# Patient Record
Sex: FEMALE | Race: WHITE | Employment: OTHER | URBAN - NONMETROPOLITAN AREA
[De-identification: names, ages, dates, MRNs, and addresses within clinical notes are randomized per-mention and may not be internally consistent; named-entity substitution may affect disease eponyms.]

---

## 2017-01-02 DIAGNOSIS — F32.9 MDD (MAJOR DEPRESSIVE DISORDER): ICD-10-CM

## 2017-01-02 DIAGNOSIS — I10 HTN (HYPERTENSION): ICD-10-CM

## 2017-01-02 DIAGNOSIS — G25.81 RESTLESS LEGS SYNDROME (RLS): Primary | ICD-10-CM

## 2017-01-02 DIAGNOSIS — E78.5 HYPERLIPIDEMIA LDL GOAL <100: ICD-10-CM

## 2017-01-03 ENCOUNTER — HOSPITAL ENCOUNTER (OUTPATIENT)
Dept: PHYSICAL THERAPY | Facility: HOSPITAL | Age: 67
Setting detail: THERAPIES SERIES
End: 2017-01-03
Attending: NURSE PRACTITIONER
Payer: COMMERCIAL

## 2017-01-03 ENCOUNTER — HOSPITAL ENCOUNTER (OUTPATIENT)
Dept: PHYSICAL THERAPY | Facility: HOSPITAL | Age: 67
Setting detail: THERAPIES SERIES
End: 2017-01-03
Attending: FAMILY MEDICINE
Payer: COMMERCIAL

## 2017-01-03 PROCEDURE — 97140 MANUAL THERAPY 1/> REGIONS: CPT | Mod: GP

## 2017-01-03 PROCEDURE — 40000718 ZZHC STATISTIC PT DEPARTMENT ORTHO VISIT

## 2017-01-03 PROCEDURE — 97035 APP MDLTY 1+ULTRASOUND EA 15: CPT | Mod: GP

## 2017-01-03 PROCEDURE — 97110 THERAPEUTIC EXERCISES: CPT | Mod: GP

## 2017-01-04 RX ORDER — ATORVASTATIN CALCIUM 20 MG/1
TABLET, FILM COATED ORAL
Qty: 90 TABLET | Refills: 1 | Status: SHIPPED | OUTPATIENT
Start: 2017-01-04 | End: 2017-07-05

## 2017-01-04 RX ORDER — VENLAFAXINE HYDROCHLORIDE 75 MG/1
CAPSULE, EXTENDED RELEASE ORAL
Qty: 90 CAPSULE | Refills: 1 | Status: SHIPPED | OUTPATIENT
Start: 2017-01-04 | End: 2017-07-05

## 2017-01-04 RX ORDER — POTASSIUM CHLORIDE 1500 MG/1
TABLET, EXTENDED RELEASE ORAL
Qty: 90 TABLET | Refills: 1 | Status: SHIPPED | OUTPATIENT
Start: 2017-01-04 | End: 2017-07-05

## 2017-01-04 RX ORDER — PRAMIPEXOLE DIHYDROCHLORIDE 0.25 MG/1
TABLET ORAL
Qty: 270 TABLET | Refills: 1 | Status: SHIPPED | OUTPATIENT
Start: 2017-01-04 | End: 2017-07-05

## 2017-01-05 ENCOUNTER — HOSPITAL ENCOUNTER (OUTPATIENT)
Dept: PHYSICAL THERAPY | Facility: HOSPITAL | Age: 67
Setting detail: THERAPIES SERIES
End: 2017-01-05
Attending: FAMILY MEDICINE
Payer: COMMERCIAL

## 2017-01-05 PROCEDURE — 97140 MANUAL THERAPY 1/> REGIONS: CPT | Mod: GP

## 2017-01-05 PROCEDURE — 40000718 ZZHC STATISTIC PT DEPARTMENT ORTHO VISIT

## 2017-01-09 ENCOUNTER — OFFICE VISIT (OUTPATIENT)
Dept: FAMILY MEDICINE | Facility: OTHER | Age: 67
End: 2017-01-09
Attending: NURSE PRACTITIONER
Payer: COMMERCIAL

## 2017-01-09 VITALS
SYSTOLIC BLOOD PRESSURE: 94 MMHG | BODY MASS INDEX: 33.75 KG/M2 | WEIGHT: 210 LBS | RESPIRATION RATE: 20 BRPM | TEMPERATURE: 97.6 F | HEART RATE: 80 BPM | HEIGHT: 66 IN | DIASTOLIC BLOOD PRESSURE: 62 MMHG

## 2017-01-09 DIAGNOSIS — J06.9 URI WITH COUGH AND CONGESTION: ICD-10-CM

## 2017-01-09 DIAGNOSIS — J01.90 ACUTE SINUSITIS WITH SYMPTOMS GREATER THAN 10 DAYS: Primary | ICD-10-CM

## 2017-01-09 PROCEDURE — 99213 OFFICE O/P EST LOW 20 MIN: CPT | Performed by: NURSE PRACTITIONER

## 2017-01-09 RX ORDER — BENZONATATE 100 MG/1
100 CAPSULE ORAL 3 TIMES DAILY PRN
Qty: 42 CAPSULE | Refills: 0 | Status: SHIPPED | OUTPATIENT
Start: 2017-01-09 | End: 2017-08-20

## 2017-01-09 RX ORDER — ALBUTEROL SULFATE 90 UG/1
2 AEROSOL, METERED RESPIRATORY (INHALATION) EVERY 6 HOURS PRN
Qty: 1 INHALER | Refills: 0 | Status: SHIPPED | OUTPATIENT
Start: 2017-01-09 | End: 2017-08-20

## 2017-01-09 RX ORDER — LEVOFLOXACIN 500 MG/1
500 TABLET, FILM COATED ORAL DAILY
Qty: 10 TABLET | Refills: 0 | Status: SHIPPED | OUTPATIENT
Start: 2017-01-09 | End: 2017-01-19

## 2017-01-09 ASSESSMENT — PAIN SCALES - GENERAL: PAINLEVEL: EXTREME PAIN (8)

## 2017-01-09 NOTE — NURSING NOTE
"Chief Complaint   Patient presents with     URI     treated at as time with antibiotics and symptoms still present. Left ear pain is new, pressure and congestion in sinuses continues with a cough       Initial BP 94/62 mmHg  Pulse 80  Temp(Src) 97.6  F (36.4  C) (Tympanic)  Resp 20  Ht 5' 6\" (1.676 m)  Wt 210 lb (95.255 kg)  BMI 33.91 kg/m2 Estimated body mass index is 33.91 kg/(m^2) as calculated from the following:    Height as of this encounter: 5' 6\" (1.676 m).    Weight as of this encounter: 210 lb (95.255 kg).  BP completed using cuff size: X-large  Tamiko Mead      "

## 2017-01-09 NOTE — PATIENT INSTRUCTIONS
Sinusitis (Antibiotic Treatment)    The sinuses are air-filled spaces within the bones of the face. They connect to the inside of the nose. Sinusitis is an inflammation of the tissue lining the sinus cavity. Sinus inflammation can occur during a cold. It can also be due to allergies to pollens and other particles in the air. Sinusitis can cause symptoms of sinus congestion and fullness. A sinus infection causes fever, headache and facial pain. There is often green or yellow drainage from the nose or into the back of the throat (post-nasal drip). You have been given antibiotics to treat this condition.  Home care:    Take the full course of antibiotics as instructed. Do not stop taking them, even if you feel better.    Drink plenty of water, hot tea, and other liquids. This may help thin mucus. It also may promote sinus drainage.    Heat may help soothe painful areas of the face. Use a towel soaked in hot water. Or,  the shower and direct the hot spray onto your face. Using a vaporizer along with a menthol rub at night may also help.     An expectorant containing guaifenesin may help thin the mucus and promote drainage from the sinuses.    Over-the-counter decongestants may be used unless a similar medicine was prescribed. Nasal sprays work the fastest. Use one that contains phenylephrine or oxymetazoline. First blow the nose gently. Then use the spray. Do not use these medicines more often than directed on the label or symptoms may get worse. You may also use tablets containing pseudoephedrine. Avoid products that combine ingredients, because side effects may be increased. Read labels. You can also ask the pharmacist for help. (NOTE: Persons with high blood pressure should not use decongestants. They can raise blood pressure.)    Over-the-counter antihistamines may help if allergies contributed to your sinusitis.      Do not use nasal rinses or irrigation during an acute sinus infection, unless told to by  your health care provider. Rinsing may spread the infection to other sinuses.    Use acetaminophen or ibuprofen to control pain, unless another pain medicine was prescribed. (If you have chronic liver or kidney disease or ever had a stomach ulcer, talk with your doctor before using these medicines. Aspirin should never be used in anyone under 18 years of age who is ill with a fever. It may cause severe liver damage.)    Don't smoke. This can worsen symptoms.  Follow-up care  Follow up with your healthcare provider or our staff if you are not improving within the next week.  When to seek medical advice  Call your healthcare provider if any of these occur:    Facial pain or headache becoming more severe    Stiff neck    Unusual drowsiness or confusion    Swelling of the forehead or eyelids    Vision problems, including blurred or double vision    Fever of 100.4 F (38 C) or higher, or as directed by your healthcare provider    Seizure    Breathing problems    Symptoms not resolving within 10 days    4352-7779 [employer]. [address] [place] [address], [place] [address]. All rights reserved. This information is not intended as a substitute for professional medical care. Always follow your healthcare professional's instructions.    Follow up with ENT

## 2017-01-09 NOTE — MR AVS SNAPSHOT
After Visit Summary   1/9/2017    Radha Fox    MRN: [id number]           Patient Information     Date Of Birth          1950        Visit Information        Provider Department      1/9/2017 2:30 PM Ninoska Gore APRN [facility]        Today's Diagnoses     Acute sinusitis with symptoms greater than 10 days    -  1     URI with cough and congestion           Care Instructions      Sinusitis (Antibiotic Treatment)    The sinuses are air-filled spaces within the bones of the face. They connect to the inside of the nose. Sinusitis is an inflammation of the tissue lining the sinus cavity. Sinus inflammation can occur during a cold. It can also be due to allergies to pollens and other particles in the air. Sinusitis can cause symptoms of sinus congestion and fullness. A sinus infection causes fever, headache and facial pain. There is often green or yellow drainage from the nose or into the back of the throat (post-nasal drip). You have been given antibiotics to treat this condition.  Home care:    Take the full course of antibiotics as instructed. Do not stop taking them, even if you feel better.    Drink plenty of water, hot tea, and other liquids. This may help thin mucus. It also may promote sinus drainage.    Heat may help soothe painful areas of the face. Use a towel soaked in hot water. Or,  the shower and direct the hot spray onto your face. Using a vaporizer along with a menthol rub at night may also help.     An expectorant containing guaifenesin may help thin the mucus and promote drainage from the sinuses.    Over-the-counter decongestants may be used unless a similar medicine was prescribed. Nasal sprays work the fastest. Use one that contains phenylephrine or oxymetazoline. First blow the nose gently. Then use the spray. Do not use these medicines more often than directed on the label or symptoms may get worse. You may also use tablets  containing pseudoephedrine. Avoid products that combine ingredients, because side effects may be increased. Read labels. You can also ask the pharmacist for help. (NOTE: Persons with high blood pressure should not use decongestants. They can raise blood pressure.)    Over-the-counter antihistamines may help if allergies contributed to your sinusitis.      Do not use nasal rinses or irrigation during an acute sinus infection, unless told to by your health care provider. Rinsing may spread the infection to other sinuses.    Use acetaminophen or ibuprofen to control pain, unless another pain medicine was prescribed. (If you have chronic liver or kidney disease or ever had a stomach ulcer, talk with your doctor before using these medicines. Aspirin should never be used in anyone under 18 years of age who is ill with a fever. It may cause severe liver damage.)    Don't smoke. This can worsen symptoms.  Follow-up care  Follow up with your healthcare provider or our staff if you are not improving within the next week.  When to seek medical advice  Call your healthcare provider if any of these occur:    Facial pain or headache becoming more severe    Stiff neck    Unusual drowsiness or confusion    Swelling of the forehead or eyelids    Vision problems, including blurred or double vision    Fever of 100.4 F (38 C) or higher, or as directed by your healthcare provider    Seizure    Breathing problems    Symptoms not resolving within 10 days    1953-1404 [employer]. [address] [place] [address]. All rights reserved. This information is not intended as a substitute for professional medical care. Always follow your healthcare professional's instructions.    Follow up with ENT          Follow-ups after your visit        Your next 10 appointments already scheduled     Parker 10, 2017  1:00 PM   Treatment with Savana Glasgow PTA   [facility] ([facility] )    [address] [place] [address]  [place] [address]   [phone]            Parker 10, 2017  1:30 PM   Treatment with Krystina Araya, PT   [facility] ([facility] )    [address]  [place] [address]   [phone]            Jan 12, 2017  1:30 PM   Treatment with Krystina Araya, PT   [facility] ([facility] )    [address]  [place] [address]   [phone]            Jan 12, 2017  2:00 PM   Treatment with Savana Glasgow PTA   [facility] ([facility] )    [address]  [place] [address]   [phone]            Jan 16, 2017  9:00 AM   LAB with HC LAB   [facility] [place] ([facility] [place] [facility])    [address]  [place] [address]   [phone]            Jan 16, 2017 10:00 AM   Level 6 with HC INF RM 3306   [facility] [place] ([facility] [place] [facility])    [address]  [place] [address]   [phone]            Jan 30, 2017  9:00 AM   LAB with HC LAB   [facility] [place] ([facility] [place] [facility])    [address]  [place] [address]   [phone]            Jan 30, 2017 10:00 AM   Level 6 with HC INF RM 3308   [facility] [place] ([facility] [place] [facility])    [address]  [place] [address]   [phone]            Mar 02, 2017  1:15 PM   (Arrive by 1:00 PM)   Return Visit with Jo Paulino PA-C   [facility] [place] ([facility] [place] [facility])    [address]  [place] [address]   [phone]            May 23, 2017  1:00 PM   (Arrive by 12:45 PM)   Return Visit with Palak Pham NP   [facility] [place] ([facility] [place] [facility])    [address]  [place] [address]   [phone]              Who to contact     If you have questions or need follow up information about today's clinic visit or your schedule please contact [facility] [place] directly at [phone].  Normal or non-critical lab and imaging results will be communicated to you by MyChart, letter or phone within 4 business days after the clinic has received the results. If you do  "not hear from us within 7 days, please contact the clinic through [email or web address] or phone. If you have a critical or abnormal lab result, we will notify you by phone as soon as possible.  Submit refill requests through [email or web address] or call your pharmacy and they will forward the refill request to us. Please allow 3 business days for your refill to be completed.          Additional Information About Your Visit        [employer] Information     [email or web address] gives you secure access to your electronic health record. If you see a primary care provider, you can also send messages to your care team and make appointments. If you have questions, please call your primary care clinic.  If you do not have a primary care provider, please call [phone] and they will assist you.        Care EveryWhere ID     This is your Care EveryWhere ID. This could be used by other organizations to access your [facility] medical records  [id number]        Your Vitals Were     Pulse Temperature Respirations Height BMI (Body Mass Index)       80 97.6  F (36.4  C) (Tympanic) 20 5' 6\" (1.676 m) 33.91 kg/m2        Blood Pressure from Last 3 Encounters:   01/09/17 94/62   12/21/16 98/60   12/14/16 120/80    Weight from Last 3 Encounters:   01/09/17 210 lb (95.255 kg)   12/21/16 217 lb (98.431 kg)   12/14/16 215 lb (97.523 kg)              Today, you had the following     No orders found for display         Today's Medication Changes          These changes are accurate as of: 1/9/17  2:42 PM.  If you have any questions, ask your nurse or doctor.               Start taking these medicines.        Dose/Directions    levofloxacin 500 MG tablet   Commonly known as:  LEVAQUIN   Used for:  Acute sinusitis with symptoms greater than 10 days   Started by:  Ninoska Gore APRN CNP        Dose:  500 mg   Take 1 tablet (500 mg) by mouth daily   Quantity:  10 tablet   Refills:  0            Where to get your medicines      These medications were sent to [facility] - [place], [place] - [address] AT [facility] [address]  [address], [place] [address]     Phone:  [phone]    - albuterol 108 (90 BASE) MCG/ACT Inhaler  - benzonatate 100 MG capsule  - levofloxacin 500 MG tablet             Primary Care Provider Office Phone # Fax #    Lanie DUARTE Costello [phone]        [facility] [address]  [facility] [address]        Thank you!     Thank you for choosing [facility]  for your care. Our goal is always to provide you with excellent care. Hearing back from our patients is one way we can continue to improve our services. Please take a few minutes to complete the written survey that you may receive in the mail after your visit with us. Thank you!             Your Updated Medication List - Protect others around you: Learn how to safely use, store and throw away your medicines at www.disposemymeds.org.          This list is accurate as of: 1/9/17  2:42 PM.  Always use your most recent med list.                   Brand Name Dispense Instructions for use    albuterol 108 (90 BASE) MCG/ACT Inhaler    PROAIR HFA/PROVENTIL HFA/VENTOLIN HFA    1 Inhaler    Inhale 2 puffs into the lungs every 6 hours as needed for shortness of breath / dyspnea or wheezing       ascorbic acid 1000 MG Tabs    vitamin C    30 tablet    Take 1 tablet (1,000 mg) by mouth daily       atorvastatin 20 MG tablet    LIPITOR    90 tablet    TAKE 1 TABLET DAILY       benzonatate 100 MG capsule    TESSALON    42 capsule    Take 1 capsule (100 mg) by mouth 3 times daily as needed for cough       * blood glucose calibration solution     1 Bottle    Use as directed       * blood glucose calibration solution     1 each    Use to calibrate blood glucose monitor as directed.       blood glucose monitoring test strip    ACCU-CHEK ANGELICA    100 strip    Use to test blood sugars one time by finger poke twice weekly or as directed.       calcium + D 600-200 MG-UNIT  Tabs   Generic drug:  calcium carbonate-vitamin D      Take 600 mg by mouth 2 times daily.       celeBREX 200 MG capsule   Generic drug:  celecoxib     90 capsule    TAKE 1 CAPSULE DAILY       ESTRACE VAGINAL 0.1 MG/GM cream   Generic drug:  estradiol     42.5 g    Place vaginally three times a week       fluticasone 50 MCG/ACT spray    FLONASE    3 Bottle    Spray 2 sprays into both nostrils daily       folic acid 1 MG tablet    FOLVITE     Take 2 tablets by mouth daily       levofloxacin 500 MG tablet    LEVAQUIN    10 tablet    Take 1 tablet (500 mg) by mouth daily       METHOTREXATE PO      Inject 12.5 mg as directed once a week.       Multi-vitamin Tabs tablet      Take  by mouth daily.       omeprazole 40 MG capsule    priLOSEC    90 capsule    TAKE 1 CAPSULE DAILY BEFORE A MEAL       potassium chloride SA 20 MEQ CR tablet    K-DUR/KLOR-CON M    90 tablet    TAKE 1 TABLET DAILY WITH FOOD       pramipexole 0.25 MG tablet    MIRAPEX    270 tablet    TAKE 1 TABLET AT 5 P.M. AND 2 TABLETS AT 9 P.M.       predniSONE 1 MG tablet    DELTASONE     Take 1 mg by mouth daily Take 3 tabs po daily       propranolol 20 MG tablet    INDERAL    90 tablet    TAKE 1 TABLET DAILY       RITUXAN IV          venlafaxine 75 MG 24 hr capsule    EFFEXOR-XR    90 capsule    TAKE 1 CAPSULE DAILY       VITAMIN B 12 PO      Take 500 mg by mouth daily.       * Notice:  This list has 2 medication(s) that are the same as other medications prescribed for you. Read the directions carefully, and ask your doctor or other care provider to review them with you.

## 2017-01-10 ENCOUNTER — HOSPITAL ENCOUNTER (OUTPATIENT)
Dept: PHYSICAL THERAPY | Facility: HOSPITAL | Age: 67
Setting detail: THERAPIES SERIES
End: 2017-01-10
Attending: FAMILY MEDICINE
Payer: COMMERCIAL

## 2017-01-10 PROCEDURE — 40000718 ZZHC STATISTIC PT DEPARTMENT ORTHO VISIT

## 2017-01-10 PROCEDURE — 97140 MANUAL THERAPY 1/> REGIONS: CPT | Mod: GP

## 2017-01-12 ENCOUNTER — HOSPITAL ENCOUNTER (OUTPATIENT)
Dept: PHYSICAL THERAPY | Facility: HOSPITAL | Age: 67
Setting detail: THERAPIES SERIES
End: 2017-01-12
Attending: FAMILY MEDICINE
Payer: COMMERCIAL

## 2017-01-12 PROCEDURE — 40000718 ZZHC STATISTIC PT DEPARTMENT ORTHO VISIT

## 2017-01-12 PROCEDURE — 97140 MANUAL THERAPY 1/> REGIONS: CPT | Mod: GP

## 2017-01-16 DIAGNOSIS — M81.0 OSTEOPOROSIS: ICD-10-CM

## 2017-01-16 DIAGNOSIS — Z79.899 ENCOUNTER FOR LONG-TERM (CURRENT) USE OF MEDICATIONS: Primary | ICD-10-CM

## 2017-01-16 LAB
ALT SERPL W P-5'-P-CCNC: 35 U/L (ref 0–50)
BASOPHILS # BLD AUTO: 0 10E9/L (ref 0–0.2)
BASOPHILS NFR BLD AUTO: 0.4 %
CALCIUM SERPL-MCNC: 9.5 MG/DL (ref 8.5–10.1)
CREAT SERPL-MCNC: 0.87 MG/DL (ref 0.52–1.04)
DIFFERENTIAL METHOD BLD: NORMAL
EOSINOPHIL # BLD AUTO: 0.3 10E9/L (ref 0–0.7)
EOSINOPHIL NFR BLD AUTO: 2.6 %
ERYTHROCYTE [DISTWIDTH] IN BLOOD BY AUTOMATED COUNT: 14.2 % (ref 10–15)
GFR SERPL CREATININE-BSD FRML MDRD: 65 ML/MIN/1.7M2
HCT VFR BLD AUTO: 40.6 % (ref 35–47)
HGB BLD-MCNC: 13.5 G/DL (ref 11.7–15.7)
IMM GRANULOCYTES # BLD: 0 10E9/L (ref 0–0.4)
IMM GRANULOCYTES NFR BLD: 0.3 %
LYMPHOCYTES # BLD AUTO: 1.3 10E9/L (ref 0.8–5.3)
LYMPHOCYTES NFR BLD AUTO: 13 %
MCH RBC QN AUTO: 32.8 PG (ref 26.5–33)
MCHC RBC AUTO-ENTMCNC: 33.3 G/DL (ref 31.5–36.5)
MCV RBC AUTO: 99 FL (ref 78–100)
MONOCYTES # BLD AUTO: 0.8 10E9/L (ref 0–1.3)
MONOCYTES NFR BLD AUTO: 8.3 %
NEUTROPHILS # BLD AUTO: 7.2 10E9/L (ref 1.6–8.3)
NEUTROPHILS NFR BLD AUTO: 75.4 %
NRBC # BLD AUTO: 0 10*3/UL
NRBC BLD AUTO-RTO: 0 /100
PLATELET # BLD AUTO: 237 10E9/L (ref 150–450)
RBC # BLD AUTO: 4.11 10E12/L (ref 3.8–5.2)
WBC # BLD AUTO: 9.6 10E9/L (ref 4–11)

## 2017-01-16 PROCEDURE — 82306 VITAMIN D 25 HYDROXY: CPT | Mod: 90 | Performed by: NURSE PRACTITIONER

## 2017-01-16 PROCEDURE — 82310 ASSAY OF CALCIUM: CPT | Performed by: NURSE PRACTITIONER

## 2017-01-16 PROCEDURE — 82565 ASSAY OF CREATININE: CPT | Performed by: NURSE PRACTITIONER

## 2017-01-16 PROCEDURE — 99000 SPECIMEN HANDLING OFFICE-LAB: CPT | Performed by: NURSE PRACTITIONER

## 2017-01-16 PROCEDURE — 85025 COMPLETE CBC W/AUTO DIFF WBC: CPT | Performed by: NURSE PRACTITIONER

## 2017-01-16 PROCEDURE — 84460 ALANINE AMINO (ALT) (SGPT): CPT | Performed by: NURSE PRACTITIONER

## 2017-01-16 PROCEDURE — 36415 COLL VENOUS BLD VENIPUNCTURE: CPT | Performed by: NURSE PRACTITIONER

## 2017-01-17 ENCOUNTER — HOSPITAL ENCOUNTER (OUTPATIENT)
Dept: PHYSICAL THERAPY | Facility: HOSPITAL | Age: 67
Setting detail: THERAPIES SERIES
End: 2017-01-17
Attending: FAMILY MEDICINE
Payer: COMMERCIAL

## 2017-01-17 ENCOUNTER — TELEPHONE (OUTPATIENT)
Dept: FAMILY MEDICINE | Facility: OTHER | Age: 67
End: 2017-01-17

## 2017-01-17 PROCEDURE — 97140 MANUAL THERAPY 1/> REGIONS: CPT | Mod: GP

## 2017-01-17 PROCEDURE — 40000718 ZZHC STATISTIC PT DEPARTMENT ORTHO VISIT

## 2017-01-17 NOTE — PROGRESS NOTES
"   01/17/17 1400   Signing Clinician's Name / Credentials   Signing clinician's name / credentials Krystina Araya DPT   Session Number   Session Number 5_[email or web address]   Progress Note/Recertification   Progress Note Due Date 02/07/17   Subjective Report   Subjective Report Patient seen [id number] for C/O LBP stemming from fall into tub at home. She feels like the leg lengths are off again. That place in the right SI jt doesn't seem to change. She has not been able to get the Rituxan for her RA so maybe the pain is the result of her RA?    Objective Measure 1   Objective Measure Somatic dysfunction   Details Left SI jt locked and left LE 5/8 inch shorter than right. Left ASIS, IC, PSIS and IT all superior relative to right. Right sacral sulcus deep and right ALEXANDRE prominent. T3-L5=NSRRL   Objective Measure 2   Objective Measure XR 11/11/2015   Details Lumbar: 1. MILD MULTILEVEL DISK DISEASE. 2. DIFFUSE FACET ARTHROSIS   System Outcome Measures   Outcome Measures Low Back Pain (see Oswestry and Wade)   Manual Therapy   Minutes 30   Skilled Intervention man ther   Patient Response good   Treatment Detail MET, regional indirect tissue releases   Progress Post tx leg lengths equal, pelvis level and SI jts equally mobile. Sacral, lumbar and thoracic segments WFL of mobility and alignment.    Plan   Homework Stand and sit with equal weight on right and left sides   Plan for next session Tx per re-eval   Comments   Comments Same pattern returned. Is the right SI jt pain related to RA and soft tissues or is it OA and increasing roughness of SI joint surfaces? Is the pain in the right SI jt causing patient to \"get away\" from the right side pain and bear more weight on left side, resulting in repeated left upslipped innominate (causing SI jt pain?) Would an SI jt injection decrease pain? Could this be a fracture, since it came from a fall into the tub?   Total Session Time   Total Session Time 30'     Krystina Araya DPT      "

## 2017-01-17 NOTE — TELEPHONE ENCOUNTER
3:39 PM    Reason for Call: Phone Call    Description: Pt called to have an order requested for the FI joint, per pt.  Pt  states the physical therapist requested that order to be made.  Pt saw PT provider Savana Glasgow 01/17/2017 at 1:30 pm.  Nurse, please advise.    Was an appointment offered for this call? No    Preferred method for responding to this message: Telephone Call: [phone] house phone    If we cannot reach you directly, may we leave a detailed response at the number you provided? Yes    Can this message wait until your PCP/provider returns, if available today? yes    Selina Ann

## 2017-01-18 LAB — DEPRECATED CALCIDIOL+CALCIFEROL SERPL-MC: 55 UG/L (ref 20–75)

## 2017-01-19 ENCOUNTER — HOSPITAL ENCOUNTER (OUTPATIENT)
Dept: PHYSICAL THERAPY | Facility: HOSPITAL | Age: 67
Setting detail: THERAPIES SERIES
End: 2017-01-19
Attending: FAMILY MEDICINE
Payer: COMMERCIAL

## 2017-01-19 ENCOUNTER — OFFICE VISIT (OUTPATIENT)
Dept: FAMILY MEDICINE | Facility: OTHER | Age: 67
End: 2017-01-19
Attending: FAMILY MEDICINE
Payer: COMMERCIAL

## 2017-01-19 VITALS
DIASTOLIC BLOOD PRESSURE: 72 MMHG | SYSTOLIC BLOOD PRESSURE: 130 MMHG | HEART RATE: 62 BPM | OXYGEN SATURATION: 98 % | WEIGHT: 218 LBS | TEMPERATURE: 97.8 F | BODY MASS INDEX: 35.2 KG/M2 | RESPIRATION RATE: 14 BRPM

## 2017-01-19 DIAGNOSIS — R05.9 COUGH: ICD-10-CM

## 2017-01-19 DIAGNOSIS — M53.3 PAIN OF RIGHT SACROILIAC JOINT: Primary | ICD-10-CM

## 2017-01-19 PROCEDURE — 40000193 ZZH STATISTIC PT WARD VISIT

## 2017-01-19 PROCEDURE — 40000718 ZZHC STATISTIC PT DEPARTMENT ORTHO VISIT

## 2017-01-19 PROCEDURE — 72220 X-RAY EXAM SACRUM TAILBONE: CPT | Mod: TC | Performed by: RADIOLOGY

## 2017-01-19 PROCEDURE — 97033 APP MDLTY 1+IONTPHRSIS EA 15: CPT | Mod: GP

## 2017-01-19 PROCEDURE — 99213 OFFICE O/P EST LOW 20 MIN: CPT | Performed by: FAMILY MEDICINE

## 2017-01-19 ASSESSMENT — PAIN SCALES - GENERAL: PAINLEVEL: MILD PAIN (2)

## 2017-01-19 NOTE — NURSING NOTE
"Chief Complaint   Patient presents with     Joint Pain     right SI joint, Krystina in PT would like Xray as she has not made any progress     Cough     3 weeks       Initial /72 mmHg  Pulse 62  Temp(Src) 97.8  F (36.6  C)  Resp 14  Wt 218 lb (98.884 kg)  SpO2 98% Estimated body mass index is 35.2 kg/(m^2) as calculated from the following:    Height as of 1/9/17: 5' 6\" (1.676 m).    Weight as of this encounter: 218 lb (98.884 kg).  BP completed using cuff size: regular    Gregoria Gregorio      "

## 2017-01-19 NOTE — MR AVS SNAPSHOT
After Visit Summary   1/19/2017    Radha Fox    MRN: [id number]           Patient Information     Date Of Birth          1950        Visit Information        Provider Department      1/19/2017 3:00 PM Lanie Costello MD [facility] [place]        Today's Diagnoses     Pain of right sacroiliac joint    -  1     Cough           Care Instructions    Will call with results of xray.  Consider SI joint injection.  Cough likely post infectious.  Continue symptomatic cares.  Follow up with ENT as scheduled.        Follow-ups after your visit        Your next 10 appointments already scheduled     Jan 23, 2017  2:00 PM   Treatment with Doreen Arnett, PT   [facility] ([facility] )    [address]  [place] [address]   [phone]            Jan 26, 2017 12:00 PM   LAB with HC LAB   [facility] [place] ([facility] [place] [facility])    [address]  [place] [address]   [phone]            Jan 26, 2017  2:00 PM   Treatment with Doreen Arnett, PT   [facility] ([facility] )    [address]  [place] [address]   [phone]            Jan 26, 2017  2:30 PM   Treatment with Krystina Araya, PT   [facility] ([facility] )    [address]  [place] [address]   [phone]            Jan 27, 2017 10:30 AM   Level 6 with HC INF RM 3308   [facility] [place] ([facility] [place] [facility])    [address]  [place] [address]   [phone]            Feb 09, 2017 12:00 PM   LAB with HC LAB   [facility] [place] ([facility] [place] [facility])    [address]  [place] [address]   [phone]            Feb 10, 2017 10:30 AM   Level 6 with HC INF RM 3306   [facility] [place] ([facility] [place] [facility])    [address]  [place] [address]   [phone]            Mar 02, 2017  1:15 PM   (Arrive by 1:00 PM)   Return Visit with Jo Paulino PA-C   [facility] [place] ([facility] [place] [facility])    [address]  [place] [address]   [phone]            May 23, 2017  1:00 PM   (Arrive by 12:45 PM)   Return Visit with Palak Pham NP   [facility] ([facility])    [address] [place] [address]   [phone]              Who to contact     If you have questions or need follow up information about today's clinic visit or your schedule please contact [facility] directly at [phone].  Normal or non-critical lab and imaging results will be communicated to you by Argushart, letter or phone within 4 business days after the clinic has received the results. If you do not hear from us within 7 days, please contact the clinic through Yoomlyt or phone. If you have a critical or abnormal lab result, we will notify you by phone as soon as possible.  Submit refill requests through In[employer] or call your pharmacy and they will forward the refill request to us. Please allow 3 business days for your refill to be completed.          Additional Information About Your Visit        [email or web address] Information     [employer] gives you secure access to your electronic health record. If you see a primary care provider, you can also send messages to your care team and make appointments. If you have questions, please call your primary care clinic.  If you do not have a primary care provider, please call [phone] and they will assist you.        Care EveryWhere ID     This is your Care EveryWhere ID. This could be used by other organizations to access your [place] medical records  [id number]        Your Vitals Were     Pulse Temperature Respirations Pulse Oximetry          62 97.8  F (36.6  C) 14 98%         Blood Pressure from Last 3 Encounters:   01/19/17 130/72   01/09/17 94/62   12/21/16 98/60    Weight from Last 3 Encounters:   01/19/17 218 lb (98.884 kg)   01/09/17 210 lb (95.255 kg)   12/21/16 217 lb (98.431 kg)              We Performed the Following     XR Sacrum and Coccyx 2 Views           Today's Medication Changes          These changes are accurate as of: 1/19/17  3:41 PM.  If you have any questions, ask your nurse or doctor.               Stop taking these medicines if you haven't already. Please contact your care team if you have questions.     levofloxacin 500 MG tablet   Commonly known as:  LEVAQUIN   Stopped by:  Lanie Costello MD                    Primary Care Provider Office Phone # Fax #    Lanie Costello [phone]        FAMILY [facility] [address] [facility] [address]        Thank you!     Thank you for choosing [facility]  for your care. Our goal is always to provide you with excellent care. Hearing back from our patients is one way we can continue to improve our services. Please take a few minutes to complete the written survey that you may receive in the mail after your visit with us. Thank you!             Your Updated Medication List - Protect others around you: Learn how to safely use, store and throw away your medicines at www.disposemymeds.org.          This list is accurate as of: 1/19/17  3:41 PM.  Always use your most recent med list.                   Brand Name Dispense Instructions for use    albuterol 108 (90 BASE) MCG/ACT Inhaler    PROAIR HFA/PROVENTIL HFA/VENTOLIN HFA    1 Inhaler    Inhale 2 puffs into the lungs every 6 hours as needed for shortness of breath / dyspnea or wheezing       ascorbic acid 1000 MG Tabs    vitamin C    30 tablet    Take 1 tablet (1,000 mg) by mouth daily       atorvastatin 20 MG tablet    LIPITOR    90 tablet    TAKE 1 TABLET DAILY       benzonatate 100 MG capsule    TESSALON    42 capsule    Take 1 capsule (100 mg) by mouth 3 times daily as needed for cough       * blood glucose calibration solution     1 Bottle    Use as directed       * blood glucose calibration solution     1 each    Use to calibrate blood glucose monitor as directed.       blood glucose monitoring test strip    ACCU-CHEK  ANGELICA    100 strip    Use to test blood sugars one time by finger poke twice weekly or as directed.       calcium + D 600-200 MG-UNIT Tabs   Generic drug:  calcium carbonate-vitamin D      Take 600 mg by mouth 2 times daily.       celeBREX 200 MG capsule   Generic drug:  celecoxib     90 capsule    TAKE 1 CAPSULE DAILY       ESTRACE VAGINAL 0.1 MG/GM cream   Generic drug:  estradiol     42.5 g    Place vaginally three times a week       fluticasone 50 MCG/ACT spray    FLONASE    3 Bottle    Spray 2 sprays into both nostrils daily       folic acid 1 MG tablet    FOLVITE     Take 2 tablets by mouth daily       METHOTREXATE PO      Inject 12.5 mg as directed once a week.       Multi-vitamin Tabs tablet      Take  by mouth daily.       omeprazole 40 MG capsule    priLOSEC    90 capsule    TAKE 1 CAPSULE DAILY BEFORE A MEAL       potassium chloride SA 20 MEQ CR tablet    K-DUR/KLOR-CON M    90 tablet    TAKE 1 TABLET DAILY WITH FOOD       pramipexole 0.25 MG tablet    MIRAPEX    270 tablet    TAKE 1 TABLET AT 5 P.M. AND 2 TABLETS AT 9 P.M.       predniSONE 1 MG tablet    DELTASONE     Take 1 mg by mouth daily Take 3 tabs po daily       propranolol 20 MG tablet    INDERAL    90 tablet    TAKE 1 TABLET DAILY       RITUXAN IV          venlafaxine 75 MG 24 hr capsule    EFFEXOR-XR    90 capsule    TAKE 1 CAPSULE DAILY       VITAMIN B 12 PO      Take 500 mg by mouth daily.       * Notice:  This list has 2 medication(s) that are the same as other medications prescribed for you. Read the directions carefully, and ask your doctor or other care provider to review them with you.

## 2017-01-19 NOTE — PATIENT INSTRUCTIONS
Will call with results of xray.  Consider SI joint injection.  Cough likely post infectious.  Continue symptomatic cares.  Follow up with ENT as scheduled.

## 2017-01-19 NOTE — PROGRESS NOTES
SUBJECTIVE:  Radha is a 67 year old female who comes in today for follow up low back pain, SI join, on the right.  Had a fall early 12/2016.  Has been doing physical therapy.  upslipping noted.  Recommend xray to evaluate for OA/RA affecting this area, as well as to rule out fracture, since it did start with a fall.  Pain is worse with prolonged standing and worse at night, lying down.  Does not radiate.    Has been dealing with a cough as well.  Would like lungs listened too.  Treated with Doxycycline 12/21/16, then Levaquin 1/9/17.  Recent CBC for her RA normal.    Current Outpatient Prescriptions   Medication     benzonatate (TESSALON) 100 MG capsule     albuterol (PROAIR HFA/PROVENTIL HFA/VENTOLIN HFA) 108 (90 BASE) MCG/ACT Inhaler     venlafaxine (EFFEXOR-XR) 75 MG 24 hr capsule     atorvastatin (LIPITOR) 20 MG tablet     potassium chloride SA (K-DUR/KLOR-CON M) 20 MEQ CR tablet     pramipexole (MIRAPEX) 0.25 MG tablet     fluticasone (FLONASE) 50 MCG/ACT spray     omeprazole (PRILOSEC) 40 MG capsule     propranolol (INDERAL) 20 MG tablet     RiTUXimab (RITUXAN IV)     ESTRACE VAGINAL 0.1 MG/GM vaginal cream     ascorbic acid (VITAMIN C) 1000 MG TABS     predniSONE (DELTASONE) 1 MG tablet     CELEBREX 200 MG capsule     folic acid (FOLVITE) 1 MG tablet     Blood Glucose Calibration (ACCU-CHEK COMPACT BLUE CONTROL) LIQD     glucose blood VI test strips (ACCU-CHEK ANGELICA) strip     Blood Glucose Calibration (ACCU-CHEK ANGELICA) SOLN     calcium carbonate-vitamin D (CALCIUM + D) 600-200 MG-UNIT TABS     Methotrexate Sodium (METHOTREXATE PO)     multivitamin, therapeutic with minerals (MULTI-VITAMIN) TABS     Cyanocobalamin (VITAMIN B 12 PO)     No current facility-administered medications for this visit.        Allergies   Allergen Reactions     Neomycin-Polymyxin-Hc      Rash behind ear after ear drops     Penicillins Hives and Itching     One time large local reaction after IM PCN in 1970, no airway symptoms      Plaquenil [Hydroxychloroquine Sulfate] Hives       Past Medical History   Diagnosis Date     Other and unspecified hyperlipidemia 04/13/2005     Leukocytosis, unspecified 12/22/2007     Depressive disorder, not elsewhere classified 01/11/2002     Contact dermatitis and other eczema, due to unspecified cause 03/05/2009     Unspecified diffuse connective tissue disease 04/04/2000     Rheumatoid arthritis(714.0) 11/09/1999     Arthropathy, unspecified, site unspecified 05/01/2000     Coronary atherosclerosis of unspecified type of vessel, native or graft 09/27/2005     Unspecified sinusitis (chronic) 09/05/2000     Impaired fasting glucose 11/12/2010     Esophageal reflux 11/12/2010     Restless legs syndrome (RLS) 04/21/2011     Osteoporosis, unspecified 01/19/2012     Obesity, unspecified 01/19/2012     Other dyspnea and respiratory abnormality 01/19/2012     reduced DLCO     Insomnia, unspecified 01/24/2013     Chest pain, unspecified 2005     Resolved     H/O: hysterectomy 1977     Abnormal PFT      moderate restriction/parenchymal; moderate diffusion defect; minimal obstruction     Diabetes mellitus type 2, diet-controlled (H)      Past Surgical History   Procedure Laterality Date     Appendectomy  1961     Cholecystectomy  1973     Genitourinary surgery       Tot internal urethrotomy  2010     Laparoscopy diagnostic (general)       Endoscopic sphenoidotomy  10/2011     Right     Tubal ligation       Excision bilateral telma bullosa  10/2011     Hysterectomy       benign     Colonoscopy N/A 3/23/2015     Procedure: COLONOSCOPY;  Surgeon: Clarisa Larkin MD;  Location: [facility]     Orthopedic surgery       left foot 5th metarsal fracture screws placed     Ethmoidectomy Bilateral 9/27/2016     Procedure: ETHMOIDECTOMY;  Surgeon: Aracelis Ayon MD;  Location: [facility]     Septoplasty N/A 9/27/2016     Procedure: SEPTOPLASTY;  Surgeon: Aracelis Ayon MD;  Location: [facility]     Social History     Social  History     Marital Status:      Spouse Name: N/A     Number of Children: N/A     Years of Education: N/A     Occupational History     Not on file.     Social History Main Topics     Smoking status: Never Smoker      Smokeless tobacco: Never Used     Alcohol Use: No     Drug Use: No     Sexual Activity:     Partners: Male     Other Topics Concern      Service No     Blood Transfusions Yes     permits if needed     Caffeine Concern Yes     coffee - 3 cups daily      Occupational Exposure No     Hobby Hazards No     Sleep Concern No     Stress Concern No     Weight Concern No     Special Diet No     Back Care No     Exercise No     Seat Belt Yes     Self-Exams No     had mammogram 2013     Parent/Sibling W/ Cabg, Mi Or Angioplasty Before 65f 55m? Yes     father-heart attack     Social History Narrative       ROS:  General: negative for, fever, chills  Skin: negative for, rash, bruising  ENT: negative for, ear pain bilaterally, sinus congestion, sore throat  Resp: Cough- as above  CV: negative for, palpitations and chest pain  GI: negative for, poor appetite, nausea and vomiting  Musculoskeletal: positive for as above, back pain, arthritis and joint pain  Neurologic: negative for, local weakness, numbness or tingling of hands and numbness or tingling of feet    OBJECTIVE:  Filed Vitals:    01/19/17 1514   BP: 130/72   Pulse: 62   Temp: 97.8  F (36.6  C)   Resp: 14   Weight: 218 lb (98.884 kg)   SpO2: 98%     GENERAL APPEARANCE: alert, no distress, cooperative and over weight  EYES: EOMI, fundi benign- PERRL  HENT: ear canals and TM's normal and nose and mouth without ulcers or lesions  NECK: no adenopathy, no asymmetry, masses, or scars and thyroid normal to palpation  RESP: lungs clear to auscultation - no rales, rhonchi or wheezes  CV: regular rates and rhythm, normal S1 S2, no S3 or S4 and no murmur, click or rub -  MS: gait normal, normal muscle tone and tender to palpation right SI joint; no midline  spine tenderness; stork testing positive on the right  SKIN: no suspicious lesions or rashes  PSYCH: mentation appears normal. and affect normal/bright    ASSESSMENT/ORDERS:    ICD-10-CM    1. Pain of right sacroiliac joint M53.3 XR Sacrum and Coccyx 2 Views     XR Sacrum and Coccyx 2 Views   2. Cough R05      PLAN:  Patient Instructions   Will call with results of xray.  Consider SI joint injection.  Cough likely post infectious.  Continue symptomatic cares.  Follow up with ENT as scheduled.        Lanie Duggan

## 2017-01-20 ENCOUNTER — TELEPHONE (OUTPATIENT)
Dept: FAMILY MEDICINE | Facility: OTHER | Age: 67
End: 2017-01-20

## 2017-01-20 DIAGNOSIS — M53.3 PAIN OF RIGHT SACROILIAC JOINT: Primary | ICD-10-CM

## 2017-01-20 NOTE — TELEPHONE ENCOUNTER
Call with xray results - normal - no fracture or arthritis commented on. Some arthritis noted in lumbar spine.  If ongoing pain at SI joint, could still arrange for steroid injection through radiology to SI joint.  Could consider MRI  Patient notified and would like to have a injection to SI joint. Please place orders

## 2017-01-23 ENCOUNTER — HOSPITAL ENCOUNTER (OUTPATIENT)
Dept: PHYSICAL THERAPY | Facility: HOSPITAL | Age: 67
Setting detail: THERAPIES SERIES
End: 2017-01-23
Attending: FAMILY MEDICINE
Payer: COMMERCIAL

## 2017-01-23 PROCEDURE — 40000718 ZZHC STATISTIC PT DEPARTMENT ORTHO VISIT

## 2017-01-23 PROCEDURE — 97033 APP MDLTY 1+IONTPHRSIS EA 15: CPT | Mod: GP

## 2017-01-25 ENCOUNTER — HOSPITAL ENCOUNTER (OUTPATIENT)
Dept: PHYSICAL THERAPY | Facility: HOSPITAL | Age: 67
Setting detail: THERAPIES SERIES
End: 2017-01-25
Attending: FAMILY MEDICINE
Payer: COMMERCIAL

## 2017-01-25 PROCEDURE — 40000718 ZZHC STATISTIC PT DEPARTMENT ORTHO VISIT

## 2017-01-25 PROCEDURE — 97033 APP MDLTY 1+IONTPHRSIS EA 15: CPT | Mod: GP

## 2017-01-26 DIAGNOSIS — Z79.899 MEDICATION MANAGEMENT: ICD-10-CM

## 2017-01-26 DIAGNOSIS — M81.0 OSTEOPOROSIS: Primary | ICD-10-CM

## 2017-01-26 LAB
CALCIUM SERPL-MCNC: NORMAL MG/DL (ref 8.5–10.1)
CREAT SERPL-MCNC: NORMAL MG/DL (ref 0.52–1.04)
DEPRECATED CALCIDIOL+CALCIFEROL SERPL-MC: NORMAL UG/L (ref 20–75)
GFR SERPL CREATININE-BSD FRML MDRD: NORMAL ML/MIN/1.7M2

## 2017-01-30 ENCOUNTER — INFUSION THERAPY VISIT (OUTPATIENT)
Dept: INFUSION THERAPY | Facility: OTHER | Age: 67
End: 2017-01-30
Attending: INTERNAL MEDICINE
Payer: COMMERCIAL

## 2017-01-30 VITALS
SYSTOLIC BLOOD PRESSURE: 129 MMHG | HEIGHT: 66 IN | BODY MASS INDEX: 34.84 KG/M2 | DIASTOLIC BLOOD PRESSURE: 60 MMHG | WEIGHT: 216.8 LBS | TEMPERATURE: 96.3 F | HEART RATE: 58 BPM | RESPIRATION RATE: 16 BRPM | OXYGEN SATURATION: 95 %

## 2017-01-30 DIAGNOSIS — M06.09 RHEUMATOID ARTHRITIS OF MULTIPLE SITES WITHOUT RHEUMATOID FACTOR (H): Primary | ICD-10-CM

## 2017-01-30 DIAGNOSIS — M06.09 RHEUMATOID ARTHRITIS OF MULTIPLE SITES WITH NEGATIVE RHEUMATOID FACTOR (H): ICD-10-CM

## 2017-01-30 DIAGNOSIS — M06.9 RHEUMATOID ARTHRITIS, INVOLVING UNSPECIFIED SITE, UNSPECIFIED RHEUMATOID FACTOR PRESENCE: ICD-10-CM

## 2017-01-30 DIAGNOSIS — Z79.899 ENCOUNTER FOR LONG-TERM (CURRENT) USE OF MEDICATIONS: Primary | ICD-10-CM

## 2017-01-30 LAB
ALT SERPL W P-5'-P-CCNC: 28 U/L (ref 0–50)
BASOPHILS # BLD AUTO: 0 10E9/L (ref 0–0.2)
BASOPHILS NFR BLD AUTO: 0.4 %
DIFFERENTIAL METHOD BLD: NORMAL
EOSINOPHIL # BLD AUTO: 0.4 10E9/L (ref 0–0.7)
EOSINOPHIL NFR BLD AUTO: 4.1 %
ERYTHROCYTE [DISTWIDTH] IN BLOOD BY AUTOMATED COUNT: 14.2 % (ref 10–15)
HCT VFR BLD AUTO: 42.7 % (ref 35–47)
HGB BLD-MCNC: 14.1 G/DL (ref 11.7–15.7)
IMM GRANULOCYTES # BLD: 0 10E9/L (ref 0–0.4)
IMM GRANULOCYTES NFR BLD: 0.2 %
LYMPHOCYTES # BLD AUTO: 2 10E9/L (ref 0.8–5.3)
LYMPHOCYTES NFR BLD AUTO: 22.1 %
MCH RBC QN AUTO: 32.6 PG (ref 26.5–33)
MCHC RBC AUTO-ENTMCNC: 33 G/DL (ref 31.5–36.5)
MCV RBC AUTO: 99 FL (ref 78–100)
MONOCYTES # BLD AUTO: 0.8 10E9/L (ref 0–1.3)
MONOCYTES NFR BLD AUTO: 8.4 %
NEUTROPHILS # BLD AUTO: 5.9 10E9/L (ref 1.6–8.3)
NEUTROPHILS NFR BLD AUTO: 64.8 %
NRBC # BLD AUTO: 0 10*3/UL
NRBC BLD AUTO-RTO: 0 /100
PLATELET # BLD AUTO: 268 10E9/L (ref 150–450)
RBC # BLD AUTO: 4.33 10E12/L (ref 3.8–5.2)
WBC # BLD AUTO: 9.2 10E9/L (ref 4–11)

## 2017-01-30 PROCEDURE — 96415 CHEMO IV INFUSION ADDL HR: CPT | Performed by: INTERNAL MEDICINE

## 2017-01-30 PROCEDURE — 85025 COMPLETE CBC W/AUTO DIFF WBC: CPT

## 2017-01-30 PROCEDURE — 96375 TX/PRO/DX INJ NEW DRUG ADDON: CPT | Performed by: INTERNAL MEDICINE

## 2017-01-30 PROCEDURE — 36415 COLL VENOUS BLD VENIPUNCTURE: CPT

## 2017-01-30 PROCEDURE — 84460 ALANINE AMINO (ALT) (SGPT): CPT

## 2017-01-30 PROCEDURE — 96413 CHEMO IV INFUSION 1 HR: CPT | Performed by: INTERNAL MEDICINE

## 2017-01-30 RX ADMIN — Medication 250 ML: at 09:39

## 2017-01-30 NOTE — PROGRESS NOTES
"Patient is a 67 year old female here accompanied by self today for infusion of Rituxumab per order of Dr. Conner under the supervision of Dr. Costello.  Patient meets parameters for today's infusion. Patient identified with two identifiers, order verified, and verbal consent for today's infusion obtained from patient.      Today's lab values:  WBC: 9.2, HGB: 14.1, PLT: 268, ANC: 5.9, ALT: 28.  Patient meets order parameters for today's treatment.     22 gauge angio cath inserted into right hand.  Immediate blood return noted.  IV secured with sterile, transparent dressing and tape.  Patient tolerated well, denies pain or discomfort at this time.  Flushes easily without resistance, no signs or symptoms of infiltration or infection.   Patient denies questions or concerns regarding infusion and/or medication(s) being administered.    This nurse was informed by pharmacy that IV ranitidine is not available at this time and recommend IV Pepcid 20mg. TC placed to Dr. Conner. MARILEE Ball RN, \"OK to give IV Pepcid 20mg in place of IV ranitidine.\" Treatment plan updated.     1024: IV pump verified with Rituxan 1000mg dose, drug, and rate of administration by second RN, Elsa Pringle.  Infusion administered per protocol.  Patient tolerated infusion well, no signs or symptoms of adverse reaction noted.  Patient denies pain nor discomfort.     IV removed, catheter intact.  Site clean, dry and intact.  No signs or symptoms of infiltration or infection.  Covered with a sterile bandage, slight pressure applied for 30 seconds.  Pt instructed to leave bandage intact for a minimum of one hour, and to call with questions or concerns.  Copy of appointments, discharge instructions, and after visit summary (AVS) provided to patient.  Patient states understanding, discharged ambulatory.  Evangelina Marcelo RN    "

## 2017-01-30 NOTE — MR AVS SNAPSHOT
After Visit Summary   1/30/2017    Radha Fox    MRN: [id number]           Patient Information     Date Of Birth          1950        Visit Information        Provider Department      1/30/2017 9:00 AM  INF  [address] [facility]        Today's Diagnoses     Rheumatoid arthritis of multiple sites without rheumatoid factor (H)    -  1     Rheumatoid arthritis of multiple sites with negative rheumatoid factor (H)         Rheumatoid arthritis, involving unspecified site, unspecified rheumatoid factor presence (H)           Care Instructions    Discharge Instructions for Infusion Therapy/Chemotherapy Department:    Your doctor has prescribed the following medication(s) Rituxan to treat your RA.    All treatments have potential side effects, but they don't all happen to everyone.  If you have any questions, problems, or concerns, we want you to call us.  You can reach the Infusion Nurses at [phone] Monday - Friday 8:00am to 4:30pm or the Health Unit Coordinator at [phone] Monday - Friday 8:00am to 5:00pm.      After hours, evenings, weekends, and holidays please call Urgent Care [phone] or toll free [phone] or go to your nearest Emergency Department.    IV, PICC line or Port access site care or injection site care:   Please report signs of infection or infiltration;  *Redness     *Swelling/puffiness  *Pain/tenderness   *Fever 100.4 F or higher  *Drainage         Possible side effects include:  *Diarrhea     *Allergic Reaction  *Constipation    *Drop in blood counts  *Depression/Anxiey   *Nausea/Vomiting  *Weakness/Fatigue   *Cold intolerance  *Skin changes/Rash   *Stomatitis (mouth sores)  *Loss of appetite/Taste changes *Weight gain/Swelling (edema)  *Hand-Foot syndrome   *Hypertension (high blood pressure)  *Abdominal pain    *Peripheral Neuropathy (numbness/tingling in hands/feet)    YOU NEED TO CALL US OR GO TO YOUR NEAREST URGENT CARE/EMERGENCY  DEPARTMENT IF ANY OF THE FOLLOWING OCCUR:     *You have a fever of 100.4 F or higher.      *You are experiencing uncontrolled vomiting while taking your  anti-nausea medications.      *You are having watery diarrhea (4-6 loose stools in a 24 hour  period).      *You are experiencing a severe rash or skin changes.      *You have mouth sores or a sore throat.      *You have severe constipation (no BM for 3 days).      ANY questions or problems, PLEASE do not hesitate to call us!!        Follow-ups after your visit        Your next 10 appointments already scheduled     Jan 31, 2017  1:30 PM   Treatment with Doreen Arnett, PT   [facility] ([facility] )    [address] [address]   [phone]            Jan 31, 2017  2:00 PM   Treatment with Krystina Araya, PT   [facility] ([facility] )    [address] [address]   [phone]            Feb 02, 2017  1:30 PM   Treatment with Krystina Araya PT   [facility] ([facility] )    [address] [address]   [phone]            Feb 02, 2017  2:00 PM   Treatment with Savana Glasgow PTA   [facility] ([facility] )    [address] [address]   [phone]            Feb 13, 2017  9:00 AM   LAB with HC LAB   [facility] [place] [facility] ([facility])    [address]  [place] [address]   [phone]            Feb 13, 2017  9:30 AM   Level 6 with HC INF RM [address]   [facility] ([facility])    [address]  [place] [address]   [phone]            Mar 02, 2017  1:15 PM   (Arrive by 1:00 PM)   Return Visit with Jo Paulino PA-C   [facility] [place] [facility] ([facility])    [address]  [place] [address]   [phone]            May 23, 2017  1:00 PM   (Arrive by 12:45 PM)   Return Visit with Palak Pham NP   [facility] [place] [facility] ([facility])    [address]  [place]  "[place] [address]   [phone]              Who to contact     If you have questions or need follow up information about today's clinic visit or your schedule please contact [facility] [place] directly at [phone].  Normal or non-critical lab and imaging results will be communicated to you by MyChart, letter or phone within 4 business days after the clinic has received the results. If you do not hear from us within 7 days, please contact the clinic through MyChart or phone. If you have a critical or abnormal lab result, we will notify you by phone as soon as possible.  Submit refill requests through [employer] or call your pharmacy and they will forward the refill request to us. Please allow 3 business days for your refill to be completed.          Additional Information About Your Visit        [facility] Information     [employer] gives you secure access to your electronic health record. If you see a primary care provider, you can also send messages to your care team and make appointments. If you have questions, please call your primary care clinic.  If you do not have a primary care provider, please call [phone] and they will assist you.        Care EveryWhere ID     This is your Care EveryWhere ID. This could be used by other organizations to access your [place] medical records  [id number]        Your Vitals Were     Pulse Temperature Respirations Height BMI (Body Mass Index) Pulse Oximetry    58 96.3  F (35.7  C) (Tympanic) 16 1.676 m (5' 6\") 35.01 kg/m2 95%       Blood Pressure from Last 3 Encounters:   01/30/17 129/60   01/19/17 130/72   01/09/17 94/62    Weight from Last 3 Encounters:   01/30/17 98.34 kg (216 lb 12.8 oz)   01/19/17 98.884 kg (218 lb)   01/09/17 95.255 kg (210 lb)              We Performed the Following     Nursing Communication 1     Nursing Communication 1     Treatment Conditions     Treatment Conditions     Treatment Conditions     Treatment Conditions        Primary Care Provider Office " Phone # Fax #    Lanie Costello [phone]       [facility] [address] [facility] [address]        Thank you!     Thank you for choosing [facility]  for your care. Our goal is always to provide you with excellent care. Hearing back from our patients is one way we can continue to improve our services. Please take a few minutes to complete the written survey that you may receive in the mail after your visit with us. Thank you!             Your Updated Medication List - Protect others around you: Learn how to safely use, store and throw away your medicines at www.disposemymeds.org.          This list is accurate as of: 1/30/17  1:50 PM.  Always use your most recent med list.                   Brand Name Dispense Instructions for use    albuterol 108 (90 BASE) MCG/ACT Inhaler    PROAIR HFA/PROVENTIL HFA/VENTOLIN HFA    1 Inhaler    Inhale 2 puffs into the lungs every 6 hours as needed for shortness of breath / dyspnea or wheezing       ascorbic acid 1000 MG Tabs    vitamin C    30 tablet    Take 1 tablet (1,000 mg) by mouth daily       atorvastatin 20 MG tablet    LIPITOR    90 tablet    TAKE 1 TABLET DAILY       benzonatate 100 MG capsule    TESSALON    42 capsule    Take 1 capsule (100 mg) by mouth 3 times daily as needed for cough       * blood glucose calibration solution     1 Bottle    Use as directed       * blood glucose calibration solution     1 each    Use to calibrate blood glucose monitor as directed.       blood glucose monitoring test strip    ACCU-CHEK ANGELICA    100 strip    Use to test blood sugars one time by finger poke twice weekly or as directed.       calcium + D 600-200 MG-UNIT Tabs   Generic drug:  calcium carbonate-vitamin D      Take 600 mg by mouth 2 times daily.       celeBREX 200 MG capsule   Generic drug:  celecoxib     90 capsule    TAKE 1 CAPSULE DAILY       ESTRACE VAGINAL 0.1 MG/GM cream   Generic drug:  estradiol     42.5 g    Place  vaginally three times a week       fluticasone 50 MCG/ACT spray    FLONASE    3 Bottle    Spray 2 sprays into both nostrils daily       folic acid 1 MG tablet    FOLVITE     Take 2 tablets by mouth daily       METHOTREXATE PO      Inject 12.5 mg as directed once a week.       Multi-vitamin Tabs tablet      Take  by mouth daily.       omeprazole 40 MG capsule    priLOSEC    90 capsule    TAKE 1 CAPSULE DAILY BEFORE A MEAL       potassium chloride SA 20 MEQ CR tablet    K-DUR/KLOR-CON M    90 tablet    TAKE 1 TABLET DAILY WITH FOOD       pramipexole 0.25 MG tablet    MIRAPEX    270 tablet    TAKE 1 TABLET AT 5 P.M. AND 2 TABLETS AT 9 P.M.       predniSONE 1 MG tablet    DELTASONE     Take 1 mg by mouth daily Take 3 tabs po daily       propranolol 20 MG tablet    INDERAL    90 tablet    TAKE 1 TABLET DAILY       RITUXAN IV          venlafaxine 75 MG 24 hr capsule    EFFEXOR-XR    90 capsule    TAKE 1 CAPSULE DAILY       VITAMIN B 12 PO      Take 500 mg by mouth daily.       * Notice:  This list has 2 medication(s) that are the same as other medications prescribed for you. Read the directions carefully, and ask your doctor or other care provider to review them with you.

## 2017-01-30 NOTE — PATIENT INSTRUCTIONS
Discharge Instructions for Infusion Therapy/Chemotherapy Department:    Your doctor has prescribed the following medication(s) Rituxan to treat your RA.    All treatments have potential side effects, but they don't all happen to everyone.  If you have any questions, problems, or concerns, we want you to call us.  You can reach the Infusion Nurses at [phone] Monday - Friday 8:00am to 4:30pm or the Health Unit Coordinator at [phone] Monday - Friday 8:00am to 5:00pm.      After hours, evenings, weekends, and holidays please call Urgent Care [phone] or toll free [phone] or go to your nearest Emergency Department.    IV, PICC line or Port access site care or injection site care:   Please report signs of infection or infiltration;  *Redness     *Swelling/puffiness  *Pain/tenderness   *Fever 100.4 F or higher  *Drainage         Possible side effects include:  *Diarrhea     *Allergic Reaction  *Constipation    *Drop in blood counts  *Depression/Anxiey   *Nausea/Vomiting  *Weakness/Fatigue   *Cold intolerance  *Skin changes/Rash   *Stomatitis (mouth sores)  *Loss of appetite/Taste changes *Weight gain/Swelling (edema)  *Hand-Foot syndrome   *Hypertension (high blood pressure)  *Abdominal pain    *Peripheral Neuropathy (numbness/tingling in hands/feet)    YOU NEED TO CALL US OR GO TO YOUR NEAREST URGENT CARE/EMERGENCY DEPARTMENT IF ANY OF THE FOLLOWING OCCUR:     *You have a fever of 100.4 F or higher.      *You are experiencing uncontrolled vomiting while taking your  anti-nausea medications.      *You are having watery diarrhea (4-6 loose stools in a 24 hour  period).      *You are experiencing a severe rash or skin changes.      *You have mouth sores or a sore throat.      *You have severe constipation (no BM for 3 days).      ANY questions or problems, PLEASE do not hesitate to call us!!

## 2017-01-31 ENCOUNTER — HOSPITAL ENCOUNTER (OUTPATIENT)
Dept: PHYSICAL THERAPY | Facility: HOSPITAL | Age: 67
Setting detail: THERAPIES SERIES
End: 2017-01-31
Attending: FAMILY MEDICINE
Payer: COMMERCIAL

## 2017-01-31 PROCEDURE — 97033 APP MDLTY 1+IONTPHRSIS EA 15: CPT | Mod: GP

## 2017-01-31 PROCEDURE — 40000718 ZZHC STATISTIC PT DEPARTMENT ORTHO VISIT

## 2017-01-31 PROCEDURE — 97110 THERAPEUTIC EXERCISES: CPT | Mod: GP

## 2017-01-31 PROCEDURE — 97140 MANUAL THERAPY 1/> REGIONS: CPT | Mod: GP

## 2017-02-01 DIAGNOSIS — M53.3 SACROCOCCYGEAL DISORDERS, NOT ELSEWHERE CLASSIFIED: Primary | ICD-10-CM

## 2017-02-02 ENCOUNTER — HOSPITAL ENCOUNTER (OUTPATIENT)
Dept: PHYSICAL THERAPY | Facility: HOSPITAL | Age: 67
Setting detail: THERAPIES SERIES
End: 2017-02-02
Attending: FAMILY MEDICINE
Payer: COMMERCIAL

## 2017-02-02 PROCEDURE — 97140 MANUAL THERAPY 1/> REGIONS: CPT | Mod: GP

## 2017-02-02 PROCEDURE — 97033 APP MDLTY 1+IONTPHRSIS EA 15: CPT | Mod: GP

## 2017-02-02 PROCEDURE — 40000718 ZZHC STATISTIC PT DEPARTMENT ORTHO VISIT

## 2017-02-13 ENCOUNTER — INFUSION THERAPY VISIT (OUTPATIENT)
Dept: INFUSION THERAPY | Facility: OTHER | Age: 67
End: 2017-02-13
Attending: FAMILY MEDICINE
Payer: COMMERCIAL

## 2017-02-13 VITALS
WEIGHT: 215.2 LBS | HEIGHT: 66 IN | BODY MASS INDEX: 34.58 KG/M2 | OXYGEN SATURATION: 95 % | TEMPERATURE: 97 F | DIASTOLIC BLOOD PRESSURE: 57 MMHG | HEART RATE: 59 BPM | RESPIRATION RATE: 16 BRPM | SYSTOLIC BLOOD PRESSURE: 124 MMHG

## 2017-02-13 DIAGNOSIS — M06.09 RHEUMATOID ARTHRITIS OF MULTIPLE SITES WITHOUT RHEUMATOID FACTOR (H): ICD-10-CM

## 2017-02-13 DIAGNOSIS — M06.09 RHEUMATOID ARTHRITIS OF MULTIPLE SITES WITH NEGATIVE RHEUMATOID FACTOR (H): ICD-10-CM

## 2017-02-13 DIAGNOSIS — M06.9 RHEUMATOID ARTHRITIS, INVOLVING UNSPECIFIED SITE, UNSPECIFIED RHEUMATOID FACTOR PRESENCE: Primary | ICD-10-CM

## 2017-02-13 DIAGNOSIS — Z79.899 MEDICATION MANAGEMENT: Primary | ICD-10-CM

## 2017-02-13 LAB
ALT SERPL W P-5'-P-CCNC: 37 U/L (ref 0–50)
BASOPHILS # BLD AUTO: 0 10E9/L (ref 0–0.2)
BASOPHILS NFR BLD AUTO: 0.3 %
DIFFERENTIAL METHOD BLD: NORMAL
EOSINOPHIL # BLD AUTO: 0.2 10E9/L (ref 0–0.7)
EOSINOPHIL NFR BLD AUTO: 2.6 %
ERYTHROCYTE [DISTWIDTH] IN BLOOD BY AUTOMATED COUNT: 14.4 % (ref 10–15)
HCT VFR BLD AUTO: 42 % (ref 35–47)
HGB BLD-MCNC: 13.9 G/DL (ref 11.7–15.7)
IMM GRANULOCYTES # BLD: 0 10E9/L (ref 0–0.4)
IMM GRANULOCYTES NFR BLD: 0.1 %
LYMPHOCYTES # BLD AUTO: 1.8 10E9/L (ref 0.8–5.3)
LYMPHOCYTES NFR BLD AUTO: 20.4 %
MCH RBC QN AUTO: 32.4 PG (ref 26.5–33)
MCHC RBC AUTO-ENTMCNC: 33.1 G/DL (ref 31.5–36.5)
MCV RBC AUTO: 98 FL (ref 78–100)
MONOCYTES # BLD AUTO: 0.6 10E9/L (ref 0–1.3)
MONOCYTES NFR BLD AUTO: 6.6 %
NEUTROPHILS # BLD AUTO: 6.2 10E9/L (ref 1.6–8.3)
NEUTROPHILS NFR BLD AUTO: 70 %
NRBC # BLD AUTO: 0 10*3/UL
NRBC BLD AUTO-RTO: 0 /100
PLATELET # BLD AUTO: 236 10E9/L (ref 150–450)
RBC # BLD AUTO: 4.29 10E12/L (ref 3.8–5.2)
WBC # BLD AUTO: 8.8 10E9/L (ref 4–11)

## 2017-02-13 PROCEDURE — 85025 COMPLETE CBC W/AUTO DIFF WBC: CPT | Performed by: INTERNAL MEDICINE

## 2017-02-13 PROCEDURE — 96375 TX/PRO/DX INJ NEW DRUG ADDON: CPT | Performed by: FAMILY MEDICINE

## 2017-02-13 PROCEDURE — 84460 ALANINE AMINO (ALT) (SGPT): CPT | Performed by: INTERNAL MEDICINE

## 2017-02-13 PROCEDURE — 96413 CHEMO IV INFUSION 1 HR: CPT | Performed by: FAMILY MEDICINE

## 2017-02-13 PROCEDURE — 36415 COLL VENOUS BLD VENIPUNCTURE: CPT | Performed by: INTERNAL MEDICINE

## 2017-02-13 PROCEDURE — 96415 CHEMO IV INFUSION ADDL HR: CPT | Performed by: FAMILY MEDICINE

## 2017-02-13 RX ORDER — DIPHENHYDRAMINE HCL 50 MG
50 CAPSULE ORAL ONCE
Status: DISCONTINUED | OUTPATIENT
Start: 2017-02-13 | End: 2017-02-13 | Stop reason: HOSPADM

## 2017-02-13 RX ORDER — ACETAMINOPHEN 325 MG/1
1000 TABLET ORAL ONCE
Status: CANCELLED
Start: 2017-02-13 | End: 2017-02-13

## 2017-02-13 RX ORDER — DIPHENHYDRAMINE HCL 50 MG
50 CAPSULE ORAL ONCE
Status: CANCELLED
Start: 2017-02-13 | End: 2017-02-13

## 2017-02-13 RX ORDER — ACETAMINOPHEN 325 MG/1
1000 TABLET ORAL ONCE
Status: DISCONTINUED | OUTPATIENT
Start: 2017-02-13 | End: 2017-02-13 | Stop reason: HOSPADM

## 2017-02-13 RX ORDER — METHYLPREDNISOLONE SODIUM SUCCINATE 125 MG/2ML
50 INJECTION, POWDER, LYOPHILIZED, FOR SOLUTION INTRAMUSCULAR; INTRAVENOUS ONCE
Status: CANCELLED
Start: 2017-02-13 | End: 2017-02-13

## 2017-02-13 RX ORDER — METHYLPREDNISOLONE SODIUM SUCCINATE 125 MG/2ML
50 INJECTION, POWDER, LYOPHILIZED, FOR SOLUTION INTRAMUSCULAR; INTRAVENOUS ONCE
Status: DISCONTINUED | OUTPATIENT
Start: 2017-02-13 | End: 2017-02-13 | Stop reason: HOSPADM

## 2017-02-13 RX ORDER — METHYLPREDNISOLONE SODIUM SUCCINATE 40 MG/ML
40 INJECTION, POWDER, LYOPHILIZED, FOR SOLUTION INTRAMUSCULAR; INTRAVENOUS ONCE
Status: CANCELLED
Start: 2017-02-13 | End: 2017-02-13

## 2017-02-13 RX ORDER — METHYLPREDNISOLONE SODIUM SUCCINATE 125 MG/2ML
100 INJECTION, POWDER, LYOPHILIZED, FOR SOLUTION INTRAMUSCULAR; INTRAVENOUS ONCE
Status: COMPLETED | OUTPATIENT
Start: 2017-02-13 | End: 2017-02-13

## 2017-02-13 RX ADMIN — Medication 250 ML: at 10:07

## 2017-02-13 RX ADMIN — METHYLPREDNISOLONE SODIUM SUCCINATE 100 MG: 125 INJECTION INTRAMUSCULAR; INTRAVENOUS at 10:55

## 2017-02-13 NOTE — PROGRESS NOTES
Evangelina Velasquez, RN        Patient is a 67 year old female here accompanied by self today for infusion of Rituxan 1000mg per order of Dr. Conner under the supervision of Dr. Costello. Patient meets parameters for today's infusion. Patient identified with two identifiers, order verified, and verbal consent for today's infusion obtained from patient.      Recent lab values: WBC: 8.8, HGB: 13.9, PLT: 236, ANC: 6.2, ALT: 37. Patient meets order parameters for today's treatment.      22 gauge angio cath inserted into right hand. Immediate blood return noted. IV secured with sterile, transparent dressing and tape. Patient tolerated well, denies pain or discomfort at this time. Flushes easily without resistance, no signs or symptoms of infiltration or infection. Patient denies questions or concerns regarding infusion and/or medication(s) being administered.     1059 Rituxan IV pump verified with dose, drug, and rate of administration with MAR . Infusion administered per protocol. Patient tolerated infusion well, no signs or symptoms of adverse reaction noted. Patient denies pain nor discomfort.      IV removed, catheter intact. Site clean, dry and intact. No signs or symptoms of infiltration or infection. Covered with a sterile bandage, slight pressure applied for 30 seconds. Pt instructed to leave bandage intact for a minimum of one hour, and to call with questions or concerns. Copy of appointments, discharge instructions, and after visit summary (AVS) provided to patient. Patient states understanding, discharged ambulatory

## 2017-02-13 NOTE — NURSING NOTE
Patient is a 67 year old female here accompanied by self today for infusion of Rituxan 1000mg per order of Dr. Conner under the supervision of Dr. Costello.  Patient meets parameters for today's infusion. Patient identified with two identifiers, order verified, and verbal consent for today's infusion obtained from patient.     Recent lab values:  WBC: 8.8, HGB: 13.9, PLT: 236, ANC: 6.2,  ALT: 37. Patient meets order parameters for today's treatment.     22 gauge angio cath inserted into right hand.  Immediate blood return noted.  IV secured with sterile, transparent dressing and tape.  Patient tolerated well, denies pain or discomfort at this time.  Flushes easily without resistance, no signs or symptoms of infiltration or infection.   Patient denies questions or concerns regarding infusion and/or medication(s) being administered.    1059 Rituxan IV pump verified with dose, drug, and rate of administration with MAR . Infusion administered per protocol.  Patient tolerated infusion well, no signs or symptoms of adverse reaction noted.  Patient denies pain nor discomfort.     IV removed, catheter intact.  Site clean, dry and intact.  No signs or symptoms of infiltration or infection.  Covered with a sterile bandage, slight pressure applied for 30 seconds.  Pt instructed to leave bandage intact for a minimum of one hour, and to call with questions or concerns.  Copy of appointments, discharge instructions, and after visit summary (AVS) provided to patient.  Patient states understanding, discharged ambulatory.

## 2017-02-13 NOTE — MR AVS SNAPSHOT
After Visit Summary   2/13/2017    Radha Fox    MRN: [id number]           Patient Information     Date Of Birth          1950        Visit Information        Provider Department      2/13/2017 9:30 AM HC INF RM [address] [facility]        Today's Diagnoses     Rheumatoid arthritis, involving unspecified site, unspecified rheumatoid factor presence (H)    -  1    Rheumatoid arthritis of multiple sites without rheumatoid factor (H)        Rheumatoid arthritis of multiple sites with negative rheumatoid factor (H)          Care Instructions    We will see you for your next scheduled infusion.        Follow-ups after your visit        Your next 10 appointments already scheduled     Mar 02, 2017  1:15 PM CST   (Arrive by 1:00 PM)   Return Visit with Jo Paulino PA-C   [facility] ([facility]    [address] [facility] [address]   [phone]            May 23, 2017  1:00 PM CDT   (Arrive by 12:45 PM)   Return Visit with Palak Pham NP   [facility] ([facility])    [address] [facility] [address]   [phone]              Who to contact     If you have questions or need follow up information about today's clinic visit or your schedule please contact [facility] directly at [phone].  Normal or non-critical lab and imaging results will be communicated to you by MyChart, letter or phone within 4 business days after the clinic has received the results. If you do not hear from us within 7 days, please contact the clinic through MyChart or phone. If you have a critical or abnormal lab result, we will notify you by phone as soon as possible.  Submit refill requests through [employer] or call your pharmacy and they will forward the refill request to us. Please allow 3 business days for your refill to be completed.          Additional Information About Your Visit        [employer] Information     [employer] gives you secure access  "to your electronic health record. If you see a primary care provider, you can also send messages to your care team and make appointments. If you have questions, please call your primary care clinic.  If you do not have a primary care provider, please call [phone] and they will assist you.        Care EveryWhere ID     This is your Care EveryWhere ID. This could be used by other organizations to access your [place] medical records  [id number]        Your Vitals Were     Pulse Temperature Respirations Height Pulse Oximetry BMI (Body Mass Index)    57 97  F (36.1  C) (Tympanic) 16 1.676 m (5' 5.98\") 95% 34.75 kg/m2       Blood Pressure from Last 3 Encounters:   02/13/17 132/58   01/30/17 129/60   01/19/17 130/72    Weight from Last 3 Encounters:   02/13/17 97.6 kg (215 lb 3.2 oz)   01/30/17 98.3 kg (216 lb 12.8 oz)   01/19/17 98.9 kg (218 lb)              We Performed the Following     Nursing Communication 1     Nursing Communication 1     Nursing Communication 1     Treatment Conditions     Treatment Conditions     Treatment Conditions     Treatment Conditions        Primary Care Provider Office Phone # Fax #    Lanie Costello [phone]       [address]  [place] [address]        Thank you!     Thank you for choosing [facility]  for your care. Our goal is always to provide you with excellent care. Hearing back from our patients is one way we can continue to improve our services. Please take a few minutes to complete the written survey that you may receive in the mail after your visit with us. Thank you!             Your Updated Medication List - Protect others around you: Learn how to safely use, store and throw away your medicines at www.disposemymeds.org.          This list is accurate as of: 2/13/17 12:42 PM.  Always use your most recent med list.                   Brand Name Dispense Instructions for use    albuterol 108 (90 BASE) MCG/ACT " Inhaler    PROAIR HFA/PROVENTIL HFA/VENTOLIN HFA    1 Inhaler    Inhale 2 puffs into the lungs every 6 hours as needed for shortness of breath / dyspnea or wheezing       ascorbic acid 1000 MG Tabs    vitamin C    30 tablet    Take 1 tablet (1,000 mg) by mouth daily       atorvastatin 20 MG tablet    LIPITOR    90 tablet    TAKE 1 TABLET DAILY       benzonatate 100 MG capsule    TESSALON    42 capsule    Take 1 capsule (100 mg) by mouth 3 times daily as needed for cough       * blood glucose calibration solution     1 Bottle    Use as directed       * blood glucose calibration solution     1 each    Use to calibrate blood glucose monitor as directed.       blood glucose monitoring test strip    ACCU-CHEK ANGELICA    100 strip    Use to test blood sugars one time by finger poke twice weekly or as directed.       calcium + D 600-200 MG-UNIT Tabs   Generic drug:  calcium carbonate-vitamin D      Take 600 mg by mouth 2 times daily.       celeBREX 200 MG capsule   Generic drug:  celecoxib     90 capsule    TAKE 1 CAPSULE DAILY       ESTRACE VAGINAL 0.1 MG/GM cream   Generic drug:  estradiol     42.5 g    Place vaginally three times a week       fluticasone 50 MCG/ACT spray    FLONASE    3 Bottle    Spray 2 sprays into both nostrils daily       folic acid 1 MG tablet    FOLVITE     Take 2 tablets by mouth daily       METHOTREXATE PO      Inject 12.5 mg as directed once a week.       Multi-vitamin Tabs tablet      Take  by mouth daily.       omeprazole 40 MG capsule    priLOSEC    90 capsule    TAKE 1 CAPSULE DAILY BEFORE A MEAL       potassium chloride SA 20 MEQ CR tablet    K-DUR/KLOR-CON M    90 tablet    TAKE 1 TABLET DAILY WITH FOOD       pramipexole 0.25 MG tablet    MIRAPEX    270 tablet    TAKE 1 TABLET AT 5 P.M. AND 2 TABLETS AT 9 P.M.       predniSONE 1 MG tablet    DELTASONE     Take 1 mg by mouth daily Take 3 tabs po daily       propranolol 20 MG tablet    INDERAL    90 tablet    TAKE 1 TABLET DAILY       RITUXAN  IV          venlafaxine 75 MG 24 hr capsule    EFFEXOR-XR    90 capsule    TAKE 1 CAPSULE DAILY       VITAMIN B 12 PO      Take 500 mg by mouth daily.       * Notice:  This list has 2 medication(s) that are the same as other medications prescribed for you. Read the directions carefully, and ask your doctor or other care provider to review them with you.

## 2017-03-02 ENCOUNTER — OFFICE VISIT (OUTPATIENT)
Dept: OTOLARYNGOLOGY | Facility: OTHER | Age: 67
End: 2017-03-02
Attending: PHYSICIAN ASSISTANT
Payer: COMMERCIAL

## 2017-03-02 VITALS
HEART RATE: 65 BPM | SYSTOLIC BLOOD PRESSURE: 120 MMHG | DIASTOLIC BLOOD PRESSURE: 60 MMHG | WEIGHT: 215 LBS | TEMPERATURE: 96.2 F | HEIGHT: 66 IN | BODY MASS INDEX: 34.55 KG/M2

## 2017-03-02 DIAGNOSIS — Z98.890 S/P FESS (FUNCTIONAL ENDOSCOPIC SINUS SURGERY): ICD-10-CM

## 2017-03-02 DIAGNOSIS — J32.0 CHRONIC MAXILLARY SINUSITIS: ICD-10-CM

## 2017-03-02 DIAGNOSIS — J32.2 CHRONIC ETHMOIDAL SINUSITIS: ICD-10-CM

## 2017-03-02 DIAGNOSIS — R05.3 CHRONIC COUGH: Primary | ICD-10-CM

## 2017-03-02 PROCEDURE — 31231 NASAL ENDOSCOPY DX: CPT | Performed by: PHYSICIAN ASSISTANT

## 2017-03-02 PROCEDURE — 99213 OFFICE O/P EST LOW 20 MIN: CPT | Mod: 25 | Performed by: PHYSICIAN ASSISTANT

## 2017-03-02 ASSESSMENT — PAIN SCALES - GENERAL: PAINLEVEL: NO PAIN (0)

## 2017-03-02 NOTE — PROGRESS NOTES
Chief Complaint   Patient presents with     Chronic Cough     Pt is here for a f/u chronic cough.  Pt continues to have a cough and sinus drainage.  States that surgery did help.  Pt is s/p Fess and septoplasty on 9/27/16.     Doing well   No concerns.   Sinuses and rinses have been w/o issues  She did have one recent OM. Sinusitis this past winter.   Past Medical History   Diagnosis Date     Abnormal PFT      moderate restriction/parenchymal; moderate diffusion defect; minimal obstruction     Arthropathy, unspecified, site unspecified 05/01/2000     Chest pain, unspecified 2005     Resolved     Contact dermatitis and other eczema, due to unspecified cause 03/05/2009     Coronary atherosclerosis of unspecified type of vessel, native or graft 09/27/2005     Depressive disorder, not elsewhere classified 01/11/2002     Diabetes mellitus type 2, diet-controlled (H)      Esophageal reflux 11/12/2010     H/O: hysterectomy 1977     Impaired fasting glucose 11/12/2010     Insomnia, unspecified 01/24/2013     Leukocytosis, unspecified 12/22/2007     Obesity, unspecified 01/19/2012     Osteoporosis, unspecified 01/19/2012     Other and unspecified hyperlipidemia 04/13/2005     Other dyspnea and respiratory abnormality 01/19/2012     reduced DLCO     Restless legs syndrome (RLS) 04/21/2011     Rheumatoid arthritis(714.0) 11/09/1999     Unspecified diffuse connective tissue disease 04/04/2000     Unspecified sinusitis (chronic) 09/05/2000        Allergies   Allergen Reactions     Neomycin-Polymyxin-Hc      Rash behind ear after ear drops     Penicillins Hives and Itching     One time large local reaction after IM PCN in 1970, no airway symptoms     Plaquenil [Hydroxychloroquine Sulfate] Hives     Current Outpatient Prescriptions   Medication     benzonatate (TESSALON) 100 MG capsule     albuterol (PROAIR HFA/PROVENTIL HFA/VENTOLIN HFA) 108 (90 BASE) MCG/ACT Inhaler     venlafaxine (EFFEXOR-XR) 75 MG 24 hr capsule      "atorvastatin (LIPITOR) 20 MG tablet     potassium chloride SA (K-DUR/KLOR-CON M) 20 MEQ CR tablet     pramipexole (MIRAPEX) 0.25 MG tablet     fluticasone (FLONASE) 50 MCG/ACT spray     omeprazole (PRILOSEC) 40 MG capsule     propranolol (INDERAL) 20 MG tablet     RiTUXimab (RITUXAN IV)     ESTRACE VAGINAL 0.1 MG/GM vaginal cream     ascorbic acid (VITAMIN C) 1000 MG TABS     predniSONE (DELTASONE) 1 MG tablet     CELEBREX 200 MG capsule     folic acid (FOLVITE) 1 MG tablet     Blood Glucose Calibration (ACCU-CHEK COMPACT BLUE CONTROL) LIQD     glucose blood VI test strips (ACCU-CHEK ANGELICA) strip     Blood Glucose Calibration (ACCU-CHEK ANGELICA) SOLN     calcium carbonate-vitamin D (CALCIUM + D) 600-200 MG-UNIT TABS     Methotrexate Sodium (METHOTREXATE PO)     multivitamin, therapeutic with minerals (MULTI-VITAMIN) TABS     Cyanocobalamin (VITAMIN B 12 PO)     No current facility-administered medications for this visit.       ROS: 10 point ROS neg other than the symptoms noted above in the HPI.  /60 (BP Location: Left arm, Cuff Size: Adult Large)  Pulse 65  Temp 96.2  F (35.7  C) (Tympanic)  Ht 5' 6\" (1.676 m)  Wt 215 lb (97.5 kg)  BMI 34.7 kg/m2    General - The patient is awake and alert, and answers questions appropriately during the history and physical.  The vocal quality is hypernasal, but there is no dyspnea or stridor noted.  Eyes - The EOMI, there is no conjuncitval or scleral injection.  Pupils are equally round and reactive to light.  Oral - The oral mucosa is pink and moist.  The tongue is mobile and midline on protrusion, no edema noted.  Nasal - The nasal examination was done with a rigid nasal endoscope today for the purposes of bilateral endoscopically assisted debridement of the sinuses.  I began by spraying both sides with lidocaine and neosynephrine.   I began on the left side, I was then able to visualize the left maxillary sinusotomy, it is healing well and open.  No purulence noted. .  The " roof was then visualized and is healing well.  I turned my attention to the right side.  .  I was then able to pass the scope into the right middle meatus.  The maxillary sinusotomy is healing well, no abnormal secretions noted.   I was  able to visualize a nicely remucosalizing surface.  Bialterally, no early synechiae or touch points were noted.  Septum intact and midline    ASSESSMENT:      ICD-10-CM    1. Chronic cough R05    2. Chronic ethmoidal sinusitis J32.2    3. Chronic maxillary sinusitis J32.0    4. S/P FESS (functional endoscopic sinus surgery) Z98.890      Doing well Follow up in 6 months- 1 year  Continue rinses and Flonase      Jo Paulino PA-C  [facility]  [facility], [place]  [phone]

## 2017-03-02 NOTE — MR AVS SNAPSHOT
After Visit Summary   3/2/2017    Radha Fox    MRN: [id number]           Patient Information     Date Of Birth          1950        Visit Information        Provider Department      3/2/2017 1:15 PM Jo Paulino PA-C [facility]        Today's Diagnoses     Chronic cough    -  1    Chronic ethmoidal sinusitis        Chronic maxillary sinusitis        S/P FESS (functional endoscopic sinus surgery)          Care Instructions    Continue with katie med rinse  Continue with Flonase.   Follow up in 6 months or as needed    If there are concerns or questions, Call [phone] and ask for nurse        Follow-ups after your visit        Follow-up notes from your care team     Return in about 6 months (around 9/2/2017), or if symptoms worsen or fail to improve.      Your next 10 appointments already scheduled     May 23, 2017  1:00 PM CDT   (Arrive by 12:45 PM)   Return Visit with Palak Pham NP   [facility] ([facility])    [address] [place], [place] [address] [address]   [phone]              Who to contact     If you have questions or need follow up information about today's clinic visit or your schedule please contact [facility] directly at [phone].  Normal or non-critical lab and imaging results will be communicated to you by MyChart, letter or phone within 4 business days after the clinic has received the results. If you do not hear from us within 7 days, please contact the clinic through MyChart or phone. If you have a critical or abnormal lab result, we will notify you by phone as soon as possible.  Submit refill requests through Wizer or call your pharmacy and they will forward the refill request to us. Please allow 3 business days for your refill to be completed.          Additional Information About Your Visit        [employer] Information     Wizer gives you secure access to your electronic health record. If you see a primary care  "provider, you can also send messages to your care team and make appointments. If you have questions, please call your primary care clinic.  If you do not have a primary care provider, please call [phone] and they will assist you.        Care EveryWhere ID     This is your Care EveryWhere ID. This could be used by other organizations to access your [place] medical records  [id number]        Your Vitals Were     Pulse Temperature Height BMI (Body Mass Index)          65 96.2  F (35.7  C) (Tympanic) 5' 6\" (1.676 m) 34.7 kg/m2         Blood Pressure from Last 3 Encounters:   03/02/17 120/60   02/13/17 124/57   01/30/17 129/60    Weight from Last 3 Encounters:   03/02/17 215 lb (97.5 kg)   02/13/17 215 lb 3.2 oz (97.6 kg)   01/30/17 216 lb 12.8 oz (98.3 kg)              Today, you had the following     No orders found for display       Primary Care Provider Office Phone # Fax #    Lanie Costello [phone]        FAMILY [facility] [address] [place] [address]  [facility] [address]        Thank you!     Thank you for choosing [facility]  for your care. Our goal is always to provide you with excellent care. Hearing back from our patients is one way we can continue to improve our services. Please take a few minutes to complete the written survey that you may receive in the mail after your visit with us. Thank you!             Your Updated Medication List - Protect others around you: Learn how to safely use, store and throw away your medicines at www.disposemymeds.org.          This list is accurate as of: 3/2/17  1:37 PM.  Always use your most recent med list.                   Brand Name Dispense Instructions for use    albuterol 108 (90 BASE) MCG/ACT Inhaler    PROAIR HFA/PROVENTIL HFA/VENTOLIN HFA    1 Inhaler    Inhale 2 puffs into the lungs every 6 hours as needed for shortness of breath / dyspnea or wheezing       ascorbic acid 1000 MG Tabs    vitamin C    30 tablet    Take 1 tablet " (1,000 mg) by mouth daily       atorvastatin 20 MG tablet    LIPITOR    90 tablet    TAKE 1 TABLET DAILY       benzonatate 100 MG capsule    TESSALON    42 capsule    Take 1 capsule (100 mg) by mouth 3 times daily as needed for cough       * blood glucose calibration solution     1 Bottle    Use as directed       * blood glucose calibration solution     1 each    Use to calibrate blood glucose monitor as directed.       blood glucose monitoring test strip    ACCU-CHEK ANGELICA    100 strip    Use to test blood sugars one time by finger poke twice weekly or as directed.       calcium + D 600-200 MG-UNIT Tabs   Generic drug:  calcium carbonate-vitamin D      Take 600 mg by mouth 2 times daily.       celeBREX 200 MG capsule   Generic drug:  celecoxib     90 capsule    TAKE 1 CAPSULE DAILY       ESTRACE VAGINAL 0.1 MG/GM cream   Generic drug:  estradiol     42.5 g    Place vaginally three times a week       fluticasone 50 MCG/ACT spray    FLONASE    3 Bottle    Spray 2 sprays into both nostrils daily       folic acid 1 MG tablet    FOLVITE     Take 2 tablets by mouth daily       METHOTREXATE PO      Inject 12.5 mg as directed once a week.       Multi-vitamin Tabs tablet      Take  by mouth daily.       omeprazole 40 MG capsule    priLOSEC    90 capsule    TAKE 1 CAPSULE DAILY BEFORE A MEAL       potassium chloride SA 20 MEQ CR tablet    K-DUR/KLOR-CON M    90 tablet    TAKE 1 TABLET DAILY WITH FOOD       pramipexole 0.25 MG tablet    MIRAPEX    270 tablet    TAKE 1 TABLET AT 5 P.M. AND 2 TABLETS AT 9 P.M.       predniSONE 1 MG tablet    DELTASONE     Take 1 mg by mouth daily Take 3 tabs po daily       propranolol 20 MG tablet    INDERAL    90 tablet    TAKE 1 TABLET DAILY       RITUXAN IV          venlafaxine 75 MG 24 hr capsule    EFFEXOR-XR    90 capsule    TAKE 1 CAPSULE DAILY       VITAMIN B 12 PO      Take 500 mg by mouth daily.       * Notice:  This list has 2 medication(s) that are the same as other medications  prescribed for you. Read the directions carefully, and ask your doctor or other care provider to review them with you.

## 2017-03-02 NOTE — NURSING NOTE
"Chief Complaint   Patient presents with     Chronic Cough     Pt is here for a f/u chronic cough.  Pt continues to have a cough and sinus drainage.  States that surgery did help.  Pt is s/p Fess and septoplasty on 9/27/16.       Initial /60 (BP Location: Left arm, Cuff Size: Adult Large)  Pulse 65  Temp 96.2  F (35.7  C) (Tympanic)  Ht 5' 6\" (1.676 m)  Wt 215 lb (97.5 kg)  BMI 34.7 kg/m2 Estimated body mass index is 34.7 kg/(m^2) as calculated from the following:    Height as of this encounter: 5' 6\" (1.676 m).    Weight as of this encounter: 215 lb (97.5 kg).  Medication Reconciliation: complete   Ana Ospina LPN      "

## 2017-03-14 NOTE — PROGRESS NOTES
Outpatient Physical Therapy Discharge Note     Patient: Radha Fox  : 1950    Beginning/End Dates of Reporting Period:  2016 to 3/14/2017    Referring Provider: Lanie Costello MD    Therapy Diagnosis: Back pain mediated by mechanical dysfunction at pelvic, sacral, lumbar, thoracic and cervical segments with functional leg length difference     Client Self Report: Patient seen [phone] for C/O LBP stemming from fall into tub at home.     Objective Measurements:  Objective Measure: Somatic dysfunction  Details: Left SI jt locked and left LE 3/8 inch shorter than right. Left ASIS, IC, PSIS and IT all superior relative to right. Right sacral sulcus deep and right ALEXANDRE prominent. T3-L5=NSRRL    Objective Measure: XR 2015  Details: Lumbar: 1. MILD MULTILEVEL DISK DISEASE. 2. DIFFUSE FACET ARTHROSIS       Goals:  Goal Identifier 1 Functional   Goal Description Improved Oswestry Disablity Index score to 20% or better   Target Date 17   Date Met      Progress:     Goal Identifier 2 Outcome   Goal Description Resolution of mechanical dysfunction   Target Date 17   Date Met      Progress:       Progress Toward Goals:   Progress limited due to infrequent appointment attendance    Plan:  Discharge from therapy.    Discharge:    Reason for Discharge: Patient has not made expected progress due to interrupted treatment attendance.  Patient has failed to schedule further appointments.    Equipment Issued: n/a    Discharge Plan: current status of patient is unknown    Krystina Araya DPT

## 2017-04-06 DIAGNOSIS — R31.9 HEMATURIA: ICD-10-CM

## 2017-04-06 DIAGNOSIS — N39.0 RECURRENT UTI: ICD-10-CM

## 2017-04-06 DIAGNOSIS — R30.0 DYSURIA: ICD-10-CM

## 2017-04-06 DIAGNOSIS — Z79.899 ENCOUNTER FOR LONG-TERM (CURRENT) USE OF MEDICATIONS: Primary | ICD-10-CM

## 2017-04-06 LAB
ALBUMIN UR-MCNC: 100 MG/DL
ALT SERPL W P-5'-P-CCNC: 32 U/L (ref 0–50)
APPEARANCE UR: ABNORMAL
BACTERIA #/AREA URNS HPF: ABNORMAL /HPF
BASOPHILS # BLD AUTO: 0.1 10E9/L (ref 0–0.2)
BASOPHILS NFR BLD AUTO: 0.5 %
BILIRUB UR QL STRIP: NEGATIVE
COLOR UR AUTO: YELLOW
DIFFERENTIAL METHOD BLD: ABNORMAL
EOSINOPHIL # BLD AUTO: 0.3 10E9/L (ref 0–0.7)
EOSINOPHIL NFR BLD AUTO: 2.8 %
ERYTHROCYTE [DISTWIDTH] IN BLOOD BY AUTOMATED COUNT: 14.1 % (ref 10–15)
GLUCOSE UR STRIP-MCNC: NEGATIVE MG/DL
HCT VFR BLD AUTO: 39.5 % (ref 35–47)
HGB BLD-MCNC: 13.6 G/DL (ref 11.7–15.7)
HGB UR QL STRIP: ABNORMAL
IMM GRANULOCYTES # BLD: 0 10E9/L (ref 0–0.4)
IMM GRANULOCYTES NFR BLD: 0.3 %
KETONES UR STRIP-MCNC: NEGATIVE MG/DL
LEUKOCYTE ESTERASE UR QL STRIP: ABNORMAL
LYMPHOCYTES # BLD AUTO: 1.5 10E9/L (ref 0.8–5.3)
LYMPHOCYTES NFR BLD AUTO: 13.6 %
MCH RBC QN AUTO: 33.6 PG (ref 26.5–33)
MCHC RBC AUTO-ENTMCNC: 34.4 G/DL (ref 31.5–36.5)
MCV RBC AUTO: 98 FL (ref 78–100)
MONOCYTES # BLD AUTO: 1.2 10E9/L (ref 0–1.3)
MONOCYTES NFR BLD AUTO: 10.4 %
MUCOUS THREADS #/AREA URNS LPF: PRESENT /LPF
NEUTROPHILS # BLD AUTO: 8 10E9/L (ref 1.6–8.3)
NEUTROPHILS NFR BLD AUTO: 72.4 %
NITRATE UR QL: NEGATIVE
NRBC # BLD AUTO: 0 10*3/UL
NRBC BLD AUTO-RTO: 0 /100
PH UR STRIP: 6.5 PH (ref 4.7–8)
PLATELET # BLD AUTO: 270 10E9/L (ref 150–450)
RBC # BLD AUTO: 4.05 10E12/L (ref 3.8–5.2)
RBC #/AREA URNS AUTO: >182 /HPF (ref 0–2)
SP GR UR STRIP: 1.02 (ref 1–1.03)
SQUAMOUS #/AREA URNS AUTO: 7 /HPF (ref 0–1)
TRANS CELLS #/AREA URNS HPF: 2 /HPF (ref 0–1)
URN SPEC COLLECT METH UR: ABNORMAL
UROBILINOGEN UR STRIP-MCNC: NORMAL MG/DL (ref 0–2)
WBC # BLD AUTO: 11 10E9/L (ref 4–11)
WBC #/AREA URNS AUTO: >182 /HPF (ref 0–2)
WBC CLUMPS #/AREA URNS HPF: PRESENT /HPF

## 2017-04-06 PROCEDURE — 81001 URINALYSIS AUTO W/SCOPE: CPT | Performed by: FAMILY MEDICINE

## 2017-04-06 PROCEDURE — 36415 COLL VENOUS BLD VENIPUNCTURE: CPT | Performed by: INTERNAL MEDICINE

## 2017-04-06 PROCEDURE — 85025 COMPLETE CBC W/AUTO DIFF WBC: CPT | Performed by: INTERNAL MEDICINE

## 2017-04-06 PROCEDURE — 87086 URINE CULTURE/COLONY COUNT: CPT | Performed by: FAMILY MEDICINE

## 2017-04-06 PROCEDURE — 84460 ALANINE AMINO (ALT) (SGPT): CPT | Performed by: INTERNAL MEDICINE

## 2017-04-06 RX ORDER — CIPROFLOXACIN 500 MG/1
500 TABLET, FILM COATED ORAL 2 TIMES DAILY
Qty: 14 TABLET | Refills: 0 | Status: SHIPPED | OUTPATIENT
Start: 2017-04-06 | End: 2017-05-31

## 2017-04-08 LAB
BACTERIA SPEC CULT: ABNORMAL
MICRO REPORT STATUS: ABNORMAL
SPECIMEN SOURCE: ABNORMAL

## 2017-04-27 DIAGNOSIS — N95.2 ATROPHIC VAGINITIS: ICD-10-CM

## 2017-04-28 DIAGNOSIS — N95.2 ATROPHIC VAGINITIS: ICD-10-CM

## 2017-04-28 RX ORDER — ESTRADIOL 0.1 MG/G
CREAM VAGINAL
Qty: 42.5 G | Refills: 0 | Status: SHIPPED | OUTPATIENT
Start: 2017-04-28 | End: 2017-05-31

## 2017-05-01 RX ORDER — ESTRADIOL 0.1 MG/G
CREAM VAGINAL
Qty: 42.5 G | OUTPATIENT
Start: 2017-05-01

## 2017-05-11 DIAGNOSIS — Z79.899 MEDICATION MANAGEMENT: ICD-10-CM

## 2017-05-11 DIAGNOSIS — M06.09 RHEUMATOID ARTHRITIS OF MULTIPLE SITES WITHOUT RHEUMATOID FACTOR (H): Primary | ICD-10-CM

## 2017-05-11 LAB
ALBUMIN SERPL-MCNC: 3.5 G/DL (ref 3.4–5)
ALT SERPL W P-5'-P-CCNC: 28 U/L (ref 0–50)
BASOPHILS # BLD AUTO: 0 10E9/L (ref 0–0.2)
BASOPHILS NFR BLD AUTO: 0.4 %
CREAT SERPL-MCNC: 0.83 MG/DL (ref 0.52–1.04)
CRP SERPL-MCNC: 8.1 MG/L (ref 0–8)
DIFFERENTIAL METHOD BLD: ABNORMAL
EOSINOPHIL # BLD AUTO: 0.3 10E9/L (ref 0–0.7)
EOSINOPHIL NFR BLD AUTO: 2.5 %
ERYTHROCYTE [DISTWIDTH] IN BLOOD BY AUTOMATED COUNT: 13.9 % (ref 10–15)
GFR SERPL CREATININE-BSD FRML MDRD: 69 ML/MIN/1.7M2
HCT VFR BLD AUTO: 39.2 % (ref 35–47)
HGB BLD-MCNC: 13.1 G/DL (ref 11.7–15.7)
IMM GRANULOCYTES # BLD: 0 10E9/L (ref 0–0.4)
IMM GRANULOCYTES NFR BLD: 0.4 %
LYMPHOCYTES # BLD AUTO: 1.2 10E9/L (ref 0.8–5.3)
LYMPHOCYTES NFR BLD AUTO: 12.3 %
MCH RBC QN AUTO: 33.3 PG (ref 26.5–33)
MCHC RBC AUTO-ENTMCNC: 33.4 G/DL (ref 31.5–36.5)
MCV RBC AUTO: 100 FL (ref 78–100)
MONOCYTES # BLD AUTO: 0.8 10E9/L (ref 0–1.3)
MONOCYTES NFR BLD AUTO: 8.2 %
NEUTROPHILS # BLD AUTO: 7.6 10E9/L (ref 1.6–8.3)
NEUTROPHILS NFR BLD AUTO: 76.2 %
NRBC # BLD AUTO: 0 10*3/UL
NRBC BLD AUTO-RTO: 0 /100
PLATELET # BLD AUTO: 249 10E9/L (ref 150–450)
RBC # BLD AUTO: 3.93 10E12/L (ref 3.8–5.2)
WBC # BLD AUTO: 9.9 10E9/L (ref 4–11)

## 2017-05-11 PROCEDURE — 84460 ALANINE AMINO (ALT) (SGPT): CPT | Performed by: INTERNAL MEDICINE

## 2017-05-11 PROCEDURE — 82565 ASSAY OF CREATININE: CPT | Performed by: INTERNAL MEDICINE

## 2017-05-11 PROCEDURE — 85025 COMPLETE CBC W/AUTO DIFF WBC: CPT | Performed by: INTERNAL MEDICINE

## 2017-05-11 PROCEDURE — 36415 COLL VENOUS BLD VENIPUNCTURE: CPT | Performed by: INTERNAL MEDICINE

## 2017-05-11 PROCEDURE — 82040 ASSAY OF SERUM ALBUMIN: CPT | Performed by: INTERNAL MEDICINE

## 2017-05-11 PROCEDURE — 86140 C-REACTIVE PROTEIN: CPT | Performed by: INTERNAL MEDICINE

## 2017-05-31 ENCOUNTER — OFFICE VISIT (OUTPATIENT)
Dept: UROLOGY | Facility: OTHER | Age: 67
End: 2017-05-31
Attending: NURSE PRACTITIONER
Payer: COMMERCIAL

## 2017-05-31 VITALS
DIASTOLIC BLOOD PRESSURE: 66 MMHG | SYSTOLIC BLOOD PRESSURE: 104 MMHG | BODY MASS INDEX: 34.23 KG/M2 | OXYGEN SATURATION: 96 % | WEIGHT: 213 LBS | TEMPERATURE: 97 F | HEART RATE: 71 BPM | HEIGHT: 66 IN

## 2017-05-31 DIAGNOSIS — N95.2 ATROPHIC VAGINITIS: ICD-10-CM

## 2017-05-31 DIAGNOSIS — R31.29 MICROSCOPIC HEMATURIA: ICD-10-CM

## 2017-05-31 DIAGNOSIS — N32.81 OAB (OVERACTIVE BLADDER): Primary | ICD-10-CM

## 2017-05-31 PROCEDURE — 99213 OFFICE O/P EST LOW 20 MIN: CPT | Mod: 25 | Performed by: NURSE PRACTITIONER

## 2017-05-31 PROCEDURE — 51798 US URINE CAPACITY MEASURE: CPT | Performed by: NURSE PRACTITIONER

## 2017-05-31 RX ORDER — ESTRADIOL 0.1 MG/G
2 CREAM VAGINAL
Qty: 42 G | Refills: 3 | Status: SHIPPED | OUTPATIENT
Start: 2017-05-31 | End: 2019-02-14

## 2017-05-31 ASSESSMENT — PAIN SCALES - GENERAL: PAINLEVEL: NO PAIN (0)

## 2017-05-31 NOTE — MR AVS SNAPSHOT
After Visit Summary   5/31/2017    Radha Fox    MRN: [id number]           Patient Information     Date Of Birth          1950        Visit Information        Provider Department      5/31/2017 10:00 AM Palak Pham NP [facility]        Today's Diagnoses     OAB (overactive bladder)    -  1    Atrophic vaginitis        Microscopic hematuria          Care Instructions    Continue Estrace cream and vitamin C.  Let me know if you need more refills for the Estrace.  Stop by the lab for a urinalysis only.  We will let you know if you still have blood in your urine, if you do you may need additional testing.    Otherwise see me in a year.          Follow-ups after your visit        Future tests that were ordered for you today     Open Future Orders        Priority Expected Expires Ordered    UA reflex to Microscopic and Culture Routine  5/31/2018 5/31/2017            Who to contact     If you have questions or need follow up information about today's clinic visit or your schedule please contact [facility] directly at [phone].  Normal or non-critical lab and imaging results will be communicated to you by MOGhart, letter or phone within 4 business days after the clinic has received the results. If you do not hear from us within 7 days, please contact the clinic through [employer] or phone. If you have a critical or abnormal lab result, we will notify you by phone as soon as possible.  Submit refill requests through [employer]formable or call your pharmacy and they will forward the refill request to us. Please allow 3 business days for your refill to be completed.          Additional Information About Your Visit        MOG[employer] Information     Per[employer]able gives you secure access to your electronic health record. If you see a primary care provider, you can also send messages to your care team and make appointments. If you have questions, please call your primary care clinic.  If  "you do not have a primary care provider, please call [phone] and they will assist you.        Care EveryWhere ID     This is your Care EveryWhere ID. This could be used by other organizations to access your [place] medical records  [id number]        Your Vitals Were     Pulse Temperature Height Pulse Oximetry BMI (Body Mass Index)       71 97  F (36.1  C) (Tympanic) 1.676 m (5' 6\") 96% 34.38 kg/m2        Blood Pressure from Last 3 Encounters:   05/31/17 104/66   03/02/17 120/60   02/13/17 124/57    Weight from Last 3 Encounters:   05/31/17 96.6 kg (213 lb)   03/02/17 97.5 kg (215 lb)   02/13/17 97.6 kg (215 lb 3.2 oz)                 Today's Medication Changes          These changes are accurate as of: 5/31/17 10:39 AM.  If you have any questions, ask your nurse or doctor.               These medicines have changed or have updated prescriptions.        Dose/Directions    ESTRACE VAGINAL 0.1 MG/GM cream   This may have changed:  See the new instructions.   Used for:  Atrophic vaginitis   Generic drug:  estradiol   Changed by:  Palak Pham, NP        Dose:  2 g   Place 2 g vaginally three times a week   Quantity:  42 g   Refills:  3            Where to get your medicines      These medications were sent to [facility] DELIVERY [address] [address]     Phone:  [phone]     ESTRACE VAGINAL 0.1 MG/GM cream                Primary Care Provider Office Phone # Fax #    Lanie Costello [phone]        FAMILY [facility] [address] [place] [address] [place] [address]        Thank you!     Thank you for choosing [facility]  for your care. Our goal is always to provide you with excellent care. Hearing back from our patients is one way we can continue to improve our services. Please take a few minutes to complete the written survey that you may receive in the mail after your visit with us. Thank you!             Your " Updated Medication List - Protect others around you: Learn how to safely use, store and throw away your medicines at www.disposemymeds.org.          This list is accurate as of: 5/31/17 10:39 AM.  Always use your most recent med list.                   Brand Name Dispense Instructions for use    albuterol 108 (90 BASE) MCG/ACT Inhaler    PROAIR HFA/PROVENTIL HFA/VENTOLIN HFA    1 Inhaler    Inhale 2 puffs into the lungs every 6 hours as needed for shortness of breath / dyspnea or wheezing       atorvastatin 20 MG tablet    LIPITOR    90 tablet    TAKE 1 TABLET DAILY       benzonatate 100 MG capsule    TESSALON    42 capsule    Take 1 capsule (100 mg) by mouth 3 times daily as needed for cough       * blood glucose calibration solution     1 Bottle    Use as directed       * blood glucose calibration solution     1 each    Use to calibrate blood glucose monitor as directed.       blood glucose monitoring test strip    ACCU-CHEK ANGELICA    100 strip    Use to test blood sugars one time by finger poke twice weekly or as directed.       calcium + D 600-200 MG-UNIT Tabs   Generic drug:  calcium carbonate-vitamin D      Take 600 mg by mouth 2 times daily.       celeBREX 200 MG capsule   Generic drug:  celecoxib     90 capsule    TAKE 1 CAPSULE DAILY       ESTRACE VAGINAL 0.1 MG/GM cream   Generic drug:  estradiol     42 g    Place 2 g vaginally three times a week       fluticasone 50 MCG/ACT spray    FLONASE    3 Bottle    Spray 2 sprays into both nostrils daily       folic acid 1 MG tablet    FOLVITE     Take 2 tablets by mouth daily       METHOTREXATE PO      Inject 12.5 mg as directed once a week.       Multi-vitamin Tabs tablet      Take  by mouth daily.       omeprazole 40 MG capsule    priLOSEC    90 capsule    TAKE 1 CAPSULE DAILY BEFORE A MEAL       potassium chloride SA 20 MEQ CR tablet    K-DUR/KLOR-CON M    90 tablet    TAKE 1 TABLET DAILY WITH FOOD       pramipexole 0.25 MG tablet    MIRAPEX    270 tablet     TAKE 1 TABLET AT 5 P.M. AND 2 TABLETS AT 9 P.M.       predniSONE 1 MG tablet    DELTASONE     Take 1 mg by mouth daily Take 3 tabs po daily       propranolol 20 MG tablet    INDERAL    90 tablet    TAKE 1 TABLET DAILY       RITUXAN IV          venlafaxine 75 MG 24 hr capsule    EFFEXOR-XR    90 capsule    TAKE 1 CAPSULE DAILY       VITAMIN B 12 PO      Take 500 mg by mouth daily.       * Notice:  This list has 2 medication(s) that are the same as other medications prescribed for you. Read the directions carefully, and ask your doctor or other care provider to review them with you.

## 2017-05-31 NOTE — NURSING NOTE
"Chief Complaint   Patient presents with     Urinary Problem     1 yr f/u; recurrent UTI's       Initial /66 (BP Location: Left arm, Cuff Size: Adult Large)  Pulse 71  Temp 97  F (36.1  C) (Tympanic)  Ht 1.676 m (5' 6\")  Wt 96.6 kg (213 lb)  SpO2 96%  BMI 34.38 kg/m2 Estimated body mass index is 34.38 kg/(m^2) as calculated from the following:    Height as of this encounter: 1.676 m (5' 6\").    Weight as of this encounter: 96.6 kg (213 lb).  Medication Reconciliation: complete   Jeanie Hoyt LPN      "

## 2017-05-31 NOTE — PROGRESS NOTES
"CC: UTI symptoms    HPI: Ms. Radha Fox is a very pleasant 65 year old female seen today in the urology clinic for recurrent urinary tract infections.  I last saw her for this problem on 5/24/16.    Since then she has had two documented urinary tract infections, which were: 11/5/16- ,000 Ecoli and 11/22/16- >100,000 Ecoli.  All other urinalysis tests showed mixed mikhail.  Of note- on 4/6/17 her urinalysis showed mixed mikhail, but she had >182 RBC.  This was also true of her 9/26/16 urine where she had 3 RBC.   She has been using her Estrace cream.  She has now started Vitamin C.  She is also taking a probiotic.  She also has a history of urinary incontinence and urinary urgency.  She has had some sort of bladder surgery in the past and many years ago was on \"some kind of bladder medicine that didn't work\".      Daytime frequency: No  Nocturia: Yes, X1  Urgency: Yes, about once a day  Dysuria: No  Hematuria: No  Urge incontinence: Yes  Stress incontinence: No  Number of pads per day: one  Episodes of incontinence per day: little dribbles  Sensation of incomplete bladder emptying: No  Strain or Crede to improve bladder emptying: No  Effect on life:  Rate from 1-5, with 1 being the least bothersome and 5 the most bothersome: 1-2    Past Medical History:   Diagnosis Date     Abnormal PFT     moderate restriction/parenchymal; moderate diffusion defect; minimal obstruction     Arthropathy, unspecified, site unspecified 05/01/2000     Chest pain, unspecified 2005    Resolved     Contact dermatitis and other eczema, due to unspecified cause 03/05/2009     Coronary atherosclerosis of unspecified type of vessel, native or graft 09/27/2005     Depressive disorder, not elsewhere classified 01/11/2002     Diabetes mellitus type 2, diet-controlled (H)      Esophageal reflux 11/12/2010     H/O: hysterectomy 1977     Impaired fasting glucose 11/12/2010     Insomnia, unspecified 01/24/2013     Leukocytosis, unspecified " 12/22/2007     Obesity, unspecified 01/19/2012     Osteoporosis, unspecified 01/19/2012     Other and unspecified hyperlipidemia 04/13/2005     Other dyspnea and respiratory abnormality 01/19/2012    reduced DLCO     Restless legs syndrome (RLS) 04/21/2011     Rheumatoid arthritis(714.0) 11/09/1999     Unspecified diffuse connective tissue disease 04/04/2000     Unspecified sinusitis (chronic) 09/05/2000     Past Surgical History:   Procedure Laterality Date     APPENDECTOMY  1961     CHOLECYSTECTOMY  1973     COLONOSCOPY N/A 3/23/2015    Procedure: COLONOSCOPY;  Surgeon: Clarisa Larkin MD;  Location: [facility]     endoscopic sphenoidotomy  10/2011    Right     ETHMOIDECTOMY Bilateral 9/27/2016    Procedure: ETHMOIDECTOMY;  Surgeon: Aracelis Ayon MD;  Location: [facility]     excision bilateral telma bullosa  10/2011     GENITOURINARY SURGERY       HYSTERECTOMY      benign     LAPAROSCOPY DIAGNOSTIC (GENERAL)       ORTHOPEDIC SURGERY      left foot 5th metarsal fracture screws placed     SEPTOPLASTY N/A 9/27/2016    Procedure: SEPTOPLASTY;  Surgeon: Aracelis Ayon MD;  Location: [facility]     TOT Internal urethrotomy  2010     TUBAL LIGATION       Current Outpatient Prescriptions   Medication Sig Dispense Refill     ESTRACE VAGINAL 0.1 MG/GM cream PLACE VAGINALLY THREE TIMES A WEEK 42.5 g 0     venlafaxine (EFFEXOR-XR) 75 MG 24 hr capsule TAKE 1 CAPSULE DAILY 90 capsule 1     atorvastatin (LIPITOR) 20 MG tablet TAKE 1 TABLET DAILY 90 tablet 1     potassium chloride SA (K-DUR/KLOR-CON M) 20 MEQ CR tablet TAKE 1 TABLET DAILY WITH FOOD 90 tablet 1     pramipexole (MIRAPEX) 0.25 MG tablet TAKE 1 TABLET AT 5 P.M. AND 2 TABLETS AT 9 P.M. 270 tablet 1     fluticasone (FLONASE) 50 MCG/ACT spray Spray 2 sprays into both nostrils daily 3 Bottle 11     omeprazole (PRILOSEC) 40 MG capsule TAKE 1 CAPSULE DAILY BEFORE A MEAL 90 capsule 3     propranolol (INDERAL) 20 MG tablet TAKE 1 TABLET DAILY 90 tablet 3     RiTUXimab  (RITUXAN IV)        predniSONE (DELTASONE) 1 MG tablet Take 1 mg by mouth daily Take 3 tabs po daily       CELEBREX 200 MG capsule TAKE 1 CAPSULE DAILY 90 capsule 1     folic acid (FOLVITE) 1 MG tablet Take 2 tablets by mouth daily       Blood Glucose Calibration (ACCU-CHEK COMPACT BLUE CONTROL) LIQD Use to calibrate blood glucose monitor as directed. 1 each 0     glucose blood VI test strips (ACCU-CHEK ANGELICA) strip Use to test blood sugars one time by finger poke twice weekly or as directed. 100 strip 3     Blood Glucose Calibration (ACCU-CHEK ANGELICA) SOLN Use as directed 1 Bottle 0     calcium carbonate-vitamin D (CALCIUM + D) 600-200 MG-UNIT TABS Take 600 mg by mouth 2 times daily.       Methotrexate Sodium (METHOTREXATE PO) Inject 12.5 mg as directed once a week.       multivitamin, therapeutic with minerals (MULTI-VITAMIN) TABS Take  by mouth daily.       Cyanocobalamin (VITAMIN B 12 PO) Take 500 mg by mouth daily.       benzonatate (TESSALON) 100 MG capsule Take 1 capsule (100 mg) by mouth 3 times daily as needed for cough (Patient not taking: Reported on 5/31/2017) 42 capsule 0     albuterol (PROAIR HFA/PROVENTIL HFA/VENTOLIN HFA) 108 (90 BASE) MCG/ACT Inhaler Inhale 2 puffs into the lungs every 6 hours as needed for shortness of breath / dyspnea or wheezing (Patient not taking: Reported on 5/31/2017) 1 Inhaler 0     Allergies   Allergen Reactions     Neomycin-Polymyxin-Hc      Rash behind ear after ear drops     Penicillins Hives and Itching     One time large local reaction after IM PCN in 1970, no airway symptoms     Plaquenil [Hydroxychloroquine Sulfate] Hives     Family History:Reviewed, no changes  Social History: Reviewed, no changes    Review Of Systems  Skin: negative  Eyes: negative  Ears/Nose/Throat:hearing loss  Respiratory: No shortness of breath, dyspnea on exertion, cough, or hemoptysis  Cardiovascular: negative  Gastrointestinal: negative  Genitourinary: dysuria  Musculoskeletal:  "arthritis  Neurologic: negative  Psychiatric:stable depression  Hematologic/Lymphatic/Immunologic: negative  Endocrine: negative    PHYSICAL EXAM:   Blood pressure 104/66, pulse 71, temperature 97  F (36.1  C), temperature source Tympanic, height 1.676 m (5' 6\"), weight 96.6 kg (213 lb), SpO2 96 %.   GENERAL: Well groomed/well developed/well nourished female in no acute distress.  RESP: No increased respiratory effort.  CV: RRR, no murmurs/rubs/gallops.  ABD: Soft, non tender, non distended, no palpable masses.  Normoactive bowel sounds.  No CVA tenderness  MS: Full range of motion in extremities.  NEURO: Alert and oriented x 3.  PSYCH: Normal mood and affect, pleasant and agreeable during interview and exam.    Orders Only on 11/25/2014   Component Date Value Range Status     WBC 11/25/2014 12.4* 4.0 - 11.0 10e9/L Final     RBC Count 11/25/2014 3.90  3.8 - 5.2 10e12/L Final     Hemoglobin 11/25/2014 13.3  11.7 - 15.7 g/dL Final     Hematocrit 11/25/2014 39.7  35.0 - 47.0 % Final     MCV 11/25/2014 102* 78 - 100 fl Final     MCH 11/25/2014 34.1* 26.5 - 33.0 pg Final     MCHC 11/25/2014 33.5  31.5 - 36.5 g/dL Final     RDW 11/25/2014 14.6  10.0 - 15.0 % Final     Platelet Count 11/25/2014 270  150 - 450 10e9/L Final     Diff Method 11/25/2014 Automated Method   Final     % Neutrophils 11/25/2014 82.8   Final     % Lymphocytes 11/25/2014 7.4   Final     % Monocytes 11/25/2014 8.4   Final     % Eosinophils 11/25/2014 1.1   Final     % Basophils 11/25/2014 0.2   Final     % Immature Granulocytes 11/25/2014 0.1   Final     Absolute Neutrophil 11/25/2014 10.3* 1.6 - 8.3 10e9/L Final     Absolute Lymphocytes 11/25/2014 0.9  0.8 - 5.3 10e9/L Final     Absolute Monoctyes 11/25/2014 1.0  0.0 - 1.3 10e9/L Final     Absolute Eosinophils 11/25/2014 0.1  0.0 - 0.7 10e9/L Final     Absolute Basophils 11/25/2014 0.0  0.0 - 0.2 10e9/L Final     Abs Immature Granulocytes 11/25/2014 0.0  0 - 0.4 10e9/L Final     ASSESSMENT/PLAN:  Ms. " Radha Fox is a very pleasant 65 year old female with a history of recurrent urinary tract infections. She will continue to use her Estrogen cream.  She will continue Vitamin C 1000 mg daily.  She has a new identified issue of possible microhematuria.  She has some issues with bleeding with intercourse, so we are not sure if the blood in her urine on 4/6/17 was from that or not.  She could not void today, she will return for a repeat urinalysis and will refrain from intercourse the day before.    We may need to do a hematuria evaluation if she still has blood in her urine, and I did explain to her what this entails, she verbalized understanding.  She will see me again in a year, or sooner if needed.    I have enjoyed participating in the medical care of this very pleasant patient.  Using layterms, and diagrams when needed, I explained the pathophysiology, usual course, potential complications, recommended monitoring, preventive modalities, treatment rationale, risks, and benefits to Ms. Radha Fox. The patient appeared to understand and all questions were satisfactorily answered.      Palak Pham  CNP, CWOCN  Urology and Wound Care  May 31, 2017

## 2017-05-31 NOTE — PATIENT INSTRUCTIONS
Continue Estrace cream and vitamin C.  Let me know if you need more refills for the Estrace.  Stop by the lab for a urinalysis only.  We will let you know if you still have blood in your urine, if you do you may need additional testing.    Otherwise see me in a year.

## 2017-06-26 ENCOUNTER — TRANSFERRED RECORDS (OUTPATIENT)
Dept: HEALTH INFORMATION MANAGEMENT | Facility: HOSPITAL | Age: 67
End: 2017-06-26

## 2017-06-27 DIAGNOSIS — M06.09 RHEUMATOID ARTHRITIS OF MULTIPLE SITES WITHOUT RHEUMATOID FACTOR (H): Primary | ICD-10-CM

## 2017-06-27 DIAGNOSIS — Z79.899 MEDICATION MANAGEMENT: ICD-10-CM

## 2017-06-27 DIAGNOSIS — R31.29 MICROSCOPIC HEMATURIA: ICD-10-CM

## 2017-06-27 DIAGNOSIS — R82.90 ABNORMAL URINE FINDINGS: ICD-10-CM

## 2017-06-27 LAB
ALBUMIN SERPL-MCNC: 3.5 G/DL (ref 3.4–5)
ALBUMIN UR-MCNC: 10 MG/DL
APPEARANCE UR: CLEAR
BACTERIA #/AREA URNS HPF: ABNORMAL /HPF
BASOPHILS # BLD AUTO: 0 10E9/L (ref 0–0.2)
BASOPHILS NFR BLD AUTO: 0.1 %
BILIRUB UR QL STRIP: ABNORMAL
CALCIUM SERPL-MCNC: 9 MG/DL (ref 8.5–10.1)
COLOR UR AUTO: YELLOW
CREAT SERPL-MCNC: 0.78 MG/DL (ref 0.52–1.04)
CRP SERPL-MCNC: 4.1 MG/L (ref 0–8)
DIFFERENTIAL METHOD BLD: ABNORMAL
EOSINOPHIL # BLD AUTO: 0 10E9/L (ref 0–0.7)
EOSINOPHIL NFR BLD AUTO: 0.1 %
ERYTHROCYTE [DISTWIDTH] IN BLOOD BY AUTOMATED COUNT: 13.8 % (ref 10–15)
GFR SERPL CREATININE-BSD FRML MDRD: 74 ML/MIN/1.7M2
GLUCOSE UR STRIP-MCNC: 70 MG/DL
HCT VFR BLD AUTO: 39.7 % (ref 35–47)
HGB BLD-MCNC: 13.4 G/DL (ref 11.7–15.7)
HGB UR QL STRIP: NEGATIVE
IMM GRANULOCYTES # BLD: 0.1 10E9/L (ref 0–0.4)
IMM GRANULOCYTES NFR BLD: 0.6 %
KETONES UR STRIP-MCNC: NEGATIVE MG/DL
LEUKOCYTE ESTERASE UR QL STRIP: ABNORMAL
LYMPHOCYTES # BLD AUTO: 0.8 10E9/L (ref 0.8–5.3)
LYMPHOCYTES NFR BLD AUTO: 4.1 %
MCH RBC QN AUTO: 33.9 PG (ref 26.5–33)
MCHC RBC AUTO-ENTMCNC: 33.8 G/DL (ref 31.5–36.5)
MCV RBC AUTO: 101 FL (ref 78–100)
MONOCYTES # BLD AUTO: 1.3 10E9/L (ref 0–1.3)
MONOCYTES NFR BLD AUTO: 6.9 %
MUCOUS THREADS #/AREA URNS LPF: PRESENT /LPF
NEUTROPHILS # BLD AUTO: 16.6 10E9/L (ref 1.6–8.3)
NEUTROPHILS NFR BLD AUTO: 88.2 %
NITRATE UR QL: NEGATIVE
NRBC # BLD AUTO: 0 10*3/UL
NRBC BLD AUTO-RTO: 0 /100
PH UR STRIP: 6 PH (ref 4.7–8)
PLATELET # BLD AUTO: 303 10E9/L (ref 150–450)
RBC # BLD AUTO: 3.95 10E12/L (ref 3.8–5.2)
RBC #/AREA URNS AUTO: 1 /HPF (ref 0–2)
SP GR UR STRIP: 1.02 (ref 1–1.03)
URN SPEC COLLECT METH UR: ABNORMAL
UROBILINOGEN UR STRIP-MCNC: NORMAL MG/DL (ref 0–2)
WBC # BLD AUTO: 18.8 10E9/L (ref 4–11)
WBC #/AREA URNS AUTO: 55 /HPF (ref 0–2)

## 2017-06-27 PROCEDURE — 82306 VITAMIN D 25 HYDROXY: CPT | Mod: 90 | Performed by: INTERNAL MEDICINE

## 2017-06-27 PROCEDURE — 2894A VOIDCORRECT: CPT | Performed by: INTERNAL MEDICINE

## 2017-06-27 PROCEDURE — 82565 ASSAY OF CREATININE: CPT | Performed by: INTERNAL MEDICINE

## 2017-06-27 PROCEDURE — 84460 ALANINE AMINO (ALT) (SGPT): CPT | Performed by: INTERNAL MEDICINE

## 2017-06-27 PROCEDURE — 82040 ASSAY OF SERUM ALBUMIN: CPT | Performed by: INTERNAL MEDICINE

## 2017-06-27 PROCEDURE — 36415 COLL VENOUS BLD VENIPUNCTURE: CPT | Performed by: INTERNAL MEDICINE

## 2017-06-27 PROCEDURE — 86140 C-REACTIVE PROTEIN: CPT | Performed by: INTERNAL MEDICINE

## 2017-06-27 PROCEDURE — 81001 URINALYSIS AUTO W/SCOPE: CPT | Performed by: NURSE PRACTITIONER

## 2017-06-27 PROCEDURE — 85025 COMPLETE CBC W/AUTO DIFF WBC: CPT | Performed by: INTERNAL MEDICINE

## 2017-06-27 PROCEDURE — 99000 SPECIMEN HANDLING OFFICE-LAB: CPT | Performed by: INTERNAL MEDICINE

## 2017-06-27 PROCEDURE — 87086 URINE CULTURE/COLONY COUNT: CPT | Performed by: NURSE PRACTITIONER

## 2017-06-27 PROCEDURE — 82310 ASSAY OF CALCIUM: CPT | Performed by: INTERNAL MEDICINE

## 2017-06-29 LAB
BACTERIA SPEC CULT: ABNORMAL
DEPRECATED CALCIDIOL+CALCIFEROL SERPL-MC: 72 UG/L (ref 20–75)
MICRO REPORT STATUS: ABNORMAL
SPECIMEN SOURCE: ABNORMAL

## 2017-07-03 LAB
ALT SERPL W P-5'-P-CCNC: 36 U/L (ref 0–50)
AST SERPL W P-5'-P-CCNC: NORMAL U/L (ref 0–45)

## 2017-07-05 DIAGNOSIS — G25.81 RESTLESS LEGS SYNDROME (RLS): ICD-10-CM

## 2017-07-05 DIAGNOSIS — E78.5 HYPERLIPIDEMIA LDL GOAL <100: ICD-10-CM

## 2017-07-05 DIAGNOSIS — F32.9 MDD (MAJOR DEPRESSIVE DISORDER): ICD-10-CM

## 2017-07-05 DIAGNOSIS — I10 HTN (HYPERTENSION): ICD-10-CM

## 2017-07-07 RX ORDER — ATORVASTATIN CALCIUM 20 MG/1
TABLET, FILM COATED ORAL
Qty: 90 TABLET | Refills: 1 | Status: SHIPPED | OUTPATIENT
Start: 2017-07-07 | End: 2017-10-30

## 2017-07-07 RX ORDER — PRAMIPEXOLE DIHYDROCHLORIDE 0.25 MG/1
TABLET ORAL
Qty: 270 TABLET | Refills: 0 | Status: SHIPPED | OUTPATIENT
Start: 2017-07-07 | End: 2017-10-30

## 2017-07-07 RX ORDER — POTASSIUM CHLORIDE 1500 MG/1
TABLET, EXTENDED RELEASE ORAL
Qty: 90 TABLET | Refills: 0 | Status: SHIPPED | OUTPATIENT
Start: 2017-07-07 | End: 2017-10-30

## 2017-07-07 RX ORDER — VENLAFAXINE HYDROCHLORIDE 75 MG/1
CAPSULE, EXTENDED RELEASE ORAL
Qty: 90 CAPSULE | Refills: 0 | Status: SHIPPED | OUTPATIENT
Start: 2017-07-07 | End: 2017-10-30

## 2017-07-07 NOTE — NURSING NOTE
FAxed orders received from [facility] L [facility] for Rituximab infusion X 2.  Orders faxed to Dr. Costello for co-signature.

## 2017-08-07 ENCOUNTER — TELEPHONE (OUTPATIENT)
Dept: INFUSION THERAPY | Facility: OTHER | Age: 67
End: 2017-08-07

## 2017-08-07 ENCOUNTER — INFUSION THERAPY VISIT (OUTPATIENT)
Dept: INFUSION THERAPY | Facility: OTHER | Age: 67
End: 2017-08-07
Attending: FAMILY MEDICINE
Payer: COMMERCIAL

## 2017-08-07 VITALS
RESPIRATION RATE: 20 BRPM | WEIGHT: 210.1 LBS | BODY MASS INDEX: 33.77 KG/M2 | HEIGHT: 66 IN | SYSTOLIC BLOOD PRESSURE: 108 MMHG | TEMPERATURE: 97 F | OXYGEN SATURATION: 95 % | DIASTOLIC BLOOD PRESSURE: 58 MMHG | HEART RATE: 55 BPM

## 2017-08-07 DIAGNOSIS — M06.09 RHEUMATOID ARTHRITIS OF MULTIPLE SITES WITHOUT RHEUMATOID FACTOR (H): Primary | ICD-10-CM

## 2017-08-07 DIAGNOSIS — M06.9 RHEUMATOID ARTHRITIS, INVOLVING UNSPECIFIED SITE, UNSPECIFIED RHEUMATOID FACTOR PRESENCE: ICD-10-CM

## 2017-08-07 DIAGNOSIS — M06.09 RHEUMATOID ARTHRITIS OF MULTIPLE SITES WITH NEGATIVE RHEUMATOID FACTOR (H): ICD-10-CM

## 2017-08-07 DIAGNOSIS — M05.9 RHEUMATOID ARTHRITIS WITH POSITIVE RHEUMATOID FACTOR, INVOLVING UNSPECIFIED SITE (H): Primary | ICD-10-CM

## 2017-08-07 LAB
ALT SERPL W P-5'-P-CCNC: 34 U/L (ref 0–50)
BASOPHILS # BLD AUTO: 0 10E9/L (ref 0–0.2)
BASOPHILS NFR BLD AUTO: 0.3 %
DIFFERENTIAL METHOD BLD: ABNORMAL
EOSINOPHIL # BLD AUTO: 0.2 10E9/L (ref 0–0.7)
EOSINOPHIL NFR BLD AUTO: 2.6 %
ERYTHROCYTE [DISTWIDTH] IN BLOOD BY AUTOMATED COUNT: 14.2 % (ref 10–15)
HCT VFR BLD AUTO: 39.5 % (ref 35–47)
HGB BLD-MCNC: 13.4 G/DL (ref 11.7–15.7)
IMM GRANULOCYTES # BLD: 0 10E9/L (ref 0–0.4)
IMM GRANULOCYTES NFR BLD: 0.4 %
LYMPHOCYTES # BLD AUTO: 1.4 10E9/L (ref 0.8–5.3)
LYMPHOCYTES NFR BLD AUTO: 18 %
MCH RBC QN AUTO: 34.3 PG (ref 26.5–33)
MCHC RBC AUTO-ENTMCNC: 33.9 G/DL (ref 31.5–36.5)
MCV RBC AUTO: 101 FL (ref 78–100)
MONOCYTES # BLD AUTO: 0.7 10E9/L (ref 0–1.3)
MONOCYTES NFR BLD AUTO: 8.8 %
NEUTROPHILS # BLD AUTO: 5.4 10E9/L (ref 1.6–8.3)
NEUTROPHILS NFR BLD AUTO: 69.9 %
NRBC # BLD AUTO: 0 10*3/UL
NRBC BLD AUTO-RTO: 0 /100
PLATELET # BLD AUTO: 256 10E9/L (ref 150–450)
RBC # BLD AUTO: 3.91 10E12/L (ref 3.8–5.2)
WBC # BLD AUTO: 7.7 10E9/L (ref 4–11)

## 2017-08-07 PROCEDURE — 96375 TX/PRO/DX INJ NEW DRUG ADDON: CPT | Performed by: FAMILY MEDICINE

## 2017-08-07 PROCEDURE — S0028 INJECTION, FAMOTIDINE, 20 MG: HCPCS | Performed by: FAMILY MEDICINE

## 2017-08-07 PROCEDURE — 96415 CHEMO IV INFUSION ADDL HR: CPT | Performed by: FAMILY MEDICINE

## 2017-08-07 PROCEDURE — 96413 CHEMO IV INFUSION 1 HR: CPT | Performed by: FAMILY MEDICINE

## 2017-08-07 PROCEDURE — 85025 COMPLETE CBC W/AUTO DIFF WBC: CPT | Performed by: NURSE PRACTITIONER

## 2017-08-07 PROCEDURE — 96367 TX/PROPH/DG ADDL SEQ IV INF: CPT | Performed by: FAMILY MEDICINE

## 2017-08-07 PROCEDURE — 36415 COLL VENOUS BLD VENIPUNCTURE: CPT | Performed by: NURSE PRACTITIONER

## 2017-08-07 PROCEDURE — 84460 ALANINE AMINO (ALT) (SGPT): CPT | Performed by: NURSE PRACTITIONER

## 2017-08-07 RX ORDER — METHYLPREDNISOLONE SODIUM SUCCINATE 125 MG/2ML
100 INJECTION, POWDER, LYOPHILIZED, FOR SOLUTION INTRAMUSCULAR; INTRAVENOUS ONCE
Status: CANCELLED
Start: 2017-08-07 | End: 2017-08-07

## 2017-08-07 RX ORDER — METHYLPREDNISOLONE SODIUM SUCCINATE 40 MG/ML
40 INJECTION, POWDER, LYOPHILIZED, FOR SOLUTION INTRAMUSCULAR; INTRAVENOUS ONCE
Status: CANCELLED
Start: 2017-08-07 | End: 2017-08-07

## 2017-08-07 RX ORDER — METHYLPREDNISOLONE SODIUM SUCCINATE 125 MG/2ML
100 INJECTION, POWDER, LYOPHILIZED, FOR SOLUTION INTRAMUSCULAR; INTRAVENOUS ONCE
Status: COMPLETED | OUTPATIENT
Start: 2017-08-07 | End: 2017-08-07

## 2017-08-07 RX ADMIN — Medication 250 ML: at 10:52

## 2017-08-07 RX ADMIN — METHYLPREDNISOLONE SODIUM SUCCINATE 100 MG: 125 INJECTION INTRAMUSCULAR; INTRAVENOUS at 10:53

## 2017-08-07 NOTE — MR AVS SNAPSHOT
After Visit Summary   8/7/2017    Radha Fox    MRN: [id number]           Patient Information     Date Of Birth          1950        Visit Information        Provider Department      8/7/2017 10:00 AM  INF  [facility]        Today's Diagnoses     Rheumatoid arthritis of multiple sites without rheumatoid factor (H)    -  1    Rheumatoid arthritis of multiple sites with negative rheumatoid factor (H)        Rheumatoid arthritis, involving unspecified site, unspecified rheumatoid factor presence (H)          Care Instructions      Rituximab Solution for injection  What is this medicine?  RITUXIMAB (ri TUX i mab) is a monoclonal antibody. This medicine changes the way the body's immune system works. It is used commonly to treat non-Hodgkin lymphoma and other conditions. In cancer cells, this drug targets a specific protein within cancer cells and stops the cancer cells from growing. It is also used to treat rheumatoid arthritis (RA). In RA, this medicine slows the inflammatory process and help reduce joint pain and swelling. This medicine is often used with other cancer or arthritis medications.  This medicine may be used for other purposes; ask your health care provider or pharmacist if you have questions.  What should I tell my health care provider before I take this medicine?  They need to know if you have any of these conditions:    blood disorders    heart disease    history of hepatitis B    infection (especially a virus infection such as chickenpox, cold sores, or herpes)    irregular heartbeat    kidney disease    lung or breathing disease, like asthma    lupus    an unusual or allergic reaction to rituximab, mouse proteins, other medicines, foods, dyes, or preservatives    pregnant or trying to get pregnant    breast-feeding  How should I use this medicine?  This medicine is for infusion into a vein. It is administered in a hospital or clinic by a specially  trained health care professional.  A special MedGuide will be given to you by the pharmacist with each prescription and refill. Be sure to read this information carefully each time.  Talk to your pediatrician regarding the use of this medicine in children. This medicine is not approved for use in children.  Overdosage: If you think you have taken too much of this medicine contact a poison control center or emergency room at once.  NOTE: This medicine is only for you. Do not share this medicine with others.  What if I miss a dose?  It is important not to miss a dose. Call your doctor or health care professional if you are unable to keep an appointment.  What may interact with this medicine?    cisplatin    medicines for blood pressure    some other medicines for arthritis    vaccines  This list may not describe all possible interactions. Give your health care provider a list of all the medicines, herbs, non-prescription drugs, or dietary supplements you use. Also tell them if you smoke, drink alcohol, or use illegal drugs. Some items may interact with your medicine.  What should I watch for while using this medicine?  Report any side effects that you notice during your treatment right away, such as changes in your breathing, fever, chills, dizziness or lightheadedness. These effects are more common with the first dose.  Visit your prescriber or health care professional for checks on your progress. You will need to have regular blood work. Report any other side effects. The side effects of this medicine can continue after you finish your treatment. Continue your course of treatment even though you feel ill unless your doctor tells you to stop.  Call your doctor or health care professional for advice if you get a fever, chills or sore throat, or other symptoms of a cold or flu. Do not treat yourself. This drug decreases your body's ability to fight infections. Try to avoid being around people who are sick.  This  medicine may increase your risk to bruise or bleed. Call your doctor or health care professional if you notice any unusual bleeding.  Be careful brushing and flossing your teeth or using a toothpick because you may get an infection or bleed more easily. If you have any dental work done, tell your dentist you are receiving this medicine.  Avoid taking products that contain aspirin, acetaminophen, ibuprofen, naproxen, or ketoprofen unless instructed by your doctor. These medicines may hide a fever.  Do not become pregnant while taking this medicine. Women should inform their doctor if they wish to become pregnant or think they might be pregnant. There is a potential for serious side effects to an unborn child. Talk to your health care professional or pharmacist for more information. Do not breast-feed an infant while taking this medicine.  What side effects may I notice from receiving this medicine?  Side effects that you should report to your doctor or health care professional as soon as possible:    allergic reactions like skin rash, itching or hives, swelling of the face, lips, or tongue    low blood counts - this medicine may decrease the number of white blood cells, red blood cells and platelets. You may be at increased risk for infections and bleeding.    signs of infection - fever or chills, cough, sore throat, pain or difficulty passing urine    signs of decreased platelets or bleeding - bruising, pinpoint red spots on the skin, black, tarry stools, blood in the urine    signs of decreased red blood cells - unusually weak or tired, fainting spells, lightheadedness    breathing problems    confused, not responsive    chest pain    fast, irregular heartbeat    feeling faint or lightheaded, falls    mouth sores    redness, blistering, peeling or loosening of the skin, including inside the mouth    stomach pain    swelling of the ankles, feet, or hands    trouble passing urine or change in the amount of  urine  Side effects that usually do not require medical attention (report to your doctor or other health care professional if they continue or are bothersome):    anxiety    headache    loss of appetite    muscle aches    nausea    night sweats  This list may not describe all possible side effects. Call your doctor for medical advice about side effects. You may report side effects to FDA at 8-442-GTJ-5103.  Where should I keep my medicine?  This drug is given in a hospital or clinic and will not be stored at home.  NOTE:This sheet is a summary. It may not cover all possible information. If you have questions about this medicine, talk to your doctor, pharmacist, or health care provider. Copyright  2016 [employer]                Follow-ups after your visit        Your next 10 appointments already scheduled     Aug 21, 2017  9:30 AM CDT   LAB with HC LAB   [facility] ([facility] [place] )    [address]  [place] [address]   [phone]            Aug 21, 2017 10:30 AM CDT   Level 6 with HC INF RM [id number]   [facility] [place] ([facility] [place] )    [address]  [place] [address]   [phone]            May 30, 2018 10:00 AM CDT   (Arrive by 9:45 AM)   Return Visit with Palak Pham NP   [facility] ([facility] [place] )    [address]  [place] [address]   [phone]              Who to contact     If you have questions or need follow up information about today's clinic visit or your schedule please contact [facility] directly at [phone].  Normal or non-critical lab and imaging results will be communicated to you by MyChart, letter or phone within 4 business days after the clinic has received the results. If you do not hear from us within 7 days, please contact the clinic through MyChart or phone. If you have a critical or abnormal lab result, we will notify you by phone as soon as possible.  Submit  "refill requests through [employer] or call your pharmacy and they will forward the refill request to us. Please allow 3 business days for your refill to be completed.          Additional Information About Your Visit        [employer] Information     [employer] gives you secure access to your electronic health record. If you see a primary care provider, you can also send messages to your care team and make appointments. If you have questions, please call your primary care clinic.  If you do not have a primary care provider, please call [phone] and they will assist you.        Care EveryWhere ID     This is your Care EveryWhere ID. This could be used by other organizations to access your [place] medical records  [id number]        Your Vitals Were     Pulse Temperature Respirations Height Pulse Oximetry BMI (Body Mass Index)    55 97  F (36.1  C) (Tympanic) 20 1.676 m (5' 6\") 95% 33.91 kg/m2       Blood Pressure from Last 3 Encounters:   08/07/17 108/58   05/31/17 104/66   03/02/17 120/60    Weight from Last 3 Encounters:   08/07/17 95.3 kg (210 lb 1.6 oz)   05/31/17 96.6 kg (213 lb)   03/02/17 97.5 kg (215 lb)              Today, you had the following     No orders found for display       Primary Care Provider Office Phone # Fax #    Lanie Costello [phone]       [facility] [address] [place] [address]        Equal Access to Services     FOUZIA TAMAYO AH: Hadii aad ku hadasho Soomaali, waaxda luqadaha, qaybta kaalmada adeegyada, erika marley. So [facility] [phone].    ATENCIÓN: Si habla español, tiene a garcia disposición servicios gratuitos de asistencia lingüística. Llwenceslao al [phone].    We comply with applicable federal civil rights laws and [place] laws. We do not discriminate on the basis of race, color, national origin, age, disability sex, sexual orientation or gender identity.            Thank you!     Thank you for choosing [facility] " [place]  for your care. Our goal is always to provide you with excellent care. Hearing back from our patients is one way we can continue to improve our services. Please take a few minutes to complete the written survey that you may receive in the mail after your visit with us. Thank you!             Your Updated Medication List - Protect others around you: Learn how to safely use, store and throw away your medicines at www.disposemymeds.org.          This list is accurate as of: 8/7/17  3:08 PM.  Always use your most recent med list.                   Brand Name Dispense Instructions for use Diagnosis    albuterol 108 (90 BASE) MCG/ACT Inhaler    PROAIR HFA/PROVENTIL HFA/VENTOLIN HFA    1 Inhaler    Inhale 2 puffs into the lungs every 6 hours as needed for shortness of breath / dyspnea or wheezing    URI with cough and congestion       atorvastatin 20 MG tablet    LIPITOR    90 tablet    TAKE 1 TABLET DAILY    Hyperlipidemia LDL goal <100       benzonatate 100 MG capsule    TESSALON    42 capsule    Take 1 capsule (100 mg) by mouth 3 times daily as needed for cough    URI with cough and congestion       * blood glucose calibration solution     1 Bottle    Use as directed    Impaired fasting glucose       * blood glucose calibration solution     1 each    Use to calibrate blood glucose monitor as directed.    Impaired fasting glucose       blood glucose monitoring test strip    ACCU-CHEK ANGELICA    100 strip    Use to test blood sugars one time by finger poke twice weekly or as directed.    Impaired fasting glucose       calcium + D 600-200 MG-UNIT Tabs   Generic drug:  calcium carbonate-vitamin D      Take 600 mg by mouth 2 times daily.        celeBREX 200 MG capsule   Generic drug:  celecoxib     90 capsule    TAKE 1 CAPSULE DAILY    RA (rheumatoid arthritis) (H)       ESTRACE VAGINAL 0.1 MG/GM cream   Generic drug:  estradiol     42 g    Place 2 g vaginally three times a week    Atrophic vaginitis       fluticasone  50 MCG/ACT spray    FLONASE    3 Bottle    Spray 2 sprays into both nostrils daily    S/P FESS (functional endoscopic sinus surgery)       folic acid 1 MG tablet    FOLVITE     Take 2 tablets by mouth daily        METHOTREXATE PO      Inject 12.5 mg as directed once a week.        Multi-vitamin Tabs tablet      Take  by mouth daily.        omeprazole 40 MG capsule    priLOSEC    90 capsule    TAKE 1 CAPSULE DAILY BEFORE A MEAL    Esophageal reflux       potassium chloride SA 20 MEQ CR tablet    K-DUR/KLOR-CON M    90 tablet    TAKE 1 TABLET DAILY WITH FOOD    HTN (hypertension)       pramipexole 0.25 MG tablet    MIRAPEX    270 tablet    TAKE 1 TABLET AT 5 P.M. AND 2 TABLETS AT 9 P.M.    Restless legs syndrome (RLS)       predniSONE 1 MG tablet    DELTASONE     Take 1 mg by mouth daily Take 3 tabs po daily        propranolol 20 MG tablet    INDERAL    90 tablet    TAKE 1 TABLET DAILY    HTN (hypertension)       RITUXAN IV           venlafaxine 75 MG 24 hr capsule    EFFEXOR-XR    90 capsule    TAKE 1 CAPSULE DAILY    MDD (major depressive disorder)       VITAMIN B 12 PO      Take 500 mg by mouth daily.        * Notice:  This list has 2 medication(s) that are the same as other medications prescribed for you. Read the directions carefully, and ask your doctor or other care provider to review them with you.

## 2017-08-07 NOTE — PROGRESS NOTES
Patient is a 67 year old female here accompanied by self today for infusion of Rituxan 1000mg per order of Dr. Conner under the supervision of Dr. Costello. Patient meets parameters for today's infusion. Patient identified with two identifiers, order verified, and verbal consent for today's infusion obtained from patient.       Recent lab values: WBC: 7.7, HGB: 13.4, PLT: 256, ANC: 5.4, ALT: 34. Patient meets order parameters for today's treatment.      22 gauge angio cath inserted into left hand. Immediate blood return noted. IV secured with sterile, transparent dressing and tape. Patient tolerated well, denies pain or discomfort at this time. Flushes easily without resistance, no signs or symptoms of infiltration or infection. Patient denies questions or concerns regarding infusion and/or medication(s) being administered.      1132- Rituxan IV pump verified with dose, drug, and rate of administration with MAR . Infusion administered per protocol. Patient tolerated infusion well, no signs or symptoms of adverse reaction noted. Patient denies pain nor discomfort.       IV removed, catheter intact. Site clean, dry and intact. No signs or symptoms of infiltration or infection. Covered with a sterile bandage, slight pressure applied for 30 seconds. Pt instructed to leave bandage intact for a minimum of one hour, and to call with questions or concerns. Copy of appointments, discharge instructions, and after visit summary (AVS) provided to patient. Patient states understanding, discharged ambulatory

## 2017-08-07 NOTE — PATIENT INSTRUCTIONS
Rituximab Solution for injection  What is this medicine?  RITUXIMAB (ri TUX i mab) is a monoclonal antibody. This medicine changes the way the body's immune system works. It is used commonly to treat non-Hodgkin lymphoma and other conditions. In cancer cells, this drug targets a specific protein within cancer cells and stops the cancer cells from growing. It is also used to treat rheumatoid arthritis (RA). In RA, this medicine slows the inflammatory process and help reduce joint pain and swelling. This medicine is often used with other cancer or arthritis medications.  This medicine may be used for other purposes; ask your health care provider or pharmacist if you have questions.  What should I tell my health care provider before I take this medicine?  They need to know if you have any of these conditions:    blood disorders    heart disease    history of hepatitis B    infection (especially a virus infection such as chickenpox, cold sores, or herpes)    irregular heartbeat    kidney disease    lung or breathing disease, like asthma    lupus    an unusual or allergic reaction to rituximab, mouse proteins, other medicines, foods, dyes, or preservatives    pregnant or trying to get pregnant    breast-feeding  How should I use this medicine?  This medicine is for infusion into a vein. It is administered in a hospital or clinic by a specially trained health care professional.  A special MedGuide will be given to you by the pharmacist with each prescription and refill. Be sure to read this information carefully each time.  Talk to your pediatrician regarding the use of this medicine in children. This medicine is not approved for use in children.  Overdosage: If you think you have taken too much of this medicine contact a poison control center or emergency room at once.  NOTE: This medicine is only for you. Do not share this medicine with others.  What if I miss a dose?  It is important not to miss a dose. Call your  doctor or health care professional if you are unable to keep an appointment.  What may interact with this medicine?    cisplatin    medicines for blood pressure    some other medicines for arthritis    vaccines  This list may not describe all possible interactions. Give your health care provider a list of all the medicines, herbs, non-prescription drugs, or dietary supplements you use. Also tell them if you smoke, drink alcohol, or use illegal drugs. Some items may interact with your medicine.  What should I watch for while using this medicine?  Report any side effects that you notice during your treatment right away, such as changes in your breathing, fever, chills, dizziness or lightheadedness. These effects are more common with the first dose.  Visit your prescriber or health care professional for checks on your progress. You will need to have regular blood work. Report any other side effects. The side effects of this medicine can continue after you finish your treatment. Continue your course of treatment even though you feel ill unless your doctor tells you to stop.  Call your doctor or health care professional for advice if you get a fever, chills or sore throat, or other symptoms of a cold or flu. Do not treat yourself. This drug decreases your body's ability to fight infections. Try to avoid being around people who are sick.  This medicine may increase your risk to bruise or bleed. Call your doctor or health care professional if you notice any unusual bleeding.  Be careful brushing and flossing your teeth or using a toothpick because you may get an infection or bleed more easily. If you have any dental work done, tell your dentist you are receiving this medicine.  Avoid taking products that contain aspirin, acetaminophen, ibuprofen, naproxen, or ketoprofen unless instructed by your doctor. These medicines may hide a fever.  Do not become pregnant while taking this medicine. Women should inform their doctor if  they wish to become pregnant or think they might be pregnant. There is a potential for serious side effects to an unborn child. Talk to your health care professional or pharmacist for more information. Do not breast-feed an infant while taking this medicine.  What side effects may I notice from receiving this medicine?  Side effects that you should report to your doctor or health care professional as soon as possible:    allergic reactions like skin rash, itching or hives, swelling of the face, lips, or tongue    low blood counts - this medicine may decrease the number of white blood cells, red blood cells and platelets. You may be at increased risk for infections and bleeding.    signs of infection - fever or chills, cough, sore throat, pain or difficulty passing urine    signs of decreased platelets or bleeding - bruising, pinpoint red spots on the skin, black, tarry stools, blood in the urine    signs of decreased red blood cells - unusually weak or tired, fainting spells, lightheadedness    breathing problems    confused, not responsive    chest pain    fast, irregular heartbeat    feeling faint or lightheaded, falls    mouth sores    redness, blistering, peeling or loosening of the skin, including inside the mouth    stomach pain    swelling of the ankles, feet, or hands    trouble passing urine or change in the amount of urine  Side effects that usually do not require medical attention (report to your doctor or other health care professional if they continue or are bothersome):    anxiety    headache    loss of appetite    muscle aches    nausea    night sweats  This list may not describe all possible side effects. Call your doctor for medical advice about side effects. You may report side effects to FDA at 7-287-AZF-2792.  Where should I keep my medicine?  This drug is given in a hospital or clinic and will not be stored at home.  NOTE:This sheet is a summary. It may not cover all possible information. If you  have questions about this medicine, talk to your doctor, pharmacist, or health care provider. Copyright  2016 Gold Standard

## 2017-08-07 NOTE — PROGRESS NOTES
POST-INFUSION OF BIOLOGICAL MEDICATION:    Reviewed with patient.  Given biologic medication or medication hand-out. Inform patient if any fever, chills or signs of infection, new symptoms, abdominal pain, heart palpitations, shortness of breath, reaction, weakness, neurological changes, seek medical attention immediately and should not receive infusions. No live virus vaccines prior to or during treatment or up to 6 months post infusion. If the patient has an upcoming procedure or surgery, this should be discussed with the rheumatologist and surgeon or provider.

## 2017-08-07 NOTE — PROGRESS NOTES
PRIOR TO INFUSION OF BIOLOGICAL MEDICATIONS OR ANY OF THESE AS LISTED: Rituxan    Prior to Infusion of biological medications or any of these as listed:    1. Elevated temperature, fever, chills, productive cough or abnormal vital signs : NO    2. Open wounds or new incisions: NO    3. Recent hospitalization: NO    4. Any standing or future labs [may be on multiple immunosuppressants]. If abnormal, hold until cleared to infuse: NO    5. Confirm negative PPD or quantiferon gold MTB. If positive, verify has negative chest x-ray or the patient is at least 4 weeks post initiation of INH/B6 therapy and have clearance from rheumatologist before infusion:NO    6. Negative hepatitis B surface antigen or hepatitis C. If positive, clearance from rheumatologist before infusion:NO    7. Any current or recent bouts of illness or infection? On any antibiotics? : NO    8. Any upcoming surgeries?:NO    9. Any new, sudden or worsening abdominal pain :NO    10. Vaccination within 4 weeks? Patient or someone in the household is scheduled to receive vaccination? No live virus vaccines prior to or during treatment :NO    11. Any nervous system diseases [i.e. multiple sclerosis, Guillain-Norman, seizures, neurological  changes]: NO    12. Pregnant or breast feeding; or plans on pregnancy in the future: NO    13. Signs of worsening depression or suicidal ideations while taking benlysta: NO    14. New-onset medical symptoms: NO      **Note: If answered yes to any of the above, hold the infusion and contact ordering rheumatologist or on-call rheumatologist.

## 2017-08-20 ENCOUNTER — HOSPITAL ENCOUNTER (EMERGENCY)
Facility: HOSPITAL | Age: 67
Discharge: HOME | End: 2017-08-20
Attending: FAMILY MEDICINE | Admitting: FAMILY MEDICINE
Payer: COMMERCIAL

## 2017-08-20 VITALS
OXYGEN SATURATION: 95 % | SYSTOLIC BLOOD PRESSURE: 137 MMHG | RESPIRATION RATE: 16 BRPM | DIASTOLIC BLOOD PRESSURE: 58 MMHG | TEMPERATURE: 96.2 F

## 2017-08-20 DIAGNOSIS — R30.0 DYSURIA: ICD-10-CM

## 2017-08-20 LAB
ALBUMIN UR-MCNC: 100 MG/DL
APPEARANCE UR: ABNORMAL
BACTERIA #/AREA URNS HPF: ABNORMAL /HPF
BILIRUB UR QL STRIP: NEGATIVE
COLOR UR AUTO: YELLOW
GLUCOSE UR STRIP-MCNC: NEGATIVE MG/DL
HGB UR QL STRIP: ABNORMAL
KETONES UR STRIP-MCNC: NEGATIVE MG/DL
LEUKOCYTE ESTERASE UR QL STRIP: ABNORMAL
MUCOUS THREADS #/AREA URNS LPF: PRESENT /LPF
NITRATE UR QL: NEGATIVE
PH UR STRIP: 6 PH (ref 4.7–8)
RBC #/AREA URNS AUTO: 50 /HPF (ref 0–2)
SOURCE: ABNORMAL
SP GR UR STRIP: 1.01 (ref 1–1.03)
UROBILINOGEN UR STRIP-MCNC: NORMAL MG/DL (ref 0–2)
WBC #/AREA URNS AUTO: >182 /HPF (ref 0–2)

## 2017-08-20 PROCEDURE — 99213 OFFICE O/P EST LOW 20 MIN: CPT

## 2017-08-20 PROCEDURE — 81001 URINALYSIS AUTO W/SCOPE: CPT | Performed by: FAMILY MEDICINE

## 2017-08-20 PROCEDURE — 99213 OFFICE O/P EST LOW 20 MIN: CPT | Performed by: FAMILY MEDICINE

## 2017-08-20 PROCEDURE — 87088 URINE BACTERIA CULTURE: CPT | Performed by: FAMILY MEDICINE

## 2017-08-20 PROCEDURE — 87186 SC STD MICRODIL/AGAR DIL: CPT | Performed by: FAMILY MEDICINE

## 2017-08-20 PROCEDURE — 87086 URINE CULTURE/COLONY COUNT: CPT | Performed by: FAMILY MEDICINE

## 2017-08-20 RX ORDER — SULFAMETHOXAZOLE/TRIMETHOPRIM 800-160 MG
1 TABLET ORAL 2 TIMES DAILY
Qty: 10 TABLET | Refills: 0 | Status: SHIPPED | OUTPATIENT
Start: 2017-08-20 | End: 2017-08-25

## 2017-08-20 ASSESSMENT — ENCOUNTER SYMPTOMS
PSYCHIATRIC NEGATIVE: 1
DYSURIA: 0
RESPIRATORY NEGATIVE: 1
HEMATURIA: 0
FEVER: 0
ABDOMINAL PAIN: 0
MUSCULOSKELETAL NEGATIVE: 1
ACTIVITY CHANGE: 0
FREQUENCY: 1

## 2017-08-20 NOTE — ED PROVIDER NOTES
History     Chief Complaint   Patient presents with     UTI     c/o frequency and notes this is typical for her with a uti     HPI  Radha Fox is a 67 year old female who has had a UTI with frequency for about 24 hours.  She states this is generally how they start, and she is pretty sure she needs an antibiotic.  She is on Rituxan for her RA, and is aware that she will likely not get a dose tomorrow with her infection.    I have reviewed the Medications, Allergies, Past Medical and Surgical History, and Social History in the Epic system.    Allergies:   Allergies   Allergen Reactions     Neomycin-Polymyxin-Hc      Rash behind ear after ear drops     Penicillins Hives and Itching     One time large local reaction after IM PCN in 1970, no airway symptoms     Plaquenil [Hydroxychloroquine Sulfate] Hives         No current facility-administered medications on file prior to encounter.   Current Outpatient Prescriptions on File Prior to Encounter:  atorvastatin (LIPITOR) 20 MG tablet TAKE 1 TABLET DAILY   venlafaxine (EFFEXOR-XR) 75 MG 24 hr capsule TAKE 1 CAPSULE DAILY   pramipexole (MIRAPEX) 0.25 MG tablet TAKE 1 TABLET AT 5 P.M. AND 2 TABLETS AT 9 P.M.   ESTRACE VAGINAL 0.1 MG/GM cream Place 2 g vaginally three times a week   omeprazole (PRILOSEC) 40 MG capsule TAKE 1 CAPSULE DAILY BEFORE A MEAL   propranolol (INDERAL) 20 MG tablet TAKE 1 TABLET DAILY   RiTUXimab (RITUXAN IV)    predniSONE (DELTASONE) 1 MG tablet Take 1 mg by mouth daily Take 3 tabs po daily   CELEBREX 200 MG capsule TAKE 1 CAPSULE DAILY   folic acid (FOLVITE) 1 MG tablet Take 2 tablets by mouth daily   calcium carbonate-vitamin D (CALCIUM + D) 600-200 MG-UNIT TABS Take 600 mg by mouth 2 times daily.   Methotrexate Sodium (METHOTREXATE PO) Inject 12.5 mg as directed once a week.   multivitamin, therapeutic with minerals (MULTI-VITAMIN) TABS Take  by mouth daily.   Cyanocobalamin (VITAMIN B 12 PO) Take 500 mg by mouth daily.   potassium  chloride SA (K-DUR/KLOR-CON M) 20 MEQ CR tablet TAKE 1 TABLET DAILY WITH FOOD   Blood Glucose Calibration (ACCU-CHEK COMPACT BLUE CONTROL) LIQD Use to calibrate blood glucose monitor as directed.   glucose blood VI test strips (ACCU-CHEK ANGELICA) strip Use to test blood sugars one time by finger poke twice weekly or as directed.   Blood Glucose Calibration (ACCU-CHEK ANGELICA) SOLN Use as directed       Patient Active Problem List   Diagnosis     RA (rheumatoid arthritis) (H)     Recurrent UTI     Hyperlipidemia     Depressive disorder, not elsewhere classified     Contact dermatitis and other eczema, due to unspecified cause     Diffuse connective tissue disease (H)     Arthropathy     Coronary atherosclerosis     Sinusitis, chronic     Impaired fasting glucose     Esophageal reflux     Restless legs syndrome (RLS)     Osteoporosis     Obesity     Insomnia     Abnormal glucose     History of sinus surgery     Simple chronic serous otitis media     Dysfunction of eustachian tube     Mixed hearing loss, unilateral     SNHL (sensory-neural hearing loss), asymmetrical     S/P myringotomy with insertion of tube     History of chronic sinusitis     Rheumatoid arthritis of multiple sites without rheumatoid factor (H)     ACP (advance care planning)     Diabetes mellitus type 2, diet-controlled (H)     Chronic cough       Past Surgical History:   Procedure Laterality Date     APPENDECTOMY  1961     CHOLECYSTECTOMY  1973     COLONOSCOPY N/A 3/23/2015    Procedure: COLONOSCOPY;  Surgeon: Clarisa Larkin MD;  Location: [facility]     endoscopic sphenoidotomy  10/2011    Right     ETHMOIDECTOMY Bilateral 9/27/2016    Procedure: ETHMOIDECTOMY;  Surgeon: Aracelis Ayon MD;  Location: [facility]     excision bilateral telma bullosa  10/2011     GENITOURINARY SURGERY       HYSTERECTOMY      benign     LAPAROSCOPY DIAGNOSTIC (GENERAL)       ORTHOPEDIC SURGERY      left foot 5th metarsal fracture screws placed     SEPTOPLASTY N/A  "9/27/2016    Procedure: SEPTOPLASTY;  Surgeon: Aracelis Ayon MD;  Location: [facility]     TOT Internal urethrotomy  2010     TUBAL LIGATION         Social History   Substance Use Topics     Smoking status: Never Smoker     Smokeless tobacco: Never Used     Alcohol use No       Most Recent Immunizations   Administered Date(s) Administered     Influenza (H1N1) 11/20/2009     Influenza (High Dose) 3 valent vaccine 10/10/2016     Influenza (IIV3) 10/06/2010     Influenza Vaccine IM 3yrs+ 4 Valent IIV4 10/14/2013     Influenza vaccine ages 6-35 months 10/19/2011     Pneumococcal 23 valent 11/15/2002     TDAP Vaccine (Boostrix) 03/08/2010       BMI: Estimated body mass index is 33.91 kg/(m^2) as calculated from the following:    Height as of 8/7/17: 1.676 m (5' 6\").    Weight as of 8/7/17: 95.3 kg (210 lb 1.6 oz).      Review of Systems   Constitutional: Negative for activity change and fever.   HENT: Negative.    Respiratory: Negative.    Gastrointestinal: Negative for abdominal pain.   Genitourinary: Positive for frequency and urgency. Negative for dysuria and hematuria.   Musculoskeletal: Negative.    Psychiatric/Behavioral: Negative.        Physical Exam   BP: 137/58  Heart Rate: 63  Temp: 96.2  F (35.7  C)  Resp: 16  SpO2: 95 %  Physical Exam   Constitutional: She is oriented to person, place, and time. She appears well-developed and well-nourished. No distress.   HENT:   Head: Normocephalic and atraumatic.   Abdominal: Soft. Bowel sounds are normal. There is no tenderness. There is no CVA tenderness.   Neurological: She is alert and oriented to person, place, and time.   Skin: Skin is warm and dry.   Psychiatric: She has a normal mood and affect.       ED Course     ED Course     Procedures        Labs Ordered and Resulted from Time of ED Arrival Up to the Time of Departure from the ED   UA MACROSCOPIC WITH REFLEX TO MICRO AND CULTURE - Abnormal; Notable for the following:        Result Value    Blood Urine " Large (*)     Protein Albumin Urine 100 (*)     Leukocyte Esterase Urine Large (*)     RBC Urine 50 (*)     WBC Urine >182 (*)     Bacteria Urine Few (*)     Mucous Urine Present (*)     All other components within normal limits   URINE CULTURE AEROBIC BACTERIAL       Assessments & Plan (with Medical Decision Making)   Urine grossly infected.  Patient will be discharged on Bactrim DS for 5 days.  Follow up with PCP prn.    I have reviewed the nursing notes.    I have reviewed the findings, diagnosis, plan and need for follow up with the patient.       New Prescriptions    SULFAMETHOXAZOLE-TRIMETHOPRIM (BACTRIM DS) 800-160 MG PER TABLET    Take 1 tablet by mouth 2 times daily for 5 days       Final diagnoses:   Dysuria       8/20/2017   [facility]     Allison Teixeira MD  08/20/17 4235

## 2017-08-20 NOTE — ED AVS SNAPSHOT
[facility]    [address] [address]    Phone:  [phone]                                       Radha Fox   MRN: [id number]    Department:  [facility]   Date of Visit:  8/20/2017           Patient Information     Date Of Birth          1950        Your diagnoses for this visit were:     Dysuria        You were seen by Allison Teixeira MD.      Follow-up Information     Follow up with Lanie Costello MD In 3 days.    Specialty:  Family Practice    Why:  If symptoms worsen, or fail to improve    Contact information:    [facility]  [address] [facility] [address]  [phone]        Discharge References/Attachments     BLADDER INFECTION, FEMALE (ADULT) (ENGLISH)      Future Appointments        Provider Department Dept Phone Center    8/21/2017 9:30 AM HC LAB [phone] [facility]    8/21/2017 10:30 AM HC INF [facility] [phone] [facility]    5/30/2018 10:00 AM Palak Carroll, NP [facility] [phone] [facility]         Review of your medicines      START taking        Dose / Directions Last dose taken    sulfamethoxazole-trimethoprim 800-160 MG per tablet   Commonly known as:  BACTRIM DS   Dose:  1 tablet   Quantity:  10 tablet        Take 1 tablet by mouth 2 times daily for 5 days   Refills:  0          Our records show that you are taking the medicines listed below. If these are incorrect, please call your family doctor or clinic.        Dose / Directions Last dose taken    atorvastatin 20 MG tablet   Commonly known as:  LIPITOR   Quantity:  90 tablet        TAKE 1 TABLET DAILY   Refills:  1        * blood glucose calibration solution   Quantity:  1 Bottle        Use as directed   Refills:  0        * blood glucose calibration solution   Quantity:  1 each        Use to calibrate blood glucose monitor as directed.   Refills:  0        blood  glucose monitoring test strip   Commonly known as:  ACCU-CHEK ANGELICA   Quantity:  100 strip        Use to test blood sugars one time by finger poke twice weekly or as directed.   Refills:  3        calcium + D 600-200 MG-UNIT Tabs   Dose:  600 mg   Generic drug:  calcium carbonate-vitamin D        Take 600 mg by mouth 2 times daily.   Refills:  0        celeBREX 200 MG capsule   Quantity:  90 capsule   Generic drug:  celecoxib        TAKE 1 CAPSULE DAILY   Refills:  1        ESTRACE VAGINAL 0.1 MG/GM cream   Dose:  2 g   Quantity:  42 g   Generic drug:  estradiol        Place 2 g vaginally three times a week   Refills:  3        folic acid 1 MG tablet   Commonly known as:  FOLVITE   Dose:  2 tablet        Take 2 tablets by mouth daily   Refills:  0        METHOTREXATE PO   Dose:  12.5 mg        Inject 12.5 mg as directed once a week.   Refills:  0        Multi-vitamin Tabs tablet        Take  by mouth daily.   Refills:  0        omeprazole 40 MG capsule   Commonly known as:  priLOSEC   Quantity:  90 capsule        TAKE 1 CAPSULE DAILY BEFORE A MEAL   Refills:  3        potassium chloride SA 20 MEQ CR tablet   Commonly known as:  K-DUR/KLOR-CON M   Quantity:  90 tablet        TAKE 1 TABLET DAILY WITH FOOD   Refills:  0        pramipexole 0.25 MG tablet   Commonly known as:  MIRAPEX   Quantity:  270 tablet        TAKE 1 TABLET AT 5 P.M. AND 2 TABLETS AT 9 P.M.   Refills:  0        predniSONE 1 MG tablet   Commonly known as:  DELTASONE   Dose:  1 mg        Take 1 mg by mouth daily Take 3 tabs po daily   Refills:  0        propranolol 20 MG tablet   Commonly known as:  INDERAL   Quantity:  90 tablet        TAKE 1 TABLET DAILY   Refills:  3        RITUXAN IV        Refills:  0        venlafaxine 75 MG 24 hr capsule   Commonly known as:  EFFEXOR-XR   Quantity:  90 capsule        TAKE 1 CAPSULE DAILY   Refills:  0        VITAMIN B 12 PO   Dose:  500 mg        Take 500 mg by mouth daily.   Refills:  0        * Notice:  This  list has 2 medication(s) that are the same as other medications prescribed for you. Read the directions carefully, and ask your doctor or other care provider to review them with you.            Prescriptions were sent or printed at these locations (1 Prescription)                   [facility] - [place], [place] - [address] AT [facility] [address]   [address], [place] [address]    Telephone:  [phone]   Fax:  [phone]   Hours:                  E-Prescribed (1 of 1)         sulfamethoxazole-trimethoprim (BACTRIM DS) 800-160 MG per tablet                Procedures and tests performed during your visit     UA reflex to Microscopic and Culture    Urine Culture Aerobic Bacterial      Orders Needing Specimen Collection     None      Pending Results     Date and Time Order Name Status Description    8/20/2017 1316 Urine Culture Aerobic Bacterial In process             Pending Culture Results     Date and Time Order Name Status Description    8/20/2017 1316 Urine Culture Aerobic Bacterial In process             Thank you for choosing [facility]       Thank you for choosing [facility] for your care. Our goal is always to provide you with excellent care. Hearing back from our patients is one way we can continue to improve our services. Please take a few minutes to complete the written survey that you may receive in the mail after you visit with us. Thank you!        [employer] Information     [employer] gives you secure access to your electronic health record. If you see a primary care provider, you can also send messages to your care team and make appointments. If you have questions, please call your primary care clinic.  If you do not have a primary care provider, please call [phone] and they will assist you.        Care EveryWhere ID     This is your Care EveryWhere ID. This could be used by other organizations to access your [facility] medical records  [id number]        Equal Access to Services      JOE TAMAYO : Hadii mikki Navarro, wapetersonda luqadaha, qaybta kaalmagisela cee, erika marley. So [facility] [phone].    ATENCIÓN: Si habla español, tiene a garcia disposición servicios gratuitos de asistencia lingüística. Llame al [phone].    We comply with applicable federal civil rights laws and [place] laws. We do not discriminate on the basis of race, color, national origin, age, disability sex, sexual orientation or gender identity.            After Visit Summary       This is your record. Keep this with you and show to your community pharmacist(s) and doctor(s) at your next visit.

## 2017-08-20 NOTE — ED AVS SNAPSHOT
[facility]    [address] [address]    Phone:  [phone]                                       Radha Fox   MRN: [id number]    Department:  [facility]   Date of Visit:  8/20/2017           After Visit Summary Signature Page     I have received my discharge instructions, and my questions have been answered. I have discussed any challenges I see with this plan with the nurse or doctor.    ..........................................................................................................................................  Patient/Patient Representative Signature      ..........................................................................................................................................  Patient Representative Print Name and Relationship to Patient    ..................................................               ................................................  Date                                            Time    ..........................................................................................................................................  Reviewed by Signature/Title    ...................................................              ..............................................  Date                                                            Time

## 2017-08-23 ENCOUNTER — TELEPHONE (OUTPATIENT)
Dept: FAMILY MEDICINE | Facility: OTHER | Age: 67
End: 2017-08-23

## 2017-08-23 DIAGNOSIS — N39.0 URINARY TRACT INFECTION: Primary | ICD-10-CM

## 2017-08-23 LAB
BACTERIA SPEC CULT: ABNORMAL
SPECIMEN SOURCE: ABNORMAL

## 2017-08-23 NOTE — TELEPHONE ENCOUNTER
Reason for call:  RESULTS    1. What is the test that was ordered? PT said she had labs done the other day and she is just waiting on the results  2. Who ordered your test? PCP  3. When was the test performed?  Lab work  Description: Pt would like to know the results of her lab work.  Was an appointment offered for this a call? No  If Yes :  Appointment type                 Date    Preferred method for responding to this message: Telephone Call  What is your phone number ?[phone]    If we cannot reach you directly, may we leave a detailed response at the number you provided? Yes  Can this message wait until your PCP/Provider returns if not available today? PCP is in

## 2017-08-23 NOTE — PROGRESS NOTES
Urine culture - final >100,000 colonies/mL Escherichia coli, sensitive to Bactrim DS prescribed for patient.  Report routed to PCP Lanie Costello.

## 2017-08-27 ENCOUNTER — HOSPITAL ENCOUNTER (EMERGENCY)
Facility: HOSPITAL | Age: 67
Discharge: HOME | End: 2017-08-27
Attending: NURSE PRACTITIONER | Admitting: NURSE PRACTITIONER
Payer: COMMERCIAL

## 2017-08-27 VITALS
RESPIRATION RATE: 18 BRPM | HEART RATE: 61 BPM | DIASTOLIC BLOOD PRESSURE: 71 MMHG | TEMPERATURE: 96.1 F | SYSTOLIC BLOOD PRESSURE: 136 MMHG | OXYGEN SATURATION: 100 %

## 2017-08-27 DIAGNOSIS — R82.90 ABNORMAL URINE FINDINGS: Primary | ICD-10-CM

## 2017-08-27 DIAGNOSIS — N30.01 ACUTE CYSTITIS WITH HEMATURIA: ICD-10-CM

## 2017-08-27 LAB
ALBUMIN UR-MCNC: 300 MG/DL
APPEARANCE UR: ABNORMAL
BACTERIA #/AREA URNS HPF: ABNORMAL /HPF
BILIRUB UR QL STRIP: NEGATIVE
COLOR UR AUTO: YELLOW
GLUCOSE UR STRIP-MCNC: 30 MG/DL
HGB UR QL STRIP: ABNORMAL
KETONES UR STRIP-MCNC: NEGATIVE MG/DL
LEUKOCYTE ESTERASE UR QL STRIP: ABNORMAL
NITRATE UR QL: NEGATIVE
PH UR STRIP: 6.5 PH (ref 4.7–8)
RBC #/AREA URNS AUTO: 16 /HPF (ref 0–2)
SOURCE: ABNORMAL
SP GR UR STRIP: 1.02 (ref 1–1.03)
UROBILINOGEN UR STRIP-MCNC: NORMAL MG/DL (ref 0–2)
WBC #/AREA URNS AUTO: >182 /HPF (ref 0–2)

## 2017-08-27 PROCEDURE — 87086 URINE CULTURE/COLONY COUNT: CPT | Performed by: NURSE PRACTITIONER

## 2017-08-27 PROCEDURE — 99213 OFFICE O/P EST LOW 20 MIN: CPT

## 2017-08-27 PROCEDURE — 99214 OFFICE O/P EST MOD 30 MIN: CPT | Performed by: NURSE PRACTITIONER

## 2017-08-27 PROCEDURE — 81001 URINALYSIS AUTO W/SCOPE: CPT | Performed by: NURSE PRACTITIONER

## 2017-08-27 RX ORDER — LEVOFLOXACIN 500 MG/1
500 TABLET, FILM COATED ORAL DAILY
Qty: 7 TABLET | Refills: 0 | Status: SHIPPED | OUTPATIENT
Start: 2017-08-27 | End: 2017-09-15

## 2017-08-27 ASSESSMENT — ENCOUNTER SYMPTOMS
RESPIRATORY NEGATIVE: 1
DYSURIA: 1
NAUSEA: 0
MUSCULOSKELETAL NEGATIVE: 1
VOMITING: 0
FEVER: 0
FLANK PAIN: 0
CONSTITUTIONAL NEGATIVE: 1
FREQUENCY: 1
DIARRHEA: 0
CARDIOVASCULAR NEGATIVE: 1
GASTROINTESTINAL NEGATIVE: 1
WEAKNESS: 0
HEMATURIA: 1

## 2017-08-27 NOTE — ED AVS SNAPSHOT
[facility]    [address] [address]    Phone:  [phone]                                       Radha Fox   MRN: [id number]    Department[facility]  [facility]   Date of Visit:  8/27/2017           After Visit Summary Signature Page     I have received my discharge instructions, and my questions have been answered. I have discussed any challenges I see with this plan with the nurse or doctor.    ..........................................................................................................................................  Patient/Patient Representative Signature      ..........................................................................................................................................  Patient Representative Print Name and Relationship to Patient    ..................................................               ................................................  Date                                            Time    ..........................................................................................................................................  Reviewed by Signature/Title    ...................................................              ..............................................  Date                                                            Time

## 2017-08-27 NOTE — ED PROVIDER NOTES
"  History     Chief Complaint   Patient presents with     UTI     The history is provided by the patient. No  was used.     Radha Fox is a 67 year old female who presents with dysuria that has been going on for 1.5 weeks.  She was seen and tx'd about one week ago with a 5 day course of septra ds and she continues to have dysuria, she reports that it really never got better. \"not really\".  She hasn't had any fever, rigors, no cva tenderness but aching in her back,she states that her urine has been \"pinky\" in color and she does not \"feel good\". .  She was seen and treated a week ago for a UTI and treated with a five day course of septra. She had a UC which grew out E Coli and was sensitive to septra Ds    She is on a biologic for RA  She has seen urology in the past for UTI's.      I have reviewed the Medications, Allergies, Past Medical and Surgical History, and Social History in the Epic system.    Allergies:   Allergies   Allergen Reactions     Neomycin-Polymyxin-Hc      Rash behind ear after ear drops     Penicillins Hives and Itching     One time large local reaction after IM PCN in 1970, no airway symptoms     Plaquenil [Hydroxychloroquine Sulfate] Hives         No current facility-administered medications on file prior to encounter.   Current Outpatient Prescriptions on File Prior to Encounter:  potassium chloride SA (K-DUR/KLOR-CON M) 20 MEQ CR tablet TAKE 1 TABLET DAILY WITH FOOD   atorvastatin (LIPITOR) 20 MG tablet TAKE 1 TABLET DAILY   venlafaxine (EFFEXOR-XR) 75 MG 24 hr capsule TAKE 1 CAPSULE DAILY   pramipexole (MIRAPEX) 0.25 MG tablet TAKE 1 TABLET AT 5 P.M. AND 2 TABLETS AT 9 P.M.   ESTRACE VAGINAL 0.1 MG/GM cream Place 2 g vaginally three times a week   omeprazole (PRILOSEC) 40 MG capsule TAKE 1 CAPSULE DAILY BEFORE A MEAL   propranolol (INDERAL) 20 MG tablet TAKE 1 TABLET DAILY   RiTUXimab (RITUXAN IV)    predniSONE (DELTASONE) 1 MG tablet Take 1 mg by mouth daily Take 3 " tabs po daily   CELEBREX 200 MG capsule TAKE 1 CAPSULE DAILY   folic acid (FOLVITE) 1 MG tablet Take 2 tablets by mouth daily   Blood Glucose Calibration (ACCU-CHEK COMPACT BLUE CONTROL) LIQD Use to calibrate blood glucose monitor as directed.   glucose blood VI test strips (ACCU-CHEK ANGELICA) strip Use to test blood sugars one time by finger poke twice weekly or as directed.   Blood Glucose Calibration (ACCU-CHEK ANGELICA) SOLN Use as directed   calcium carbonate-vitamin D (CALCIUM + D) 600-200 MG-UNIT TABS Take 600 mg by mouth 2 times daily.   Methotrexate Sodium (METHOTREXATE PO) Inject 12.5 mg as directed once a week.   multivitamin, therapeutic with minerals (MULTI-VITAMIN) TABS Take  by mouth daily.   Cyanocobalamin (VITAMIN B 12 PO) Take 500 mg by mouth daily.       Patient Active Problem List   Diagnosis     RA (rheumatoid arthritis) (H)     Recurrent UTI     Hyperlipidemia     Depressive disorder, not elsewhere classified     Contact dermatitis and other eczema, due to unspecified cause     Diffuse connective tissue disease (H)     Arthropathy     Coronary atherosclerosis     Sinusitis, chronic     Impaired fasting glucose     Esophageal reflux     Restless legs syndrome (RLS)     Osteoporosis     Obesity     Insomnia     Abnormal glucose     History of sinus surgery     Simple chronic serous otitis media     Dysfunction of eustachian tube     Mixed hearing loss, unilateral     SNHL (sensory-neural hearing loss), asymmetrical     S/P myringotomy with insertion of tube     History of chronic sinusitis     Rheumatoid arthritis of multiple sites without rheumatoid factor (H)     ACP (advance care planning)     Diabetes mellitus type 2, diet-controlled (H)     Chronic cough       Past Surgical History:   Procedure Laterality Date     APPENDECTOMY  1961     CHOLECYSTECTOMY  1973     COLONOSCOPY N/A 3/23/2015    Procedure: COLONOSCOPY;  Surgeon: Clarisa Larkin MD;  Location: [facility]     endoscopic sphenoidotomy   "10/2011    Right     ETHMOIDECTOMY Bilateral 9/27/2016    Procedure: ETHMOIDECTOMY;  Surgeon: Aracelis Ayon MD;  Location: [facility]     excision bilateral telma bullosa  10/2011     GENITOURINARY SURGERY       HYSTERECTOMY      benign     LAPAROSCOPY DIAGNOSTIC (GENERAL)       ORTHOPEDIC SURGERY      left foot 5th metarsal fracture screws placed     SEPTOPLASTY N/A 9/27/2016    Procedure: SEPTOPLASTY;  Surgeon: Aracelis Ayon MD;  Location: [facility]     TOT Internal urethrotomy  2010     TUBAL LIGATION         Social History   Substance Use Topics     Smoking status: Never Smoker     Smokeless tobacco: Never Used     Alcohol use No       Most Recent Immunizations   Administered Date(s) Administered     Influenza (H1N1) 11/20/2009     Influenza (High Dose) 3 valent vaccine 10/10/2016     Influenza (IIV3) 10/06/2010     Influenza Vaccine IM 3yrs+ 4 Valent IIV4 10/14/2013     Influenza vaccine ages 6-35 months 10/19/2011     Pneumococcal 23 valent 11/15/2002     TDAP Vaccine (Boostrix) 03/08/2010       BMI: Estimated body mass index is 33.91 kg/(m^2) as calculated from the following:    Height as of 8/7/17: 1.676 m (5' 6\").    Weight as of 8/7/17: 95.3 kg (210 lb 1.6 oz).      Review of Systems   Constitutional: Negative.  Negative for fever.   Respiratory: Negative.    Cardiovascular: Negative.    Gastrointestinal: Negative.  Negative for diarrhea, nausea and vomiting.   Genitourinary: Positive for dysuria, frequency, hematuria and urgency. Negative for flank pain and vaginal discharge.   Musculoskeletal: Negative.    Skin: Negative.  Negative for rash.   Neurological: Negative for weakness.       Physical Exam   BP: 136/71  Pulse: 61  Temp: 96.1  F (35.6  C)  Resp: 18  SpO2: 100 %  Physical Exam   Constitutional: She is oriented to person, place, and time. She appears well-developed and well-nourished. No distress.   HENT:   Head: Normocephalic and atraumatic.   Neck: Normal range of motion. "   Cardiovascular: Normal rate, regular rhythm and normal heart sounds.  Exam reveals no gallop and no friction rub.    No murmur heard.  Pulmonary/Chest: Effort normal. She has no wheezes. She has no rales.   Abdominal: Soft. Bowel sounds are normal. She exhibits no mass. There is no tenderness. There is no rebound and no guarding.   No CVA tenderness   Musculoskeletal: Normal range of motion.   Stiffness/baseline   Neurological: She is alert and oriented to person, place, and time.   Skin: Skin is warm and dry. No rash noted. She is not diaphoretic.   Nursing note and vitals reviewed.      ED Course     ED Course     Procedures               Labs Ordered and Resulted from Time of ED Arrival Up to the Time of Departure from the ED   UA MACROSCOPIC WITH REFLEX TO MICRO AND CULTURE - Abnormal; Notable for the following:        Result Value    Glucose Urine 30 (*)     Blood Urine Moderate (*)     Protein Albumin Urine 300 (*)     Leukocyte Esterase Urine Large (*)     RBC Urine 16 (*)     WBC Urine >182 (*)     Bacteria Urine Few (*)     All other components within normal limits   URINE CULTURE AEROBIC BACTERIAL     Results for orders placed or performed during the hospital encounter of 08/27/17   UA reflex to Microscopic and Culture   Result Value Ref Range    Color Urine Yellow     Appearance Urine Cloudy     Glucose Urine 30 (A) NEG^Negative mg/dL    Bilirubin Urine Negative NEG^Negative    Ketones Urine Negative NEG^Negative mg/dL    Specific Gravity Urine 1.025 1.003 - 1.035    Blood Urine Moderate (A) NEG^Negative    pH Urine 6.5 4.7 - 8.0 pH    Protein Albumin Urine 300 (A) NEG^Negative mg/dL    Urobilinogen mg/dL Normal 0.0 - 2.0 mg/dL    Nitrite Urine Negative NEG^Negative    Leukocyte Esterase Urine Large (A) NEG^Negative    Source Midstream Urine     RBC Urine 16 (H) 0 - 2 /HPF    WBC Urine >182 (H) 0 - 2 /HPF    Bacteria Urine Few (A) NEG^Negative /HPF       Assessments & Plan (with Medical Decision Making)  "    I have reviewed the nursing notes.    Pathophysiology, possible etiology and treatment with potential outcomes, risks, benefits, and alternatives discussed to the best of my ability      She is concerned that she may get a \"stomach ache from cipro\"  as she thinks this has happened in the past.  Therefore she is given levaquin.  She has not been using pyridium.  Sx should start to improve fairly quickly in the next day or so and no new sx should develop if this is not the case she should f/u with PCP or here if needed  Written and verbal instructions provided  I have reviewed the findings, diagnosis, plan and need for follow up with the patient.      Discharge Medication List as of 8/27/2017 10:12 AM      START taking these medications    Details   levofloxacin (LEVAQUIN) 500 MG tablet Take 1 tablet (500 mg) by mouth daily, Disp-7 tablet, R-0, E-Prescribe             Final diagnoses:   Acute cystitis with hematuria     Pt verbalizes understanding and agreement with plan.  Follow up for worsening symptoms    8/27/2017   [facility]     Savana Gutierrez NP  08/27/17 1257    "

## 2017-08-27 NOTE — ED NOTES
Pt presents today alone for c/o dysuria, urgency and frequency she was just on Sulfa for a UTI and it didn't resolve the infection, pt is still experiencing the same s/s, previous urine was cultured and should have responded to the sulfa.

## 2017-08-27 NOTE — DISCHARGE INSTRUCTIONS
Understanding Urinary Tract Infections (UTIs)  Most UTIs are caused by bacteria, although they may also be caused by viruses or fungi. Bacteria from the bowel are the most common source of infection. The infection may start because of any of the following:    Sexual activity. During sex, bacteria can travel from the penis, vagina, or rectum into the urethra.     Bacteria on the skin outside the rectum may travel into the urethra. This is more common in women since the rectum and urethra are closer to each other than in men. Wiping from front to back after using the toilet and keeping the area clean can help prevent germs from getting to the urethra.    Blockage of urine flow through the urinary tract. If urine sits too long, germs may start to grow out of control.      Parts of the urinary tract  The infection can occur in any part of the urinary tract.    The kidneys collect and store urine.    The ureters carry urine from the kidneys to the bladder.    The bladder holds urine until you are ready to let it out.    The urethra carries urine from the bladder out of the body. It is shorter in women, so bacteria can move through it more easily. The urethra is longer in men, so a UTI is less likely to reach the bladder or kidneys in men.  Date Last Reviewed: 1/1/2017 2000-2017 [employer]. [address] [place] [address], [place] [address]. All rights reserved. This information is not intended as a substitute for professional medical care. Always follow your healthcare professional's instructions.

## 2017-08-27 NOTE — ED AVS SNAPSHOT
[facility]    [address] [address]    Phone:  [phone]                                       Radha Fox   MRN: [id number]    Department[facility]  [facility]   Date of Visit:  8/27/2017           Patient Information     Date Of Birth          1950        Your diagnoses for this visit were:     Abnormal urine findings     Acute cystitis with hematuria        You were seen by Savana Gutierrez NP.      Follow-up Information     Follow up with [facility].    Specialty:  EMERGENCY MEDICINE    Why:  As needed, If symptoms worsen    Contact information:    [address] [address] [phone]    Additional information:    From [place]: Take [address]. Turn left at [address]. Turn left at the first stoplight on [address]. At the first stop sign, take a right onto [address]. Take a left into the parking lot and continue through until you reach the North enterance of the building.       From [place]: Take [address]. Take the [address] ramp towards [place]. Turn left onto [address]. Take a slight right onto [address]. Turn left at the first stoplight on [address]. At the first stop sign, take a right onto [address]. Take a left into the parking lot and continue through until you reach the North enterance of the building.       From [place]: Take [address]. Take a right at [address]. At the first stop sign, take a right onto [address]. Take a left into the parking lot and continue through until you reach the North enterance of the building.         Follow up with Lanie Costello MD.    Specialty:  Family Practice    Why:  As needed, If symptoms worsen    Contact information:    [facility]  [address]  [facility] [address]  [phone]          Discharge Instructions         Understanding Urinary Tract Infections  (UTIs)  Most UTIs are caused by bacteria, although they may also be caused by viruses or fungi. Bacteria from the bowel are the most common source of infection. The infection may start because of any of the following:    Sexual activity. During sex, bacteria can travel from the penis, vagina, or rectum into the urethra.     Bacteria on the skin outside the rectum may travel into the urethra. This is more common in women since the rectum and urethra are closer to each other than in men. Wiping from front to back after using the toilet and keeping the area clean can help prevent germs from getting to the urethra.    Blockage of urine flow through the urinary tract. If urine sits too long, germs may start to grow out of control.      Parts of the urinary tract  The infection can occur in any part of the urinary tract.    The kidneys collect and store urine.    The ureters carry urine from the kidneys to the bladder.    The bladder holds urine until you are ready to let it out.    The urethra carries urine from the bladder out of the body. It is shorter in women, so bacteria can move through it more easily. The urethra is longer in men, so a UTI is less likely to reach the bladder or kidneys in men.  Date Last Reviewed: 1/1/2017 2000-2017 [employer]. [address] [place] [address], [place] [address]. All rights reserved. This information is not intended as a substitute for professional medical care. Always follow your healthcare professional's instructions.          Future Appointments        Provider Department Dept Phone Center    5/30/2018 10:00 AM Palak Carroll NP [facility] [phone] Madhuri Vergara         Review of your medicines      START taking        Dose / Directions Last dose taken    levofloxacin 500 MG tablet   Commonly known as:  LEVAQUIN   Dose:  500 mg   Quantity:  7 tablet        Take 1 tablet (500 mg) by mouth daily   Refills:  0          Our records show that you are  taking the medicines listed below. If these are incorrect, please call your family doctor or clinic.        Dose / Directions Last dose taken    atorvastatin 20 MG tablet   Commonly known as:  LIPITOR   Quantity:  90 tablet        TAKE 1 TABLET DAILY   Refills:  1        * blood glucose calibration solution   Quantity:  1 Bottle        Use as directed   Refills:  0        * blood glucose calibration solution   Quantity:  1 each        Use to calibrate blood glucose monitor as directed.   Refills:  0        blood glucose monitoring test strip   Commonly known as:  ACCU-CHEK ANGELICA   Quantity:  100 strip        Use to test blood sugars one time by finger poke twice weekly or as directed.   Refills:  3        calcium + D 600-200 MG-UNIT Tabs   Dose:  600 mg   Generic drug:  calcium carbonate-vitamin D        Take 600 mg by mouth 2 times daily.   Refills:  0        celeBREX 200 MG capsule   Quantity:  90 capsule   Generic drug:  celecoxib        TAKE 1 CAPSULE DAILY   Refills:  1        ESTRACE VAGINAL 0.1 MG/GM cream   Dose:  2 g   Quantity:  42 g   Generic drug:  estradiol        Place 2 g vaginally three times a week   Refills:  3        folic acid 1 MG tablet   Commonly known as:  FOLVITE   Dose:  2 tablet        Take 2 tablets by mouth daily   Refills:  0        METHOTREXATE PO   Dose:  12.5 mg        Inject 12.5 mg as directed once a week.   Refills:  0        Multi-vitamin Tabs tablet        Take  by mouth daily.   Refills:  0        omeprazole 40 MG capsule   Commonly known as:  priLOSEC   Quantity:  90 capsule        TAKE 1 CAPSULE DAILY BEFORE A MEAL   Refills:  3        potassium chloride SA 20 MEQ CR tablet   Commonly known as:  K-DUR/KLOR-CON M   Quantity:  90 tablet        TAKE 1 TABLET DAILY WITH FOOD   Refills:  0        pramipexole 0.25 MG tablet   Commonly known as:  MIRAPEX   Quantity:  270 tablet        TAKE 1 TABLET AT 5 P.M. AND 2 TABLETS AT 9 P.M.   Refills:  0        predniSONE 1 MG tablet    Commonly known as:  DELTASONE   Dose:  1 mg        Take 1 mg by mouth daily Take 3 tabs po daily   Refills:  0        propranolol 20 MG tablet   Commonly known as:  INDERAL   Quantity:  90 tablet        TAKE 1 TABLET DAILY   Refills:  3        RITUXAN IV        Refills:  0        venlafaxine 75 MG 24 hr capsule   Commonly known as:  EFFEXOR-XR   Quantity:  90 capsule        TAKE 1 CAPSULE DAILY   Refills:  0        VITAMIN B 12 PO   Dose:  500 mg        Take 500 mg by mouth daily.   Refills:  0        * Notice:  This list has 2 medication(s) that are the same as other medications prescribed for you. Read the directions carefully, and ask your doctor or other care provider to review them with you.            Prescriptions were sent or printed at these locations (1 Prescription)                   [facility] [address] - [place], [place] - [address] AT [facility] [address]   [address], [place] [address]    Telephone:  [phone]   Fax:  [phone]   Hours:                  E-Prescribed (1 of 1)         levofloxacin (LEVAQUIN) 500 MG tablet                Procedures and tests performed during your visit     UA reflex to Microscopic and Culture    Urine Culture Aerobic Bacterial      Orders Needing Specimen Collection     None      Pending Results     Date and Time Order Name Status Description    8/27/2017 0953 Urine Culture Aerobic Bacterial In process             Pending Culture Results     Date and Time Order Name Status Description    8/27/2017 0953 Urine Culture Aerobic Bacterial In process             Thank you for choosing [facility]       Thank you for choosing [facility] for your care. Our goal is always to provide you with excellent care. Hearing back from our patients is one way we can continue to improve our services. Please take a few minutes to complete the written survey that you may receive in the mail after you visit with us. Thank you!        [employer] Information     [employer] gives you  secure access to your electronic health record. If you see a primary care provider, you can also send messages to your care team and make appointments. If you have questions, please call your primary care clinic.  If you do not have a primary care provider, please call [phone] and they will assist you.        Care EveryWhere ID     This is your Care EveryWhere ID. This could be used by other organizations to access your [facility] medical records  [id number]        Equal Access to Services     JOE TAMAYO : Richard Navarro, wacolten dixon, qageo kaalmagsiela cee, erika marley. So [facility] [phone].    ATENCIÓN: Si habla español, tiene a garcia disposición servicios gratuitos de asistencia lingüística. Llame al [phone].    We comply with applicable federal civil rights laws and [place] laws. We do not discriminate on the basis of race, color, national origin, age, disability sex, sexual orientation or gender identity.            After Visit Summary       This is your record. Keep this with you and show to your community pharmacist(s) and doctor(s) at your next visit.

## 2017-08-29 LAB
BACTERIA SPEC CULT: ABNORMAL
SPECIMEN SOURCE: ABNORMAL

## 2017-09-05 DIAGNOSIS — R82.90 ABNORMAL URINE FINDINGS: Primary | ICD-10-CM

## 2017-09-05 DIAGNOSIS — N39.0 URINARY TRACT INFECTION: ICD-10-CM

## 2017-09-05 LAB
ALBUMIN UR-MCNC: 30 MG/DL
APPEARANCE UR: ABNORMAL
BACTERIA #/AREA URNS HPF: ABNORMAL /HPF
BILIRUB UR QL STRIP: NEGATIVE
COLOR UR AUTO: ABNORMAL
GLUCOSE UR STRIP-MCNC: 70 MG/DL
HGB UR QL STRIP: NEGATIVE
KETONES UR STRIP-MCNC: NEGATIVE MG/DL
LEUKOCYTE ESTERASE UR QL STRIP: ABNORMAL
MUCOUS THREADS #/AREA URNS LPF: PRESENT /LPF
NITRATE UR QL: NEGATIVE
PH UR STRIP: 5.5 PH (ref 4.7–8)
RBC #/AREA URNS AUTO: 4 /HPF (ref 0–2)
SOURCE: ABNORMAL
SP GR UR STRIP: 1.01 (ref 1–1.03)
SQUAMOUS #/AREA URNS AUTO: 8 /HPF (ref 0–1)
UROBILINOGEN UR STRIP-MCNC: NORMAL MG/DL (ref 0–2)
WBC #/AREA URNS AUTO: >182 /HPF (ref 0–2)

## 2017-09-05 PROCEDURE — 81001 URINALYSIS AUTO W/SCOPE: CPT | Performed by: FAMILY MEDICINE

## 2017-09-05 PROCEDURE — 87086 URINE CULTURE/COLONY COUNT: CPT | Performed by: FAMILY MEDICINE

## 2017-09-06 ENCOUNTER — TELEPHONE (OUTPATIENT)
Dept: FAMILY MEDICINE | Facility: OTHER | Age: 67
End: 2017-09-06

## 2017-09-06 NOTE — TELEPHONE ENCOUNTER
Reason for call:  RESULTS    1. What is the test that was ordered? Urine Culture  2. Who ordered your test? PCP Dr. Lanie Costello  3. When was the test performed?  09/05/2017  Description: PT would like to know the results of her urine culture from yesterday, if you could call her back at [phone]  Preferred method for responding to this message: Telephone Call  What is your phone number ?   [phone]    If we cannot reach you directly, may we leave a detailed response at the number you provided? Yes  Can this message wait until your PCP/Provider returns if not available today? PCP is in

## 2017-09-07 DIAGNOSIS — M06.9 RHEUMATOID ARTHRITIS, INVOLVING UNSPECIFIED SITE, UNSPECIFIED RHEUMATOID FACTOR PRESENCE: Primary | ICD-10-CM

## 2017-09-07 LAB
BACTERIA SPEC CULT: ABNORMAL
SPECIMEN SOURCE: ABNORMAL

## 2017-09-07 NOTE — NURSING NOTE
Pt called to inform that UTI has been treated and needs to be scheduled for Day 15 Rituximab infusion. Pt scheduled for 9-15-17, labs day prior. Rituximab medication is delivered from [facility].

## 2017-09-14 DIAGNOSIS — M06.09 RHEUMATOID ARTHRITIS OF MULTIPLE SITES WITHOUT RHEUMATOID FACTOR (H): Primary | ICD-10-CM

## 2017-09-14 LAB
ALBUMIN SERPL-MCNC: 3.5 G/DL (ref 3.4–5)
ALT SERPL W P-5'-P-CCNC: 40 U/L (ref 0–50)
BASOPHILS # BLD AUTO: 0 10E9/L (ref 0–0.2)
BASOPHILS NFR BLD AUTO: 0.4 %
CREAT SERPL-MCNC: 0.78 MG/DL (ref 0.52–1.04)
CRP SERPL-MCNC: 4.2 MG/L (ref 0–8)
DIFFERENTIAL METHOD BLD: ABNORMAL
EOSINOPHIL # BLD AUTO: 0.4 10E9/L (ref 0–0.7)
EOSINOPHIL NFR BLD AUTO: 3.7 %
ERYTHROCYTE [DISTWIDTH] IN BLOOD BY AUTOMATED COUNT: 14.6 % (ref 10–15)
GFR SERPL CREATININE-BSD FRML MDRD: 74 ML/MIN/1.7M2
HCT VFR BLD AUTO: 38 % (ref 35–47)
HGB BLD-MCNC: 12.8 G/DL (ref 11.7–15.7)
IMM GRANULOCYTES # BLD: 0 10E9/L (ref 0–0.4)
IMM GRANULOCYTES NFR BLD: 0.4 %
LYMPHOCYTES # BLD AUTO: 0.9 10E9/L (ref 0.8–5.3)
LYMPHOCYTES NFR BLD AUTO: 9.8 %
MCH RBC QN AUTO: 34.4 PG (ref 26.5–33)
MCHC RBC AUTO-ENTMCNC: 33.7 G/DL (ref 31.5–36.5)
MCV RBC AUTO: 102 FL (ref 78–100)
MONOCYTES # BLD AUTO: 1.1 10E9/L (ref 0–1.3)
MONOCYTES NFR BLD AUTO: 11.3 %
NEUTROPHILS # BLD AUTO: 7 10E9/L (ref 1.6–8.3)
NEUTROPHILS NFR BLD AUTO: 74.4 %
NRBC # BLD AUTO: 0 10*3/UL
NRBC BLD AUTO-RTO: 0 /100
PLATELET # BLD AUTO: 257 10E9/L (ref 150–450)
RBC # BLD AUTO: 3.72 10E12/L (ref 3.8–5.2)
WBC # BLD AUTO: 9.5 10E9/L (ref 4–11)

## 2017-09-14 PROCEDURE — 82040 ASSAY OF SERUM ALBUMIN: CPT | Performed by: INTERNAL MEDICINE

## 2017-09-14 PROCEDURE — 84460 ALANINE AMINO (ALT) (SGPT): CPT | Performed by: INTERNAL MEDICINE

## 2017-09-14 PROCEDURE — 36415 COLL VENOUS BLD VENIPUNCTURE: CPT | Performed by: INTERNAL MEDICINE

## 2017-09-14 PROCEDURE — 85025 COMPLETE CBC W/AUTO DIFF WBC: CPT | Performed by: INTERNAL MEDICINE

## 2017-09-14 PROCEDURE — 82565 ASSAY OF CREATININE: CPT | Performed by: INTERNAL MEDICINE

## 2017-09-14 PROCEDURE — 86140 C-REACTIVE PROTEIN: CPT | Performed by: INTERNAL MEDICINE

## 2017-09-15 ENCOUNTER — INFUSION THERAPY VISIT (OUTPATIENT)
Dept: INFUSION THERAPY | Facility: OTHER | Age: 67
End: 2017-09-15
Attending: FAMILY MEDICINE
Payer: COMMERCIAL

## 2017-09-15 VITALS
SYSTOLIC BLOOD PRESSURE: 135 MMHG | BODY MASS INDEX: 33.82 KG/M2 | RESPIRATION RATE: 16 BRPM | DIASTOLIC BLOOD PRESSURE: 62 MMHG | HEART RATE: 59 BPM | OXYGEN SATURATION: 94 % | WEIGHT: 210.4 LBS | TEMPERATURE: 96 F | HEIGHT: 66 IN

## 2017-09-15 DIAGNOSIS — M06.9 RHEUMATOID ARTHRITIS, INVOLVING UNSPECIFIED SITE, UNSPECIFIED RHEUMATOID FACTOR PRESENCE: ICD-10-CM

## 2017-09-15 DIAGNOSIS — M06.09 RHEUMATOID ARTHRITIS OF MULTIPLE SITES WITH NEGATIVE RHEUMATOID FACTOR (H): ICD-10-CM

## 2017-09-15 DIAGNOSIS — M06.09 RHEUMATOID ARTHRITIS OF MULTIPLE SITES WITHOUT RHEUMATOID FACTOR (H): Primary | ICD-10-CM

## 2017-09-15 PROCEDURE — S0028 INJECTION, FAMOTIDINE, 20 MG: HCPCS | Performed by: INTERNAL MEDICINE

## 2017-09-15 PROCEDURE — 96413 CHEMO IV INFUSION 1 HR: CPT | Performed by: INTERNAL MEDICINE

## 2017-09-15 PROCEDURE — 96375 TX/PRO/DX INJ NEW DRUG ADDON: CPT | Performed by: INTERNAL MEDICINE

## 2017-09-15 PROCEDURE — 96415 CHEMO IV INFUSION ADDL HR: CPT | Performed by: INTERNAL MEDICINE

## 2017-09-15 RX ORDER — METHYLPREDNISOLONE SODIUM SUCCINATE 40 MG/ML
40 INJECTION, POWDER, LYOPHILIZED, FOR SOLUTION INTRAMUSCULAR; INTRAVENOUS ONCE
Status: CANCELLED
Start: 2017-09-15 | End: 2017-09-15

## 2017-09-15 RX ORDER — METHYLPREDNISOLONE SODIUM SUCCINATE 125 MG/2ML
100 INJECTION, POWDER, LYOPHILIZED, FOR SOLUTION INTRAMUSCULAR; INTRAVENOUS ONCE
Status: COMPLETED | OUTPATIENT
Start: 2017-09-15 | End: 2017-09-15

## 2017-09-15 RX ORDER — METHYLPREDNISOLONE SODIUM SUCCINATE 125 MG/2ML
100 INJECTION, POWDER, LYOPHILIZED, FOR SOLUTION INTRAMUSCULAR; INTRAVENOUS ONCE
Status: CANCELLED
Start: 2017-09-15 | End: 2017-09-15

## 2017-09-15 RX ADMIN — Medication 250 ML: at 09:53

## 2017-09-15 RX ADMIN — METHYLPREDNISOLONE SODIUM SUCCINATE 100 MG: 125 INJECTION INTRAMUSCULAR; INTRAVENOUS at 09:55

## 2017-09-15 NOTE — PATIENT INSTRUCTIONS
Rituximab Solution for injection  What is this medicine?  RITUXIMAB (ri TUX i mab) is a monoclonal antibody. This medicine changes the way the body's immune system works. It is used commonly to treat non-Hodgkin lymphoma and other conditions. In cancer cells, this drug targets a specific protein within cancer cells and stops the cancer cells from growing. It is also used to treat rheumatoid arthritis (RA). In RA, this medicine slows the inflammatory process and help reduce joint pain and swelling. This medicine is often used with other cancer or arthritis medications.  This medicine may be used for other purposes; ask your health care provider or pharmacist if you have questions.  What should I tell my health care provider before I take this medicine?  They need to know if you have any of these conditions:    blood disorders    heart disease    history of hepatitis B    infection (especially a virus infection such as chickenpox, cold sores, or herpes)    irregular heartbeat    kidney disease    lung or breathing disease, like asthma    lupus    an unusual or allergic reaction to rituximab, mouse proteins, other medicines, foods, dyes, or preservatives    pregnant or trying to get pregnant    breast-feeding  How should I use this medicine?  This medicine is for infusion into a vein. It is administered in a hospital or clinic by a specially trained health care professional.  A special MedGuide will be given to you by the pharmacist with each prescription and refill. Be sure to read this information carefully each time.  Talk to your pediatrician regarding the use of this medicine in children. This medicine is not approved for use in children.  Overdosage: If you think you have taken too much of this medicine contact a poison control center or emergency room at once.  NOTE: This medicine is only for you. Do not share this medicine with others.  What if I miss a dose?  It is important not to miss a dose. Call your  doctor or health care professional if you are unable to keep an appointment.  What may interact with this medicine?    cisplatin    medicines for blood pressure    some other medicines for arthritis    vaccines  This list may not describe all possible interactions. Give your health care provider a list of all the medicines, herbs, non-prescription drugs, or dietary supplements you use. Also tell them if you smoke, drink alcohol, or use illegal drugs. Some items may interact with your medicine.  What should I watch for while using this medicine?  Report any side effects that you notice during your treatment right away, such as changes in your breathing, fever, chills, dizziness or lightheadedness. These effects are more common with the first dose.  Visit your prescriber or health care professional for checks on your progress. You will need to have regular blood work. Report any other side effects. The side effects of this medicine can continue after you finish your treatment. Continue your course of treatment even though you feel ill unless your doctor tells you to stop.  Call your doctor or health care professional for advice if you get a fever, chills or sore throat, or other symptoms of a cold or flu. Do not treat yourself. This drug decreases your body's ability to fight infections. Try to avoid being around people who are sick.  This medicine may increase your risk to bruise or bleed. Call your doctor or health care professional if you notice any unusual bleeding.  Be careful brushing and flossing your teeth or using a toothpick because you may get an infection or bleed more easily. If you have any dental work done, tell your dentist you are receiving this medicine.  Avoid taking products that contain aspirin, acetaminophen, ibuprofen, naproxen, or ketoprofen unless instructed by your doctor. These medicines may hide a fever.  Do not become pregnant while taking this medicine. Women should inform their doctor if  they wish to become pregnant or think they might be pregnant. There is a potential for serious side effects to an unborn child. Talk to your health care professional or pharmacist for more information. Do not breast-feed an infant while taking this medicine.  What side effects may I notice from receiving this medicine?  Side effects that you should report to your doctor or health care professional as soon as possible:    allergic reactions like skin rash, itching or hives, swelling of the face, lips, or tongue    low blood counts - this medicine may decrease the number of white blood cells, red blood cells and platelets. You may be at increased risk for infections and bleeding.    signs of infection - fever or chills, cough, sore throat, pain or difficulty passing urine    signs of decreased platelets or bleeding - bruising, pinpoint red spots on the skin, black, tarry stools, blood in the urine    signs of decreased red blood cells - unusually weak or tired, fainting spells, lightheadedness    breathing problems    confused, not responsive    chest pain    fast, irregular heartbeat    feeling faint or lightheaded, falls    mouth sores    redness, blistering, peeling or loosening of the skin, including inside the mouth    stomach pain    swelling of the ankles, feet, or hands    trouble passing urine or change in the amount of urine  Side effects that usually do not require medical attention (report to your doctor or other health care professional if they continue or are bothersome):    anxiety    headache    loss of appetite    muscle aches    nausea    night sweats  This list may not describe all possible side effects. Call your doctor for medical advice about side effects. You may report side effects to FDA at 2-836-RQB-2761.  Where should I keep my medicine?  This drug is given in a hospital or clinic and will not be stored at home.  NOTE:This sheet is a summary. It may not cover all possible information. If you  have questions about this medicine, talk to your doctor, pharmacist, or health care provider. Copyright  2016 Gold Standard

## 2017-09-15 NOTE — MR AVS SNAPSHOT
After Visit Summary   9/15/2017    Radha Fox    MRN: [id number]           Patient Information     Date Of Birth          1950        Visit Information        Provider Department      9/15/2017 9:00 AM  INF  [address] 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  RITUXIMAB (ri TUX i mab) is a monoclonal antibody. This medicine changes the way the body's immune system works. It is used commonly to treat non-Hodgkin lymphoma and other conditions. In cancer cells, this drug targets a specific protein within cancer cells and stops the cancer cells from growing. It is also used to treat rheumatoid arthritis (RA). In RA, this medicine slows the inflammatory process and help reduce joint pain and swelling. This medicine is often used with other cancer or arthritis medications.  This medicine may be used for other purposes; ask your health care provider or pharmacist if you have questions.  What should I tell my health care provider before I take this medicine?  They need to know if you have any of these conditions:    blood disorders    heart disease    history of hepatitis B    infection (especially a virus infection such as chickenpox, cold sores, or herpes)    irregular heartbeat    kidney disease    lung or breathing disease, like asthma    lupus    an unusual or allergic reaction to rituximab, mouse proteins, other medicines, foods, dyes, or preservatives    pregnant or trying to get pregnant    breast-feeding  How should I use this medicine?  This medicine is for infusion into a vein. It is administered in a hospital or clinic by a specially  trained health care professional.  A special MedGuide will be given to you by the pharmacist with each prescription and refill. Be sure to read this information carefully each time.  Talk to your pediatrician regarding the use of this medicine in children. This medicine is not approved for use in children.  Overdosage: If you think you have taken too much of this medicine contact a poison control center or emergency room at once.  NOTE: This medicine is only for you. Do not share this medicine with others.  What if I miss a dose?  It is important not to miss a dose. Call your doctor or health care professional if you are unable to keep an appointment.  What may interact with this medicine?    cisplatin    medicines for blood pressure    some other medicines for arthritis    vaccines  This list may not describe all possible interactions. Give your health care provider a list of all the medicines, herbs, non-prescription drugs, or dietary supplements you use. Also tell them if you smoke, drink alcohol, or use illegal drugs. Some items may interact with your medicine.  What should I watch for while using this medicine?  Report any side effects that you notice during your treatment right away, such as changes in your breathing, fever, chills, dizziness or lightheadedness. These effects are more common with the first dose.  Visit your prescriber or health care professional for checks on your progress. You will need to have regular blood work. Report any other side effects. The side effects of this medicine can continue after you finish your treatment. Continue your course of treatment even though you feel ill unless your doctor tells you to stop.  Call your doctor or health care professional for advice if you get a fever, chills or sore throat, or other symptoms of a cold or flu. Do not treat yourself. This drug decreases your body's ability to fight infections. Try to avoid being around people who are sick.  This  medicine may increase your risk to bruise or bleed. Call your doctor or health care professional if you notice any unusual bleeding.  Be careful brushing and flossing your teeth or using a toothpick because you may get an infection or bleed more easily. If you have any dental work done, tell your dentist you are receiving this medicine.  Avoid taking products that contain aspirin, acetaminophen, ibuprofen, naproxen, or ketoprofen unless instructed by your doctor. These medicines may hide a fever.  Do not become pregnant while taking this medicine. Women should inform their doctor if they wish to become pregnant or think they might be pregnant. There is a potential for serious side effects to an unborn child. Talk to your health care professional or pharmacist for more information. Do not breast-feed an infant while taking this medicine.  What side effects may I notice from receiving this medicine?  Side effects that you should report to your doctor or health care professional as soon as possible:    allergic reactions like skin rash, itching or hives, swelling of the face, lips, or tongue    low blood counts - this medicine may decrease the number of white blood cells, red blood cells and platelets. You may be at increased risk for infections and bleeding.    signs of infection - fever or chills, cough, sore throat, pain or difficulty passing urine    signs of decreased platelets or bleeding - bruising, pinpoint red spots on the skin, black, tarry stools, blood in the urine    signs of decreased red blood cells - unusually weak or tired, fainting spells, lightheadedness    breathing problems    confused, not responsive    chest pain    fast, irregular heartbeat    feeling faint or lightheaded, falls    mouth sores    redness, blistering, peeling or loosening of the skin, including inside the mouth    stomach pain    swelling of the ankles, feet, or hands    trouble passing urine or change in the amount of  urine  Side effects that usually do not require medical attention (report to your doctor or other health care professional if they continue or are bothersome):    anxiety    headache    loss of appetite    muscle aches    nausea    night sweats  This list may not describe all possible side effects. Call your doctor for medical advice about side effects. You may report side effects to FDA at 1-939-CPV-2309.  Where should I keep my medicine?  This drug is given in a hospital or clinic and will not be stored at home.  NOTE:This sheet is a summary. It may not cover all possible information. If you have questions about this medicine, talk to your doctor, pharmacist, or health care provider. Copyright  2016 Gold Standard                Follow-ups after your visit        Your next 10 appointments already scheduled     May 30, 2018 10:00 AM CDT   (Arrive by 9:45 AM)   Return Visit with Palak Pham NP   [facility] ([facility] )    [address]  [facility] [address]   [phone]              Who to contact     If you have questions or need follow up information about today's clinic visit or your schedule please contact [facility] ([facility]] directly at [phone].  Normal or non-critical lab and imaging results will be communicated to you by MyChart, letter or phone within 4 business days after the clinic has received the results. If you do not hear from us within 7 days, please contact the clinic through [employer] or phone. If you have a critical or abnormal lab result, we will notify you by phone as soon as possible.  Submit refill requests through "[employer]" or call your pharmacy and they will forward the refill request to us. Please allow 3 business days for your refill to be completed.          Additional Information About Your Visit        [employer] Information     "[employer] gives you secure access to your electronic health record. If you see a primary care provider, you can also  "send messages to your care team and make appointments. If you have questions, please call your primary care clinic.  If you do not have a primary care provider, please call [phone] and they will assist you.        Care EveryWhere ID     This is your Care EveryWhere ID. This could be used by other organizations to access your [facility] medical records  [id number]        Your Vitals Were     Pulse Temperature Respirations Height Pulse Oximetry BMI (Body Mass Index)    59 96  F (35.6  C) (Oral) 16 1.676 m (5' 6\") 94% 33.96 kg/m2       Blood Pressure from Last 3 Encounters:   09/15/17 135/62   08/27/17 136/71   08/20/17 137/58    Weight from Last 3 Encounters:   09/15/17 95.4 kg (210 lb 6.4 oz)   08/07/17 95.3 kg (210 lb 1.6 oz)   05/31/17 96.6 kg (213 lb)              Today, you had the following     No orders found for display       Primary Care Provider Office Phone # Fax #    Lanie Costello [phone]       [facility] [address]  [facility] [address]        Equal Access to Services     JOE TAMAYO AH: Hadii aad ku hadasho Soomaali, waaxda luqadaha, qaybta kaalmada adeegyada, erika back haytarikn cammie ferrera lajuan ah. So [facility] [phone].    ATENCIÓN: Si habla español, tiene a garcia disposición servicios gratuitos de asistencia lingüística. Llame al [phone].    We comply with applicable federal civil rights laws and [place] laws. We do not discriminate on the basis of race, color, national origin, age, disability sex, sexual orientation or gender identity.            Thank you!     Thank you for choosing [facility]  for your care. Our goal is always to provide you with excellent care. Hearing back from our patients is one way we can continue to improve our services. Please take a few minutes to complete the written survey that you may receive in the mail after your visit with us. Thank you!             Your Updated Medication List - Protect others around " you: Learn how to safely use, store and throw away your medicines at www.disposemymeds.org.          This list is accurate as of: 9/15/17  1:52 PM.  Always use your most recent med list.                   Brand Name Dispense Instructions for use Diagnosis    atorvastatin 20 MG tablet    LIPITOR    90 tablet    TAKE 1 TABLET DAILY    Hyperlipidemia LDL goal <100       * blood glucose calibration solution     1 Bottle    Use as directed    Impaired fasting glucose       * blood glucose calibration solution     1 each    Use to calibrate blood glucose monitor as directed.    Impaired fasting glucose       blood glucose monitoring test strip    ACCU-CHEK ANGELICA    100 strip    Use to test blood sugars one time by finger poke twice weekly or as directed.    Impaired fasting glucose       calcium + D 600-200 MG-UNIT Tabs   Generic drug:  calcium carbonate-vitamin D      Take 600 mg by mouth 2 times daily.        celeBREX 200 MG capsule   Generic drug:  celecoxib     90 capsule    TAKE 1 CAPSULE DAILY    RA (rheumatoid arthritis) (H)       ESTRACE VAGINAL 0.1 MG/GM cream   Generic drug:  estradiol     42 g    Place 2 g vaginally three times a week    Atrophic vaginitis       folic acid 1 MG tablet    FOLVITE     Take 2 tablets by mouth daily        METHOTREXATE PO      Inject 12.5 mg as directed once a week.        Multi-vitamin Tabs tablet      Take  by mouth daily.        omeprazole 40 MG capsule    priLOSEC    90 capsule    TAKE 1 CAPSULE DAILY BEFORE A MEAL    Esophageal reflux       potassium chloride SA 20 MEQ CR tablet    K-DUR/KLOR-CON M    90 tablet    TAKE 1 TABLET DAILY WITH FOOD    HTN (hypertension)       pramipexole 0.25 MG tablet    MIRAPEX    270 tablet    TAKE 1 TABLET AT 5 P.M. AND 2 TABLETS AT 9 P.M.    Restless legs syndrome (RLS)       predniSONE 1 MG tablet    DELTASONE     Take 1 mg by mouth daily Take 3 tabs po daily        propranolol 20 MG tablet    INDERAL    90 tablet    TAKE 1 TABLET DAILY     HTN (hypertension)       RITUXAN IV           venlafaxine 75 MG 24 hr capsule    EFFEXOR-XR    90 capsule    TAKE 1 CAPSULE DAILY    MDD (major depressive disorder)       VITAMIN B 12 PO      Take 500 mg by mouth daily.        * Notice:  This list has 2 medication(s) that are the same as other medications prescribed for you. Read the directions carefully, and ask your doctor or other care provider to review them with you.

## 2017-09-15 NOTE — PROGRESS NOTES
Patient is a 66yo female here today for infusion of Rituxan per order of Dr Conner under the supervision of Dr Costello.  Patient meets parameters for today's infusion. Patient identified with two identifiers, order verified, and verbal consent for today's infusion obtained from patient.    9/14/17 lab values:  WBC: 9.5, HGB: 12.8, PLT: 257, ANC: 7.0,  ALT: 40,  Creatinine: 0.78.  Patient meets order parameters for today's treatment.     24 gauge angio cath inserted into the patient's right hand.  Immediate blood return noted.  IV secured with sterile, transparent dressing and tape.  Patient tolerated well, denies pain or discomfort at this time.  Flushes easily without resistance, no signs or symptoms of infiltration or infection.   Patient denies questions or concerns regarding infusion and/or medication(s) being administered.    1032 IV pump verified with rituximab 1000mg dose, drug, and rate of administration with MAR and medication label.  Infusion administered per protocol.  Patient tolerated infusion well, no signs or symptoms of adverse reaction noted.  Patient denies pain nor discomfort.     1355 IV removed, catheter intact.  Site clean, dry and intact.  No signs or symptoms of infiltration or infection.  Covered with a sterile bandage, slight pressure applied for 30 seconds.  Pt instructed to leave bandage intact for a minimum of one hour, and to call with questions or concerns.  Copy of appointments, discharge instructions, and after visit summary (AVS) provided to patient.  Patient states understanding, discharged ambulatory.    Lauren Pipkin, RN

## 2017-09-20 ENCOUNTER — TELEPHONE (OUTPATIENT)
Dept: FAMILY MEDICINE | Facility: OTHER | Age: 67
End: 2017-09-20

## 2017-09-21 ENCOUNTER — TELEPHONE (OUTPATIENT)
Dept: FAMILY MEDICINE | Facility: OTHER | Age: 67
End: 2017-09-21

## 2017-09-27 ENCOUNTER — OFFICE VISIT (OUTPATIENT)
Dept: FAMILY MEDICINE | Facility: OTHER | Age: 67
End: 2017-09-27
Attending: FAMILY MEDICINE
Payer: COMMERCIAL

## 2017-09-27 VITALS
HEART RATE: 70 BPM | DIASTOLIC BLOOD PRESSURE: 72 MMHG | WEIGHT: 209 LBS | SYSTOLIC BLOOD PRESSURE: 100 MMHG | BODY MASS INDEX: 33.73 KG/M2 | TEMPERATURE: 97.7 F | OXYGEN SATURATION: 97 %

## 2017-09-27 DIAGNOSIS — E78.5 HYPERLIPIDEMIA, UNSPECIFIED HYPERLIPIDEMIA TYPE: ICD-10-CM

## 2017-09-27 DIAGNOSIS — E11.9 TYPE 2 DIABETES, DIET CONTROLLED (H): ICD-10-CM

## 2017-09-27 DIAGNOSIS — Z12.31 ENCOUNTER FOR SCREENING MAMMOGRAM FOR BREAST CANCER: ICD-10-CM

## 2017-09-27 DIAGNOSIS — Z23 NEED FOR PROPHYLACTIC VACCINATION AND INOCULATION AGAINST INFLUENZA: Primary | ICD-10-CM

## 2017-09-27 LAB
ALBUMIN SERPL-MCNC: 3.4 G/DL (ref 3.4–5)
ALP SERPL-CCNC: 74 U/L (ref 40–150)
ALT SERPL W P-5'-P-CCNC: 35 U/L (ref 0–50)
ANION GAP SERPL CALCULATED.3IONS-SCNC: 7 MMOL/L (ref 3–14)
AST SERPL W P-5'-P-CCNC: 18 U/L (ref 0–45)
BILIRUB SERPL-MCNC: 0.5 MG/DL (ref 0.2–1.3)
BUN SERPL-MCNC: 22 MG/DL (ref 7–30)
CALCIUM SERPL-MCNC: 8.7 MG/DL (ref 8.5–10.1)
CHLORIDE SERPL-SCNC: 106 MMOL/L (ref 94–109)
CHOLEST SERPL-MCNC: 162 MG/DL
CO2 SERPL-SCNC: 28 MMOL/L (ref 20–32)
CREAT SERPL-MCNC: 0.8 MG/DL (ref 0.52–1.04)
CREAT UR-MCNC: 86 MG/DL
EST. AVERAGE GLUCOSE BLD GHB EST-MCNC: 134 MG/DL
GFR SERPL CREATININE-BSD FRML MDRD: 71 ML/MIN/1.7M2
GLUCOSE SERPL-MCNC: 122 MG/DL (ref 70–99)
HBA1C MFR BLD: 6.3 % (ref 4.3–6)
HDLC SERPL-MCNC: 79 MG/DL
LDLC SERPL CALC-MCNC: 61 MG/DL
MICROALBUMIN UR-MCNC: 10 MG/L
MICROALBUMIN/CREAT UR: 11.05 MG/G CR (ref 0–25)
NONHDLC SERPL-MCNC: 83 MG/DL
POTASSIUM SERPL-SCNC: 4.2 MMOL/L (ref 3.4–5.3)
PROT SERPL-MCNC: 7 G/DL (ref 6.8–8.8)
SODIUM SERPL-SCNC: 141 MMOL/L (ref 133–144)
T4 FREE SERPL-MCNC: 1.03 NG/DL (ref 0.76–1.46)
TRIGL SERPL-MCNC: 109 MG/DL
TSH SERPL DL<=0.005 MIU/L-ACNC: 5.49 MU/L (ref 0.4–4)

## 2017-09-27 PROCEDURE — 83036 HEMOGLOBIN GLYCOSYLATED A1C: CPT | Performed by: FAMILY MEDICINE

## 2017-09-27 PROCEDURE — 90662 IIV NO PRSV INCREASED AG IM: CPT | Performed by: FAMILY MEDICINE

## 2017-09-27 PROCEDURE — 84439 ASSAY OF FREE THYROXINE: CPT | Performed by: FAMILY MEDICINE

## 2017-09-27 PROCEDURE — 80061 LIPID PANEL: CPT | Performed by: FAMILY MEDICINE

## 2017-09-27 PROCEDURE — 80053 COMPREHEN METABOLIC PANEL: CPT | Performed by: FAMILY MEDICINE

## 2017-09-27 PROCEDURE — 99214 OFFICE O/P EST MOD 30 MIN: CPT | Mod: 25 | Performed by: FAMILY MEDICINE

## 2017-09-27 PROCEDURE — 90472 IMMUNIZATION ADMIN EACH ADD: CPT | Performed by: FAMILY MEDICINE

## 2017-09-27 PROCEDURE — 82043 UR ALBUMIN QUANTITATIVE: CPT | Performed by: FAMILY MEDICINE

## 2017-09-27 PROCEDURE — 90471 IMMUNIZATION ADMIN: CPT | Performed by: FAMILY MEDICINE

## 2017-09-27 PROCEDURE — 36415 COLL VENOUS BLD VENIPUNCTURE: CPT | Performed by: FAMILY MEDICINE

## 2017-09-27 PROCEDURE — 84443 ASSAY THYROID STIM HORMONE: CPT | Performed by: FAMILY MEDICINE

## 2017-09-27 PROCEDURE — 90732 PPSV23 VACC 2 YRS+ SUBQ/IM: CPT | Performed by: FAMILY MEDICINE

## 2017-09-27 ASSESSMENT — ANXIETY QUESTIONNAIRES
3. WORRYING TOO MUCH ABOUT DIFFERENT THINGS: NOT AT ALL
5. BEING SO RESTLESS THAT IT IS HARD TO SIT STILL: NOT AT ALL
6. BECOMING EASILY ANNOYED OR IRRITABLE: NOT AT ALL
1. FEELING NERVOUS, ANXIOUS, OR ON EDGE: NOT AT ALL
4. TROUBLE RELAXING: NOT AT ALL
7. FEELING AFRAID AS IF SOMETHING AWFUL MIGHT HAPPEN: NOT AT ALL
2. NOT BEING ABLE TO STOP OR CONTROL WORRYING: NOT AT ALL
GAD7 TOTAL SCORE: 0

## 2017-09-27 ASSESSMENT — PAIN SCALES - GENERAL: PAINLEVEL: NO PAIN (0)

## 2017-09-27 ASSESSMENT — PATIENT HEALTH QUESTIONNAIRE - PHQ9: SUM OF ALL RESPONSES TO PHQ QUESTIONS 1-9: 1

## 2017-09-27 NOTE — PATIENT INSTRUCTIONS
Flu shot and Pneumonia 23 booster given.  Will call with lab results.  Mammogram ordered.  Reminder to schedule annual diabetic eye exam.  Foot exam normal today.  DEXA done with rheumatology.

## 2017-09-27 NOTE — MR AVS SNAPSHOT
After Visit Summary   9/27/2017    Radha Fox    MRN: [id number]           Patient Information     Date Of Birth          1950        Visit Information        Provider Department      9/27/2017 10:00 AM Lanie Costello MD [facility]        Today's Diagnoses     Need for prophylactic vaccination and inoculation against influenza    -  1    Type 2 diabetes, diet controlled (H)        Hyperlipidemia, unspecified hyperlipidemia type        Encounter for screening mammogram for breast cancer          Care Instructions    Flu shot and Pneumonia 23 booster given.  Will call with lab results.  Mammogram ordered.  Reminder to schedule annual diabetic eye exam.  Foot exam normal today.  DEXA done with rheumatology.            Follow-ups after your visit        Your next 10 appointments already scheduled     May 30, 2018 10:00 AM CDT   (Arrive by 9:45 AM)   Return Visit with Palak Pham NP   [facility] [place] [facility]    [address]  [place] [address]   [phone]              Who to contact     If you have questions or need follow up information about today's clinic visit or your schedule please contact [facility] directly at [phone].  Normal or non-critical lab and imaging results will be communicated to you by MyChart, letter or phone within 4 business days after the clinic has received the results. If you do not hear from us within 7 days, please contact the clinic through [employer] or phone. If you have a critical or abnormal lab result, we will notify you by phone as soon as possible.  Submit refill requests through [employer] or call your pharmacy and they will forward the refill request to us. Please allow 3 business days for your refill to be completed.          Additional Information About Your Visit        MyChart Information     [employer] gives you secure access to your electronic health record. If you see a  primary care provider, you can also send messages to your care team and make appointments. If you have questions, please call your primary care clinic.  If you do not have a primary care provider, please call [phone] and they will assist you.        Care EveryWhere ID     This is your Care EveryWhere ID. This could be used by other organizations to access your [place] medical records  [id number]        Your Vitals Were     Pulse Temperature Pulse Oximetry BMI (Body Mass Index)          70 97.7  F (36.5  C) (Tympanic) 97% 33.73 kg/m2         Blood Pressure from Last 3 Encounters:   09/27/17 100/72   09/15/17 135/62   08/27/17 136/71    Weight from Last 3 Encounters:   09/27/17 209 lb (94.8 kg)   09/15/17 210 lb 6.4 oz (95.4 kg)   08/07/17 210 lb 1.6 oz (95.3 kg)              We Performed the Following     ADMIN INFLUENZA (For MEDICARE Patients ONLY) []     ADMIN PNEUMOVAX VACCINE (For MEDICARE Patients ONLY) []     Albumin Random Urine Quantitative with Creat Ratio     Comprehensive metabolic panel     HC FLU VACCINE, INCREASED ANTIGEN, PRESV FREE     Hemoglobin A1c     Lipid Profile (Chol, Trig, HDL, LDL calc)     Pneumococcal vaccine 23 valent PPSV23  (Pneumovax) [50050]     TSH with free T4 reflex        Primary Care Provider Office Phone # Fax #    Lanie Costello [phone]       [facility] [address]  [place] [address]        Equal Access to Services     FOUZIA TAMAYO : Hadii aad ku hadasho Soomaali, waaxda luqadaha, qaybta kaalmada adeegyada, erika rodriguez . So [facility] [phone].    ATENCIÓN: Si habla español, tiene a garcia disposición servicios gratuitos de asistencia lingüística. Llame al [phone].    We comply with applicable federal civil rights laws and [place] laws. We do not discriminate on the basis of race, color, national origin, age, disability sex, sexual orientation or gender identity.            Thank you!      [facility] you for [facility]  for your care. Our goal is always to provide you with excellent care. Hearing back from our patients is one way we can continue to improve our services. Please take a few minutes to complete the written survey that you may receive in the mail after your visit with us. Thank you!             Your Updated Medication List - Protect others around you: Learn how to safely use, store and throw away your medicines at www.disposemymeds.org.          This list is accurate as of: 9/27/17 10:29 AM.  Always use your most recent med list.                   Brand Name Dispense Instructions for use Diagnosis    atorvastatin 20 MG tablet    LIPITOR    90 tablet    TAKE 1 TABLET DAILY    Hyperlipidemia LDL goal <100       * blood glucose calibration solution     1 Bottle    Use as directed    Impaired fasting glucose       * blood glucose calibration solution     1 each    Use to calibrate blood glucose monitor as directed.    Impaired fasting glucose       blood glucose monitoring test strip    ACCU-CHEK ANGELICA    100 strip    Use to test blood sugars one time by finger poke twice weekly or as directed.    Impaired fasting glucose       calcium + D 600-200 MG-UNIT Tabs   Generic drug:  calcium carbonate-vitamin D      Take 600 mg by mouth 2 times daily.        celeBREX 200 MG capsule   Generic drug:  celecoxib     90 capsule    TAKE 1 CAPSULE DAILY    RA (rheumatoid arthritis) (H)       ESTRACE VAGINAL 0.1 MG/GM cream   Generic drug:  estradiol     42 g    Place 2 g vaginally three times a week    Atrophic vaginitis       folic acid 1 MG tablet    FOLVITE     Take 2 tablets by mouth daily        METHOTREXATE PO      Inject 12.5 mg as directed once a week.        Multi-vitamin Tabs tablet      Take  by mouth daily.        omeprazole 40 MG capsule    priLOSEC    90 capsule    TAKE 1 CAPSULE DAILY BEFORE A MEAL    Esophageal reflux       potassium chloride SA 20 MEQ CR tablet     K-DUR/KLOR-CON M    90 tablet    TAKE 1 TABLET DAILY WITH FOOD    HTN (hypertension)       pramipexole 0.25 MG tablet    MIRAPEX    270 tablet    TAKE 1 TABLET AT 5 P.M. AND 2 TABLETS AT 9 P.M.    Restless legs syndrome (RLS)       predniSONE 1 MG tablet    DELTASONE     Take 1 mg by mouth daily Take 3 tabs po daily        propranolol 20 MG tablet    INDERAL    90 tablet    TAKE 1 TABLET DAILY    HTN (hypertension)       RITUXAN IV           venlafaxine 75 MG 24 hr capsule    EFFEXOR-XR    90 capsule    TAKE 1 CAPSULE DAILY    MDD (major depressive disorder)       VITAMIN B 12 PO      Take 500 mg by mouth daily.        * Notice:  This list has 2 medication(s) that are the same as other medications prescribed for you. Read the directions carefully, and ask your doctor or other care provider to review them with you.

## 2017-09-27 NOTE — PROGRESS NOTES
SUBJECTIVE:  Radha is a 67 year old female who comes in today for follow up chronic disease management.    Diabetes - diagnosed 1 year ago, a1c 6.5%, diet controlled.  Occasionally checks sugars - 100-120.      Hyopkalemia - has been on replacement.  Is not on diuretic.  Wonders if she still needs it.    Osteopenia - DEXA done with her rheumatologist, Dr. [place].      Hyperlipidemia - due for fasting labs.    Would like flu shot.    Current Outpatient Prescriptions   Medication     potassium chloride SA (K-DUR/KLOR-CON M) 20 MEQ CR tablet     atorvastatin (LIPITOR) 20 MG tablet     venlafaxine (EFFEXOR-XR) 75 MG 24 hr capsule     pramipexole (MIRAPEX) 0.25 MG tablet     ESTRACE VAGINAL 0.1 MG/GM cream     omeprazole (PRILOSEC) 40 MG capsule     propranolol (INDERAL) 20 MG tablet     RiTUXimab (RITUXAN IV)     predniSONE (DELTASONE) 1 MG tablet     CELEBREX 200 MG capsule     folic acid (FOLVITE) 1 MG tablet     Blood Glucose Calibration (ACCU-CHEK COMPACT BLUE CONTROL) LIQD     glucose blood VI test strips (ACCU-CHEK ANGELICA) strip     Blood Glucose Calibration (ACCU-CHEK ANGELICA) SOLN     calcium carbonate-vitamin D (CALCIUM + D) 600-200 MG-UNIT TABS     Methotrexate Sodium (METHOTREXATE PO)     multivitamin, therapeutic with minerals (MULTI-VITAMIN) TABS     Cyanocobalamin (VITAMIN B 12 PO)     No current facility-administered medications for this visit.         Allergies   Allergen Reactions     Neomycin-Polymyxin-Hc      Rash behind ear after ear drops     Penicillins Hives and Itching     One time large local reaction after IM PCN in 1970, no airway symptoms     Plaquenil [Hydroxychloroquine Sulfate] Hives       Past Medical History:   Diagnosis Date     Abnormal PFT     moderate restriction/parenchymal; moderate diffusion defect; minimal obstruction     Arthropathy, unspecified, site unspecified 05/01/2000     Chest pain, unspecified 2005    Resolved     Contact dermatitis and other eczema, due to unspecified  cause 03/05/2009     Coronary atherosclerosis of unspecified type of vessel, native or graft 09/27/2005     Depressive disorder, not elsewhere classified 01/11/2002     Diabetes mellitus type 2, diet-controlled (H)      Esophageal reflux 11/12/2010     H/O: hysterectomy 1977     Impaired fasting glucose 11/12/2010     Insomnia, unspecified 01/24/2013     Leukocytosis, unspecified 12/22/2007     Obesity, unspecified 01/19/2012     Osteoporosis, unspecified 01/19/2012     Other and unspecified hyperlipidemia 04/13/2005     Other dyspnea and respiratory abnormality 01/19/2012    reduced DLCO     Restless legs syndrome (RLS) 04/21/2011     Rheumatoid arthritis(714.0) 11/09/1999     Unspecified diffuse connective tissue disease 04/04/2000     Unspecified sinusitis (chronic) 09/05/2000     Past Surgical History:   Procedure Laterality Date     APPENDECTOMY  1961     CHOLECYSTECTOMY  1973     COLONOSCOPY N/A 3/23/2015    Procedure: COLONOSCOPY;  Surgeon: Clarisa Larkin MD;  Location: [facility]     endoscopic sphenoidotomy  10/2011    Right     ETHMOIDECTOMY Bilateral 9/27/2016    Procedure: ETHMOIDECTOMY;  Surgeon: Aracelis Ayon MD;  Location: [facility] OR     excision bilateral telma bullosa  10/2011     GENITOURINARY SURGERY       HYSTERECTOMY      benign     LAPAROSCOPY DIAGNOSTIC (GENERAL)       ORTHOPEDIC SURGERY      left foot 5th metarsal fracture screws placed     SEPTOPLASTY N/A 9/27/2016    Procedure: SEPTOPLASTY;  Surgeon: Aracelis Ayon MD;  Location: [facility] OR     TOT Internal urethrotomy  2010     TUBAL LIGATION       Social History     Social History     Marital status:      Spouse name: N/A     Number of children: N/A     Years of education: N/A     Occupational History     Not on file.     Social History Main Topics     Smoking status: Never Smoker     Smokeless tobacco: Never Used     Alcohol use No     Drug use: No     Sexual activity: Yes     Partners: Male     Other Topics Concern       Service No     Blood Transfusions Yes     permits if needed     Caffeine Concern Yes     coffee - 3 cups daily      Occupational Exposure No     Hobby Hazards No     Sleep Concern No     Stress Concern No     Weight Concern No     Special Diet No     Back Care No     Exercise No     Seat Belt Yes     Self-Exams No     had mammogram 2013     Parent/Sibling W/ Cabg, Mi Or Angioplasty Before 65f 55m? Yes     father-heart attack     Social History Narrative       ROS:  General: negative for, fever, chills  Skin: negative for, rash  Eyes: negative for, visual blurring, double vision  Resp: No shortness of breath and No cough  CV: negative for, chest pain, lower extremity edema and syncope or near-syncope  GI: negative for, poor appetite, nausea, vomiting and abdominal pain  : negative for, dysuria, frequency and hematuria  Musculoskeletal: positive for arthritis  Neurologic: negative for, local weakness, numbness or tingling of hands and numbness or tingling of feet  Psychiatric: negative  Endocrine: negative      OBJECTIVE:  Vitals:    09/27/17 0951   BP: 100/72   BP Location: Left arm   Patient Position: Sitting   Cuff Size: Adult Large   Pulse: 70   Temp: 97.7  F (36.5  C)   TempSrc: Tympanic   SpO2: 97%   Weight: 209 lb (94.8 kg)     GENERAL APPEARANCE: healthy, alert and no distress  EYES: EOMI, fundi benign- PERRL  NECK: no adenopathy, no asymmetry, masses, or scars and thyroid normal to palpation  RESP: lungs clear to auscultation - no rales, rhonchi or wheezes  CV: regular rates and rhythm, normal S1 S2, no S3 or S4 and no murmur, click or rub -  MS: extremities normal- no gross deformities noted, gait normal, normal muscle tone, peripheral pulses normal and normal monofilament exam; no callouses  SKIN: no suspicious lesions or rashes  NEURO: Normal strength and tone, sensory exam grossly normal, mentation intact and speech normal  PSYCH: mentation appears normal. and affect  normal/bright    ASSESSMENT/ORDERS:    ICD-10-CM    1. Need for prophylactic vaccination and inoculation against influenza Z23 HC FLU VACCINE, INCREASED ANTIGEN, PRESV FREE     ADMIN INFLUENZA (For MEDICARE Patients ONLY) []     ADMIN PNEUMOVAX VACCINE (For MEDICARE Patients ONLY) []     Pneumococcal vaccine 23 valent PPSV23  (Pneumovax) [25995]     T4 free     Estimated Average Glucose   2. Type 2 diabetes, diet controlled (H) E11.9 Hemoglobin A1c     TSH with free T4 reflex     Albumin Random Urine Quantitative with Creat Ratio     Lipid Profile (Chol, Trig, HDL, LDL calc)     Comprehensive metabolic panel   3. Hyperlipidemia, unspecified hyperlipidemia type E78.5 Lipid Profile (Chol, Trig, HDL, LDL calc)     Comprehensive metabolic panel   4. Encounter for screening mammogram for breast cancer Z12.31 MA Screening Digital Bilateral     MA Screening Digital Bilateral     PLAN:  Patient Instructions   Flu shot and Pneumonia 23 booster given.  Will call with lab results.  Mammogram ordered.  Reminder to schedule annual diabetic eye exam.  Foot exam normal today.  DEXA done with rheumatology.      Also, if potassium higher normal, can do trial off.    Lanie Duggan    Injectable Influenza Immunization Documentation    1.  Is the person to be vaccinated sick today?   No    2. Does the person to be vaccinated have an allergy to a component   of the vaccine?   No    3. Has the person to be vaccinated ever had a serious reaction   to influenza vaccine in the past?   No    4. Has the person to be vaccinated ever had Guillain-Barré syndrome?   No    Form completed by Lucila Presley LPN

## 2017-09-28 ASSESSMENT — ANXIETY QUESTIONNAIRES: GAD7 TOTAL SCORE: 0

## 2017-10-17 ENCOUNTER — RADIANT APPOINTMENT (OUTPATIENT)
Dept: MAMMOGRAPHY | Facility: OTHER | Age: 67
End: 2017-10-17
Attending: FAMILY MEDICINE
Payer: MEDICARE

## 2017-10-17 DIAGNOSIS — Z12.31 ENCOUNTER FOR SCREENING MAMMOGRAM FOR BREAST CANCER: ICD-10-CM

## 2017-10-17 PROCEDURE — 77063 BREAST TOMOSYNTHESIS BI: CPT | Mod: TC

## 2017-10-17 PROCEDURE — G0202 SCR MAMMO BI INCL CAD: HCPCS | Mod: TC

## 2017-10-19 ENCOUNTER — TELEPHONE (OUTPATIENT)
Dept: FAMILY MEDICINE | Facility: OTHER | Age: 67
End: 2017-10-19

## 2017-10-19 DIAGNOSIS — E11.9 DIABETES MELLITUS, TYPE 2 (H): Primary | ICD-10-CM

## 2017-10-30 DIAGNOSIS — I10 HYPERTENSION, UNSPECIFIED TYPE: ICD-10-CM

## 2017-10-30 DIAGNOSIS — E11.9 DIABETES MELLITUS, TYPE 2 (H): ICD-10-CM

## 2017-10-30 DIAGNOSIS — G25.81 RESTLESS LEGS SYNDROME (RLS): ICD-10-CM

## 2017-10-30 DIAGNOSIS — F32.9 MDD (MAJOR DEPRESSIVE DISORDER): ICD-10-CM

## 2017-10-30 DIAGNOSIS — K21.9 GASTROESOPHAGEAL REFLUX DISEASE, ESOPHAGITIS PRESENCE NOT SPECIFIED: ICD-10-CM

## 2017-10-30 DIAGNOSIS — E78.5 HYPERLIPIDEMIA LDL GOAL <100: ICD-10-CM

## 2017-10-30 DIAGNOSIS — R73.09 ELEVATED GLUCOSE: Primary | ICD-10-CM

## 2017-10-30 NOTE — TELEPHONE ENCOUNTER
[facility] [place]    Last office visit 9/27/2017   Atorvastatin 90 refill 1 last fill 7/7/2017  Omeprazole 90 refill 3 last fill 10/17/2016  Potassium 90 refill 0 last fill 7/7/2017  mirapex 270 refill 0 last fill 7/7/2017  Propanolol 90 refill 3 last fill 7/29/2016  effexor 90 refill 0 last fill 7/7/2017  Test strips - new machine one touch ultra 2

## 2017-11-02 RX ORDER — PRAMIPEXOLE DIHYDROCHLORIDE 0.25 MG/1
TABLET ORAL
Qty: 270 TABLET | Refills: 0 | Status: SHIPPED | OUTPATIENT
Start: 2017-11-02 | End: 2018-02-27

## 2017-11-02 RX ORDER — PROPRANOLOL HYDROCHLORIDE 20 MG/1
20 TABLET ORAL DAILY
Qty: 90 TABLET | Refills: 1 | Status: SHIPPED | OUTPATIENT
Start: 2017-11-02 | End: 2018-05-14

## 2017-11-02 RX ORDER — OMEPRAZOLE 40 MG/1
CAPSULE, DELAYED RELEASE ORAL
Qty: 90 CAPSULE | Refills: 1 | Status: SHIPPED | OUTPATIENT
Start: 2017-11-02 | End: 2018-08-01

## 2017-11-02 RX ORDER — ATORVASTATIN CALCIUM 20 MG/1
20 TABLET, FILM COATED ORAL DAILY
Qty: 90 TABLET | Refills: 1 | Status: SHIPPED | OUTPATIENT
Start: 2017-11-02 | End: 2018-08-01

## 2017-11-02 RX ORDER — VENLAFAXINE HYDROCHLORIDE 75 MG/1
75 CAPSULE, EXTENDED RELEASE ORAL DAILY
Qty: 90 CAPSULE | Refills: 0 | Status: SHIPPED | OUTPATIENT
Start: 2017-11-02 | End: 2018-02-27

## 2017-11-02 RX ORDER — POTASSIUM CHLORIDE 1500 MG/1
TABLET, EXTENDED RELEASE ORAL
Qty: 90 TABLET | Refills: 1 | Status: SHIPPED | OUTPATIENT
Start: 2017-11-02 | End: 2018-05-16

## 2017-11-02 NOTE — TELEPHONE ENCOUNTER
All other medications have been signed and transferred to [facility].  When going to sign Omeprazole drug warning alerted that hasn't been signed off by [facility] Provider.  Thank you.

## 2017-12-04 ENCOUNTER — TELEPHONE (OUTPATIENT)
Dept: FAMILY MEDICINE | Facility: OTHER | Age: 67
End: 2017-12-04

## 2017-12-04 ENCOUNTER — TRANSFERRED RECORDS (OUTPATIENT)
Dept: HEALTH INFORMATION MANAGEMENT | Facility: HOSPITAL | Age: 67
End: 2017-12-04

## 2017-12-04 NOTE — TELEPHONE ENCOUNTER
12:53 PM    Reason for Call: OVERBOOK    Patient is having the following symptoms: Sinus infection for  2 months    The patient is requesting an appointment for  Today  with  Dr. Costello    Was an appointment offered for this call? No    Preferred method for responding to this message: [phone]    If we cannot reach you directly, may we leave a detailed response at the number you provided? Yes    Evelin Kim

## 2017-12-05 ENCOUNTER — OFFICE VISIT (OUTPATIENT)
Dept: FAMILY MEDICINE | Facility: OTHER | Age: 67
End: 2017-12-05
Attending: FAMILY MEDICINE
Payer: COMMERCIAL

## 2017-12-05 VITALS
BODY MASS INDEX: 33.4 KG/M2 | WEIGHT: 207.8 LBS | TEMPERATURE: 95.3 F | HEIGHT: 66 IN | HEART RATE: 61 BPM | DIASTOLIC BLOOD PRESSURE: 78 MMHG | SYSTOLIC BLOOD PRESSURE: 110 MMHG | OXYGEN SATURATION: 97 %

## 2017-12-05 DIAGNOSIS — J01.90 ACUTE SINUSITIS WITH SYMPTOMS > 10 DAYS: Primary | ICD-10-CM

## 2017-12-05 PROCEDURE — 99212 OFFICE O/P EST SF 10 MIN: CPT

## 2017-12-05 PROCEDURE — 99213 OFFICE O/P EST LOW 20 MIN: CPT | Performed by: FAMILY MEDICINE

## 2017-12-05 RX ORDER — AZITHROMYCIN 250 MG/1
TABLET, FILM COATED ORAL
Qty: 6 TABLET | Refills: 0 | Status: SHIPPED | OUTPATIENT
Start: 2017-12-05 | End: 2018-02-16

## 2017-12-05 ASSESSMENT — ANXIETY QUESTIONNAIRES
3. WORRYING TOO MUCH ABOUT DIFFERENT THINGS: NOT AT ALL
5. BEING SO RESTLESS THAT IT IS HARD TO SIT STILL: NOT AT ALL
6. BECOMING EASILY ANNOYED OR IRRITABLE: NOT AT ALL
GAD7 TOTAL SCORE: 0
2. NOT BEING ABLE TO STOP OR CONTROL WORRYING: NOT AT ALL
7. FEELING AFRAID AS IF SOMETHING AWFUL MIGHT HAPPEN: NOT AT ALL
1. FEELING NERVOUS, ANXIOUS, OR ON EDGE: NOT AT ALL
4. TROUBLE RELAXING: NOT AT ALL

## 2017-12-05 ASSESSMENT — PATIENT HEALTH QUESTIONNAIRE - PHQ9: SUM OF ALL RESPONSES TO PHQ QUESTIONS 1-9: 0

## 2017-12-05 ASSESSMENT — PAIN SCALES - GENERAL: PAINLEVEL: NO PAIN (1)

## 2017-12-05 NOTE — PROGRESS NOTES
SUBJECTIVE:   Radha Fox is a 67 year old female who presents to clinic today for the following health issues:      RESPIRATORY SYMPTOMS/ SINUS       Duration: 2 months    Description  nasal congestion, rhinorrhea, facial pain/pressure, cough, headache and fatigue/malaise    Severity: moderate- severe    Accompanying signs and symptoms: coughing up thick green mucus, blowing thick green mucus, facial pressure above and below the eyes    History (predisposing factors):  history of recurrent otitis media and 2 sinus surgery, pul op removal, had tubes put in the ears    Precipitating or alleviating factors: None    Therapies tried and outcome:  nasal spray/wash - salt water rinse,musinex, Claritin,nasocort, sudafed no relief, symptoms have not changed    Problem list and histories reviewed & adjusted, as indicated.  Additional history: as documented    Current Outpatient Prescriptions   Medication Sig Dispense Refill     azithromycin (ZITHROMAX) 250 MG tablet Two tablets first day, then one tablet daily for four days. 6 tablet 0     atorvastatin (LIPITOR) 20 MG tablet Take 1 tablet (20 mg) by mouth daily 90 tablet 1     blood glucose monitoring (NO BRAND SPECIFIED) test strip Use to test blood sugars 1 times daily or as directed 100 strip 1     omeprazole (PRILOSEC) 40 MG capsule TAKE 1 CAPSULE DAILY BEFORE A MEAL 90 capsule 1     potassium chloride SA (K-DUR/KLOR-CON M) 20 MEQ CR tablet TAKE 1 TABLET DAILY WITH FOOD 90 tablet 1     pramipexole (MIRAPEX) 0.25 MG tablet TAKE 1 TABLET AT 5 P.M. AND 2 TABLETS AT 9 P.M. 270 tablet 0     propranolol (INDERAL) 20 MG tablet Take 1 tablet (20 mg) by mouth daily 90 tablet 1     venlafaxine (EFFEXOR-XR) 75 MG 24 hr capsule Take 1 capsule (75 mg) by mouth daily 90 capsule 0     ASPIRIN PO Take 81 mg by mouth daily       ESTRACE VAGINAL 0.1 MG/GM cream Place 2 g vaginally three times a week 42 g 3     RiTUXimab (RITUXAN IV)        predniSONE (DELTASONE) 1 MG tablet Take  "1 mg by mouth daily Take 3 tabs po daily       CELEBREX 200 MG capsule TAKE 1 CAPSULE DAILY 90 capsule 1     folic acid (FOLVITE) 1 MG tablet Take 2 tablets by mouth daily       Blood Glucose Calibration (ACCU-CHEK COMPACT BLUE CONTROL) LIQD Use to calibrate blood glucose monitor as directed. 1 each 0     glucose blood VI test strips (ACCU-CHEK ANGELICA) strip Use to test blood sugars one time by finger poke twice weekly or as directed. 100 strip 3     Blood Glucose Calibration (ACCU-CHEK ANGELICA) SOLN Use as directed 1 Bottle 0     calcium carbonate-vitamin D (CALCIUM + D) 600-200 MG-UNIT TABS Take 600 mg by mouth 2 times daily.       Methotrexate Sodium (METHOTREXATE PO) Inject 12.5 mg as directed once a week.       multivitamin, therapeutic with minerals (MULTI-VITAMIN) TABS Take  by mouth daily.       Cyanocobalamin (VITAMIN B 12 PO) Take 500 mg by mouth daily.       Labs reviewed in EPIC    Reviewed and updated as needed this visit by clinical staffTobacco  Allergies  Meds       Reviewed and updated as needed this visit by Provider         ROS:  Constitutional, HEENT, cardiovascular, pulmonary, gi and gu systems are negative, except as otherwise noted.      OBJECTIVE:                                                    /78 (BP Location: Right arm, Patient Position: Sitting, Cuff Size: Adult Regular)  Pulse 61  Temp 95.3  F (35.2  C) (Tympanic)  Ht 5' 6\" (1.676 m)  Wt 207 lb 12.8 oz (94.3 kg)  SpO2 97%  BMI 33.54 kg/m2  Body mass index is 33.54 kg/(m^2).  GENERAL APPEARANCE: healthy, alert and no distress  HENT: ear canals and TM's normal, nose and mouth without ulcers or lesions and maxillary sinus tenderness bilateral  NECK: no adenopathy, no asymmetry, masses, or scars and thyroid normal to palpation  RESP: lungs clear to auscultation - no rales, rhonchi or wheezes  CV: regular rates and rhythm, normal S1 S2, no S3 or S4 and no murmur, click or rub  PSYCH: mentation appears normal and affect " normal/bright       ASSESSMENT/PLAN:                                                    1. Acute sinusitis with symptoms > 10 days  F/u if not improving  - azithromycin (ZITHROMAX) 250 MG tablet; Two tablets first day, then one tablet daily for four days.  Dispense: 6 tablet; Refill: 0    Patient was agreeable to this plan and had no further questions.  See Patient Instructions    Rubina Cyr MD  [facility]

## 2017-12-05 NOTE — NURSING NOTE
"Chief Complaint   Patient presents with     URI       Initial /78 (BP Location: Right arm, Patient Position: Sitting, Cuff Size: Adult Regular)  Pulse 61  Temp 95.3  F (35.2  C) (Tympanic)  Ht 5' 6\" (1.676 m)  Wt 207 lb 12.8 oz (94.3 kg)  SpO2 97%  BMI 33.54 kg/m2 Estimated body mass index is 33.54 kg/(m^2) as calculated from the following:    Height as of this encounter: 5' 6\" (1.676 m).    Weight as of this encounter: 207 lb 12.8 oz (94.3 kg).  Medication Reconciliation: complete     Verna De Leon    "

## 2017-12-05 NOTE — MR AVS SNAPSHOT
After Visit Summary   12/5/2017    Radha Fox    MRN: [id number]           Patient Information     Date Of Birth          1950        Visit Information        Provider Department      12/5/2017 1:30 PM [facility]        Today's Diagnoses     Acute sinusitis with symptoms > 10 days    -  1       Follow-ups after your visit        Your next 10 appointments already scheduled     May 30, 2018 10:00 AM CDT   (Arrive by 9:45 AM)   Return Visit with Paalk Pham NP   [facility] [place] ([facility] [place] )    [address]  [place] [address]   [phone]              Who to contact     If you have questions or need follow up information about today's clinic visit or your schedule please contact [facility] directly at [phone].  Normal or non-critical lab and imaging results will be communicated to you by MyChart, letter or phone within 4 business days after the clinic has received the results. If you do not hear from us within 7 days, please contact the clinic through MyChart or phone. If you have a critical or abnormal lab result, we will notify you by phone as soon as possible.  Submit refill requests through [employer] or call your pharmacy and they will forward the refill request to us. Please allow 3 business days for your refill to be completed.          Additional Information About Your Visit        MyChart Information     [employer] gives you secure access to your electronic health record. If you see a primary care provider, you can also send messages to your care team and make appointments. If you have questions, please call your primary care clinic.  If you do not have a primary care provider, please call [phone] and they will assist you.        Care EveryWhere ID     This is your Care EveryWhere ID. This could be used by other organizations to access your [place] medical records  [id number]       "  Your Vitals Were     Pulse Temperature Height Pulse Oximetry BMI (Body Mass Index)       61 95.3  F (35.2  C) (Tympanic) 5' 6\" (1.676 m) 97% 33.54 kg/m2        Blood Pressure from Last 3 Encounters:   12/05/17 110/78   09/27/17 100/72   09/15/17 135/62    Weight from Last 3 Encounters:   12/05/17 207 lb 12.8 oz (94.3 kg)   09/27/17 209 lb (94.8 kg)   09/15/17 210 lb 6.4 oz (95.4 kg)              Today, you had the following     No orders found for display         Today's Medication Changes          These changes are accurate as of: 12/5/17  1:52 PM.  If you have any questions, ask your nurse or doctor.               Start taking these medicines.        Dose/Directions    azithromycin 250 MG tablet   Commonly known as:  ZITHROMAX   Used for:  Acute sinusitis with symptoms > 10 days   Started by:  Rubina Cyr MD        Two tablets first day, then one tablet daily for four days.   Quantity:  6 tablet   Refills:  0            Where to get your medicines      These medications were sent to [facility] [address] [facility], [place] - [address] AT [facility] [address]  [address], [facility] [address]     Phone:  [phone]     azithromycin 250 MG tablet                Primary Care Provider Office Phone # Fax #    Lanie Costello [phone]       [facility] [address]  [place] [address]        Equal Access to Services     [facility] AH: Hadii mikki ku hadasho Soomaali, waaxda luqadaha, qaybta kaalmada adeegyada, waxay nazanin marley. So [facility] [phone].    ATENCIÓN: Si habla español, tiene a garcia disposición servicios gratuitos de asistencia lingüística. Llame al [phone].    We comply with applicable federal civil rights laws and [place] laws. We do not discriminate on the basis of race, color, national origin, age, disability, sex, sexual orientation, or gender identity.            Thank you!     Thank you for choosing [facility] [place]  for your care. Our goal is always to provide you with excellent care. Hearing back from our patients is one way we can continue to improve our services. Please take a few minutes to complete the written survey that you may receive in the mail after your visit with us. Thank you!             Your Updated Medication List - Protect others around you: Learn how to safely use, store and throw away your medicines at www.disposemymeds.org.          This list is accurate as of: 12/5/17  1:52 PM.  Always use your most recent med list.                   Brand Name Dispense Instructions for use Diagnosis    ASPIRIN PO      Take 81 mg by mouth daily        atorvastatin 20 MG tablet    LIPITOR    90 tablet    Take 1 tablet (20 mg) by mouth daily    Hyperlipidemia LDL goal <100       azithromycin 250 MG tablet    ZITHROMAX    6 tablet    Two tablets first day, then one tablet daily for four days.    Acute sinusitis with symptoms > 10 days       * blood glucose calibration solution     1 Bottle    Use as directed    Impaired fasting glucose       * blood glucose calibration solution     1 each    Use to calibrate blood glucose monitor as directed.    Impaired fasting glucose       * blood glucose monitoring test strip    ACCU-CHEK ANGELICA    100 strip    Use to test blood sugars one time by finger poke twice weekly or as directed.    Impaired fasting glucose       * blood glucose monitoring test strip    no brand specified    100 strip    Use to test blood sugars 1 times daily or as directed    Elevated glucose, Diabetes mellitus, type 2 (H)       calcium + D 600-200 MG-UNIT Tabs   Generic drug:  calcium carbonate-vitamin D      Take 600 mg by mouth 2 times daily.        celeBREX 200 MG capsule   Generic drug:  celecoxib     90 capsule    TAKE 1 CAPSULE DAILY    RA (rheumatoid arthritis) (H)       ESTRACE VAGINAL 0.1 MG/GM cream   Generic drug:  estradiol     42 g    Place 2 g vaginally three times a week    Atrophic  vaginitis       folic acid 1 MG tablet    FOLVITE     Take 2 tablets by mouth daily        METHOTREXATE PO      Inject 12.5 mg as directed once a week.        Multi-vitamin Tabs tablet      Take  by mouth daily.        omeprazole 40 MG capsule    priLOSEC    90 capsule    TAKE 1 CAPSULE DAILY BEFORE A MEAL    Gastroesophageal reflux disease, esophagitis presence not specified       potassium chloride SA 20 MEQ CR tablet    K-DUR/KLOR-CON M    90 tablet    TAKE 1 TABLET DAILY WITH FOOD    Hypertension, unspecified type       pramipexole 0.25 MG tablet    MIRAPEX    270 tablet    TAKE 1 TABLET AT 5 P.M. AND 2 TABLETS AT 9 P.M.    Restless legs syndrome (RLS)       predniSONE 1 MG tablet    DELTASONE     Take 1 mg by mouth daily Take 3 tabs po daily        propranolol 20 MG tablet    INDERAL    90 tablet    Take 1 tablet (20 mg) by mouth daily    Hypertension, unspecified type       RITUXAN IV           venlafaxine 75 MG 24 hr capsule    EFFEXOR-XR    90 capsule    Take 1 capsule (75 mg) by mouth daily    MDD (major depressive disorder)       VITAMIN B 12 PO      Take 500 mg by mouth daily.        * Notice:  This list has 4 medication(s) that are the same as other medications prescribed for you. Read the directions carefully, and ask your doctor or other care provider to review them with you.

## 2017-12-06 ASSESSMENT — ANXIETY QUESTIONNAIRES: GAD7 TOTAL SCORE: 0

## 2018-01-18 DIAGNOSIS — M06.09 RHEUMATOID ARTHRITIS OF MULTIPLE SITES WITHOUT RHEUMATOID FACTOR (H): ICD-10-CM

## 2018-01-18 DIAGNOSIS — M81.0 OSTEOPOROSIS: Primary | ICD-10-CM

## 2018-01-18 DIAGNOSIS — Z79.899 ENCOUNTER FOR LONG-TERM (CURRENT) USE OF MEDICATIONS: ICD-10-CM

## 2018-01-18 LAB
ALBUMIN SERPL-MCNC: 3.5 G/DL (ref 3.4–5)
ALT SERPL W P-5'-P-CCNC: 33 U/L (ref 0–50)
BASOPHILS # BLD AUTO: 0 10E9/L (ref 0–0.2)
BASOPHILS NFR BLD AUTO: 0.4 %
CALCIUM SERPL-MCNC: 8.9 MG/DL (ref 8.5–10.1)
CREAT SERPL-MCNC: 0.82 MG/DL (ref 0.52–1.04)
CRP SERPL-MCNC: 4.9 MG/L (ref 0–8)
DIFFERENTIAL METHOD BLD: ABNORMAL
EOSINOPHIL # BLD AUTO: 0.2 10E9/L (ref 0–0.7)
EOSINOPHIL NFR BLD AUTO: 1.4 %
ERYTHROCYTE [DISTWIDTH] IN BLOOD BY AUTOMATED COUNT: 14.1 % (ref 10–15)
GFR SERPL CREATININE-BSD FRML MDRD: 70 ML/MIN/1.7M2
HCT VFR BLD AUTO: 39.2 % (ref 35–47)
HGB BLD-MCNC: 13.2 G/DL (ref 11.7–15.7)
IMM GRANULOCYTES # BLD: 0.1 10E9/L (ref 0–0.4)
IMM GRANULOCYTES NFR BLD: 0.4 %
LYMPHOCYTES # BLD AUTO: 1.1 10E9/L (ref 0.8–5.3)
LYMPHOCYTES NFR BLD AUTO: 9.3 %
MCH RBC QN AUTO: 33.9 PG (ref 26.5–33)
MCHC RBC AUTO-ENTMCNC: 33.7 G/DL (ref 31.5–36.5)
MCV RBC AUTO: 101 FL (ref 78–100)
MONOCYTES # BLD AUTO: 1 10E9/L (ref 0–1.3)
MONOCYTES NFR BLD AUTO: 9.1 %
NEUTROPHILS # BLD AUTO: 8.9 10E9/L (ref 1.6–8.3)
NEUTROPHILS NFR BLD AUTO: 79.4 %
NRBC # BLD AUTO: 0 10*3/UL
NRBC BLD AUTO-RTO: 0 /100
PLATELET # BLD AUTO: 264 10E9/L (ref 150–450)
RBC # BLD AUTO: 3.89 10E12/L (ref 3.8–5.2)
WBC # BLD AUTO: 11.3 10E9/L (ref 4–11)

## 2018-01-18 PROCEDURE — 86140 C-REACTIVE PROTEIN: CPT | Mod: ZL | Performed by: NURSE PRACTITIONER

## 2018-01-18 PROCEDURE — 36415 COLL VENOUS BLD VENIPUNCTURE: CPT | Mod: ZL | Performed by: NURSE PRACTITIONER

## 2018-01-18 PROCEDURE — 84460 ALANINE AMINO (ALT) (SGPT): CPT | Mod: ZL | Performed by: NURSE PRACTITIONER

## 2018-01-18 PROCEDURE — 84681 ASSAY OF C-PEPTIDE: CPT | Mod: ZL | Performed by: NURSE PRACTITIONER

## 2018-01-18 PROCEDURE — 82306 VITAMIN D 25 HYDROXY: CPT | Mod: ZL | Performed by: NURSE PRACTITIONER

## 2018-01-18 PROCEDURE — 82310 ASSAY OF CALCIUM: CPT | Mod: ZL | Performed by: NURSE PRACTITIONER

## 2018-01-18 PROCEDURE — 82565 ASSAY OF CREATININE: CPT | Mod: ZL | Performed by: NURSE PRACTITIONER

## 2018-01-18 PROCEDURE — 82040 ASSAY OF SERUM ALBUMIN: CPT | Mod: ZL | Performed by: NURSE PRACTITIONER

## 2018-01-18 PROCEDURE — 85025 COMPLETE CBC W/AUTO DIFF WBC: CPT | Mod: ZL | Performed by: NURSE PRACTITIONER

## 2018-01-18 PROCEDURE — 86431 RHEUMATOID FACTOR QUANT: CPT | Mod: ZL | Performed by: NURSE PRACTITIONER

## 2018-01-21 LAB
DEPRECATED CALCIDIOL+CALCIFEROL SERPL-MC: 48 UG/L (ref 20–75)
RHEUMATOID FACT SER NEPH-ACNC: <20 IU/ML (ref 0–20)

## 2018-01-22 LAB — C PEPTIDE SERPL-MCNC: 9.3 NG/ML (ref 0.9–6.9)

## 2018-01-25 DIAGNOSIS — R73.01 IMPAIRED FASTING GLUCOSE: ICD-10-CM

## 2018-01-26 DIAGNOSIS — R73.09 ELEVATED GLUCOSE: ICD-10-CM

## 2018-01-26 DIAGNOSIS — E11.9 TYPE 2 DIABETES MELLITUS WITHOUT COMPLICATION, WITHOUT LONG-TERM CURRENT USE OF INSULIN (H): Primary | ICD-10-CM

## 2018-02-12 NOTE — PROGRESS NOTES
Faxed orders received per Dr. Conner/[facility] for Rituximab infusion 1,000mg on day 1 and day 15. Labs prior to day 1: CBC with diff, Alb, ALT, creat, CRP. Labs prior to day 15: CBC with diff, CRP. Therapy plan updated. Orders faxed to Dr. Costello for co-signiature. [facility] updated to call pt to schedule.

## 2018-02-12 NOTE — PROGRESS NOTES
Faxed orders received with co-signature per Dr. Costello for rituximab infusions. Sent to scanning.

## 2018-02-15 DIAGNOSIS — M06.09 RHEUMATOID ARTHRITIS OF MULTIPLE SITES WITHOUT RHEUMATOID FACTOR (H): ICD-10-CM

## 2018-02-15 DIAGNOSIS — Z79.899 ENCOUNTER FOR LONG-TERM (CURRENT) USE OF MEDICATIONS: ICD-10-CM

## 2018-02-15 LAB
ALBUMIN SERPL-MCNC: 3.7 G/DL (ref 3.4–5)
ALT SERPL W P-5'-P-CCNC: 35 U/L (ref 0–50)
BASOPHILS # BLD AUTO: 0.1 10E9/L (ref 0–0.2)
BASOPHILS NFR BLD AUTO: 0.4 %
CREAT SERPL-MCNC: 0.89 MG/DL (ref 0.52–1.04)
CRP SERPL-MCNC: 6.2 MG/L (ref 0–8)
DIFFERENTIAL METHOD BLD: ABNORMAL
EOSINOPHIL # BLD AUTO: 0.2 10E9/L (ref 0–0.7)
EOSINOPHIL NFR BLD AUTO: 1.6 %
ERYTHROCYTE [DISTWIDTH] IN BLOOD BY AUTOMATED COUNT: 14 % (ref 10–15)
GFR SERPL CREATININE-BSD FRML MDRD: 63 ML/MIN/1.7M2
HCT VFR BLD AUTO: 40.8 % (ref 35–47)
HGB BLD-MCNC: 13.9 G/DL (ref 11.7–15.7)
IMM GRANULOCYTES # BLD: 0.1 10E9/L (ref 0–0.4)
IMM GRANULOCYTES NFR BLD: 0.4 %
LYMPHOCYTES # BLD AUTO: 1.1 10E9/L (ref 0.8–5.3)
LYMPHOCYTES NFR BLD AUTO: 7.7 %
MCH RBC QN AUTO: 34.2 PG (ref 26.5–33)
MCHC RBC AUTO-ENTMCNC: 34.1 G/DL (ref 31.5–36.5)
MCV RBC AUTO: 101 FL (ref 78–100)
MONOCYTES # BLD AUTO: 1.4 10E9/L (ref 0–1.3)
MONOCYTES NFR BLD AUTO: 9.9 %
NEUTROPHILS # BLD AUTO: 11.3 10E9/L (ref 1.6–8.3)
NEUTROPHILS NFR BLD AUTO: 80 %
NRBC # BLD AUTO: 0 10*3/UL
NRBC BLD AUTO-RTO: 0 /100
PLATELET # BLD AUTO: 270 10E9/L (ref 150–450)
RBC # BLD AUTO: 4.06 10E12/L (ref 3.8–5.2)
WBC # BLD AUTO: 14.1 10E9/L (ref 4–11)

## 2018-02-15 PROCEDURE — 82040 ASSAY OF SERUM ALBUMIN: CPT | Mod: ZL | Performed by: NURSE PRACTITIONER

## 2018-02-15 PROCEDURE — 82565 ASSAY OF CREATININE: CPT | Mod: ZL | Performed by: NURSE PRACTITIONER

## 2018-02-15 PROCEDURE — 84460 ALANINE AMINO (ALT) (SGPT): CPT | Mod: ZL | Performed by: NURSE PRACTITIONER

## 2018-02-15 PROCEDURE — 36415 COLL VENOUS BLD VENIPUNCTURE: CPT | Mod: ZL | Performed by: NURSE PRACTITIONER

## 2018-02-15 PROCEDURE — 86140 C-REACTIVE PROTEIN: CPT | Mod: ZL | Performed by: NURSE PRACTITIONER

## 2018-02-15 PROCEDURE — 85025 COMPLETE CBC W/AUTO DIFF WBC: CPT | Mod: ZL | Performed by: NURSE PRACTITIONER

## 2018-02-16 ENCOUNTER — INFUSION THERAPY VISIT (OUTPATIENT)
Dept: INFUSION THERAPY | Facility: OTHER | Age: 68
End: 2018-02-16
Attending: INTERNAL MEDICINE
Payer: MEDICARE

## 2018-02-16 VITALS
HEART RATE: 65 BPM | BODY MASS INDEX: 33.59 KG/M2 | OXYGEN SATURATION: 97 % | SYSTOLIC BLOOD PRESSURE: 130 MMHG | DIASTOLIC BLOOD PRESSURE: 62 MMHG | HEIGHT: 66 IN | TEMPERATURE: 97.1 F | WEIGHT: 209 LBS | RESPIRATION RATE: 16 BRPM

## 2018-02-16 DIAGNOSIS — M06.09 RHEUMATOID ARTHRITIS OF MULTIPLE SITES WITH NEGATIVE RHEUMATOID FACTOR (H): ICD-10-CM

## 2018-02-16 DIAGNOSIS — M06.09 RHEUMATOID ARTHRITIS OF MULTIPLE SITES WITHOUT RHEUMATOID FACTOR (H): Primary | ICD-10-CM

## 2018-02-16 DIAGNOSIS — M06.9 RHEUMATOID ARTHRITIS, INVOLVING UNSPECIFIED SITE, UNSPECIFIED RHEUMATOID FACTOR PRESENCE: ICD-10-CM

## 2018-02-16 PROCEDURE — 25000125 ZZHC RX 250: Performed by: FAMILY MEDICINE

## 2018-02-16 PROCEDURE — 96366 THER/PROPH/DIAG IV INF ADDON: CPT

## 2018-02-16 PROCEDURE — 96365 THER/PROPH/DIAG IV INF INIT: CPT

## 2018-02-16 PROCEDURE — 25000128 H RX IP 250 OP 636: Performed by: FAMILY MEDICINE

## 2018-02-16 RX ORDER — METHYLPREDNISOLONE SODIUM SUCCINATE 125 MG/2ML
100 INJECTION, POWDER, LYOPHILIZED, FOR SOLUTION INTRAMUSCULAR; INTRAVENOUS ONCE
Status: COMPLETED | OUTPATIENT
Start: 2018-02-16 | End: 2018-02-16

## 2018-02-16 RX ORDER — METHYLPREDNISOLONE SODIUM SUCCINATE 40 MG/ML
40 INJECTION, POWDER, LYOPHILIZED, FOR SOLUTION INTRAMUSCULAR; INTRAVENOUS ONCE
Status: CANCELLED
Start: 2018-02-16 | End: 2018-02-16

## 2018-02-16 RX ORDER — METHYLPREDNISOLONE SODIUM SUCCINATE 125 MG/2ML
100 INJECTION, POWDER, LYOPHILIZED, FOR SOLUTION INTRAMUSCULAR; INTRAVENOUS ONCE
Status: CANCELLED
Start: 2018-02-16 | End: 2018-02-16

## 2018-02-16 RX ADMIN — RITUXIMAB 1000 MG: 10 INJECTION, SOLUTION INTRAVENOUS at 10:05

## 2018-02-16 RX ADMIN — METHYLPREDNISOLONE SODIUM SUCCINATE 100 MG: 125 INJECTION, POWDER, FOR SOLUTION INTRAMUSCULAR; INTRAVENOUS at 09:24

## 2018-02-16 RX ADMIN — FAMOTIDINE 20 MG: 10 INJECTION, SOLUTION INTRAVENOUS at 09:23

## 2018-02-16 NOTE — PROGRESS NOTES
Francois assessment and visit note faxed to Dr Conner at [facility].

## 2018-02-16 NOTE — PROGRESS NOTES
"Patient's WBC 14.1, does not meet parameters for Rituximab infusion per order. Noted that Pt is taking daily prednisone 1mg, denies any s/s of infection, afebrile. TC to [place] with Dr. Conner at [facility]. Verbal order received: \"OK to proceed with rituximab infusion today with WBC 14.1.\" Pharmacy updated.    1120: PIV placed by anesthesia after 3 attempts. No s/s of infiltration. Pt tolerated well.  1125: Rituximab restarted at 200ml/hr. Infusing without issues.  "

## 2018-02-16 NOTE — PATIENT INSTRUCTIONS
We will see you back as planned.   Rituximab Solution for injection  What is this medicine?  RITUXIMAB (ri TUX i mab) is a monoclonal antibody. This medicine changes the way the body's immune system works. It is used commonly to treat non-Hodgkin lymphoma and other conditions. In cancer cells, this drug targets a specific protein within cancer cells and stops the cancer cells from growing. It is also used to treat rheumatoid arthritis (RA). In RA, this medicine slows the inflammatory process and help reduce joint pain and swelling. This medicine is often used with other cancer or arthritis medications.  This medicine may be used for other purposes; ask your health care provider or pharmacist if you have questions.  What should I tell my health care provider before I take this medicine?  They need to know if you have any of these conditions:    blood disorders    heart disease    history of hepatitis B    infection (especially a virus infection such as chickenpox, cold sores, or herpes)    irregular heartbeat    kidney disease    lung or breathing disease, like asthma    lupus    an unusual or allergic reaction to rituximab, mouse proteins, other medicines, foods, dyes, or preservatives    pregnant or trying to get pregnant    breast-feeding  How should I use this medicine?  This medicine is for infusion into a vein. It is administered in a hospital or clinic by a specially trained health care professional.  A special MedGuide will be given to you by the pharmacist with each prescription and refill. Be sure to read this information carefully each time.  Talk to your pediatrician regarding the use of this medicine in children. This medicine is not approved for use in children.  Overdosage: If you think you have taken too much of this medicine contact a poison control center or emergency room at once.  NOTE: This medicine is only for you. Do not share this medicine with others.  What if I miss a dose?  It is important  not to miss a dose. Call your doctor or health care professional if you are unable to keep an appointment.  What may interact with this medicine?    cisplatin    medicines for blood pressure    some other medicines for arthritis    vaccines  This list may not describe all possible interactions. Give your health care provider a list of all the medicines, herbs, non-prescription drugs, or dietary supplements you use. Also tell them if you smoke, drink alcohol, or use illegal drugs. Some items may interact with your medicine.  What should I watch for while using this medicine?  Report any side effects that you notice during your treatment right away, such as changes in your breathing, fever, chills, dizziness or lightheadedness. These effects are more common with the first dose.  Visit your prescriber or health care professional for checks on your progress. You will need to have regular blood work. Report any other side effects. The side effects of this medicine can continue after you finish your treatment. Continue your course of treatment even though you feel ill unless your doctor tells you to stop.  Call your doctor or health care professional for advice if you get a fever, chills or sore throat, or other symptoms of a cold or flu. Do not treat yourself. This drug decreases your body's ability to fight infections. Try to avoid being around people who are sick.  This medicine may increase your risk to bruise or bleed. Call your doctor or health care professional if you notice any unusual bleeding.  Be careful brushing and flossing your teeth or using a toothpick because you may get an infection or bleed more easily. If you have any dental work done, tell your dentist you are receiving this medicine.  Avoid taking products that contain aspirin, acetaminophen, ibuprofen, naproxen, or ketoprofen unless instructed by your doctor. These medicines may hide a fever.  Do not become pregnant while taking this medicine. Women  should inform their doctor if they wish to become pregnant or think they might be pregnant. There is a potential for serious side effects to an unborn child. Talk to your health care professional or pharmacist for more information. Do not breast-feed an infant while taking this medicine.  What side effects may I notice from receiving this medicine?  Side effects that you should report to your doctor or health care professional as soon as possible:    allergic reactions like skin rash, itching or hives, swelling of the face, lips, or tongue    low blood counts - this medicine may decrease the number of white blood cells, red blood cells and platelets. You may be at increased risk for infections and bleeding.    signs of infection - fever or chills, cough, sore throat, pain or difficulty passing urine    signs of decreased platelets or bleeding - bruising, pinpoint red spots on the skin, black, tarry stools, blood in the urine    signs of decreased red blood cells - unusually weak or tired, fainting spells, lightheadedness    breathing problems    confused, not responsive    chest pain    fast, irregular heartbeat    feeling faint or lightheaded, falls    mouth sores    redness, blistering, peeling or loosening of the skin, including inside the mouth    stomach pain    swelling of the ankles, feet, or hands    trouble passing urine or change in the amount of urine  Side effects that usually do not require medical attention (report to your doctor or other health care professional if they continue or are bothersome):    anxiety    headache    loss of appetite    muscle aches    nausea    night sweats  This list may not describe all possible side effects. Call your doctor for medical advice about side effects. You may report side effects to FDA at 4-768-FDA-4981.  Where should I keep my medicine?  This drug is given in a hospital or clinic and will not be stored at home.  NOTE:This sheet is a summary. It may not cover  all possible information. If you have questions about this medicine, talk to your doctor, pharmacist, or health care provider. Copyright  2016 [employer]

## 2018-02-16 NOTE — MR AVS SNAPSHOT
After Visit Summary   2/16/2018    Radha Fox    MRN: [id number]           Patient Information     Date Of Birth          1950        Visit Information        Provider Department      2/16/2018 8:30 AM  INF  [address] [facility]        Today's Diagnoses     Rheumatoid arthritis of multiple sites without rheumatoid factor (H)    -  1    Rheumatoid arthritis of multiple sites with negative rheumatoid factor (H)        Rheumatoid arthritis, involving unspecified site, unspecified rheumatoid factor presence (H)          Care Instructions    We will see you back as planned.   Rituximab Solution for injection  What is this medicine?  RITUXIMAB (ri TUX i mab) is a monoclonal antibody. This medicine changes the way the body's immune system works. It is used commonly to treat non-Hodgkin lymphoma and other conditions. In cancer cells, this drug targets a specific protein within cancer cells and stops the cancer cells from growing. It is also used to treat rheumatoid arthritis (RA). In RA, this medicine slows the inflammatory process and help reduce joint pain and swelling. This medicine is often used with other cancer or arthritis medications.  This medicine may be used for other purposes; ask your health care provider or pharmacist if you have questions.  What should I tell my health care provider before I take this medicine?  They need to know if you have any of these conditions:    blood disorders    heart disease    history of hepatitis B    infection (especially a virus infection such as chickenpox, cold sores, or herpes)    irregular heartbeat    kidney disease    lung or breathing disease, like asthma    lupus    an unusual or allergic reaction to rituximab, mouse proteins, other medicines, foods, dyes, or preservatives    pregnant or trying to get pregnant    breast-feeding  How should I use this medicine?  This medicine is for infusion into a vein. It is administered in a  hospital or clinic by a specially trained health care professional.  A special MedGuide will be given to you by the pharmacist with each prescription and refill. Be sure to read this information carefully each time.  Talk to your pediatrician regarding the use of this medicine in children. This medicine is not approved for use in children.  Overdosage: If you think you have taken too much of this medicine contact a poison control center or emergency room at once.  NOTE: This medicine is only for you. Do not share this medicine with others.  What if I miss a dose?  It is important not to miss a dose. Call your doctor or health care professional if you are unable to keep an appointment.  What may interact with this medicine?    cisplatin    medicines for blood pressure    some other medicines for arthritis    vaccines  This list may not describe all possible interactions. Give your health care provider a list of all the medicines, herbs, non-prescription drugs, or dietary supplements you use. Also tell them if you smoke, drink alcohol, or use illegal drugs. Some items may interact with your medicine.  What should I watch for while using this medicine?  Report any side effects that you notice during your treatment right away, such as changes in your breathing, fever, chills, dizziness or lightheadedness. These effects are more common with the first dose.  Visit your prescriber or health care professional for checks on your progress. You will need to have regular blood work. Report any other side effects. The side effects of this medicine can continue after you finish your treatment. Continue your course of treatment even though you feel ill unless your doctor tells you to stop.  Call your doctor or health care professional for advice if you get a fever, chills or sore throat, or other symptoms of a cold or flu. Do not treat yourself. This drug decreases your body's ability to fight infections. Try to avoid being around  people who are sick.  This medicine may increase your risk to bruise or bleed. Call your doctor or health care professional if you notice any unusual bleeding.  Be careful brushing and flossing your teeth or using a toothpick because you may get an infection or bleed more easily. If you have any dental work done, tell your dentist you are receiving this medicine.  Avoid taking products that contain aspirin, acetaminophen, ibuprofen, naproxen, or ketoprofen unless instructed by your doctor. These medicines may hide a fever.  Do not become pregnant while taking this medicine. Women should inform their doctor if they wish to become pregnant or think they might be pregnant. There is a potential for serious side effects to an unborn child. Talk to your health care professional or pharmacist for more information. Do not breast-feed an infant while taking this medicine.  What side effects may I notice from receiving this medicine?  Side effects that you should report to your doctor or health care professional as soon as possible:    allergic reactions like skin rash, itching or hives, swelling of the face, lips, or tongue    low blood counts - this medicine may decrease the number of white blood cells, red blood cells and platelets. You may be at increased risk for infections and bleeding.    signs of infection - fever or chills, cough, sore throat, pain or difficulty passing urine    signs of decreased platelets or bleeding - bruising, pinpoint red spots on the skin, black, tarry stools, blood in the urine    signs of decreased red blood cells - unusually weak or tired, fainting spells, lightheadedness    breathing problems    confused, not responsive    chest pain    fast, irregular heartbeat    feeling faint or lightheaded, falls    mouth sores    redness, blistering, peeling or loosening of the skin, including inside the mouth    stomach pain    swelling of the ankles, feet, or hands    trouble passing urine or change  in the amount of urine  Side effects that usually do not require medical attention (report to your doctor or other health care professional if they continue or are bothersome):    anxiety    headache    loss of appetite    muscle aches    nausea    night sweats  This list may not describe all possible side effects. Call your doctor for medical advice about side effects. You may report side effects to FDA at 7-865-STX-4231.  Where should I keep my medicine?  This drug is given in a hospital or clinic and will not be stored at home.  NOTE:This sheet is a summary. It may not cover all possible information. If you have questions about this medicine, talk to your doctor, pharmacist, or health care provider. Copyright  2016 Gold Standard                Follow-ups after your visit        Your next 10 appointments already scheduled     Mar 01, 2018 12:30 PM CST   LAB with HC LAB   [facility] ([facility] [place] )    [address]  [place] [address]   [phone]            Mar 02, 2018  8:30 AM CST   Level 6 with HC INF RM [address]   [facility] [place] ([facility] [place] )    [address]  [place] [address]   [phone]            May 29, 2018  1:30 PM CDT   (Arrive by 1:15 PM)   Return Visit with Palak Pham NP   [facility] ([facility] [place] )    [address]  [place] [address]   [phone]              Who to contact     If you have questions or need follow up information about today's clinic visit or your schedule please contact [facility] directly at [phone].  Normal or non-critical lab and imaging results will be communicated to you by MyChart, letter or phone within 4 business days after the clinic has received the results. If you do not hear from us within 7 days, please contact the clinic through MyChart or phone. If you have a critical or abnormal lab result, we will notify you by phone as soon as  "possible.  Submit refill requests through [employer] or call your pharmacy and they will forward the refill request to us. Please allow 3 business days for your refill to be completed.          Additional Information About Your Visit        [employer] Information     [employer] gives you secure access to your electronic health record. If you see a primary care provider, you can also send messages to your care team and make appointments. If you have questions, please call your primary care clinic.  If you do not have a primary care provider, please call [phone] and they will assist you.        Care EveryWhere ID     This is your Care EveryWhere ID. This could be used by other organizations to access your [facility] medical records  [id number]        Your Vitals Were     Pulse Temperature Respirations Height Pulse Oximetry BMI (Body Mass Index)    65 97.1  F (36.2  C) (Tympanic) 16 1.676 m (5' 6\") 97% 33.73 kg/m2       Blood Pressure from Last 3 Encounters:   02/16/18 130/62   12/05/17 110/78   09/27/17 100/72    Weight from Last 3 Encounters:   02/16/18 94.8 kg (209 lb)   12/05/17 94.3 kg (207 lb 12.8 oz)   09/27/17 94.8 kg (209 lb)              Today, you had the following     No orders found for display       Primary Care Provider Office Phone # Fax #    Lanie Costello [phone]       [address] [facility] [address]  [facility] [address]        Equal Access to Services     [facility] Hospital[facility] A AH: Hadii mikki ku hadasho Soomaali, waaxda luqadaha, qaybta kaalmada adeegyada, [email or web address] nazanin rodriguez . So [facility] [phone].    ATENCIÓN: Si habla español, tiene a garcia disposición servicios gratuitos de asistencia lingüística. Llwenceslao al [phone].    We comply with applicable federal civil rights laws and [place] laws. We do not discriminate on the basis of race, color, national origin, age, disability, sex, sexual orientation, or gender identity.            Thank you!     Thank you for choosing [facility] " [place]  for your care. Our goal is always to provide you with excellent care. Hearing back from our patients is one way we can continue to improve our services. Please take a few minutes to complete the written survey that you may receive in the mail after your visit with us. Thank you!             Your Updated Medication List - Protect others around you: Learn how to safely use, store and throw away your medicines at www.disposemymeds.org.          This list is accurate as of 2/16/18  3:02 PM.  Always use your most recent med list.                   Brand Name Dispense Instructions for use Diagnosis    ASPIRIN PO      Take 81 mg by mouth daily        atorvastatin 20 MG tablet    LIPITOR    90 tablet    Take 1 tablet (20 mg) by mouth daily    Hyperlipidemia LDL goal <100       * blood glucose calibration solution     1 Bottle    Use as directed    Impaired fasting glucose       * blood glucose calibration solution     1 each    Use to calibrate blood glucose monitor as directed.    Impaired fasting glucose       * blood glucose monitoring test strip    ONETOUCH ULTRA    100 strip    Use to test blood sugar daily or as directed.    Impaired fasting glucose       * blood glucose monitoring test strip    ONETOUCH ULTRA    100 strip    Use to test blood sugars one time daily or as directed.    Elevated glucose, Type 2 diabetes mellitus without complication, without long-term current use of insulin (H)       calcium + D 600-200 MG-UNIT Tabs   Generic drug:  calcium carbonate-vitamin D      Take 600 mg by mouth 2 times daily.        celeBREX 200 MG capsule   Generic drug:  celecoxib     90 capsule    TAKE 1 CAPSULE DAILY    RA (rheumatoid arthritis) (H)       ESTRACE VAGINAL 0.1 MG/GM cream   Generic drug:  estradiol     42 g    Place 2 g vaginally three times a week    Atrophic vaginitis       folic acid 1 MG tablet    FOLVITE     Take 2 tablets by mouth daily        METHOTREXATE PO      Inject 12.5 mg as directed once  a week.        Multi-vitamin Tabs tablet      Take  by mouth daily.        omeprazole 40 MG capsule    priLOSEC    90 capsule    TAKE 1 CAPSULE DAILY BEFORE A MEAL    Gastroesophageal reflux disease, esophagitis presence not specified       potassium chloride SA 20 MEQ CR tablet    K-DUR/KLOR-CON M    90 tablet    TAKE 1 TABLET DAILY WITH FOOD    Hypertension, unspecified type       pramipexole 0.25 MG tablet    MIRAPEX    270 tablet    TAKE 1 TABLET AT 5 P.M. AND 2 TABLETS AT 9 P.M.    Restless legs syndrome (RLS)       predniSONE 1 MG tablet    DELTASONE     Take 1 mg by mouth daily Take 3 tabs po daily        propranolol 20 MG tablet    INDERAL    90 tablet    Take 1 tablet (20 mg) by mouth daily    Hypertension, unspecified type       RITUXAN IV           venlafaxine 75 MG 24 hr capsule    EFFEXOR-XR    90 capsule    Take 1 capsule (75 mg) by mouth daily    MDD (major depressive disorder)       VITAMIN B 12 PO      Take 500 mg by mouth daily.        * Notice:  This list has 4 medication(s) that are the same as other medications prescribed for you. Read the directions carefully, and ask your doctor or other care provider to review them with you.

## 2018-02-16 NOTE — PROGRESS NOTES
Patients own med sent from her pharmacy to inpatient pharmacy.       Patient is a 68 year old here accompanied by self today for infusion of Rituxan per order of Dr Conner under the supervision of Dr FADIA Costello.  Patient WBC elevated, Dr Conner contacted by Evangelina Marcelo RN, and authorization given to treat as no signs/symptoms of infection reported/noted. Patient identified with two identifiers, order verified, and verbal consent for today's infusion obtained from patient.      Yesterday lab values:  WBC: 14.1, HGB: 13.9, PLT: 270, ANC: 11.3, ALT: 35.     24 gauge angio cath inserted into right hand.  Immediate blood return noted.  IV secured with sterile, transparent dressing and tape.  Patient tolerated well, denies pain or discomfort at this time.  Flushes easily without resistance, no signs or symptoms of infiltration or infection.   Patient denies questions or concerns regarding infusion and/or medication(s) being administered. However at approximately 1038 line no longer infusing/infusion paused. Unable to flush. Line occluded and IV removed. Evangelina Marcelo attempted insertion without success and anesthesiology phoned.       1005: IV pump verified with Rituxan 1000mg dose, drug, and rate of administration.  Infusion administered per protocol.  Patient tolerated infusion well, no signs or symptoms of adverse reaction noted.  Patient denies pain nor discomfort.       IV removed, catheter intact.  Site clean, dry and intact.  No signs or symptoms of infiltration or infection.  Covered with a sterile bandage, slight pressure applied for 30 seconds.  Pt instructed to leave bandage intact for a minimum of one hour, and to call with questions or concerns.  Copy of appointments, discharge instructions, and after visit summary (AVS) provided to patient.  Patient states understanding, discharged ambulatory.

## 2018-02-27 DIAGNOSIS — G25.81 RESTLESS LEGS SYNDROME (RLS): ICD-10-CM

## 2018-02-27 DIAGNOSIS — F32.9 MDD (MAJOR DEPRESSIVE DISORDER): ICD-10-CM

## 2018-02-27 NOTE — TELEPHONE ENCOUNTER
pramipexole (MIRAPEX) 0.25 MG tablet  Last Written Prescription Date:  11/2/17  Last Fill Quantity: 270,   # refills: 0  Last Office Visit: 9/27/17  Future Office visit:          venlafaxine (EFFEXOR-XR) 75 MG 24 hr capsule   Last Written Prescription Date:  11/2/17  Last Fill Quantity: 90,   # refills: 0  Last Office Visit: 9/27/17  Future Office visit:

## 2018-03-01 DIAGNOSIS — M06.09 RHEUMATOID ARTHRITIS OF MULTIPLE SITES WITHOUT RHEUMATOID FACTOR (H): ICD-10-CM

## 2018-03-01 DIAGNOSIS — Z79.899 ENCOUNTER FOR LONG-TERM (CURRENT) USE OF MEDICATIONS: ICD-10-CM

## 2018-03-01 LAB
ALBUMIN SERPL-MCNC: 3.4 G/DL (ref 3.4–5)
ALT SERPL W P-5'-P-CCNC: 26 U/L (ref 0–50)
BASOPHILS # BLD AUTO: 0 10E9/L (ref 0–0.2)
BASOPHILS NFR BLD AUTO: 0.4 %
CREAT SERPL-MCNC: 0.85 MG/DL (ref 0.52–1.04)
CRP SERPL-MCNC: 5.4 MG/L (ref 0–8)
DIFFERENTIAL METHOD BLD: ABNORMAL
EOSINOPHIL # BLD AUTO: 0.2 10E9/L (ref 0–0.7)
EOSINOPHIL NFR BLD AUTO: 1.9 %
ERYTHROCYTE [DISTWIDTH] IN BLOOD BY AUTOMATED COUNT: 13.4 % (ref 10–15)
GFR SERPL CREATININE-BSD FRML MDRD: 67 ML/MIN/1.7M2
HCT VFR BLD AUTO: 40 % (ref 35–47)
HGB BLD-MCNC: 13.6 G/DL (ref 11.7–15.7)
IMM GRANULOCYTES # BLD: 0 10E9/L (ref 0–0.4)
IMM GRANULOCYTES NFR BLD: 0.4 %
LYMPHOCYTES # BLD AUTO: 0.9 10E9/L (ref 0.8–5.3)
LYMPHOCYTES NFR BLD AUTO: 7.7 %
MCH RBC QN AUTO: 34.3 PG (ref 26.5–33)
MCHC RBC AUTO-ENTMCNC: 34 G/DL (ref 31.5–36.5)
MCV RBC AUTO: 101 FL (ref 78–100)
MONOCYTES # BLD AUTO: 0.9 10E9/L (ref 0–1.3)
MONOCYTES NFR BLD AUTO: 7.7 %
NEUTROPHILS # BLD AUTO: 9.3 10E9/L (ref 1.6–8.3)
NEUTROPHILS NFR BLD AUTO: 81.9 %
NRBC # BLD AUTO: 0 10*3/UL
NRBC BLD AUTO-RTO: 0 /100
PLATELET # BLD AUTO: 273 10E9/L (ref 150–450)
RBC # BLD AUTO: 3.97 10E12/L (ref 3.8–5.2)
WBC # BLD AUTO: 11.4 10E9/L (ref 4–11)

## 2018-03-01 PROCEDURE — 86140 C-REACTIVE PROTEIN: CPT | Mod: ZL | Performed by: NURSE PRACTITIONER

## 2018-03-01 PROCEDURE — 36415 COLL VENOUS BLD VENIPUNCTURE: CPT | Mod: ZL | Performed by: NURSE PRACTITIONER

## 2018-03-01 PROCEDURE — 84460 ALANINE AMINO (ALT) (SGPT): CPT | Mod: ZL | Performed by: NURSE PRACTITIONER

## 2018-03-01 PROCEDURE — 82040 ASSAY OF SERUM ALBUMIN: CPT | Mod: ZL | Performed by: NURSE PRACTITIONER

## 2018-03-01 PROCEDURE — 85025 COMPLETE CBC W/AUTO DIFF WBC: CPT | Mod: ZL | Performed by: NURSE PRACTITIONER

## 2018-03-01 PROCEDURE — 82565 ASSAY OF CREATININE: CPT | Mod: ZL | Performed by: NURSE PRACTITIONER

## 2018-03-01 RX ORDER — PRAMIPEXOLE DIHYDROCHLORIDE 0.25 MG/1
TABLET ORAL
Qty: 270 TABLET | Refills: 0 | Status: SHIPPED | OUTPATIENT
Start: 2018-03-01 | End: 2018-05-14

## 2018-03-01 RX ORDER — VENLAFAXINE HYDROCHLORIDE 75 MG/1
75 CAPSULE, EXTENDED RELEASE ORAL DAILY
Qty: 90 CAPSULE | Refills: 0 | Status: SHIPPED | OUTPATIENT
Start: 2018-03-01 | End: 2018-05-14

## 2018-03-02 ENCOUNTER — INFUSION THERAPY VISIT (OUTPATIENT)
Dept: INFUSION THERAPY | Facility: OTHER | Age: 68
End: 2018-03-02
Attending: INTERNAL MEDICINE
Payer: MEDICARE

## 2018-03-02 VITALS
DIASTOLIC BLOOD PRESSURE: 50 MMHG | HEART RATE: 59 BPM | BODY MASS INDEX: 33.93 KG/M2 | RESPIRATION RATE: 16 BRPM | HEIGHT: 66 IN | OXYGEN SATURATION: 97 % | SYSTOLIC BLOOD PRESSURE: 128 MMHG | WEIGHT: 211.1 LBS | TEMPERATURE: 97.8 F

## 2018-03-02 DIAGNOSIS — M06.09 RHEUMATOID ARTHRITIS OF MULTIPLE SITES WITH NEGATIVE RHEUMATOID FACTOR (H): ICD-10-CM

## 2018-03-02 DIAGNOSIS — M06.9 RHEUMATOID ARTHRITIS, INVOLVING UNSPECIFIED SITE, UNSPECIFIED RHEUMATOID FACTOR PRESENCE: ICD-10-CM

## 2018-03-02 DIAGNOSIS — M06.09 RHEUMATOID ARTHRITIS OF MULTIPLE SITES WITHOUT RHEUMATOID FACTOR (H): Primary | ICD-10-CM

## 2018-03-02 PROCEDURE — 96367 TX/PROPH/DG ADDL SEQ IV INF: CPT

## 2018-03-02 PROCEDURE — 25000128 H RX IP 250 OP 636: Performed by: FAMILY MEDICINE

## 2018-03-02 PROCEDURE — 25000125 ZZHC RX 250: Performed by: FAMILY MEDICINE

## 2018-03-02 PROCEDURE — 96366 THER/PROPH/DIAG IV INF ADDON: CPT

## 2018-03-02 PROCEDURE — 96413 CHEMO IV INFUSION 1 HR: CPT

## 2018-03-02 PROCEDURE — 96365 THER/PROPH/DIAG IV INF INIT: CPT

## 2018-03-02 PROCEDURE — 96375 TX/PRO/DX INJ NEW DRUG ADDON: CPT

## 2018-03-02 PROCEDURE — 25000128 H RX IP 250 OP 636

## 2018-03-02 PROCEDURE — 96415 CHEMO IV INFUSION ADDL HR: CPT

## 2018-03-02 RX ORDER — METHYLPREDNISOLONE SODIUM SUCCINATE 125 MG/2ML
100 INJECTION, POWDER, LYOPHILIZED, FOR SOLUTION INTRAMUSCULAR; INTRAVENOUS ONCE
Status: DISCONTINUED | OUTPATIENT
Start: 2018-03-02 | End: 2018-03-02 | Stop reason: HOSPADM

## 2018-03-02 RX ORDER — METHYLPREDNISOLONE SODIUM SUCCINATE 40 MG/ML
40 INJECTION, POWDER, LYOPHILIZED, FOR SOLUTION INTRAMUSCULAR; INTRAVENOUS ONCE
Status: CANCELLED
Start: 2018-03-02 | End: 2018-03-02

## 2018-03-02 RX ORDER — SODIUM CHLORIDE 9 MG/ML
250 INJECTION, SOLUTION INTRAVENOUS ONCE
Status: DISCONTINUED | OUTPATIENT
Start: 2018-03-02 | End: 2018-03-02 | Stop reason: HOSPADM

## 2018-03-02 RX ORDER — METHYLPREDNISOLONE SODIUM SUCCINATE 125 MG/2ML
100 INJECTION, POWDER, LYOPHILIZED, FOR SOLUTION INTRAMUSCULAR; INTRAVENOUS ONCE
Status: CANCELLED
Start: 2018-03-02 | End: 2018-03-02

## 2018-03-02 RX ADMIN — METHYLPREDNISOLONE SODIUM SUCCINATE 100 MG: 125 INJECTION, POWDER, FOR SOLUTION INTRAMUSCULAR; INTRAVENOUS at 11:25

## 2018-03-02 RX ADMIN — SODIUM CHLORIDE 250 ML: 9 INJECTION, SOLUTION INTRAVENOUS at 11:20

## 2018-03-02 RX ADMIN — FAMOTIDINE 20 MG: 20 INJECTION, SOLUTION INTRAVENOUS at 11:27

## 2018-03-02 NOTE — PATIENT INSTRUCTIONS
Rituximab Solution for injection  What is this medicine?  RITUXIMAB (ri TUX i mab) is a monoclonal antibody. This medicine changes the way the body's immune system works. It is used commonly to treat non-Hodgkin lymphoma and other conditions. In cancer cells, this drug targets a specific protein within cancer cells and stops the cancer cells from growing. It is also used to treat rheumatoid arthritis (RA). In RA, this medicine slows the inflammatory process and help reduce joint pain and swelling. This medicine is often used with other cancer or arthritis medications.  This medicine may be used for other purposes; ask your health care provider or pharmacist if you have questions.  What should I tell my health care provider before I take this medicine?  They need to know if you have any of these conditions:    blood disorders    heart disease    history of hepatitis B    infection (especially a virus infection such as chickenpox, cold sores, or herpes)    irregular heartbeat    kidney disease    lung or breathing disease, like asthma    lupus    an unusual or allergic reaction to rituximab, mouse proteins, other medicines, foods, dyes, or preservatives    pregnant or trying to get pregnant    breast-feeding  How should I use this medicine?  This medicine is for infusion into a vein. It is administered in a hospital or clinic by a specially trained health care professional.  A special MedGuide will be given to you by the pharmacist with each prescription and refill. Be sure to read this information carefully each time.  Talk to your pediatrician regarding the use of this medicine in children. This medicine is not approved for use in children.  Overdosage: If you think you have taken too much of this medicine contact a poison control center or emergency room at once.  NOTE: This medicine is only for you. Do not share this medicine with others.  What if I miss a dose?  It is important not to miss a dose. Call your  doctor or health care professional if you are unable to keep an appointment.  What may interact with this medicine?    cisplatin    medicines for blood pressure    some other medicines for arthritis    vaccines  This list may not describe all possible interactions. Give your health care provider a list of all the medicines, herbs, non-prescription drugs, or dietary supplements you use. Also tell them if you smoke, drink alcohol, or use illegal drugs. Some items may interact with your medicine.  What should I watch for while using this medicine?  Report any side effects that you notice during your treatment right away, such as changes in your breathing, fever, chills, dizziness or lightheadedness. These effects are more common with the first dose.  Visit your prescriber or health care professional for checks on your progress. You will need to have regular blood work. Report any other side effects. The side effects of this medicine can continue after you finish your treatment. Continue your course of treatment even though you feel ill unless your doctor tells you to stop.  Call your doctor or health care professional for advice if you get a fever, chills or sore throat, or other symptoms of a cold or flu. Do not treat yourself. This drug decreases your body's ability to fight infections. Try to avoid being around people who are sick.  This medicine may increase your risk to bruise or bleed. Call your doctor or health care professional if you notice any unusual bleeding.  Be careful brushing and flossing your teeth or using a toothpick because you may get an infection or bleed more easily. If you have any dental work done, tell your dentist you are receiving this medicine.  Avoid taking products that contain aspirin, acetaminophen, ibuprofen, naproxen, or ketoprofen unless instructed by your doctor. These medicines may hide a fever.  Do not become pregnant while taking this medicine. Women should inform their doctor if  they wish to become pregnant or think they might be pregnant. There is a potential for serious side effects to an unborn child. Talk to your health care professional or pharmacist for more information. Do not breast-feed an infant while taking this medicine.  What side effects may I notice from receiving this medicine?  Side effects that you should report to your doctor or health care professional as soon as possible:    allergic reactions like skin rash, itching or hives, swelling of the face, lips, or tongue    low blood counts - this medicine may decrease the number of white blood cells, red blood cells and platelets. You may be at increased risk for infections and bleeding.    signs of infection - fever or chills, cough, sore throat, pain or difficulty passing urine    signs of decreased platelets or bleeding - bruising, pinpoint red spots on the skin, black, tarry stools, blood in the urine    signs of decreased red blood cells - unusually weak or tired, fainting spells, lightheadedness    breathing problems    confused, not responsive    chest pain    fast, irregular heartbeat    feeling faint or lightheaded, falls    mouth sores    redness, blistering, peeling or loosening of the skin, including inside the mouth    stomach pain    swelling of the ankles, feet, or hands    trouble passing urine or change in the amount of urine  Side effects that usually do not require medical attention (report to your doctor or other health care professional if they continue or are bothersome):    anxiety    headache    loss of appetite    muscle aches    nausea    night sweats  This list may not describe all possible side effects. Call your doctor for medical advice about side effects. You may report side effects to FDA at 2-145-JGY-8251.  NOTE:This sheet is a summary. It may not cover all possible information. If you have questions about this medicine, talk to your doctor, pharmacist, or health care provider. Copyright  2016  Gold Standard

## 2018-03-02 NOTE — PROGRESS NOTES
Pt not treated today d/t no drug available d/t insurance issues. Pt to call insurance and reschedule.    1120: Spoke with Beata at [facility]. Patient's medicare benefit covers rituximab infusions with no prior authorization needed, paperwork received via fax and sent to scanning. Pt would like to continue with today's infusion. Pharmacy notified.

## 2018-03-02 NOTE — MR AVS SNAPSHOT
After Visit Summary   3/2/2018    Radha Fox    MRN: [id number]           Patient Information     Date Of Birth          1950        Visit Information        Provider Department      3/2/2018 8:30 AM  INF  [address] [facility] [place]        Today's Diagnoses     Rheumatoid arthritis of multiple sites without rheumatoid factor (H)    -  1    Rheumatoid arthritis of multiple sites with negative rheumatoid factor (H)        Rheumatoid arthritis, involving unspecified site, unspecified rheumatoid factor presence (H)          Care Instructions      Rituximab Solution for injection  What is this medicine?  RITUXIMAB (ri TUX i mab) is a monoclonal antibody. This medicine changes the way the body's immune system works. It is used commonly to treat non-Hodgkin lymphoma and other conditions. In cancer cells, this drug targets a specific protein within cancer cells and stops the cancer cells from growing. It is also used to treat rheumatoid arthritis (RA). In RA, this medicine slows the inflammatory process and help reduce joint pain and swelling. This medicine is often used with other cancer or arthritis medications.  This medicine may be used for other purposes; ask your health care provider or pharmacist if you have questions.  What should I tell my health care provider before I take this medicine?  They need to know if you have any of these conditions:    blood disorders    heart disease    history of hepatitis B    infection (especially a virus infection such as chickenpox, cold sores, or herpes)    irregular heartbeat    kidney disease    lung or breathing disease, like asthma    lupus    an unusual or allergic reaction to rituximab, mouse proteins, other medicines, foods, dyes, or preservatives    pregnant or trying to get pregnant    breast-feeding  How should I use this medicine?  This medicine is for infusion into a vein. It is administered in a hospital or clinic by a specially  trained health care professional.  A special MedGuide will be given to you by the pharmacist with each prescription and refill. Be sure to read this information carefully each time.  Talk to your pediatrician regarding the use of this medicine in children. This medicine is not approved for use in children.  Overdosage: If you think you have taken too much of this medicine contact a poison control center or emergency room at once.  NOTE: This medicine is only for you. Do not share this medicine with others.  What if I miss a dose?  It is important not to miss a dose. Call your doctor or health care professional if you are unable to keep an appointment.  What may interact with this medicine?    cisplatin    medicines for blood pressure    some other medicines for arthritis    vaccines  This list may not describe all possible interactions. Give your health care provider a list of all the medicines, herbs, non-prescription drugs, or dietary supplements you use. Also tell them if you smoke, drink alcohol, or use illegal drugs. Some items may interact with your medicine.  What should I watch for while using this medicine?  Report any side effects that you notice during your treatment right away, such as changes in your breathing, fever, chills, dizziness or lightheadedness. These effects are more common with the first dose.  Visit your prescriber or health care professional for checks on your progress. You will need to have regular blood work. Report any other side effects. The side effects of this medicine can continue after you finish your treatment. Continue your course of treatment even though you feel ill unless your doctor tells you to stop.  Call your doctor or health care professional for advice if you get a fever, chills or sore throat, or other symptoms of a cold or flu. Do not treat yourself. This drug decreases your body's ability to fight infections. Try to avoid being around people who are sick.  This  medicine may increase your risk to bruise or bleed. Call your doctor or health care professional if you notice any unusual bleeding.  Be careful brushing and flossing your teeth or using a toothpick because you may get an infection or bleed more easily. If you have any dental work done, tell your dentist you are receiving this medicine.  Avoid taking products that contain aspirin, acetaminophen, ibuprofen, naproxen, or ketoprofen unless instructed by your doctor. These medicines may hide a fever.  Do not become pregnant while taking this medicine. Women should inform their doctor if they wish to become pregnant or think they might be pregnant. There is a potential for serious side effects to an unborn child. Talk to your health care professional or pharmacist for more information. Do not breast-feed an infant while taking this medicine.  What side effects may I notice from receiving this medicine?  Side effects that you should report to your doctor or health care professional as soon as possible:    allergic reactions like skin rash, itching or hives, swelling of the face, lips, or tongue    low blood counts - this medicine may decrease the number of white blood cells, red blood cells and platelets. You may be at increased risk for infections and bleeding.    signs of infection - fever or chills, cough, sore throat, pain or difficulty passing urine    signs of decreased platelets or bleeding - bruising, pinpoint red spots on the skin, black, tarry stools, blood in the urine    signs of decreased red blood cells - unusually weak or tired, fainting spells, lightheadedness    breathing problems    confused, not responsive    chest pain    fast, irregular heartbeat    feeling faint or lightheaded, falls    mouth sores    redness, blistering, peeling or loosening of the skin, including inside the mouth    stomach pain    swelling of the ankles, feet, or hands    trouble passing urine or change in the amount of  urine  Side effects that usually do not require medical attention (report to your doctor or other health care professional if they continue or are bothersome):    anxiety    headache    loss of appetite    muscle aches    nausea    night sweats  This list may not describe all possible side effects. Call your doctor for medical advice about side effects. You may report side effects to FDA at 0-215-HEG-4851.  NOTE:This sheet is a summary. It may not cover all possible information. If you have questions about this medicine, talk to your doctor, pharmacist, or health care provider. Copyright  2016 Gold Standard                Follow-ups after your visit        Your next 10 appointments already scheduled     May 29, 2018  1:30 PM CDT   (Arrive by 1:15 PM)   Return Visit with Palak Pham NP   [facility] [place] [facility]    [address] [place] [address]   [phone]              Who to contact     If you have questions or need follow up information about today's clinic visit or your schedule please contact [facility] directly at [phone].  Normal or non-critical lab and imaging results will be communicated to you by Pantechhart, letter or phone within 4 business days after the clinic has received the results. If you do not hear from us within 7 days, please contact the clinic through Pantechhart or phone. If you have a critical or abnormal lab result, we will notify you by phone as soon as possible.  Submit refill requests through Cloudmach or call your pharmacy and they will forward the refill request to us. Please allow 3 business days for your refill to be completed.          Additional Information About Your Visit        Pantechhart Information     Cloudmach gives you secure access to your electronic health record. If you see a primary care provider, you can also send messages to your care team and make appointments. If you have questions, please call your primary care  "clinic.  If you do not have a primary care provider, please call [phone] and they will assist you.        Care EveryWhere ID     This is your Care EveryWhere ID. This could be used by other organizations to access your [place] medical records  [id number]        Your Vitals Were     Pulse Temperature Respirations Height Pulse Oximetry BMI (Body Mass Index)    59 97.8  F (36.6  C) (Tympanic) 16 1.676 m (5' 6\") 97% 34.07 kg/m2       Blood Pressure from Last 3 Encounters:   03/02/18 128/50   02/16/18 130/62   12/05/17 110/78    Weight from Last 3 Encounters:   03/02/18 95.8 kg (211 lb 1.6 oz)   02/16/18 94.8 kg (209 lb)   12/05/17 94.3 kg (207 lb 12.8 oz)              Today, you had the following     No orders found for display       Primary Care Provider Office Phone # Fax #    Lanie Costello [phone] [address]  [facility] [address]        Equal Access to Services     [facility]: Hadii mikki conley haddennys Sorosita, waaxda luledy, qaybta wilfrido cee, erika rodriguez . So [facility] [phone].    ATENCIÓN: Si habla español, tiene a garcia disposición servicios gratuitos de asistencia lingüística. [facility] [phone].    We comply with applicable federal civil rights laws and [place] laws. We do not discriminate on the basis of race, color, national origin, age, disability, sex, sexual orientation, or gender identity.            Thank you!     Thank you for choosing [facility]  for your care. Our goal is always to provide you with excellent care. Hearing back from our patients is one way we can continue to improve our services. Please take a few minutes to complete the written survey that you may receive in the mail after your visit with us. Thank you!             Your Updated Medication List - Protect others around you: Learn how to safely use, store and throw away your medicines at www.disposemymeds.org.          This list is accurate " as of 3/2/18  3:09 PM.  Always use your most recent med list.                   Brand Name Dispense Instructions for use Diagnosis    ASPIRIN PO      Take 81 mg by mouth daily        atorvastatin 20 MG tablet    LIPITOR    90 tablet    Take 1 tablet (20 mg) by mouth daily    Hyperlipidemia LDL goal <100       * blood glucose calibration solution     1 Bottle    Use as directed    Impaired fasting glucose       * blood glucose calibration solution     1 each    Use to calibrate blood glucose monitor as directed.    Impaired fasting glucose       * blood glucose monitoring test strip    ONETOUCH ULTRA    100 strip    Use to test blood sugar daily or as directed.    Impaired fasting glucose       * blood glucose monitoring test strip    ONETOUCH ULTRA    100 strip    Use to test blood sugars one time daily or as directed.    Elevated glucose, Type 2 diabetes mellitus without complication, without long-term current use of insulin (H)       calcium + D 600-200 MG-UNIT Tabs   Generic drug:  calcium carbonate-vitamin D      Take 600 mg by mouth 2 times daily.        celeBREX 200 MG capsule   Generic drug:  celecoxib     90 capsule    TAKE 1 CAPSULE DAILY    RA (rheumatoid arthritis) (H)       ESTRACE VAGINAL 0.1 MG/GM cream   Generic drug:  estradiol     42 g    Place 2 g vaginally three times a week    Atrophic vaginitis       folic acid 1 MG tablet    FOLVITE     Take 2 tablets by mouth daily        METHOTREXATE PO      Inject 12.5 mg as directed once a week.        Multi-vitamin Tabs tablet      Take  by mouth daily.        omeprazole 40 MG capsule    priLOSEC    90 capsule    TAKE 1 CAPSULE DAILY BEFORE A MEAL    Gastroesophageal reflux disease, esophagitis presence not specified       potassium chloride SA 20 MEQ CR tablet    K-DUR/KLOR-CON M    90 tablet    TAKE 1 TABLET DAILY WITH FOOD    Hypertension, unspecified type       pramipexole 0.25 MG tablet    MIRAPEX    270 tablet    TAKE 1 TABLET AT 5 P.M. AND 2 TABLETS  AT 9 P.M.    Restless legs syndrome (RLS)       predniSONE 1 MG tablet    DELTASONE     Take 1 mg by mouth daily Take 3 tabs po daily        propranolol 20 MG tablet    INDERAL    90 tablet    Take 1 tablet (20 mg) by mouth daily    Hypertension, unspecified type       RITUXAN IV           venlafaxine 75 MG 24 hr capsule    EFFEXOR-XR    90 capsule    Take 1 capsule (75 mg) by mouth daily    MDD (major depressive disorder)       VITAMIN B 12 PO      Take 500 mg by mouth daily.        * Notice:  This list has 4 medication(s) that are the same as other medications prescribed for you. Read the directions carefully, and ask your doctor or other care provider to review them with you.

## 2018-03-02 NOTE — PROGRESS NOTES
Patient is a 68 year old female here accompanied by self today for infusion of Rituximab 1000mg per order of Dr. Costello under the supervision of Dr. Conner.  Patient meets parameters for today's infusion. Patient denies any fever, chills or antibiotics.  Rheumatology questionnaire completed.  Patient identified with two identifiers, order verified, and verbal consent for today's infusion obtained from patient.      3-1-18 lab values:  WBC: 11.4, HGB: 13.6, PLT: 273, ANC: 9.3, ALT: 26, Creatinine: 0.85, CRP Inflammation: 5.4.  Patient meets order parameters for today's treatment.     1115: 22 gauge angio cath inserted into right upper inner forearm.  Immediate blood return noted.  IV secured with sterile, transparent dressing and tape.  Patient tolerated well, denies pain or discomfort at this time.  Flushes easily without resistance, slight bruising noted surrounding site, no signs or symptoms of infiltration or infection.   Patient denies questions or concerns regarding infusion and/or medication(s) being administered.    1140: IV pump verified with Rituximab 1,000mg dose, drug, and rate of administration with MAR and medication label.  Infusion administered per protocol.  Patient tolerated infusion well, no signs or symptoms of adverse reaction noted.  Patient denies pain nor discomfort.     IV removed, catheter intact.  Site clean, dry and intact.  No signs or symptoms of infiltration or infection.  Covered with a sterile bandage, slight pressure applied for 30 seconds.  Pt instructed to leave bandage intact for a minimum of one hour, and to call with questions or concerns.  Copy of appointments, discharge instructions, and after visit summary (AVS) provided to patient.  Patient states understanding, discharged ambulatory.  Evangelina Marcelo RN

## 2018-04-03 NOTE — PROGRESS NOTES
Initial /90 (BP Location: Right arm, Patient Position: Sitting, Cuff Size: Adult Regular)  Pulse 81  Temp 98.2  F (36.8  C) (Oral)  Ht 1.829 m (6')  Wt 110.7 kg (244 lb)  BMI 33.09 kg/m2 Estimated body mass index is 33.09 kg/(m^2) as calculated from the following:    Height as of this encounter: 1.829 m (6').    Weight as of this encounter: 110.7 kg (244 lb). .    Crista Guillen CMA     [facility] Website verified for patient immunization history.      SUBJECTIVE:                                                    Radha Fox is a 66 year old female who presents to clinic today for the following health issues:    Continues to have sinus pressure. Today she has pain in her left ear. Radha has an appointment with [facility] in February and is trying to get into ENT sooner due to continued symptoms after sinus surgery in September. Radha finished the doxicycline, sinus rinsing and using flonase without any relief.      RESPIRATORY SYMPTOMS      Duration: since Xmas time    Description  nasal congestion, facial pain/pressure, cough, chills, ear pain left, headache, fatigue/malaise and hoarse voice    Severity: severe, worse in am    Accompanying signs and symptoms: cough and sinus pressure    History (predisposing factors):  Treated for sinus infection in December, never really cleared up.  Has sinus surgery in September to help clear these up for her    Precipitating or alleviating factors: None    Therapies tried and outcome:  nasal spray/wash - previous surgery           Problem list and histories reviewed & adjusted, as indicated.  Additional history: as documented    Patient Active Problem List   Diagnosis     RA (rheumatoid arthritis) (H)     Recurrent UTI     Hyperlipidemia     Depressive disorder, not elsewhere classified     Contact dermatitis and other eczema, due to unspecified cause     Diffuse connective tissue disease (H)     Arthropathy     Coronary atherosclerosis     Sinusitis, chronic     Impaired fasting glucose     Esophageal reflux     Restless legs syndrome (RLS)     Osteoporosis     Obesity     Insomnia     Abnormal glucose     History of sinus surgery     Simple chronic serous otitis media     Dysfunction of eustachian tube     Mixed hearing loss, unilateral     SNHL (sensory-neural hearing loss), asymmetrical     S/P myringotomy with insertion of tube     History of chronic  sinusitis     Rheumatoid arthritis of multiple sites without rheumatoid factor (H)     ACP (advance care planning)     Diabetes mellitus type 2, diet-controlled (H)     Chronic cough     Past Surgical History   Procedure Laterality Date     Appendectomy  1961     Cholecystectomy  1973     Genitourinary surgery       Tot internal urethrotomy  2010     Laparoscopy diagnostic (general)       Endoscopic sphenoidotomy  10/2011     Right     Tubal ligation       Excision bilateral telma bullosa  10/2011     Hysterectomy       benign     Colonoscopy N/A 3/23/2015     Procedure: COLONOSCOPY;  Surgeon: Clarisa Larkin MD;  Location: [facility] OR     Orthopedic surgery       left foot 5th metarsal fracture screws placed     Ethmoidectomy Bilateral 9/27/2016     Procedure: ETHMOIDECTOMY;  Surgeon: Aracelis Ayon MD;  Location: [facility] OR     Septoplasty N/A 9/27/2016     Procedure: SEPTOPLASTY;  Surgeon: Aracelis Ayon MD;  Location: [facility] OR       Social History   Substance Use Topics     Smoking status: Never Smoker      Smokeless tobacco: Never Used     Alcohol Use: No     Family History   Problem Relation Age of Onset     C.A.D. Father 83     Other - See Comments Father      CHF     CEREBROVASCULAR DISEASE Father      CVA     DIABETES Father      Hypertension Father      Coronary Artery Disease Father      CEREBROVASCULAR DISEASE Mother 72     CVA     HEART DISEASE Mother      Heart Issue     Hyperlipidemia Mother      Coronary Artery Disease Mother      DIABETES Brother      DIABETES Sister      Colon Cancer No family hx of      Breast Cancer No family hx of      Asthma No family hx of      Thyroid Disease No family hx of          Current Outpatient Prescriptions   Medication Sig Dispense Refill     venlafaxine (EFFEXOR-XR) 75 MG 24 hr capsule TAKE 1 CAPSULE DAILY 90 capsule 1     atorvastatin (LIPITOR) 20 MG tablet TAKE 1 TABLET DAILY 90 tablet 1     potassium chloride SA (K-DUR/KLOR-CON M) 20 MEQ CR tablet TAKE 1  TABLET DAILY WITH FOOD 90 tablet 1     pramipexole (MIRAPEX) 0.25 MG tablet TAKE 1 TABLET AT 5 P.M. AND 2 TABLETS AT 9 P.M. 270 tablet 1     albuterol (PROAIR HFA/PROVENTIL HFA/VENTOLIN HFA) 108 (90 BASE) MCG/ACT Inhaler Inhale 2 puffs into the lungs every 6 hours as needed for shortness of breath / dyspnea or wheezing 1 Inhaler 0     benzonatate (TESSALON) 100 MG capsule Take 1 capsule (100 mg) by mouth 3 times daily as needed for cough 42 capsule 0     fluticasone (FLONASE) 50 MCG/ACT spray Spray 2 sprays into both nostrils daily 3 Bottle 11     omeprazole (PRILOSEC) 40 MG capsule TAKE 1 CAPSULE DAILY BEFORE A MEAL 90 capsule 3     propranolol (INDERAL) 20 MG tablet TAKE 1 TABLET DAILY 90 tablet 3     RiTUXimab (RITUXAN IV)        ESTRACE VAGINAL 0.1 MG/GM vaginal cream Place vaginally three times a week 42.5 g 1     ascorbic acid (VITAMIN C) 1000 MG TABS Take 1 tablet (1,000 mg) by mouth daily 30 tablet 11     predniSONE (DELTASONE) 1 MG tablet Take 1 mg by mouth daily Take 3 tabs po daily       CELEBREX 200 MG capsule TAKE 1 CAPSULE DAILY 90 capsule 1     folic acid (FOLVITE) 1 MG tablet Take 2 tablets by mouth daily       Blood Glucose Calibration (ACCU-CHEK COMPACT BLUE CONTROL) LIQD Use to calibrate blood glucose monitor as directed. 1 each 0     glucose blood VI test strips (ACCU-CHEK ANGELICA) strip Use to test blood sugars one time by finger poke twice weekly or as directed. 100 strip 3     Blood Glucose Calibration (ACCU-CHEK ANGELICA) SOLN Use as directed 1 Bottle 0     calcium carbonate-vitamin D (CALCIUM + D) 600-200 MG-UNIT TABS Take 600 mg by mouth 2 times daily.       Methotrexate Sodium (METHOTREXATE PO) Inject 12.5 mg as directed once a week.       multivitamin, therapeutic with minerals (MULTI-VITAMIN) TABS Take  by mouth daily.       Cyanocobalamin (VITAMIN B 12 PO) Take 500 mg by mouth daily.       Allergies   Allergen Reactions     Neomycin-Polymyxin-Hc      Rash behind ear after ear drops      "Penicillins Hives and Itching     One time large local reaction after IM PCN in 1970, no airway symptoms     Plaquenil [Hydroxychloroquine Sulfate] Hives       ROS:  CONSTITUTIONAL:POSITIVE  for chills and fever   ENT/MOUTH: POSITIVE for ear pain left, Hx sinus infections, nasal congestion, postnasal drainage and sinus pressure - headache  RESP:cough-productive and wheezing  CV: NEGATIVE for chest pain, palpitations or peripheral edema    OBJECTIVE:                                                    BP 94/62 mmHg  Pulse 80  Temp(Src) 97.6  F (36.4  C) (Tympanic)  Resp 20  Ht 5' 6\" (1.676 m)  Wt 210 lb (95.255 kg)  BMI 33.91 kg/m2  Body mass index is 33.91 kg/(m^2).   GENERAL: healthy, alert and no distress  HENT: normal cephalic/atraumatic, right ear: normal: no effusions, no erythema, normal landmarks, left ear: erythematous, oropharynx clear, oral mucous membranes moist and sinuses: frontal tenderness on both sides  NECK: no adenopathy, no asymmetry, masses, or scars and thyroid normal to palpation  RESP: decreased breath sounds R lower posterior, L lower posterior and bilateral  CV: regular rate and rhythm, normal S1 S2, no S3 or S4, no murmur, click or rub, no peripheral edema and peripheral pulses strong  PSYCH: mentation appears normal, affect normal/bright    Diagnostic Test Results:  none      ASSESSMENT:                                                      Radha had sinus surgery in September. She states she felt better until about the beginning of December. Since then she has not been able to clear her sinuses and has increased sinus pressure.  Radha should follow up with ENT.      PLAN:                                                    ASSESSMENT / PLAN:  (J01.90) Acute sinusitis with symptoms greater than 10 days  (primary encounter diagnosis)  Comment:   Plan:  levofloxacin (LEVAQUIN) 500 MG tablet   Follow up with ENT-             (J06.9) URI with cough and congestion  Comment:   Plan: "  benzonatate (TESSALON) 100 MG capsule,          albuterol (PROAIR HFA/PROVENTIL HFA/VENTOLIN HFA) 108 (90 BASE) MCG/ACT Inhaler              Follow up if no improvement or symptoms worsen          SUSHMA [facility] ROSALINDA

## 2018-05-16 DIAGNOSIS — M06.9 RA (RHEUMATOID ARTHRITIS) (H): ICD-10-CM

## 2018-05-16 DIAGNOSIS — I10 HYPERTENSION, UNSPECIFIED TYPE: ICD-10-CM

## 2018-05-16 NOTE — TELEPHONE ENCOUNTER
Potassium chloride   Last Written Prescription Date:  11/2/17  Last Fill Quantity: 90,   # refills: 1  Last Office Visit: 9/27/17  Future Office visit:           celebrex       Last Written Prescription Date:  7/9/14  Last Fill Quantity: 90,   # refills: 1  Last Office Visit: 9/27/17  Future Office visit:       Routing refill request to provider for review/approval because:  Drug not on the [facility], [facility] or [facility] refill protocol or controlled substance

## 2018-05-17 RX ORDER — CELECOXIB 200 MG/1
200 CAPSULE ORAL DAILY
Qty: 90 CAPSULE | Refills: 0 | Status: SHIPPED | OUTPATIENT
Start: 2018-05-17 | End: 2018-08-01

## 2018-05-17 RX ORDER — POTASSIUM CHLORIDE 1500 MG/1
TABLET, EXTENDED RELEASE ORAL
Qty: 90 TABLET | Refills: 0 | Status: SHIPPED | OUTPATIENT
Start: 2018-05-17 | End: 2018-08-02

## 2018-06-16 ENCOUNTER — HOSPITAL ENCOUNTER (EMERGENCY)
Facility: HOSPITAL | Age: 68
Discharge: HOME | End: 2018-06-16
Attending: NURSE PRACTITIONER | Admitting: NURSE PRACTITIONER
Payer: MEDICARE

## 2018-06-16 VITALS
HEART RATE: 59 BPM | OXYGEN SATURATION: 97 % | SYSTOLIC BLOOD PRESSURE: 160 MMHG | TEMPERATURE: 98 F | RESPIRATION RATE: 18 BRPM | DIASTOLIC BLOOD PRESSURE: 71 MMHG

## 2018-06-16 DIAGNOSIS — R82.90 ABNORMAL URINE FINDINGS: Primary | ICD-10-CM

## 2018-06-16 DIAGNOSIS — N30.00 ACUTE CYSTITIS WITHOUT HEMATURIA: ICD-10-CM

## 2018-06-16 LAB
ALBUMIN UR-MCNC: NEGATIVE MG/DL
APPEARANCE UR: CLEAR
BACTERIA #/AREA URNS HPF: ABNORMAL /HPF
BILIRUB UR QL STRIP: NEGATIVE
COLOR UR AUTO: YELLOW
GLUCOSE UR STRIP-MCNC: NEGATIVE MG/DL
HGB UR QL STRIP: NEGATIVE
KETONES UR STRIP-MCNC: NEGATIVE MG/DL
LEUKOCYTE ESTERASE UR QL STRIP: ABNORMAL
MUCOUS THREADS #/AREA URNS LPF: PRESENT /LPF
NITRATE UR QL: NEGATIVE
PH UR STRIP: 6 PH (ref 4.7–8)
RBC #/AREA URNS AUTO: 3 /HPF (ref 0–2)
SOURCE: ABNORMAL
SP GR UR STRIP: 1.01 (ref 1–1.03)
UROBILINOGEN UR STRIP-MCNC: NORMAL MG/DL (ref 0–2)
WBC #/AREA URNS AUTO: 88 /HPF (ref 0–5)

## 2018-06-16 PROCEDURE — 87086 URINE CULTURE/COLONY COUNT: CPT | Performed by: NURSE PRACTITIONER

## 2018-06-16 PROCEDURE — G0463 HOSPITAL OUTPT CLINIC VISIT: HCPCS

## 2018-06-16 PROCEDURE — 99213 OFFICE O/P EST LOW 20 MIN: CPT | Performed by: NURSE PRACTITIONER

## 2018-06-16 PROCEDURE — 81001 URINALYSIS AUTO W/SCOPE: CPT | Performed by: NURSE PRACTITIONER

## 2018-06-16 RX ORDER — NITROFURANTOIN 25; 75 MG/1; MG/1
100 CAPSULE ORAL 2 TIMES DAILY
Qty: 14 CAPSULE | Refills: 0 | Status: SHIPPED | OUTPATIENT
Start: 2018-06-16 | End: 2018-08-08

## 2018-06-16 ASSESSMENT — ENCOUNTER SYMPTOMS
FATIGUE: 0
CHILLS: 0
FEVER: 0
APPETITE CHANGE: 0

## 2018-06-16 NOTE — ED AVS SNAPSHOT
[facility]    [address] [address]    Phone:  [phone]                                       Radha Fox   MRN: [id number]    Department:  [facility]   Date of Visit:  6/16/2018           Patient Information     Date Of Birth          1950        Your diagnoses for this visit were:     Abnormal urine findings     Acute cystitis without hematuria        You were seen by Marta Hill NP.      Follow-up Information     Follow up with [facility].    Specialty:  EMERGENCY MEDICINE    Why:  As needed, If symptoms worsen, or concerns develop    Contact information:    [address] [address] [phone]    Additional information:    From [facility]: Take [address]. Turn left at [address]. Turn left at the first stoplight on [address]. At the first stop sign, take a right onto [address]. Take a left into the parking lot and continue through until you reach the North enterance of the building.       From [place]: Take [address]. Take the [address] ramp towards [place]. Turn left onto [address]. Take a slight right onto [address]. Turn left at the first stoplight on [address]. At the first stop sign, take a right onto [address]. Take a left into the parking lot and continue through until you reach the North enterance of the building.       From [place]: Take [address]. Take a right at [address]. At the first stop sign, take a right onto [address]. Take a left into the parking lot and continue through until you reach the North enterance of the building.         Follow up with Lanie Costello MD.    Specialty:  Family Practice    Why:  As needed, if symptoms do not improve    Contact information:    [address]  [facility] [address]  [phone]           Review of your medicines      START taking         Dose / Directions Last dose taken    nitroFURantoin (macrocrystal-monohydrate) 100 MG capsule   Commonly known as:  MACROBID   Dose:  100 mg   Quantity:  14 capsule        Take 1 capsule (100 mg) by mouth 2 times daily   Refills:  0          Our records show that you are taking the medicines listed below. If these are incorrect, please call your family doctor or clinic.        Dose / Directions Last dose taken    ASPIRIN PO   Dose:  81 mg        Take 81 mg by mouth daily   Refills:  0        atorvastatin 20 MG tablet   Commonly known as:  LIPITOR   Dose:  20 mg   Quantity:  90 tablet        Take 1 tablet (20 mg) by mouth daily   Refills:  1        * blood glucose calibration solution   Quantity:  1 Bottle        Use as directed   Refills:  0        * blood glucose calibration solution   Quantity:  1 each        Use to calibrate blood glucose monitor as directed.   Refills:  0        * blood glucose monitoring test strip   Commonly known as:  ONETOUCH ULTRA   Quantity:  100 strip        Use to test blood sugar daily or as directed.   Refills:  2        * blood glucose monitoring test strip   Commonly known as:  ONETOUCH ULTRA   Quantity:  100 strip        Use to test blood sugars one time daily or as directed.   Refills:  2        calcium + D 600-200 MG-UNIT Tabs   Dose:  600 mg   Generic drug:  calcium carbonate-vitamin D        Take 600 mg by mouth 2 times daily.   Refills:  0        celecoxib 200 MG capsule   Commonly known as:  celeBREX   Dose:  200 mg   Quantity:  90 capsule        Take 1 capsule (200 mg) by mouth daily   Refills:  0        folic acid 1 MG tablet   Commonly known as:  FOLVITE   Dose:  2 tablet        Take 2 tablets by mouth daily   Refills:  0        METHOTREXATE PO   Dose:  12.5 mg        Inject 12.5 mg as directed once a week.   Refills:  0        Multi-vitamin Tabs tablet        Take  by mouth daily.   Refills:  0        omeprazole 40 MG capsule   Commonly known as:  priLOSEC   Quantity:  90  capsule        TAKE 1 CAPSULE DAILY BEFORE A MEAL   Refills:  1        potassium chloride SA 20 MEQ CR tablet   Commonly known as:  K-DUR/KLOR-CON M   Quantity:  90 tablet        TAKE 1 TABLET DAILY WITH FOOD   Refills:  0        pramipexole 0.25 MG tablet   Commonly known as:  MIRAPEX   Quantity:  270 tablet        TAKE ONE TABLET BY MOUTH AT 5 PM AND TWO TABLETS AT 9 PM   Refills:  1        predniSONE 1 MG tablet   Commonly known as:  DELTASONE   Dose:  1 mg        Take 1 mg by mouth daily Take 3 tabs po daily   Refills:  0        propranolol 20 MG tablet   Commonly known as:  INDERAL   Quantity:  90 tablet        TAKE ONE TABLET BY MOUTH ONCE DAILY   Refills:  1        RITUXAN IV        Refills:  0        venlafaxine 75 MG 24 hr capsule   Commonly known as:  EFFEXOR-XR   Quantity:  90 capsule        TAKE 1 CAPSULE BY MOUTH ONCE DAILY   Refills:  0        VITAMIN B 12 PO   Dose:  500 mg        Take 500 mg by mouth daily.   Refills:  0        * Notice:  This list has 4 medication(s) that are the same as other medications prescribed for you. Read the directions carefully, and ask your doctor or other care provider to review them with you.            Prescriptions were sent or printed at these locations (1 Prescription)                   [facility] [address] [facility], [place] - [address] [address]   [address] [address], [facility] [address]    Telephone:  [phone]   Fax:  [phone]   Hours:                  E-Prescribed (1 of 1)         nitroFURantoin, macrocrystal-monohydrate, (MACROBID) 100 MG capsule                Procedures and tests performed during your visit     UA reflex to Microscopic and Culture    Urine Culture Aerobic Bacterial      Orders Needing Specimen Collection     None      Pending Results     Date and Time Order Name Status Description    6/16/2018 1133 Urine Culture Aerobic Bacterial In process             Pending Culture Results     Date and Time Order Name Status Description    6/16/2018 1133 Urine  Culture Aerobic Bacterial In process             Thank you for choosing [facility]       Thank you for choosing [facility] for your care. Our goal is always to provide you with excellent care. Hearing back from our patients is one way we can continue to improve our services. Please take a few minutes to complete the written survey that you may receive in the mail after you visit with us. Thank you!        [employer] Information     [employer] gives you secure access to your electronic health record. If you see a primary care provider, you can also send messages to your care team and make appointments. If you have questions, please call your primary care clinic.  If you do not have a primary care provider, please call [phone] and they will assist you.        Care EveryWhere ID     This is your Care EveryWhere ID. This could be used by other organizations to access your [facility] medical records  [id number]        Equal Access to Services     JOE TAMAYO : Richard Navarro, jayme dixon, erika diaz. So [facility] [phone].    ATENCIÓN: Si habla español, tiene a garcia disposición servicios gratuitos de asistencia lingüística. Llame al [phone].    We comply with applicable federal civil rights laws and [place] laws. We do not discriminate on the basis of race, color, national origin, age, disability, sex, sexual orientation, or gender identity.            After Visit Summary       This is your record. Keep this with you and show to your community pharmacist(s) and doctor(s) at your next visit.

## 2018-06-16 NOTE — ED AVS SNAPSHOT
[facility]    [address] [address]    Phone:  [phone]                                       Radha Fox   MRN: [id number]    Department[facility]  [facility]   Date of Visit:  6/16/2018           After Visit Summary Signature Page     I have received my discharge instructions, and my questions have been answered. I have discussed any challenges I see with this plan with the nurse or doctor.    ..........................................................................................................................................  Patient/Patient Representative Signature      ..........................................................................................................................................  Patient Representative Print Name and Relationship to Patient    ..................................................               ................................................  Date                                            Time    ..........................................................................................................................................  Reviewed by Signature/Title    ...................................................              ..............................................  Date                                                            Time

## 2018-06-16 NOTE — ED PROVIDER NOTES
History     Chief Complaint   Patient presents with     Rule out Urinary Tract Infection     up several times to void  has had in the past     The history is provided by the patient. No  was used.     Radha Fox is a 68 year old female who presents today with a 2-3 day history of dysuria.  Appetite has been good.  She notes she has been traveling recently, hasn't been drinking fluids as much as normal.  No fevers or chills.  No hematuria      Problem List:    Patient Active Problem List    Diagnosis Date Noted     Type 2 diabetes mellitus without complication (H) 10/30/2017     Priority: Medium     Chronic cough 08/17/2016     Priority: Medium     ACP (advance care planning) 07/21/2016     Priority: Medium     Advance Care Planning 7/21/2016: ACP Review of Chart / Resources Provided:  Reviewed chart for advance care plan.  Radha Fox has no plan or code status on file. Discussed available resources and provided with information. Confirmed code status reflects current choices pending further ACP discussions.  Confirmed/documented legally designated decision makers.  Added by Gregoria Gregorio             Diabetes mellitus type 2, diet-controlled (H)      Priority: Medium     Rheumatoid arthritis of multiple sites without rheumatoid factor (H) 11/10/2015     Priority: Medium     SNHL (sensory-neural hearing loss), asymmetrical 07/08/2014     Priority: Medium     negative MRI IAC 7/2014       S/P myringotomy with insertion of tube 07/08/2014     Priority: Medium     History of chronic sinusitis 07/08/2014     Priority: Medium     History of sinus surgery 05/28/2014     Priority: Medium     Simple chronic serous otitis media 05/28/2014     Priority: Medium     Problem list name updated by automated process. Provider to review       Dysfunction of eustachian tube 05/28/2014     Priority: Medium     Mixed hearing loss, unilateral 05/28/2014     Priority: Medium     Abnormal glucose  01/22/2014     Priority: Medium     Problem list name updated by automated process. Provider to review       Recurrent UTI 03/20/2013     Priority: Medium     RA (rheumatoid arthritis) (H) 03/12/2013     Priority: Medium     Insomnia 01/24/2013     Priority: Medium     Problem list name updated by automated process. Provider to review       Osteoporosis 01/19/2012     Priority: Medium     Problem list name updated by automated process. Provider to review       Obesity 01/19/2012     Priority: Medium     Problem list name updated by automated process. Provider to review       Restless legs syndrome (RLS) 04/21/2011     Priority: Medium     Esophageal reflux 11/12/2010     Priority: Medium     Contact dermatitis and other eczema, due to unspecified cause 03/05/2009     Priority: Medium     Coronary atherosclerosis 09/27/2005     Priority: Medium     Problem list name updated by automated process. Provider to review       Hyperlipidemia 04/13/2005     Priority: Medium     Problem list name updated by automated process. Provider to review       Depressive disorder, not elsewhere classified 01/11/2002     Priority: Medium     Sinusitis, chronic 09/05/2000     Priority: Medium     Problem list name updated by automated process. Provider to review       Arthropathy 05/01/2000     Priority: Medium     Problem list name updated by automated process. Provider to review       Diffuse connective tissue disease (H) 04/04/2000     Priority: Medium     Problem list name updated by automated process. Provider to review          Past Medical History:    Past Medical History:   Diagnosis Date     Abnormal PFT      Arthropathy, unspecified, site unspecified 05/01/2000     Chest pain, unspecified 2005     Contact dermatitis and other eczema, due to unspecified cause 03/05/2009     Coronary atherosclerosis of unspecified type of vessel, native or graft 09/27/2005     Depressive disorder, not elsewhere classified 01/11/2002     Diabetes  mellitus type 2, diet-controlled (H)      Esophageal reflux 11/12/2010     H/O: hysterectomy 1977     Impaired fasting glucose 11/12/2010     Insomnia, unspecified 01/24/2013     Leukocytosis, unspecified 12/22/2007     Obesity, unspecified 01/19/2012     Osteoporosis, unspecified 01/19/2012     Other and unspecified hyperlipidemia 04/13/2005     Other dyspnea and respiratory abnormality 01/19/2012     Restless legs syndrome (RLS) 04/21/2011     Rheumatoid arthritis(714.0) 11/09/1999     Unspecified diffuse connective tissue disease 04/04/2000     Unspecified sinusitis (chronic) 09/05/2000       Past Surgical History:    Past Surgical History:   Procedure Laterality Date     APPENDECTOMY  1961     CHOLECYSTECTOMY  1973     COLONOSCOPY N/A 3/23/2015    Procedure: COLONOSCOPY;  Surgeon: Clarisa Larkin MD;  Location: [facility]     endoscopic sphenoidotomy  10/2011    Right     ETHMOIDECTOMY Bilateral 9/27/2016    Procedure: ETHMOIDECTOMY;  Surgeon: Aracelis Ayon MD;  Location: [facility]     excision bilateral telma bullosa  10/2011     GENITOURINARY SURGERY       HYSTERECTOMY      benign     LAPAROSCOPY DIAGNOSTIC (GENERAL)       ORTHOPEDIC SURGERY      left foot 5th metarsal fracture screws placed     SEPTOPLASTY N/A 9/27/2016    Procedure: SEPTOPLASTY;  Surgeon: Aracelis Ayon MD;  Location: [facility]     TOT Internal urethrotomy  2010     TUBAL LIGATION         Family History:    Family History   Problem Relation Age of Onset     C.A.D. Father 83     Other - See Comments Father      CHF     CEREBROVASCULAR DISEASE Father      CVA     DIABETES Father      Hypertension Father      Coronary Artery Disease Father      CEREBROVASCULAR DISEASE Mother 72     CVA     HEART DISEASE Mother      Heart Issue     Hyperlipidemia Mother      Coronary Artery Disease Mother      DIABETES Brother      DIABETES Sister      Colon Cancer No family hx of      Breast Cancer No family hx of      Asthma No family hx of      Thyroid  Disease No family hx of        Social History:  Marital Status:   [2]  Social History   Substance Use Topics     Smoking status: Never Smoker     Smokeless tobacco: Never Used     Alcohol use No        Medications:      nitroFURantoin, macrocrystal-monohydrate, (MACROBID) 100 MG capsule   ASPIRIN PO   atorvastatin (LIPITOR) 20 MG tablet   Blood Glucose Calibration (ACCU-CHEK ANGELICA) SOLN   Blood Glucose Calibration (ACCU-CHEK COMPACT BLUE CONTROL) LIQD   blood glucose monitoring (ONETOUCH ULTRA) test strip   blood glucose monitoring (ONETOUCH ULTRA) test strip   calcium carbonate-vitamin D (CALCIUM + D) 600-200 MG-UNIT TABS   celecoxib (CELEBREX) 200 MG capsule   Cyanocobalamin (VITAMIN B 12 PO)   folic acid (FOLVITE) 1 MG tablet   Methotrexate Sodium (METHOTREXATE PO)   multivitamin, therapeutic with minerals (MULTI-VITAMIN) TABS   omeprazole (PRILOSEC) 40 MG capsule   potassium chloride SA (K-DUR/KLOR-CON M) 20 MEQ CR tablet   pramipexole (MIRAPEX) 0.25 MG tablet   predniSONE (DELTASONE) 1 MG tablet   propranolol (INDERAL) 20 MG tablet   RiTUXimab (RITUXAN IV)   venlafaxine (EFFEXOR-XR) 75 MG 24 hr capsule         Review of Systems   Constitutional: Negative for appetite change, chills, fatigue and fever.   Genitourinary: Positive for dysuria, frequency and urgency. Negative for genital sores.       Physical Exam   BP: 160/71  Pulse: 59  Temp: 98  F (36.7  C)  Resp: 18  SpO2: 97 %      Physical Exam   Constitutional: She is oriented to person, place, and time. She appears well-developed.   HENT:   Head: Normocephalic.   Eyes: Conjunctivae are normal.   Neck: Normal range of motion. Neck supple.   Cardiovascular: Normal rate and regular rhythm.    Pulmonary/Chest: Effort normal and breath sounds normal.   Abdominal: Soft. Normal appearance and bowel sounds are normal. There is tenderness in the suprapubic area. There is no CVA tenderness.   Neurological: She is alert and oriented to person, place, and time.    Skin: Skin is warm and dry.   Psychiatric: She has a normal mood and affect. Her behavior is normal.   Nursing note and vitals reviewed.      ED Course     ED Course     Procedures      No results found for this or any previous visit (from the past 24 hour(s)).    Medications - No data to display    Assessments & Plan (with Medical Decision Making)     I have reviewed the nursing notes.    I have reviewed the findings, diagnosis, plan and need for follow up with the patient.  ASSESSMENT / PLAN:  (N30.00) Acute cystitis without hematuria  Comment: symptomatic, no CVA tenderness, afebrile, eating and drinking well  Plan: Drink lots of fluids (at least 6-8 glasses a day, unless you must restrict fluids for other medical reasons).    Avoid sexual intercourse until your symptoms are gone   Macrobid as prescribed   Pyridium as needed for 1-2 days   PREVENTING FUTURE INFECTIONS:   Always wipe from front to back after a bowel movement. Keep the genital area clean and dry.    Drink plenty of fluids each day to avoid dehydration.  Urinate right after intercourse to flush out the bladder.     Wear cotton underwear and cotton-lined panty hose; avoid tight-fitting pants.   FOLLOW UP Return to this facility or see your doctor if ALL symptoms are not gone after three days of treatment.   GET PROMPT MEDICAL ATTENTION if any of the following occur:     Fever over 101 F (38.3 C), No improvement by the third day of treatment, Increasing back or abdominal pain, vomiting,    unable to keep fluids or medicine down, weakness, dizziness, or fainting, vaginal discharge, pain, redness, or swelling of the vaginal area          Discharge Medication List as of 6/16/2018 12:20 PM      START taking these medications    Details   nitroFURantoin, macrocrystal-monohydrate, (MACROBID) 100 MG capsule Take 1 capsule (100 mg) by mouth 2 times daily, Disp-14 capsule, R-0, E-Prescribe             Final diagnoses:   Acute cystitis without hematuria        6/16/2018   [facility]     Marta Hill NP  06/18/18 [id number]

## 2018-06-18 LAB
BACTERIA SPEC CULT: ABNORMAL
SPECIMEN SOURCE: ABNORMAL

## 2018-06-18 ASSESSMENT — ENCOUNTER SYMPTOMS
FREQUENCY: 1
DYSURIA: 1

## 2018-06-18 NOTE — PROGRESS NOTES
I called and talked with the pt, concerning her recent UTI. She states she is feeling better. Culture results dictate no susceptibility testing required. Pt will call if she does not continue to feel better.  Irais Khoury Certified  Physician Assistant  6/18/2018  9:24 AM  UR[facility]ENT [facility]ARE CLINIC

## 2018-06-26 DIAGNOSIS — Z79.899 ENCOUNTER FOR LONG-TERM (CURRENT) USE OF MEDICATIONS: ICD-10-CM

## 2018-06-26 DIAGNOSIS — M06.09 RHEUMATOID ARTHRITIS OF MULTIPLE SITES WITHOUT RHEUMATOID FACTOR (H): ICD-10-CM

## 2018-06-26 LAB
ALBUMIN SERPL-MCNC: 3.6 G/DL (ref 3.4–5)
ALT SERPL W P-5'-P-CCNC: 37 U/L (ref 0–50)
BASOPHILS # BLD AUTO: 0 10E9/L (ref 0–0.2)
BASOPHILS NFR BLD AUTO: 0.3 %
CREAT SERPL-MCNC: 0.81 MG/DL (ref 0.52–1.04)
CRP SERPL-MCNC: 4.4 MG/L (ref 0–8)
DIFFERENTIAL METHOD BLD: ABNORMAL
EOSINOPHIL # BLD AUTO: 0.2 10E9/L (ref 0–0.7)
EOSINOPHIL NFR BLD AUTO: 1.5 %
ERYTHROCYTE [DISTWIDTH] IN BLOOD BY AUTOMATED COUNT: 13.5 % (ref 10–15)
GFR SERPL CREATININE-BSD FRML MDRD: 71 ML/MIN/1.7M2
HCT VFR BLD AUTO: 39.1 % (ref 35–47)
HGB BLD-MCNC: 13.2 G/DL (ref 11.7–15.7)
IMM GRANULOCYTES # BLD: 0 10E9/L (ref 0–0.4)
IMM GRANULOCYTES NFR BLD: 0.3 %
LYMPHOCYTES # BLD AUTO: 1.1 10E9/L (ref 0.8–5.3)
LYMPHOCYTES NFR BLD AUTO: 9.5 %
MCH RBC QN AUTO: 33.5 PG (ref 26.5–33)
MCHC RBC AUTO-ENTMCNC: 33.8 G/DL (ref 31.5–36.5)
MCV RBC AUTO: 99 FL (ref 78–100)
MONOCYTES # BLD AUTO: 0.9 10E9/L (ref 0–1.3)
MONOCYTES NFR BLD AUTO: 7.9 %
NEUTROPHILS # BLD AUTO: 9.5 10E9/L (ref 1.6–8.3)
NEUTROPHILS NFR BLD AUTO: 80.5 %
NRBC # BLD AUTO: 0 10*3/UL
NRBC BLD AUTO-RTO: 0 /100
PLATELET # BLD AUTO: 265 10E9/L (ref 150–450)
RBC # BLD AUTO: 3.94 10E12/L (ref 3.8–5.2)
WBC # BLD AUTO: 11.8 10E9/L (ref 4–11)

## 2018-06-26 PROCEDURE — 84460 ALANINE AMINO (ALT) (SGPT): CPT | Mod: ZL | Performed by: NURSE PRACTITIONER

## 2018-06-26 PROCEDURE — 36415 COLL VENOUS BLD VENIPUNCTURE: CPT | Mod: ZL | Performed by: NURSE PRACTITIONER

## 2018-06-26 PROCEDURE — 82040 ASSAY OF SERUM ALBUMIN: CPT | Mod: ZL | Performed by: NURSE PRACTITIONER

## 2018-06-26 PROCEDURE — 86140 C-REACTIVE PROTEIN: CPT | Mod: ZL | Performed by: NURSE PRACTITIONER

## 2018-06-26 PROCEDURE — 82565 ASSAY OF CREATININE: CPT | Mod: ZL | Performed by: NURSE PRACTITIONER

## 2018-06-26 PROCEDURE — 85025 COMPLETE CBC W/AUTO DIFF WBC: CPT | Mod: ZL | Performed by: NURSE PRACTITIONER

## 2018-08-01 ENCOUNTER — TELEPHONE (OUTPATIENT)
Dept: FAMILY MEDICINE | Facility: OTHER | Age: 68
End: 2018-08-01

## 2018-08-01 DIAGNOSIS — E78.5 HYPERLIPIDEMIA LDL GOAL <100: ICD-10-CM

## 2018-08-01 DIAGNOSIS — M06.9 RA (RHEUMATOID ARTHRITIS) (H): ICD-10-CM

## 2018-08-01 DIAGNOSIS — F32.9 MDD (MAJOR DEPRESSIVE DISORDER): ICD-10-CM

## 2018-08-01 DIAGNOSIS — K21.9 GASTROESOPHAGEAL REFLUX DISEASE, ESOPHAGITIS PRESENCE NOT SPECIFIED: ICD-10-CM

## 2018-08-01 NOTE — TELEPHONE ENCOUNTER
8:51 AM    Reason for Call: Phone Call    Description: Liz from [facility] called and states that she would like to try to get the labs from June faxed to them at [phone]    Was an appointment offered for this call? No  If yes : Appointment type              Date    Preferred method for responding to this message: Telephone Call  What is your phone number ?    If we cannot reach you directly, may we leave a detailed response at the number you provided? No    Can this message wait until your PCP/provider returns, if available today? Not applicable, PCP is in     [facility]

## 2018-08-01 NOTE — TELEPHONE ENCOUNTER
Printed Yaneli labs et put into the [facility] fax bin to be faxed to Liz at [facility].

## 2018-08-02 ENCOUNTER — TELEPHONE (OUTPATIENT)
Dept: INFUSION THERAPY | Facility: OTHER | Age: 68
End: 2018-08-02

## 2018-08-02 ENCOUNTER — MEDICAL CORRESPONDENCE (OUTPATIENT)
Dept: HEALTH INFORMATION MANAGEMENT | Facility: CLINIC | Age: 68
End: 2018-08-02

## 2018-08-02 DIAGNOSIS — M06.09 RHEUMATOID ARTHRITIS OF MULTIPLE SITES WITHOUT RHEUMATOID FACTOR (H): ICD-10-CM

## 2018-08-02 DIAGNOSIS — M06.9 RHEUMATOID ARTHRITIS, INVOLVING UNSPECIFIED SITE, UNSPECIFIED RHEUMATOID FACTOR PRESENCE: Primary | ICD-10-CM

## 2018-08-02 DIAGNOSIS — Z79.899 ENCOUNTER FOR LONG-TERM (CURRENT) USE OF MEDICATIONS: ICD-10-CM

## 2018-08-02 DIAGNOSIS — I10 HYPERTENSION, UNSPECIFIED TYPE: ICD-10-CM

## 2018-08-02 DIAGNOSIS — M81.0 OP (OSTEOPOROSIS): Primary | ICD-10-CM

## 2018-08-02 LAB
ALBUMIN SERPL-MCNC: 3.8 G/DL (ref 3.4–5)
ALT SERPL W P-5'-P-CCNC: 31 U/L (ref 0–50)
BASOPHILS # BLD AUTO: 0.1 10E9/L (ref 0–0.2)
BASOPHILS NFR BLD AUTO: 0.5 %
CALCIUM SERPL-MCNC: 8.7 MG/DL (ref 8.5–10.1)
CREAT SERPL-MCNC: 0.86 MG/DL (ref 0.52–1.04)
CRP SERPL-MCNC: 4.3 MG/L (ref 0–8)
DIFFERENTIAL METHOD BLD: ABNORMAL
EOSINOPHIL # BLD AUTO: 0.2 10E9/L (ref 0–0.7)
EOSINOPHIL NFR BLD AUTO: 1.9 %
ERYTHROCYTE [DISTWIDTH] IN BLOOD BY AUTOMATED COUNT: 14.2 % (ref 10–15)
ERYTHROCYTE [SEDIMENTATION RATE] IN BLOOD BY WESTERGREN METHOD: 17 MM/H (ref 0–30)
GFR SERPL CREATININE-BSD FRML MDRD: 66 ML/MIN/1.7M2
HCT VFR BLD AUTO: 41.3 % (ref 35–47)
HGB BLD-MCNC: 14.2 G/DL (ref 11.7–15.7)
IMM GRANULOCYTES # BLD: 0 10E9/L (ref 0–0.4)
IMM GRANULOCYTES NFR BLD: 0.3 %
LYMPHOCYTES # BLD AUTO: 1.1 10E9/L (ref 0.8–5.3)
LYMPHOCYTES NFR BLD AUTO: 9.1 %
MCH RBC QN AUTO: 34.1 PG (ref 26.5–33)
MCHC RBC AUTO-ENTMCNC: 34.4 G/DL (ref 31.5–36.5)
MCV RBC AUTO: 99 FL (ref 78–100)
MONOCYTES # BLD AUTO: 1.2 10E9/L (ref 0–1.3)
MONOCYTES NFR BLD AUTO: 10.2 %
NEUTROPHILS # BLD AUTO: 9.1 10E9/L (ref 1.6–8.3)
NEUTROPHILS NFR BLD AUTO: 78 %
NRBC # BLD AUTO: 0 10*3/UL
NRBC BLD AUTO-RTO: 0 /100
PLATELET # BLD AUTO: 272 10E9/L (ref 150–450)
RBC # BLD AUTO: 4.17 10E12/L (ref 3.8–5.2)
WBC # BLD AUTO: 11.7 10E9/L (ref 4–11)

## 2018-08-02 PROCEDURE — 82565 ASSAY OF CREATININE: CPT | Mod: ZL | Performed by: NURSE PRACTITIONER

## 2018-08-02 PROCEDURE — 36415 COLL VENOUS BLD VENIPUNCTURE: CPT | Mod: ZL | Performed by: NURSE PRACTITIONER

## 2018-08-02 PROCEDURE — 82306 VITAMIN D 25 HYDROXY: CPT | Mod: ZL | Performed by: INTERNAL MEDICINE

## 2018-08-02 PROCEDURE — 84460 ALANINE AMINO (ALT) (SGPT): CPT | Mod: ZL | Performed by: NURSE PRACTITIONER

## 2018-08-02 PROCEDURE — 82310 ASSAY OF CALCIUM: CPT | Mod: ZL | Performed by: INTERNAL MEDICINE

## 2018-08-02 PROCEDURE — 86140 C-REACTIVE PROTEIN: CPT | Mod: ZL | Performed by: NURSE PRACTITIONER

## 2018-08-02 PROCEDURE — 85652 RBC SED RATE AUTOMATED: CPT | Mod: ZL

## 2018-08-02 PROCEDURE — 82040 ASSAY OF SERUM ALBUMIN: CPT | Mod: ZL | Performed by: NURSE PRACTITIONER

## 2018-08-02 PROCEDURE — 85025 COMPLETE CBC W/AUTO DIFF WBC: CPT | Mod: ZL | Performed by: NURSE PRACTITIONER

## 2018-08-02 RX ORDER — ATORVASTATIN CALCIUM 20 MG/1
TABLET, FILM COATED ORAL
Qty: 90 TABLET | Refills: 0 | Status: SHIPPED | OUTPATIENT
Start: 2018-08-02 | End: 2018-10-27

## 2018-08-02 RX ORDER — CELECOXIB 200 MG/1
CAPSULE ORAL
Qty: 90 CAPSULE | Refills: 0 | Status: SHIPPED | OUTPATIENT
Start: 2018-08-02 | End: 2018-10-27

## 2018-08-02 RX ORDER — OMEPRAZOLE 40 MG/1
CAPSULE, DELAYED RELEASE ORAL
Qty: 90 CAPSULE | Refills: 0 | Status: SHIPPED | OUTPATIENT
Start: 2018-08-02 | End: 2018-10-27

## 2018-08-02 RX ORDER — VENLAFAXINE HYDROCHLORIDE 75 MG/1
CAPSULE, EXTENDED RELEASE ORAL
Qty: 90 CAPSULE | Refills: 0 | Status: SHIPPED | OUTPATIENT
Start: 2018-08-02 | End: 2018-10-27

## 2018-08-02 NOTE — PROGRESS NOTES
Faxed orders received from [facility] per DR. Sanchez for patient to receive one infusion of Rituximab 1000mg this week. Labs being done today. Therapy plan updated. [facility] updated to call patient to schedule. Orders faxed to Dr. Costello for co-signature.

## 2018-08-02 NOTE — TELEPHONE ENCOUNTER
Spoke with patient.  Informed her her Rituxan orders have been received, she is scheduled for infusion tomorrow at 0930, and her coverage of drug has been reviewed and it is covered.  Verbalizes understanding.  She will go to  lab and have labwork done today.

## 2018-08-02 NOTE — PROGRESS NOTES
Co-signed orders for rituximab received from Dr Costello. Treatment plan accurate. Orders sent to scanning and faxed to inhouse pharmacy.

## 2018-08-02 NOTE — TELEPHONE ENCOUNTER
Lipitor 20 mg     Last Written Prescription Date:  1/25/18  Last Fill Quantity: 90,   # refills: 0  Last Office Visit: 6/16/18  Future Office visit:   None listed    Routing refill request to provider for review/approval because:  Medication is reported/historical

## 2018-08-03 ENCOUNTER — INFUSION THERAPY VISIT (OUTPATIENT)
Dept: INFUSION THERAPY | Facility: OTHER | Age: 68
End: 2018-08-03
Attending: FAMILY MEDICINE
Payer: MEDICARE

## 2018-08-03 ENCOUNTER — TELEPHONE (OUTPATIENT)
Dept: FAMILY MEDICINE | Facility: OTHER | Age: 68
End: 2018-08-03

## 2018-08-03 VITALS
OXYGEN SATURATION: 94 % | WEIGHT: 214.5 LBS | RESPIRATION RATE: 18 BRPM | TEMPERATURE: 97.1 F | HEART RATE: 66 BPM | HEIGHT: 66 IN | SYSTOLIC BLOOD PRESSURE: 120 MMHG | BODY MASS INDEX: 34.47 KG/M2 | DIASTOLIC BLOOD PRESSURE: 68 MMHG

## 2018-08-03 DIAGNOSIS — M06.09 RHEUMATOID ARTHRITIS OF MULTIPLE SITES WITHOUT RHEUMATOID FACTOR (H): ICD-10-CM

## 2018-08-03 DIAGNOSIS — M06.9 RHEUMATOID ARTHRITIS, INVOLVING UNSPECIFIED SITE, UNSPECIFIED RHEUMATOID FACTOR PRESENCE: Primary | ICD-10-CM

## 2018-08-03 DIAGNOSIS — M06.09 RHEUMATOID ARTHRITIS OF MULTIPLE SITES WITH NEGATIVE RHEUMATOID FACTOR (H): ICD-10-CM

## 2018-08-03 PROCEDURE — 96415 CHEMO IV INFUSION ADDL HR: CPT

## 2018-08-03 PROCEDURE — 25000128 H RX IP 250 OP 636: Performed by: FAMILY MEDICINE

## 2018-08-03 PROCEDURE — 96413 CHEMO IV INFUSION 1 HR: CPT

## 2018-08-03 PROCEDURE — 96375 TX/PRO/DX INJ NEW DRUG ADDON: CPT

## 2018-08-03 PROCEDURE — 96367 TX/PROPH/DG ADDL SEQ IV INF: CPT

## 2018-08-03 PROCEDURE — 25000125 ZZHC RX 250: Performed by: FAMILY MEDICINE

## 2018-08-03 PROCEDURE — 96366 THER/PROPH/DIAG IV INF ADDON: CPT

## 2018-08-03 PROCEDURE — 96365 THER/PROPH/DIAG IV INF INIT: CPT

## 2018-08-03 RX ORDER — POTASSIUM CHLORIDE 1500 MG/1
TABLET, EXTENDED RELEASE ORAL
Qty: 90 TABLET | Refills: 0 | Status: SHIPPED | OUTPATIENT
Start: 2018-08-03 | End: 2018-10-27

## 2018-08-03 RX ORDER — METHYLPREDNISOLONE SODIUM SUCCINATE 125 MG/2ML
100 INJECTION, POWDER, LYOPHILIZED, FOR SOLUTION INTRAMUSCULAR; INTRAVENOUS ONCE
Status: COMPLETED | OUTPATIENT
Start: 2018-08-03 | End: 2018-08-03

## 2018-08-03 RX ORDER — METHYLPREDNISOLONE SODIUM SUCCINATE 40 MG/ML
40 INJECTION, POWDER, LYOPHILIZED, FOR SOLUTION INTRAMUSCULAR; INTRAVENOUS ONCE
Status: CANCELLED
Start: 2018-08-03 | End: 2018-08-03

## 2018-08-03 RX ORDER — METHYLPREDNISOLONE SODIUM SUCCINATE 125 MG/2ML
100 INJECTION, POWDER, LYOPHILIZED, FOR SOLUTION INTRAMUSCULAR; INTRAVENOUS ONCE
Status: CANCELLED
Start: 2018-12-03 | End: 2018-12-03

## 2018-08-03 RX ADMIN — SODIUM CHLORIDE 250 ML: 9 INJECTION, SOLUTION INTRAVENOUS at 10:01

## 2018-08-03 RX ADMIN — METHYLPREDNISOLONE SODIUM SUCCINATE 100 MG: 125 INJECTION, POWDER, FOR SOLUTION INTRAMUSCULAR; INTRAVENOUS at 10:01

## 2018-08-03 RX ADMIN — RITUXIMAB 1000 MG: 10 INJECTION, SOLUTION INTRAVENOUS at 10:39

## 2018-08-03 RX ADMIN — FAMOTIDINE 20 MG: 20 INJECTION, SOLUTION INTRAVENOUS at 10:01

## 2018-08-03 NOTE — PROGRESS NOTES
1039: IV pump verified with rituximab 1000mg dose, drug, and rate of administration.  Infusion administered per protocol.

## 2018-08-03 NOTE — PROGRESS NOTES
Patient is a 68 year old female here accompanied by self today for infusion of Ritxuximab 1,000 mg  per order of Dr. FADIA Costello under the supervision of MARIA L Sanchez.  Patient meets parameters for today's infusion. Patient identified with two identifiers, order verified, and verbal consent for today's infusion obtained from patient.   Recent lab values:  WBC: 11.7, HGB: 14.2, PLT: 272, ANC: 9.1 , ALT: 31, Creatinine: 0.86.  Patient meets order parameters for today's treatment.     22 gauge angio cath inserted into right hand.  Immediate blood return noted.  IV secured with sterile, transparent dressing and tape.  Patient tolerated well, denies pain or discomfort at this time.  Flushes easily without resistance, no signs or symptoms of infiltration or infection.   Patient denies questions or concerns regarding infusion and/or medication(s) being administered.    1351  IV removed, catheter intact.  Site clean, dry and intact.  No signs or symptoms of infiltration or infection.  Covered with a sterile bandage, slight pressure applied for 30 seconds.  Pt instructed to leave bandage intact for a minimum of one hour, and to call with questions or concerns.  Copy of appointments, discharge instructions, and after visit summary (AVS) provided to patient.  Patient states understanding, discharged ambulatory.

## 2018-08-03 NOTE — PATIENT INSTRUCTIONS
We will see you back Monday, December 3rd at 0930.    Rituximab Solution for injection  What is this medicine?  RITUXIMAB (ri TUX i mab) is a monoclonal antibody. This medicine changes the way the body's immune system works. It is used commonly to treat non-Hodgkin lymphoma and other conditions. In cancer cells, this drug targets a specific protein within cancer cells and stops the cancer cells from growing. It is also used to treat rheumatoid arthritis (RA). In RA, this medicine slows the inflammatory process and help reduce joint pain and swelling. This medicine is often used with other cancer or arthritis medications.  This medicine may be used for other purposes; ask your health care provider or pharmacist if you have questions.  What should I tell my health care provider before I take this medicine?  They need to know if you have any of these conditions:    blood disorders    heart disease    history of hepatitis B    infection (especially a virus infection such as chickenpox, cold sores, or herpes)    irregular heartbeat    kidney disease    lung or breathing disease, like asthma    lupus    an unusual or allergic reaction to rituximab, mouse proteins, other medicines, foods, dyes, or preservatives    pregnant or trying to get pregnant    breast-feeding  How should I use this medicine?  This medicine is for infusion into a vein. It is administered in a hospital or clinic by a specially trained health care professional.  A special MedGuide will be given to you by the pharmacist with each prescription and refill. Be sure to read this information carefully each time.  Talk to your pediatrician regarding the use of this medicine in children. This medicine is not approved for use in children.  Overdosage: If you think you have taken too much of this medicine contact a poison control center or emergency room at once.  NOTE: This medicine is only for you. Do not share this medicine with others.  What if I miss a  dose?  It is important not to miss a dose. Call your doctor or health care professional if you are unable to keep an appointment.  What may interact with this medicine?    cisplatin    medicines for blood pressure    some other medicines for arthritis    vaccines  This list may not describe all possible interactions. Give your health care provider a list of all the medicines, herbs, non-prescription drugs, or dietary supplements you use. Also tell them if you smoke, drink alcohol, or use illegal drugs. Some items may interact with your medicine.  What should I watch for while using this medicine?  Report any side effects that you notice during your treatment right away, such as changes in your breathing, fever, chills, dizziness or lightheadedness. These effects are more common with the first dose.  Visit your prescriber or health care professional for checks on your progress. You will need to have regular blood work. Report any other side effects. The side effects of this medicine can continue after you finish your treatment. Continue your course of treatment even though you feel ill unless your doctor tells you to stop.  Call your doctor or health care professional for advice if you get a fever, chills or sore throat, or other symptoms of a cold or flu. Do not treat yourself. This drug decreases your body's ability to fight infections. Try to avoid being around people who are sick.  This medicine may increase your risk to bruise or bleed. Call your doctor or health care professional if you notice any unusual bleeding.  Be careful brushing and flossing your teeth or using a toothpick because you may get an infection or bleed more easily. If you have any dental work done, tell your dentist you are receiving this medicine.  Avoid taking products that contain aspirin, acetaminophen, ibuprofen, naproxen, or ketoprofen unless instructed by your doctor. These medicines may hide a fever.  Do not become pregnant while  taking this medicine. Women should inform their doctor if they wish to become pregnant or think they might be pregnant. There is a potential for serious side effects to an unborn child. Talk to your health care professional or pharmacist for more information. Do not breast-feed an infant while taking this medicine.  What side effects may I notice from receiving this medicine?  Side effects that you should report to your doctor or health care professional as soon as possible:    allergic reactions like skin rash, itching or hives, swelling of the face, lips, or tongue    low blood counts - this medicine may decrease the number of white blood cells, red blood cells and platelets. You may be at increased risk for infections and bleeding.    signs of infection - fever or chills, cough, sore throat, pain or difficulty passing urine    signs of decreased platelets or bleeding - bruising, pinpoint red spots on the skin, black, tarry stools, blood in the urine    signs of decreased red blood cells - unusually weak or tired, fainting spells, lightheadedness    breathing problems    confused, not responsive    chest pain    fast, irregular heartbeat    feeling faint or lightheaded, falls    mouth sores    redness, blistering, peeling or loosening of the skin, including inside the mouth    stomach pain    swelling of the ankles, feet, or hands    trouble passing urine or change in the amount of urine  Side effects that usually do not require medical attention (report to your doctor or other health care professional if they continue or are bothersome):    anxiety    headache    loss of appetite    muscle aches    nausea    night sweats  This list may not describe all possible side effects. Call your doctor for medical advice about side effects. You may report side effects to FDA at 3-582-FDA-0853.  Where should I keep my medicine?  This drug is given in a hospital or clinic and will not be stored at home.  NOTE:This sheet is a  summary. It may not cover all possible information. If you have questions about this medicine, talk to your doctor, pharmacist, or health care provider. Copyright  2016 Gold Standard

## 2018-08-03 NOTE — MR AVS SNAPSHOT
After Visit Summary   8/3/2018    Radha Fox    MRN: [id number]           Patient Information     Date Of Birth          1950        Visit Information        Provider Department      8/3/2018 9:30 AM  INF RM [address] 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    Rituximab Solution for injection  What is this medicine?  RITUXIMAB (ri TUX i mab) is a monoclonal antibody. This medicine changes the way the body's immune system works. It is used commonly to treat non-Hodgkin lymphoma and other conditions. In cancer cells, this drug targets a specific protein within cancer cells and stops the cancer cells from growing. It is also used to treat rheumatoid arthritis (RA). In RA, this medicine slows the inflammatory process and help reduce joint pain and swelling. This medicine is often used with other cancer or arthritis medications.  This medicine may be used for other purposes; ask your health care provider or pharmacist if you have questions.  What should I tell my health care provider before I take this medicine?  They need to know if you have any of these conditions:    blood disorders    heart disease    history of hepatitis B    infection (especially a virus infection such as chickenpox, cold sores, or herpes)    irregular heartbeat    kidney disease    lung or breathing disease, like asthma    lupus    an unusual or allergic reaction to rituximab, mouse proteins, other medicines, foods, dyes, or preservatives    pregnant or trying to get pregnant    breast-feeding  How should I use this medicine?  This medicine is for infusion into a vein. It is  administered in a hospital or clinic by a specially trained health care professional.  A special MedGuide will be given to you by the pharmacist with each prescription and refill. Be sure to read this information carefully each time.  Talk to your pediatrician regarding the use of this medicine in children. This medicine is not approved for use in children.  Overdosage: If you think you have taken too much of this medicine contact a poison control center or emergency room at once.  NOTE: This medicine is only for you. Do not share this medicine with others.  What if I miss a dose?  It is important not to miss a dose. Call your doctor or health care professional if you are unable to keep an appointment.  What may interact with this medicine?    cisplatin    medicines for blood pressure    some other medicines for arthritis    vaccines  This list may not describe all possible interactions. Give your health care provider a list of all the medicines, herbs, non-prescription drugs, or dietary supplements you use. Also tell them if you smoke, drink alcohol, or use illegal drugs. Some items may interact with your medicine.  What should I watch for while using this medicine?  Report any side effects that you notice during your treatment right away, such as changes in your breathing, fever, chills, dizziness or lightheadedness. These effects are more common with the first dose.  Visit your prescriber or health care professional for checks on your progress. You will need to have regular blood work. Report any other side effects. The side effects of this medicine can continue after you finish your treatment. Continue your course of treatment even though you feel ill unless your doctor tells you to stop.  Call your doctor or health care professional for advice if you get a fever, chills or sore throat, or other symptoms of a cold or flu. Do not treat yourself. This drug decreases your body's ability to fight infections. Try to  avoid being around people who are sick.  This medicine may increase your risk to bruise or bleed. Call your doctor or health care professional if you notice any unusual bleeding.  Be careful brushing and flossing your teeth or using a toothpick because you may get an infection or bleed more easily. If you have any dental work done, tell your dentist you are receiving this medicine.  Avoid taking products that contain aspirin, acetaminophen, ibuprofen, naproxen, or ketoprofen unless instructed by your doctor. These medicines may hide a fever.  Do not become pregnant while taking this medicine. Women should inform their doctor if they wish to become pregnant or think they might be pregnant. There is a potential for serious side effects to an unborn child. Talk to your health care professional or pharmacist for more information. Do not breast-feed an infant while taking this medicine.  What side effects may I notice from receiving this medicine?  Side effects that you should report to your doctor or health care professional as soon as possible:    allergic reactions like skin rash, itching or hives, swelling of the face, lips, or tongue    low blood counts - this medicine may decrease the number of white blood cells, red blood cells and platelets. You may be at increased risk for infections and bleeding.    signs of infection - fever or chills, cough, sore throat, pain or difficulty passing urine    signs of decreased platelets or bleeding - bruising, pinpoint red spots on the skin, black, tarry stools, blood in the urine    signs of decreased red blood cells - unusually weak or tired, fainting spells, lightheadedness    breathing problems    confused, not responsive    chest pain    fast, irregular heartbeat    feeling faint or lightheaded, falls    mouth sores    redness, blistering, peeling or loosening of the skin, including inside the mouth    stomach pain    swelling of the ankles, feet, or hands    trouble  passing urine or change in the amount of urine  Side effects that usually do not require medical attention (report to your doctor or other health care professional if they continue or are bothersome):    anxiety    headache    loss of appetite    muscle aches    nausea    night sweats  This list may not describe all possible side effects. Call your doctor for medical advice about side effects. You may report side effects to FDA at 2-708-QOO-3711.  Where should I keep my medicine?  This drug is given in a hospital or clinic and will not be stored at home.  NOTE:This sheet is a summary. It may not cover all possible information. If you have questions about this medicine, talk to your doctor, pharmacist, or health care provider. Copyright  2016 Gold Standard                Follow-ups after your visit        Future tests that were ordered for you today     Open Standing Orders        Priority Remaining Interval Expires Ordered    Erythrocyte sedimentation rate auto Routine 9/10  8/2/2019 8/2/2018            Who to contact     If you have questions or need follow up information about today's clinic visit or your schedule please contact [facility] directly at [phone].  Normal or non-critical lab and imaging results will be communicated to you by [email or web address], letter or phone within 4 business days after the clinic has received the results. If you do not hear from us within 7 days, please contact the clinic through [email or web address] or phone. If you have a critical or abnormal lab result, we will notify you by phone as soon as possible.  Submit refill requests through [employer] or call your pharmacy and they will forward the refill request to us. Please allow 3 business days for your refill to be completed.          Additional Information About Your Visit        [employer] Information     [employer] gives you secure access to your electronic health record. If you see a primary care provider, you can also send messages to your  "care team and make appointments. If you have questions, please call your primary care clinic.  If you do not have a primary care provider, please call [phone] and they will assist you.        Care EveryWhere ID     This is your Care EveryWhere ID. This could be used by other organizations to access your [facility] medical records  [id number]        Your Vitals Were     Pulse Temperature Respirations Height Pulse Oximetry BMI (Body Mass Index)    66 97.1  F (36.2  C) (Tympanic) 18 1.676 m (5' 6\") 94% 34.62 kg/m2       Blood Pressure from Last 3 Encounters:   08/03/18 120/68   06/16/18 160/71   03/02/18 128/50    Weight from Last 3 Encounters:   08/03/18 97.3 kg (214 lb 8 oz)   03/02/18 95.8 kg (211 lb 1.6 oz)   02/16/18 94.8 kg (209 lb)              Today, you had the following     No orders found for display       Primary Care Provider Office Phone # Fax #    Lanie Costello [phone] [address] [facility] [address]        Equal Access to Services     [facility] AH: Hadii aad ku hadasho Soomaali, waaxda luqadaha, qaybta kaalmada adeegyada, waxay kerwinin haytarikn ademer ferrera laangeliton ah. So [facility] [phone].    ATENCIÓN: Si habla español, tiene a garcia disposición servicios gratuitos de asistencia lingüística. Llame al [phone].    We comply with applicable federal civil rights laws and [place] laws. We do not discriminate on the basis of race, color, national origin, age, disability, sex, sexual orientation, or gender identity.            Thank you!     Thank you for choosing [facility]  for your care. Our goal is always to provide you with excellent care. Hearing back from our patients is one way we can continue to improve our services. Please take a few minutes to complete the written survey that you may receive in the mail after your visit with us. Thank you!             Your Updated Medication List - Protect others around you: Learn how to safely use, store and " throw away your medicines at www.disposemymeds.org.          This list is accurate as of 8/3/18  1:55 PM.  Always use your most recent med list.                   Brand Name Dispense Instructions for use Diagnosis    ASPIRIN PO      Take 81 mg by mouth daily        atorvastatin 20 MG tablet    LIPITOR    90 tablet    TAKE ONE TABLET BY MOUTH ONCE DAILY    Hyperlipidemia LDL goal <100       * blood glucose calibration solution     1 Bottle    Use as directed    Impaired fasting glucose       * blood glucose calibration solution     1 each    Use to calibrate blood glucose monitor as directed.    Impaired fasting glucose       * blood glucose monitoring test strip    ONETOUCH ULTRA    100 strip    Use to test blood sugar daily or as directed.    Impaired fasting glucose       * blood glucose monitoring test strip    ONETOUCH ULTRA    100 strip    Use to test blood sugars one time daily or as directed.    Elevated glucose, Type 2 diabetes mellitus without complication, without long-term current use of insulin (H)       calcium + D 600-200 MG-UNIT Tabs   Generic drug:  calcium carbonate-vitamin D      Take 600 mg by mouth 2 times daily.        celecoxib 200 MG capsule    celeBREX    90 capsule    TAKE 1 CAPSULE BY MOUTH ONCE DAILY    RA (rheumatoid arthritis) (H)       folic acid 1 MG tablet    FOLVITE     Take 2 tablets by mouth daily        KLOR-CON 20 MEQ CR tablet   Generic drug:  potassium chloride SA     90 tablet    TAKE ONE TABLET BY MOUTH ONCE DAILY WITH FOOD    Hypertension, unspecified type       METHOTREXATE PO      Inject 12.5 mg as directed once a week.        Multi-vitamin Tabs tablet      Take  by mouth daily.        nitroFURantoin (macrocrystal-monohydrate) 100 MG capsule    MACROBID    14 capsule    Take 1 capsule (100 mg) by mouth 2 times daily        omeprazole 40 MG capsule    priLOSEC    90 capsule    TAKE ONE CAPSULE BY MOUTH ONCE DAILY BEFORE A MEAL    Gastroesophageal reflux disease, esophagitis  presence not specified       pramipexole 0.25 MG tablet    MIRAPEX    270 tablet    TAKE ONE TABLET BY MOUTH AT 5 PM AND TWO TABLETS AT 9 PM    Restless legs syndrome (RLS)       predniSONE 1 MG tablet    DELTASONE     Take 1 mg by mouth daily Take 3 tabs po daily        propranolol 20 MG tablet    INDERAL    90 tablet    TAKE ONE TABLET BY MOUTH ONCE DAILY    Hypertension, unspecified type       RITUXAN IV           venlafaxine 75 MG 24 hr capsule    EFFEXOR-XR    90 capsule    TAKE 1 CAPSULE BY MOUTH ONCE DAILY. DUE  FOR  FOLLOW  UP  VISIT    MDD (major depressive disorder)       VITAMIN B 12 PO      Take 500 mg by mouth daily.        * Notice:  This list has 4 medication(s) that are the same as other medications prescribed for you. Read the directions carefully, and ask your doctor or other care provider to review them with you.

## 2018-08-05 LAB — DEPRECATED CALCIDIOL+CALCIFEROL SERPL-MC: 68 UG/L (ref 20–75)

## 2018-08-08 ENCOUNTER — OFFICE VISIT (OUTPATIENT)
Dept: FAMILY MEDICINE | Facility: OTHER | Age: 68
End: 2018-08-08
Attending: FAMILY MEDICINE
Payer: MEDICARE

## 2018-08-08 ENCOUNTER — TELEPHONE (OUTPATIENT)
Dept: FAMILY MEDICINE | Facility: OTHER | Age: 68
End: 2018-08-08

## 2018-08-08 VITALS
BODY MASS INDEX: 34.54 KG/M2 | RESPIRATION RATE: 20 BRPM | TEMPERATURE: 97.4 F | HEART RATE: 64 BPM | DIASTOLIC BLOOD PRESSURE: 88 MMHG | WEIGHT: 214 LBS | SYSTOLIC BLOOD PRESSURE: 122 MMHG | OXYGEN SATURATION: 97 %

## 2018-08-08 DIAGNOSIS — J01.90 ACUTE SINUSITIS WITH SYMPTOMS > 10 DAYS: Primary | ICD-10-CM

## 2018-08-08 PROCEDURE — 99213 OFFICE O/P EST LOW 20 MIN: CPT | Performed by: FAMILY MEDICINE

## 2018-08-08 PROCEDURE — G0463 HOSPITAL OUTPT CLINIC VISIT: HCPCS

## 2018-08-08 RX ORDER — LEVOFLOXACIN 500 MG/1
500 TABLET, FILM COATED ORAL DAILY
Qty: 7 TABLET | Refills: 0 | Status: SHIPPED | OUTPATIENT
Start: 2018-08-08 | End: 2018-08-17

## 2018-08-08 ASSESSMENT — ANXIETY QUESTIONNAIRES
2. NOT BEING ABLE TO STOP OR CONTROL WORRYING: NOT AT ALL
7. FEELING AFRAID AS IF SOMETHING AWFUL MIGHT HAPPEN: NOT AT ALL
6. BECOMING EASILY ANNOYED OR IRRITABLE: NOT AT ALL
5. BEING SO RESTLESS THAT IT IS HARD TO SIT STILL: NOT AT ALL
3. WORRYING TOO MUCH ABOUT DIFFERENT THINGS: NOT AT ALL
GAD7 TOTAL SCORE: 0
1. FEELING NERVOUS, ANXIOUS, OR ON EDGE: NOT AT ALL

## 2018-08-08 ASSESSMENT — PATIENT HEALTH QUESTIONNAIRE - PHQ9: 5. POOR APPETITE OR OVEREATING: NOT AT ALL

## 2018-08-08 ASSESSMENT — PAIN SCALES - GENERAL: PAINLEVEL: MODERATE PAIN (5)

## 2018-08-08 NOTE — TELEPHONE ENCOUNTER
SINUS PAIN/CONGESTION     RN Sinusitis Treatment Protocol: ages 18 and up  Radha Fox, a 68 year old female, is having symptoms reviewed for possible sinus infection.    NURSING ASSESSMENT:  Symptoms: headache, congestion, coughing up green sputum  Shortness of breath: NO  Onset of symptoms was 1 week(s) ago.    Treatment measures tried include Claritin, nasal spray, nasal rinse, Zytrec-D.   Course of illness is same.  Predisposing conditions include: History of chronic sinusitis  ALLERGIES:   Allergies   Allergen Reactions     Neomycin-Polymyxin-Hc      Rash behind ear after ear drops     Penicillins Hives and Itching     One time large local reaction after IM PCN in 1970, no airway symptoms     Plaquenil [Hydroxychloroquine Sulfate] Hives     Complicating Factors and Serious Symptoms:   Patient reports: NONE       RECOMMENDED DISPOSITION:  Visit clinic today or tomorrow    Will comply with recommendation: Yes    If further questions/concerns or if symptoms do not improve, worsen or new symptoms develop, call your PCP or [facility] as soon as possible.      Guideline used: Adult Telephone Protocols Office Version 3rd Edition - Kareem Betancur MD, FACEP      Gregoria Wells, MANDEEP

## 2018-08-08 NOTE — PROGRESS NOTES
SUBJECTIVE:   Radha Fox is a 68 year old female who presents to clinic today for the following health issues:      RESPIRATORY SYMPTOMS      Duration: few months    Description  nasal congestion and cough    Severity: moderate    Accompanying signs and symptoms: last night got a sinus headache and didn't feel well. Chills, clammy    History (predisposing factors):  Seen ENT in past, had sinus surgery    Precipitating or alleviating factors: None    Therapies tried and outcome:  Ibuprofen, nasal rinse, claritin, nasonex, zyrtec D. Nothing is helping    Last antibiotics 12/2017    No fever        Problem list and histories reviewed & adjusted, as indicated.  Additional history: as documented    Current Outpatient Prescriptions   Medication     ASPIRIN PO     atorvastatin (LIPITOR) 20 MG tablet     Blood Glucose Calibration (ACCU-CHEK ANGELICA) SOLN     Blood Glucose Calibration (ACCU-CHEK COMPACT BLUE CONTROL) LIQD     blood glucose monitoring (ONETOUCH ULTRA) test strip     blood glucose monitoring (ONETOUCH ULTRA) test strip     calcium carbonate-vitamin D (CALCIUM + D) 600-200 MG-UNIT TABS     celecoxib (CELEBREX) 200 MG capsule     Cyanocobalamin (VITAMIN B 12 PO)     folic acid (FOLVITE) 1 MG tablet     KLOR-CON 20 MEQ CR tablet     levofloxacin (LEVAQUIN) 500 MG tablet     Methotrexate Sodium (METHOTREXATE PO)     multivitamin, therapeutic with minerals (MULTI-VITAMIN) TABS     omeprazole (PRILOSEC) 40 MG capsule     pramipexole (MIRAPEX) 0.25 MG tablet     predniSONE (DELTASONE) 1 MG tablet     propranolol (INDERAL) 20 MG tablet     RiTUXimab (RITUXAN IV)     venlafaxine (EFFEXOR-XR) 75 MG 24 hr capsule     No current facility-administered medications for this visit.        Patient Active Problem List   Diagnosis     RA (rheumatoid arthritis) (H)     Recurrent UTI     Hyperlipidemia     Depressive disorder, not elsewhere classified     Contact dermatitis and other eczema, due to unspecified cause      Diffuse connective tissue disease (H)     Arthropathy     Coronary atherosclerosis     Sinusitis, chronic     Esophageal reflux     Restless legs syndrome (RLS)     Osteoporosis     Obesity     Insomnia     Abnormal glucose     History of sinus surgery     Simple chronic serous otitis media     Dysfunction of eustachian tube     Mixed hearing loss, unilateral     SNHL (sensory-neural hearing loss), asymmetrical     S/P myringotomy with insertion of tube     History of chronic sinusitis     Rheumatoid arthritis of multiple sites without rheumatoid factor (H)     ACP (advance care planning)     Diabetes mellitus type 2, diet-controlled (H)     Chronic cough     Type 2 diabetes mellitus without complication (H)     Past Surgical History:   Procedure Laterality Date     APPENDECTOMY  1961     CHOLECYSTECTOMY  1973     COLONOSCOPY N/A 3/23/2015    Procedure: COLONOSCOPY;  Surgeon: Clarisa Larkin MD;  Location: [facility]     endoscopic sphenoidotomy  10/2011    Right     ETHMOIDECTOMY Bilateral 9/27/2016    Procedure: ETHMOIDECTOMY;  Surgeon: Aracelis Ayon MD;  Location: [facility]     excision bilateral telma bullosa  10/2011     GENITOURINARY SURGERY       HYSTERECTOMY      benign     LAPAROSCOPY DIAGNOSTIC (GENERAL)       ORTHOPEDIC SURGERY      left foot 5th metarsal fracture screws placed     SEPTOPLASTY N/A 9/27/2016    Procedure: SEPTOPLASTY;  Surgeon: Aracelis Ayon MD;  Location: [facility]     TOT Internal urethrotomy  2010     TUBAL LIGATION         Social History   Substance Use Topics     Smoking status: Never Smoker     Smokeless tobacco: Never Used     Alcohol use No     Family History   Problem Relation Age of Onset     C.A.D. Father 83     Other - See Comments Father      CHF     Cerebrovascular Disease Father      CVA     Diabetes Father      Hypertension Father      Coronary Artery Disease Father      Cerebrovascular Disease Mother 72     CVA     HEART DISEASE Mother      Heart Issue      Hyperlipidemia Mother      Coronary Artery Disease Mother      Diabetes Brother      Diabetes Sister      Colon Cancer No family hx of      Breast Cancer No family hx of      Asthma No family hx of      Thyroid Disease No family hx of            Reviewed and updated as needed this visit by clinical staff  Tobacco  Allergies  Meds  Med Hx  Surg Hx  Fam Hx  Soc Hx      Reviewed and updated as needed this visit by Provider  Allergies  Meds         ROS:  Constitutional, HEENT, cardiovascular, pulmonary, gi and gu systems are negative, except as otherwise noted.    OBJECTIVE:     /88 (BP Location: Right arm, Patient Position: Sitting, Cuff Size: Adult Regular)  Pulse 64  Temp 97.4  F (36.3  C) (Tympanic)  Resp 20  Wt 214 lb (97.1 kg)  SpO2 97%  BMI 34.54 kg/m2  Body mass index is 34.54 kg/(m^2).  GENERAL: healthy, alert and no distress  EYES: Eyes grossly normal to inspection, PERRL and conjunctivae and sclerae normal  HENT: normal cephalic/atraumatic, ear canals and TM's normal, nasal mucosa edematous , oral mucous membranes moist and post nasal drainage; tender to palpation frontal sinuses and maxillary  NECK: no adenopathy, no asymmetry, masses, or scars and thyroid normal to palpation  RESP: lungs clear to auscultation - no rales, rhonchi or wheezes  CV: regular rate and rhythm, normal S1 S2, no S3 or S4, no murmur, click or rub, no peripheral edema and peripheral pulses strong  MS: no gross musculoskeletal defects noted, no edema  PSYCH: mentation appears normal, affect normal/bright      ASSESSMENT/PLAN:     (J01.90) Acute sinusitis with symptoms > 10 days  (primary encounter diagnosis)  Plan: levofloxacin (LEVAQUIN) 500 MG tablet    Is on low dose Prednisone.  Discussed increased risk of tendon rupture with Levaquin.  Has tolerated in the past.  Is PCN allergic.  Patient elected to proceed with Levaquin, which has worked well for her in the past.    Patient Instructions   Complete antibiotic  course.  Continue antihistamine, saline rinses, nasal steroid.  Follow up as needed.              Lanie Duggan MD  [facility]

## 2018-08-08 NOTE — MR AVS SNAPSHOT
After Visit Summary   8/8/2018    Radha Fox    MRN: [id number]           Patient Information     Date Of Birth          1950        Visit Information        Provider Department      8/8/2018 1:45 PM Lanie Costello MD [facility]        Today's Diagnoses     Acute sinusitis with symptoms > 10 days    -  1      Care Instructions    Complete antibiotic course.  Continue antihistamine, saline rinses, nasal steroid.  Follow up as needed.            Follow-ups after your visit        Your next 10 appointments already scheduled     Dec 03, 2018  9:30 AM CST   Level 5 with HC INF RM [id number]   [facility] ([facility] [place] )    [address]  [place] [address]   [phone]              Who to contact     If you have questions or need follow up information about today's clinic visit or your schedule please contact [facility] directly at [phone].  Normal or non-critical lab and imaging results will be communicated to you by MyChart, letter or phone within 4 business days after the clinic has received the results. If you do not hear from us within 7 days, please contact the clinic through [employer] or phone. If you have a critical or abnormal lab result, we will notify you by phone as soon as possible.  Submit refill requests through [employer] or call your pharmacy and they will forward the refill request to us. Please allow 3 business days for your refill to be completed.          Additional Information About Your Visit        MyChart Information     [employer] gives you secure access to your electronic health record. If you see a primary care provider, you can also send messages to your care team and make appointments. If you have questions, please call your primary care clinic.  If you do not have a primary care provider, please call [phone] and they will assist you.        Care EveryWhere ID     This is your Care EveryWhere ID.  This could be used by other organizations to access your [facility] medical records  [id number]        Your Vitals Were     Pulse Temperature Respirations Pulse Oximetry BMI (Body Mass Index)       64 97.4  F (36.3  C) (Tympanic) 20 97% 34.54 kg/m2        Blood Pressure from Last 3 Encounters:   08/08/18 122/88   08/03/18 120/68   06/16/18 160/71    Weight from Last 3 Encounters:   08/08/18 214 lb (97.1 kg)   08/03/18 214 lb 8 oz (97.3 kg)   03/02/18 211 lb 1.6 oz (95.8 kg)              Today, you had the following     No orders found for display         Today's Medication Changes          These changes are accurate as of 8/8/18  2:42 PM.  If you have any questions, ask your nurse or doctor.               Start taking these medicines.        Dose/Directions    levofloxacin 500 MG tablet   Commonly known as:  LEVAQUIN   Used for:  Acute sinusitis with symptoms > 10 days   Started by:  Lanie Costello MD        Dose:  500 mg   Take 1 tablet (500 mg) by mouth daily   Quantity:  7 tablet   Refills:  0            Where to get your medicines      These medications were sent to [id number] - [place], [place] - [address]   [id number] , [place] [address]     Phone:  [phone]     levofloxacin 500 MG tablet                Primary Care Provider Office Phone # Fax #    Lanie Costello [phone]       [address]  [place] [address]        Equal Access to Services     [facility] AH: Hadii aad ku hadasho Soomaali, waaxda luqadaha, qaybta kaalmada chaceda, wax nazanin rodriguez . So [facility] [phone].    ATENCIÓN: Si habla español, tiene a garcia disposición servicios gratuitos de asistencia lingüística. Llame al [phone].    We comply with applicable federal civil rights laws and [place] laws. We do not discriminate on the basis of race, color, national origin, age, disability, sex, sexual orientation, or gender identity.            Thank you!     Thank you for  choosing [facility] [place]  for your care. Our goal is always to provide you with excellent care. Hearing back from our patients is one way we can continue to improve our services. Please take a few minutes to complete the written survey that you may receive in the mail after your visit with us. Thank you!             Your Updated Medication List - Protect others around you: Learn how to safely use, store and throw away your medicines at www.disposemymeds.org.          This list is accurate as of 8/8/18  2:42 PM.  Always use your most recent med list.                   Brand Name Dispense Instructions for use Diagnosis    ASPIRIN PO      Take 81 mg by mouth daily        atorvastatin 20 MG tablet    LIPITOR    90 tablet    TAKE ONE TABLET BY MOUTH ONCE DAILY    Hyperlipidemia LDL goal <100       * blood glucose calibration solution     1 Bottle    Use as directed    Impaired fasting glucose       * blood glucose calibration solution     1 each    Use to calibrate blood glucose monitor as directed.    Impaired fasting glucose       * blood glucose monitoring test strip    ONETOUCH ULTRA    100 strip    Use to test blood sugar daily or as directed.    Impaired fasting glucose       * blood glucose monitoring test strip    ONETOUCH ULTRA    100 strip    Use to test blood sugars one time daily or as directed.    Elevated glucose, Type 2 diabetes mellitus without complication, without long-term current use of insulin (H)       calcium + D 600-200 MG-UNIT Tabs   Generic drug:  calcium carbonate-vitamin D      Take 600 mg by mouth 2 times daily.        celecoxib 200 MG capsule    celeBREX    90 capsule    TAKE 1 CAPSULE BY MOUTH ONCE DAILY    RA (rheumatoid arthritis) (H)       folic acid 1 MG tablet    FOLVITE     Take 2 tablets by mouth daily        KLOR-CON 20 MEQ CR tablet   Generic drug:  potassium chloride SA     90 tablet    TAKE ONE TABLET BY MOUTH ONCE DAILY WITH FOOD    Hypertension, unspecified type        levofloxacin 500 MG tablet    LEVAQUIN    7 tablet    Take 1 tablet (500 mg) by mouth daily    Acute sinusitis with symptoms > 10 days       METHOTREXATE PO      Inject 12.5 mg as directed once a week.        Multi-vitamin Tabs tablet      Take  by mouth daily.        omeprazole 40 MG capsule    priLOSEC    90 capsule    TAKE ONE CAPSULE BY MOUTH ONCE DAILY BEFORE A MEAL    Gastroesophageal reflux disease, esophagitis presence not specified       pramipexole 0.25 MG tablet    MIRAPEX    270 tablet    TAKE ONE TABLET BY MOUTH AT 5 PM AND TWO TABLETS AT 9 PM    Restless legs syndrome (RLS)       predniSONE 1 MG tablet    DELTASONE     Take 1 mg by mouth daily Take 3 tabs po daily        propranolol 20 MG tablet    INDERAL    90 tablet    TAKE ONE TABLET BY MOUTH ONCE DAILY    Hypertension, unspecified type       RITUXAN IV           venlafaxine 75 MG 24 hr capsule    EFFEXOR-XR    90 capsule    TAKE 1 CAPSULE BY MOUTH ONCE DAILY. DUE  FOR  FOLLOW  UP  VISIT    MDD (major depressive disorder)       VITAMIN B 12 PO      Take 500 mg by mouth daily.        * Notice:  This list has 4 medication(s) that are the same as other medications prescribed for you. Read the directions carefully, and ask your doctor or other care provider to review them with you.

## 2018-08-08 NOTE — NURSING NOTE
"Chief Complaint   Patient presents with     Sinus Problem       Initial /88 (BP Location: Right arm, Patient Position: Sitting, Cuff Size: Adult Regular)  Pulse 64  Temp 97.4  F (36.3  C) (Tympanic)  Resp 20  Wt 214 lb (97.1 kg)  SpO2 97%  BMI 34.54 kg/m2 Estimated body mass index is 34.54 kg/(m^2) as calculated from the following:    Height as of 8/3/18: 5' 6\" (1.676 m).    Weight as of this encounter: 214 lb (97.1 kg).  Medication Reconciliation: complete    Cassidy Castillo LPN  "

## 2018-08-08 NOTE — TELEPHONE ENCOUNTER
Patient called with possible sinus infection x 1 week.  She is requesting an appointment today with  or she will go to Urgent Care.  Checked with  and she was willing to fit patient in at 1:45.  Notified patient.      Next 5 appointments (look out 90 days)     Aug 08, 2018  1:45 PM CDT   (Arrive by 1:30 PM)   SHORT with Lanie Costello MD   [facility] [place] ([facility] [place] )    [address] [place] [address] [address]  [place] [address]   [phone]

## 2018-08-08 NOTE — LETTER
My Depression Action Plan  Name: Radha Fox   Date of Birth 1950  Date: 8/8/2018    My doctor: Lanie Costello   My clinic: [facility] [place]  [place] [address]  [phone]          GREEN    ZONE   Good Control    What it looks like:     Things are going generally well. You have normal up s and down s. You may even feel depressed from time to time, but bad moods usually last less than a day.   What you need to do:  1. Continue to care for yourself (see self care plan)  2. Check your depression survival kit and update it as needed  3. Follow your physician s recommendations including any medication.  4. Do not stop taking medication unless you consult with your physician first.           YELLOW         ZONE Getting Worse    What it looks like:     Depression is starting to interfere with your life.     It may be hard to get out of bed; you may be starting to isolate yourself from others.    Symptoms of depression are starting to last most all day and this has happened for several days.     You may have suicidal thoughts but they are not constant.   What you need to do:     1. Call your care team, your response to treatment will improve if you keep your care team informed of your progress. Yellow periods are signs an adjustment may need to be made.     2. Continue your self-care, even if you have to fake it!    3. Talk to someone in your support network    4. Open up your depression survival kit           RED    ZONE Medical Alert - Get Help    What it looks like:     Depression is seriously interfering with your life.     You may experience these or other symptoms: You can t get out of bed most days, can t work or engage in other necessary activities, you have trouble taking care of basic hygiene, or basic responsibilities, thoughts of suicide or death that will not go away, self-injurious behavior.     What you need to do:  1. Call your care team and request a  same-day appointment. If they are not available (weekends or after hours) call your local crisis line, emergency room or 911.            Depression Self Care Plan / Survival Kit    Self-Care for Depression  Here s the deal. Your body and mind are really not as separate as most people think.  What you do and think affects how you feel and how you feel influences what you do and think. This means if you do things that people who feel good do, it will help you feel better.  Sometimes this is all it takes.  There is also a place for medication and therapy depending on how severe your depression is, so be sure to consult with your medical provider and/ or Behavioral Health Consultant if your symptoms are worsening or not improving.     In order to better manage my stress, I will:    Exercise  Get some form of exercise, every day. This will help reduce pain and release endorphins, the  feel good  chemicals in your brain. This is almost as good as taking antidepressants!  This is not the same as joining a gym and then never going! (they count on that by the way ) It can be as simple as just going for a walk or doing some gardening, anything that will get you moving.      Hygiene   Maintain good hygiene (Get out of bed in the morning, Make your bed, Brush your teeth, Take a shower, and Get dressed like you were going to work, even if you are unemployed).  If your clothes don't fit try to get ones that do.    Diet  I will strive to eat foods that are good for me, drink plenty of water, and avoid excessive sugar, caffeine, alcohol, and other mood-altering substances.  Some foods that are helpful in depression are: complex carbohydrates, B vitamins, flaxseed, fish or fish oil, fresh fruits and vegetables.    Psychotherapy  I agree to participate in Individual Therapy (if recommended).    Medication  If prescribed medications, I agree to take them.  Missing doses can result in serious side effects.  I understand that drinking  alcohol, or other illicit drug use, may cause potential side effects.  I will not stop my medication abruptly without first discussing it with my provider.    Staying Connected With Others  I will stay in touch with my friends, family members, and my primary care provider/team.    Use your imagination  Be creative.  We all have a creative side; it doesn t matter if it s oil painting, sand castles, or mud pies! This will also kick up the endorphins.    Witness Beauty  (AKA stop and smell the roses) Take a look outside, even in mid-winter. Notice colors, textures. Watch the squirrels and birds.     Service to others  Be of service to others.  There is always someone else in need.  By helping others we can  get out of ourselves  and remember the really important things.  This also provides opportunities for practicing all the other parts of the program.    Humor  Laugh and be silly!  Adjust your TV habits for less news and crime-drama and more comedy.    Control your stress  Try breathing deep, massage therapy, biofeedback, and meditation. Find time to relax each day.     My support system    Clinic Contact:  Phone number:    Contact 1:  Phone number:    Contact 2:  Phone number:    Uatsdin/:  Phone number:    Therapist:  Phone number:    Local crisis center:    Phone number:    Other community support:  Phone number:

## 2018-08-08 NOTE — TELEPHONE ENCOUNTER
10:02 AM    Reason for Call: OVERBOOK    Patient is having the following symptoms: Poss sinus infection for 1 weeks.    The patient is requesting an appointment for ASAP with Dr Costello.    Was an appointment offered for this call? Yes  If yes : Appointment type short              Date  08/15/18    Preferred method for responding to this message: Telephone Call  What is your phone number ?  [phone]    If we cannot reach you directly, may we leave a detailed response at the number you provided? Yes    Can this message wait until your PCP/provider returns, if unavailable today? Not applicable    Sidra Arshad

## 2018-08-08 NOTE — PATIENT INSTRUCTIONS
Complete antibiotic course.  Continue antihistamine, saline rinses, nasal steroid.  Follow up as needed.

## 2018-08-10 ASSESSMENT — ANXIETY QUESTIONNAIRES: GAD7 TOTAL SCORE: 0

## 2018-08-10 ASSESSMENT — PATIENT HEALTH QUESTIONNAIRE - PHQ9: SUM OF ALL RESPONSES TO PHQ QUESTIONS 1-9: 1

## 2018-08-11 ENCOUNTER — HOSPITAL ENCOUNTER (EMERGENCY)
Facility: HOSPITAL | Age: 68
Discharge: HOME | End: 2018-08-11
Attending: PHYSICIAN ASSISTANT | Admitting: PHYSICIAN ASSISTANT
Payer: MEDICARE

## 2018-08-11 ENCOUNTER — APPOINTMENT (OUTPATIENT)
Dept: CT IMAGING | Facility: HOSPITAL | Age: 68
End: 2018-08-11
Attending: PHYSICIAN ASSISTANT
Payer: MEDICARE

## 2018-08-11 VITALS
DIASTOLIC BLOOD PRESSURE: 71 MMHG | SYSTOLIC BLOOD PRESSURE: 144 MMHG | OXYGEN SATURATION: 98 % | RESPIRATION RATE: 20 BRPM | TEMPERATURE: 97.8 F | HEART RATE: 66 BPM

## 2018-08-11 DIAGNOSIS — R10.31 RLQ ABDOMINAL PAIN: ICD-10-CM

## 2018-08-11 LAB
ALBUMIN UR-MCNC: 10 MG/DL
ANION GAP SERPL CALCULATED.3IONS-SCNC: 6 MMOL/L (ref 3–14)
APPEARANCE UR: ABNORMAL
BACTERIA #/AREA URNS HPF: ABNORMAL /HPF
BASOPHILS # BLD AUTO: 0 10E9/L (ref 0–0.2)
BASOPHILS NFR BLD AUTO: 0.3 %
BILIRUB UR QL STRIP: NEGATIVE
BUN SERPL-MCNC: 21 MG/DL (ref 7–30)
CALCIUM SERPL-MCNC: 8.9 MG/DL (ref 8.5–10.1)
CHLORIDE SERPL-SCNC: 105 MMOL/L (ref 94–109)
CO2 SERPL-SCNC: 29 MMOL/L (ref 20–32)
COLOR UR AUTO: YELLOW
CREAT SERPL-MCNC: 0.88 MG/DL (ref 0.52–1.04)
CRP SERPL-MCNC: 5.8 MG/L (ref 0–8)
DIFFERENTIAL METHOD BLD: ABNORMAL
EOSINOPHIL # BLD AUTO: 0.3 10E9/L (ref 0–0.7)
EOSINOPHIL NFR BLD AUTO: 3.4 %
ERYTHROCYTE [DISTWIDTH] IN BLOOD BY AUTOMATED COUNT: 14.2 % (ref 10–15)
GFR SERPL CREATININE-BSD FRML MDRD: 64 ML/MIN/1.7M2
GLUCOSE SERPL-MCNC: 171 MG/DL (ref 70–99)
GLUCOSE UR STRIP-MCNC: NEGATIVE MG/DL
HCT VFR BLD AUTO: 41.2 % (ref 35–47)
HGB BLD-MCNC: 14 G/DL (ref 11.7–15.7)
HGB UR QL STRIP: NEGATIVE
IMM GRANULOCYTES # BLD: 0.1 10E9/L (ref 0–0.4)
IMM GRANULOCYTES NFR BLD: 0.5 %
KETONES UR STRIP-MCNC: NEGATIVE MG/DL
LEUKOCYTE ESTERASE UR QL STRIP: ABNORMAL
LYMPHOCYTES # BLD AUTO: 1.2 10E9/L (ref 0.8–5.3)
LYMPHOCYTES NFR BLD AUTO: 11.9 %
MCH RBC QN AUTO: 34.1 PG (ref 26.5–33)
MCHC RBC AUTO-ENTMCNC: 34 G/DL (ref 31.5–36.5)
MCV RBC AUTO: 101 FL (ref 78–100)
MONOCYTES # BLD AUTO: 1.2 10E9/L (ref 0–1.3)
MONOCYTES NFR BLD AUTO: 12.2 %
MUCOUS THREADS #/AREA URNS LPF: PRESENT /LPF
NEUTROPHILS # BLD AUTO: 7.1 10E9/L (ref 1.6–8.3)
NEUTROPHILS NFR BLD AUTO: 71.7 %
NITRATE UR QL: NEGATIVE
NRBC # BLD AUTO: 0 10*3/UL
NRBC BLD AUTO-RTO: 0 /100
PH UR STRIP: 6 PH (ref 4.7–8)
PLATELET # BLD AUTO: 248 10E9/L (ref 150–450)
POTASSIUM SERPL-SCNC: 4.2 MMOL/L (ref 3.4–5.3)
RBC # BLD AUTO: 4.1 10E12/L (ref 3.8–5.2)
RBC #/AREA URNS AUTO: 2 /HPF (ref 0–2)
SODIUM SERPL-SCNC: 140 MMOL/L (ref 133–144)
SOURCE: ABNORMAL
SP GR UR STRIP: 1.01 (ref 1–1.03)
SQUAMOUS #/AREA URNS AUTO: 15 /HPF (ref 0–1)
UROBILINOGEN UR STRIP-MCNC: NORMAL MG/DL (ref 0–2)
WBC # BLD AUTO: 9.9 10E9/L (ref 4–11)
WBC #/AREA URNS AUTO: 32 /HPF (ref 0–5)

## 2018-08-11 PROCEDURE — 81001 URINALYSIS AUTO W/SCOPE: CPT | Performed by: PHYSICIAN ASSISTANT

## 2018-08-11 PROCEDURE — 85025 COMPLETE CBC W/AUTO DIFF WBC: CPT | Performed by: PHYSICIAN ASSISTANT

## 2018-08-11 PROCEDURE — 36415 COLL VENOUS BLD VENIPUNCTURE: CPT | Performed by: PHYSICIAN ASSISTANT

## 2018-08-11 PROCEDURE — 99284 EMERGENCY DEPT VISIT MOD MDM: CPT | Mod: 25

## 2018-08-11 PROCEDURE — 87086 URINE CULTURE/COLONY COUNT: CPT | Performed by: PHYSICIAN ASSISTANT

## 2018-08-11 PROCEDURE — 99284 EMERGENCY DEPT VISIT MOD MDM: CPT | Performed by: PHYSICIAN ASSISTANT

## 2018-08-11 PROCEDURE — 74176 CT ABD & PELVIS W/O CONTRAST: CPT | Mod: TC

## 2018-08-11 PROCEDURE — 80048 BASIC METABOLIC PNL TOTAL CA: CPT | Performed by: PHYSICIAN ASSISTANT

## 2018-08-11 PROCEDURE — 86140 C-REACTIVE PROTEIN: CPT | Performed by: PHYSICIAN ASSISTANT

## 2018-08-11 ASSESSMENT — ENCOUNTER SYMPTOMS
FLANK PAIN: 1
ABDOMINAL PAIN: 1
CONSTITUTIONAL NEGATIVE: 1
DYSURIA: 0
CARDIOVASCULAR NEGATIVE: 1
CHILLS: 0
FEVER: 0
RESPIRATORY NEGATIVE: 1
HEMATURIA: 0

## 2018-08-11 NOTE — ED AVS SNAPSHOT
[facility]    [address] [address]    Phone:  [phone]                                       Radha Fox   MRN: [id number]    Department:  [facility]   Date of Visit:  8/11/2018           Patient Information     Date Of Birth          1950        Your diagnoses for this visit were:     RLQ abdominal pain        You were seen by Carlos Cutler PA.      Follow-up Information     Follow up with Lanie Costello MD In 1 week.    Specialty:  Family Practice    Contact information:    [address]  [facility] [address] [phone]          Follow up with [facility].    Specialty:  EMERGENCY MEDICINE    Why:  If symptoms worsen    Contact information:    [address] [address] [phone]    Additional information:    From [facility]: Take [address]. Turn left at [address]. Turn left at the first stoplight on [address]. At the first stop sign, take a right onto [address]. Take a left into the parking lot and continue through until you reach the North enterance of the building.       From [place]: Take [address]. Take the [address] ramp towards [place]. Turn left onto [address]. Take a slight right onto [address] [facility] [address]. Turn left at the first stoplight on [address]. At the first stop sign, take a right onto [address]. Take a left into the parking lot and continue through until you reach the North enterance of the building.       From [place]: Take [address]. Take a right at [address]. At the first stop sign, take a right onto [address]. Take a left into the parking lot and continue through until you reach the North enterance of the building.         Discharge Instructions       - Magnesium (typically 400mg at night) and/or vitamin C (a few oranges or supplemental vitamin C 500-1000mg every 1-2 hours until increased  bowel movements- vitamin C for only 1 day!)   - Stay hydrated and may continue with high fiber (avoiding seeds).   * Follow up with primary care in 5-7 days with poor improvement, back here with any fevers or pain on the left.    Discharge References/Attachments     ABDOMINAL PAIN, ADULT (ENGLISH)      Your next 10 appointments already scheduled     Dec 03, 2018  9:30 AM CST   Level 5 with HC INF RM [id number]   [facility] [place] ([facility] [place] )    [address]  [place] [address]   [phone]                 Review of your medicines      Our records show that you are taking the medicines listed below. If these are incorrect, please call your family doctor or clinic.        Dose / Directions Last dose taken    ASPIRIN PO   Dose:  81 mg        Take 81 mg by mouth daily   Refills:  0        atorvastatin 20 MG tablet   Commonly known as:  LIPITOR   Quantity:  90 tablet        TAKE ONE TABLET BY MOUTH ONCE DAILY   Refills:  0        * blood glucose calibration solution   Quantity:  1 Bottle        Use as directed   Refills:  0        * blood glucose calibration solution   Quantity:  1 each        Use to calibrate blood glucose monitor as directed.   Refills:  0        * blood glucose monitoring test strip   Commonly known as:  ONETOUCH ULTRA   Quantity:  100 strip        Use to test blood sugar daily or as directed.   Refills:  2        * blood glucose monitoring test strip   Commonly known as:  ONETOUCH ULTRA   Quantity:  100 strip        Use to test blood sugars one time daily or as directed.   Refills:  2        calcium + D 600-200 MG-UNIT Tabs   Dose:  600 mg   Generic drug:  calcium carbonate-vitamin D        Take 600 mg by mouth 2 times daily.   Refills:  0        celecoxib 200 MG capsule   Commonly known as:  celeBREX   Quantity:  90 capsule        TAKE 1 CAPSULE BY MOUTH ONCE DAILY   Refills:  0        folic acid 1 MG tablet   Commonly known as:  FOLVITE   Dose:  2 tablet        Take 2  tablets by mouth daily   Refills:  0        KLOR-CON 20 MEQ CR tablet   Quantity:  90 tablet   Generic drug:  potassium chloride SA        TAKE ONE TABLET BY MOUTH ONCE DAILY WITH FOOD   Refills:  0        levofloxacin 500 MG tablet   Commonly known as:  LEVAQUIN   Dose:  500 mg   Quantity:  7 tablet        Take 1 tablet (500 mg) by mouth daily   Refills:  0        METHOTREXATE PO   Dose:  12.5 mg        Inject 12.5 mg as directed once a week.   Refills:  0        Multi-vitamin Tabs tablet        Take  by mouth daily.   Refills:  0        omeprazole 40 MG capsule   Commonly known as:  priLOSEC   Quantity:  90 capsule        TAKE ONE CAPSULE BY MOUTH ONCE DAILY BEFORE A MEAL   Refills:  0        pramipexole 0.25 MG tablet   Commonly known as:  MIRAPEX   Quantity:  270 tablet        TAKE ONE TABLET BY MOUTH AT 5 PM AND TWO TABLETS AT 9 PM   Refills:  1        predniSONE 1 MG tablet   Commonly known as:  DELTASONE   Dose:  1 mg        Take 1 mg by mouth daily Take 3 tabs po daily   Refills:  0        propranolol 20 MG tablet   Commonly known as:  INDERAL   Quantity:  90 tablet        TAKE ONE TABLET BY MOUTH ONCE DAILY   Refills:  1        RITUXAN IV        Refills:  0        venlafaxine 75 MG 24 hr capsule   Commonly known as:  EFFEXOR-XR   Quantity:  90 capsule        TAKE 1 CAPSULE BY MOUTH ONCE DAILY. DUE  FOR  FOLLOW  UP  VISIT   Refills:  0        VITAMIN B 12 PO   Dose:  500 mg        Take 500 mg by mouth daily.   Refills:  0        * Notice:  This list has 4 medication(s) that are the same as other medications prescribed for you. Read the directions carefully, and ask your doctor or other care provider to review them with you.            Procedures and tests performed during your visit     Basic metabolic panel    CBC with platelets differential    CRP inflammation    CT Abdomen Pelvis w/o Contrast    UA reflex to Microscopic and Culture      Orders Needing Specimen Collection     None      Pending Results      No orders found from 8/9/2018 to 8/12/2018.            Pending Culture Results     No orders found from 8/9/2018 to 8/12/2018.            Thank you for choosing [facility]       Thank you for choosing [facility] for your care. Our goal is always to provide you with excellent care. Hearing back from our patients is one way we can continue to improve our services. Please take a few minutes to complete the written survey that you may receive in the mail after you visit with us. Thank you!        [facility] Information     [employer] gives you secure access to your electronic health record. If you see a primary care provider, you can also send messages to your care team and make appointments. If you have questions, please call your primary care clinic.  If you do not have a primary care provider, please call [phone] and they will assist you.        Care EveryWhere ID     This is your Care EveryWhere ID. This could be used by other organizations to access your [facility] medical records  [id number]        Equal Access to Services     JOE TAMAYO : Hadii mikki Navarro, jayme dixon, cindi cee, erika marley. So [facility] [phone].    ATENCIÓN: Si habla español, tiene a garcia disposición servicios gratuitos de asistencia lingüística. Llame al [phone].    We comply with applicable federal civil rights laws and [place] laws. We do not discriminate on the basis of race, color, national origin, age, disability, sex, sexual orientation, or gender identity.            After Visit Summary       This is your record. Keep this with you and show to your community pharmacist(s) and doctor(s) at your next visit.

## 2018-08-11 NOTE — ED NOTES
States right lower abd pain and lower back flank pain for a few days .  Pain is sharp at times and mostly dull ache.  Pain better with standing.  BM today no Nor V.  Denies dysuria.

## 2018-08-11 NOTE — DISCHARGE INSTRUCTIONS
- Magnesium (typically 400mg at night) and/or vitamin C (a few oranges or supplemental vitamin C 500-1000mg every 1-2 hours until increased bowel movements- vitamin C for only 1 day!)   - Stay hydrated and may continue with high fiber (avoiding seeds).   * Follow up with primary care in 5-7 days with poor improvement, back here with any fevers or pain on the left.

## 2018-08-11 NOTE — ED PROVIDER NOTES
History     Chief Complaint   Patient presents with     Back Pain     right lower back pain started a few days ago     Flank Pain     right side pain started a few days ago     HPI  Radha Fox is a 68 year old female with Hx DM, RA, CAD who presents ambulatory to the emergency department complaining of right low back and flank pain that started 3 days ago.  Patient describes pain is achy to sharp and she cannot find a comfortable position.  She adds she has a history of recurrent UTIs and has not been on an antibiotic since December.  No hematuria, urgency, frequency.  No known history of kidney stones.  She does take methotrexate and prednisone for RA.  Abdominal surgeries include: Appendectomy, cholecystectomy, hysterectomy.    Problem List:    Patient Active Problem List    Diagnosis Date Noted     Type 2 diabetes mellitus without complication (H) 10/30/2017     Priority: Medium     Chronic cough 08/17/2016     Priority: Medium     ACP (advance care planning) 07/21/2016     Priority: Medium     Advance Care Planning 7/21/2016: ACP Review of Chart / Resources Provided:  Reviewed chart for advance care plan.  Radha Fox has no plan or code status on file. Discussed available resources and provided with information. Confirmed code status reflects current choices pending further ACP discussions.  Confirmed/documented legally designated decision makers.  Added by Gregoria Gregorio             Diabetes mellitus type 2, diet-controlled (H)      Priority: Medium     Rheumatoid arthritis of multiple sites without rheumatoid factor (H) 11/10/2015     Priority: Medium     SNHL (sensory-neural hearing loss), asymmetrical 07/08/2014     Priority: Medium     negative MRI IAC 7/2014       S/P myringotomy with insertion of tube 07/08/2014     Priority: Medium     History of chronic sinusitis 07/08/2014     Priority: Medium     History of sinus surgery 05/28/2014     Priority: Medium     Simple chronic serous  otitis media 05/28/2014     Priority: Medium     Problem list name updated by automated process. Provider to review       Dysfunction of eustachian tube 05/28/2014     Priority: Medium     Mixed hearing loss, unilateral 05/28/2014     Priority: Medium     Abnormal glucose 01/22/2014     Priority: Medium     Problem list name updated by automated process. Provider to review       Recurrent UTI 03/20/2013     Priority: Medium     RA (rheumatoid arthritis) (H) 03/12/2013     Priority: Medium     Insomnia 01/24/2013     Priority: Medium     Problem list name updated by automated process. Provider to review       Osteoporosis 01/19/2012     Priority: Medium     Problem list name updated by automated process. Provider to review       Obesity 01/19/2012     Priority: Medium     Problem list name updated by automated process. Provider to review       Restless legs syndrome (RLS) 04/21/2011     Priority: Medium     Esophageal reflux 11/12/2010     Priority: Medium     Contact dermatitis and other eczema, due to unspecified cause 03/05/2009     Priority: Medium     Coronary atherosclerosis 09/27/2005     Priority: Medium     Problem list name updated by automated process. Provider to review       Hyperlipidemia 04/13/2005     Priority: Medium     Problem list name updated by automated process. Provider to review       Depressive disorder, not elsewhere classified 01/11/2002     Priority: Medium     Sinusitis, chronic 09/05/2000     Priority: Medium     Problem list name updated by automated process. Provider to review       Arthropathy 05/01/2000     Priority: Medium     Problem list name updated by automated process. Provider to review       Diffuse connective tissue disease (H) 04/04/2000     Priority: Medium     Problem list name updated by automated process. Provider to review          Past Medical History:    Past Medical History:   Diagnosis Date     Abnormal PFT      Arthropathy, unspecified, site unspecified  05/01/2000     Chest pain, unspecified 2005     Contact dermatitis and other eczema, due to unspecified cause 03/05/2009     Coronary atherosclerosis of unspecified type of vessel, native or graft 09/27/2005     Depressive disorder, not elsewhere classified 01/11/2002     Diabetes mellitus type 2, diet-controlled (H)      Esophageal reflux 11/12/2010     H/O: hysterectomy 1977     Impaired fasting glucose 11/12/2010     Insomnia, unspecified 01/24/2013     Leukocytosis, unspecified 12/22/2007     Obesity, unspecified 01/19/2012     Osteoporosis, unspecified 01/19/2012     Other and unspecified hyperlipidemia 04/13/2005     Other dyspnea and respiratory abnormality 01/19/2012     Restless legs syndrome (RLS) 04/21/2011     Rheumatoid arthritis(714.0) 11/09/1999     Unspecified diffuse connective tissue disease 04/04/2000     Unspecified sinusitis (chronic) 09/05/2000       Past Surgical History:    Past Surgical History:   Procedure Laterality Date     APPENDECTOMY  1961     CHOLECYSTECTOMY  1973     COLONOSCOPY N/A 3/23/2015    Procedure: COLONOSCOPY;  Surgeon: Clarisa Larkin MD;  Location: [facility]     endoscopic sphenoidotomy  10/2011    Right     ETHMOIDECTOMY Bilateral 9/27/2016    Procedure: ETHMOIDECTOMY;  Surgeon: Aracelis Ayon MD;  Location: [facility]     excision bilateral telma bullosa  10/2011     GENITOURINARY SURGERY       HYSTERECTOMY      benign     LAPAROSCOPY DIAGNOSTIC (GENERAL)       ORTHOPEDIC SURGERY      left foot 5th metarsal fracture screws placed     SEPTOPLASTY N/A 9/27/2016    Procedure: SEPTOPLASTY;  Surgeon: Aracelis Ayon MD;  Location: [facility]     TOT Internal urethrotomy  2010     TUBAL LIGATION         Family History:    Family History   Problem Relation Age of Onset     C.A.D. Father 83     Other - See Comments Father      CHF     Cerebrovascular Disease Father      CVA     Diabetes Father      Hypertension Father      Coronary Artery Disease Father      Cerebrovascular  Disease Mother 72     CVA     HEART DISEASE Mother      Heart Issue     Hyperlipidemia Mother      Coronary Artery Disease Mother      Diabetes Brother      Diabetes Sister      Colon Cancer No family hx of      Breast Cancer No family hx of      Asthma No family hx of      Thyroid Disease No family hx of        Social History:  Marital Status:   [2]  Social History   Substance Use Topics     Smoking status: Never Smoker     Smokeless tobacco: Never Used     Alcohol use No        Medications:      ASPIRIN PO   atorvastatin (LIPITOR) 20 MG tablet   Blood Glucose Calibration (ACCU-CHEK ANGELICA) SOLN   Blood Glucose Calibration (ACCU-CHEK COMPACT BLUE CONTROL) LIQD   blood glucose monitoring (ONETOUCH ULTRA) test strip   blood glucose monitoring (ONETOUCH ULTRA) test strip   calcium carbonate-vitamin D (CALCIUM + D) 600-200 MG-UNIT TABS   celecoxib (CELEBREX) 200 MG capsule   Cyanocobalamin (VITAMIN B 12 PO)   folic acid (FOLVITE) 1 MG tablet   KLOR-CON 20 MEQ CR tablet   levofloxacin (LEVAQUIN) 500 MG tablet   Methotrexate Sodium (METHOTREXATE PO)   multivitamin, therapeutic with minerals (MULTI-VITAMIN) TABS   omeprazole (PRILOSEC) 40 MG capsule   pramipexole (MIRAPEX) 0.25 MG tablet   predniSONE (DELTASONE) 1 MG tablet   propranolol (INDERAL) 20 MG tablet   RiTUXimab (RITUXAN IV)   venlafaxine (EFFEXOR-XR) 75 MG 24 hr capsule         Review of Systems   Constitutional: Negative.  Negative for chills and fever.   Respiratory: Negative.    Cardiovascular: Negative.    Gastrointestinal: Positive for abdominal pain.   Genitourinary: Positive for flank pain. Negative for dysuria, hematuria, pelvic pain and vaginal bleeding.       Physical Exam   BP: 141/75  Pulse: 66  Heart Rate: 56  Temp: 96.9  F (36.1  C)  Resp: 18  SpO2: 99 %      Physical Exam   Constitutional: She is oriented to person, place, and time. She appears well-developed and well-nourished. No distress.   HENT:   Mouth/Throat: Oropharynx is clear and  moist.   Eyes: Conjunctivae are normal.   Cardiovascular: Normal rate and normal heart sounds.    Pulmonary/Chest: Effort normal and breath sounds normal.   Abdominal: Soft. Bowel sounds are normal. There is tenderness. There is no rebound, no guarding and no CVA tenderness.       Neurological: She is alert and oriented to person, place, and time.   Skin: Skin is warm and dry.   Psychiatric: She has a normal mood and affect.   Nursing note and vitals reviewed.      ED Course     ED Course     Procedures      Results for orders placed or performed during the hospital encounter of 08/11/18 (from the past 24 hour(s))   CBC with platelets differential   Result Value Ref Range    WBC 9.9 4.0 - 11.0 10e9/L    RBC Count 4.10 3.8 - 5.2 10e12/L    Hemoglobin 14.0 11.7 - 15.7 g/dL    Hematocrit 41.2 35.0 - 47.0 %     (H) 78 - 100 fl    MCH 34.1 (H) 26.5 - 33.0 pg    MCHC 34.0 31.5 - 36.5 g/dL    RDW 14.2 10.0 - 15.0 %    Platelet Count 248 150 - 450 10e9/L    Diff Method Automated Method     % Neutrophils 71.7 %    % Lymphocytes 11.9 %    % Monocytes 12.2 %    % Eosinophils 3.4 %    % Basophils 0.3 %    % Immature Granulocytes 0.5 %    Nucleated RBCs 0 0 /100    Absolute Neutrophil 7.1 1.6 - 8.3 10e9/L    Absolute Lymphocytes 1.2 0.8 - 5.3 10e9/L    Absolute Monocytes 1.2 0.0 - 1.3 10e9/L    Absolute Eosinophils 0.3 0.0 - 0.7 10e9/L    Absolute Basophils 0.0 0.0 - 0.2 10e9/L    Abs Immature Granulocytes 0.1 0 - 0.4 10e9/L    Absolute Nucleated RBC 0.0    Basic metabolic panel   Result Value Ref Range    Sodium 140 133 - 144 mmol/L    Potassium 4.2 3.4 - 5.3 mmol/L    Chloride 105 94 - 109 mmol/L    Carbon Dioxide 29 20 - 32 mmol/L    Anion Gap 6 3 - 14 mmol/L    Glucose 171 (H) 70 - 99 mg/dL    Urea Nitrogen 21 7 - 30 mg/dL    Creatinine 0.88 0.52 - 1.04 mg/dL    GFR Estimate 64 >60 mL/min/1.7m2    GFR Estimate If Black 78 >60 mL/min/1.7m2    Calcium 8.9 8.5 - 10.1 mg/dL   CRP inflammation   Result Value Ref Range     CRP Inflammation 5.8 0.0 - 8.0 mg/L   UA reflex to Microscopic and Culture   Result Value Ref Range    Color Urine Yellow     Appearance Urine Slightly Cloudy     Glucose Urine Negative NEG^Negative mg/dL    Bilirubin Urine Negative NEG^Negative    Ketones Urine Negative NEG^Negative mg/dL    Specific Gravity Urine 1.012 1.003 - 1.035    Blood Urine Negative NEG^Negative    pH Urine 6.0 4.7 - 8.0 pH    Protein Albumin Urine 10 (A) NEG^Negative mg/dL    Urobilinogen mg/dL Normal 0.0 - 2.0 mg/dL    Nitrite Urine Negative NEG^Negative    Leukocyte Esterase Urine Large (A) NEG^Negative    Source Midstream Urine     RBC Urine 2 0 - 2 /HPF    WBC Urine 32 (H) 0 - 5 /HPF    Bacteria Urine Few (A) NEG^Negative /HPF    Squamous Epithelial /HPF Urine 15 (H) 0 - 1 /HPF    Mucous Urine Present (A) NEG^Negative /LPF   Urine Culture Aerobic Bacterial   Result Value Ref Range    Specimen Description Unspecified Urine     Culture Micro Culture in progress    CT Abdomen Pelvis w/o Contrast    Narrative    EXAMINATION: CT ABDOMEN PELVIS W/O CONTRAST, 8/11/2018 12:21 PM    HISTORY: right pelvic pain, r/o stone;     COMPARISON: 3/20/2013    TECHNIQUE:  Helical CT images from the lung bases through the  symphysis pubis were obtained without IV or oral contrast    FINDINGS:    Pancreatico hepatobiliary: Normal appearance of the liver, bile ducts,  pancreas, and spleen. The gallbladder is not visualized and may have  been resected.    Genitourinary:  Normal appearance of the adrenal glands, kidneys,  ureters, and bladder. The uterus has been resected. The ovaries are  not visualized but the adnexa appear normal.    Gastrointestinal:  Sigmoid diverticulosis. Large amount of stool in  the cecum which is in the right lower quadrant. The distal esophagus,  stomach, and bowel otherwise appear normal.    Lymph nodes:  No lymphadenopathy    Vasculature:  Vascular calcifications. No aneurysm identified.    Lung bases: Not well    Musculoskeletal:  Degenerative arthritis in the lumbar spine and  pelvis. No worrisome bone lesions are identified      Impression    IMPRESSION:   1. Sigmoid diverticulosis without evidence of diverticulitis.  2. Large amount of stool in the right colon.  3. Cholecystectomy and hysterectomy     HANK JUNIOR MD       Medications - No data to display    Assessments & Plan (with Medical Decision Making)     I have reviewed the nursing notes.    I have reviewed the findings, diagnosis, plan and need for follow up with the patient.       Discharge Medication List as of 8/11/2018  1:34 PM          Final diagnoses:   RLQ abdominal pain   Radha declines IVF and pain control. CT negative for stone, but large amount of stool right colon.  Labs acceptable. UA with 15 epis, leuk est and few bacteria, culture is ordered as prior 3 cultures were mixed mikhail. I discussed results with Radha and she prefers to trial mag and will hydrate and ambulate. I assured her we will call if urine culture results indicate antibiotic. She is agreeable to plan and discharged home in stable condition.     8/11/2018   [facility]     Carlos Cutler PA  08/12/18 0424

## 2018-08-11 NOTE — ED AVS SNAPSHOT
[facility]    [address] [address]    Phone:  [phone]                                       Radha Fox   MRN: [id number]    Department:  [facility]   Date of Visit:  8/11/2018           After Visit Summary Signature Page     I have received my discharge instructions, and my questions have been answered. I have discussed any challenges I see with this plan with the nurse or doctor.    ..........................................................................................................................................  Patient/Patient Representative Signature      ..........................................................................................................................................  Patient Representative Print Name and Relationship to Patient    ..................................................               ................................................  Date                                            Time    ..........................................................................................................................................  Reviewed by Signature/Title    ...................................................              ..............................................  Date                                                            Time

## 2018-08-14 LAB
BACTERIA SPEC CULT: NORMAL
SPECIMEN SOURCE: NORMAL

## 2018-08-17 ENCOUNTER — OFFICE VISIT (OUTPATIENT)
Dept: FAMILY MEDICINE | Facility: OTHER | Age: 68
End: 2018-08-17
Attending: FAMILY MEDICINE
Payer: MEDICARE

## 2018-08-17 VITALS
BODY MASS INDEX: 33.75 KG/M2 | HEIGHT: 66 IN | DIASTOLIC BLOOD PRESSURE: 72 MMHG | HEART RATE: 63 BPM | RESPIRATION RATE: 16 BRPM | OXYGEN SATURATION: 99 % | SYSTOLIC BLOOD PRESSURE: 130 MMHG | WEIGHT: 210 LBS | TEMPERATURE: 97.5 F

## 2018-08-17 DIAGNOSIS — K59.00 CONSTIPATION, UNSPECIFIED CONSTIPATION TYPE: Primary | ICD-10-CM

## 2018-08-17 PROCEDURE — G0463 HOSPITAL OUTPT CLINIC VISIT: HCPCS

## 2018-08-17 PROCEDURE — 99213 OFFICE O/P EST LOW 20 MIN: CPT | Performed by: FAMILY MEDICINE

## 2018-08-17 ASSESSMENT — ANXIETY QUESTIONNAIRES
1. FEELING NERVOUS, ANXIOUS, OR ON EDGE: NOT AT ALL
6. BECOMING EASILY ANNOYED OR IRRITABLE: NOT AT ALL
GAD7 TOTAL SCORE: 0
2. NOT BEING ABLE TO STOP OR CONTROL WORRYING: NOT AT ALL
7. FEELING AFRAID AS IF SOMETHING AWFUL MIGHT HAPPEN: NOT AT ALL
3. WORRYING TOO MUCH ABOUT DIFFERENT THINGS: NOT AT ALL
4. TROUBLE RELAXING: NOT AT ALL
5. BEING SO RESTLESS THAT IT IS HARD TO SIT STILL: NOT AT ALL

## 2018-08-17 ASSESSMENT — PAIN SCALES - GENERAL: PAINLEVEL: MODERATE PAIN (5)

## 2018-08-17 NOTE — PATIENT INSTRUCTIONS
Miralax daily as needed.  Continue adequate water intake.   Can also try milk of magnesia or senna as laxatives as well.

## 2018-08-17 NOTE — PROGRESS NOTES
SUBJECTIVE:   Radha Fox is a 68 year old female who presents to clinic today for the following health issues:      ED/UC Followup:    Facility: [facility] [place]  Date of visit: 08/11/18  Reason for visit: RLQ abdominal pain  Current Status: Worse     Had presented to ER with 3 days of right LBP and flank pain, unable to find comfortable position.    History of appendectomy, cholecystectomy, hysterectomy.  CT with large amount of stool in right colon, diverticulosis, but no diverticulitis.  Labs stable.  Urine culture negative.  States she was treated with vitamin C and magnesium supplements?  Then tried metamucil.    No relief.      Problem list and histories reviewed & adjusted, as indicated.  Additional history: as documented    Current Outpatient Prescriptions   Medication     Probiotic Product (PROBIOTIC DAILY PO)     ASPIRIN PO     atorvastatin (LIPITOR) 20 MG tablet     Blood Glucose Calibration (ACCU-CHEK ANGELICA) SOLN     Blood Glucose Calibration (ACCU-CHEK COMPACT BLUE CONTROL) LIQD     blood glucose monitoring (ONETOUCH ULTRA) test strip     blood glucose monitoring (ONETOUCH ULTRA) test strip     calcium carbonate-vitamin D (CALCIUM + D) 600-200 MG-UNIT TABS     celecoxib (CELEBREX) 200 MG capsule     Cyanocobalamin (VITAMIN B 12 PO)     folic acid (FOLVITE) 1 MG tablet     KLOR-CON 20 MEQ CR tablet     Methotrexate Sodium (METHOTREXATE PO)     multivitamin, therapeutic with minerals (MULTI-VITAMIN) TABS     omeprazole (PRILOSEC) 40 MG capsule     pramipexole (MIRAPEX) 0.25 MG tablet     predniSONE (DELTASONE) 1 MG tablet     propranolol (INDERAL) 20 MG tablet     RiTUXimab (RITUXAN IV)     venlafaxine (EFFEXOR-XR) 75 MG 24 hr capsule     No current facility-administered medications for this visit.        Patient Active Problem List   Diagnosis     RA (rheumatoid arthritis) (H)     Recurrent UTI     Hyperlipidemia     Depressive disorder, not elsewhere classified     Contact dermatitis and other eczema, due  to unspecified cause     Diffuse connective tissue disease (H)     Arthropathy     Coronary atherosclerosis     Sinusitis, chronic     Esophageal reflux     Restless legs syndrome (RLS)     Osteoporosis     Obesity     Insomnia     Abnormal glucose     History of sinus surgery     Simple chronic serous otitis media     Dysfunction of eustachian tube     Mixed hearing loss, unilateral     SNHL (sensory-neural hearing loss), asymmetrical     S/P myringotomy with insertion of tube     History of chronic sinusitis     Rheumatoid arthritis of multiple sites without rheumatoid factor (H)     ACP (advance care planning)     Diabetes mellitus type 2, diet-controlled (H)     Chronic cough     Type 2 diabetes mellitus without complication (H)     Past Surgical History:   Procedure Laterality Date     APPENDECTOMY  1961     CHOLECYSTECTOMY  1973     COLONOSCOPY N/A 3/23/2015    Procedure: COLONOSCOPY;  Surgeon: Clarisa Larkin MD;  Location: [facility]     endoscopic sphenoidotomy  10/2011    Right     ETHMOIDECTOMY Bilateral 9/27/2016    Procedure: ETHMOIDECTOMY;  Surgeon: Aracelis Ayon MD;  Location: [facility]     excision bilateral telma bullosa  10/2011     GENITOURINARY SURGERY       HYSTERECTOMY      benign     LAPAROSCOPY DIAGNOSTIC (GENERAL)       ORTHOPEDIC SURGERY      left foot 5th metarsal fracture screws placed     SEPTOPLASTY N/A 9/27/2016    Procedure: SEPTOPLASTY;  Surgeon: Aracelis Ayon MD;  Location: [facility]     TOT Internal urethrotomy  2010     TUBAL LIGATION         Social History   Substance Use Topics     Smoking status: Never Smoker     Smokeless tobacco: Never Used     Alcohol use No     Family History   Problem Relation Age of Onset     C.A.D. Father 83     Other - See Comments Father      CHF     Cerebrovascular Disease Father      CVA     Diabetes Father      Hypertension Father      Coronary Artery Disease Father      Cerebrovascular Disease Mother 72     CVA     HEART DISEASE Mother       "Heart Issue     Hyperlipidemia Mother      Coronary Artery Disease Mother      Diabetes Brother      Diabetes Sister      Colon Cancer No family hx of      Breast Cancer No family hx of      Asthma No family hx of      Thyroid Disease No family hx of            Reviewed and updated as needed this visit by clinical staff       Reviewed and updated as needed this visit by Provider         ROS:  CONSTITUTIONAL:NEGATIVE for fever, chills, change in weight  INTEGUMENTARY/SKIN: NEGATIVE for worrisome rashes  RESP:NEGATIVE for significant cough or SOB  CV: NEGATIVE for chest pain, palpitations or peripheral edema  GI: POSITIVE for abdominal pain RLQ and NEGATIVE for diarrhea, hematemesis, hematochezia, melena, nausea, poor appetite and vomiting  : negative for, dysuria and hematuria    OBJECTIVE:     /72 (BP Location: Left arm, Patient Position: Sitting, Cuff Size: Adult Regular)  Pulse 63  Temp 97.5  F (36.4  C) (Tympanic)  Resp 16  Ht 5' 6\" (1.676 m)  Wt 210 lb (95.3 kg)  SpO2 99%  BMI 33.89 kg/m2  Body mass index is 33.89 kg/(m^2).  GENERAL: healthy, alert and no distress  NECK: no adenopathy, no asymmetry, masses, or scars and thyroid normal to palpation  RESP: lungs clear to auscultation - no rales, rhonchi or wheezes  CV: regular rate and rhythm, normal S1 S2, no S3 or S4, no murmur, click or rub, no peripheral edema and peripheral pulses strong  ABDOMEN: no organomegaly or masses, bowel sounds normal and soft; mild tenderness throughout, most notable RLQ  MS: no gross musculoskeletal defects noted, no edema  PSYCH: mentation appears normal, affect normal/bright    Diagnostic Test Results:  none    ER records reviewed.    ASSESSMENT/PLAN:   (K59.00) Constipation, unspecified constipation type  (primary encounter diagnosis)  Comment: not sure what vitamin D or magnesium supplements would do for her constipation?  Perhaps she was instructed on milk of magnesia or mag citrate?    Plan:   Patient " Instructions   Miralax daily as needed.  Continue adequate water intake.   Can also try milk of magnesia or senna as laxatives as well.          Lanie Duggan MD  [facility]

## 2018-08-17 NOTE — MR AVS SNAPSHOT
After Visit Summary   8/17/2018    Radha Fox    MRN: [id number]           Patient Information     Date Of Birth          1950        Visit Information        Provider Department      8/17/2018 2:15 PM Lanie Costello MD [facility]        Today's Diagnoses     Constipation, unspecified constipation type    -  1      Care Instructions    Miralax daily as needed.  Continue adequate water intake.   Can also try milk of magnesia or senna as laxatives as well.              Follow-ups after your visit        Your next 10 appointments already scheduled     Dec 03, 2018  9:30 AM CST   Level 5 with HC INF RM [id number]   [facility] [place] )    [address]  [place] [address] [phone]              Who to contact     If you have questions or need follow up information about today's clinic visit or your schedule please contact [facility] directly at [phone].  Normal or non-critical lab and imaging results will be communicated to you by MyChart, letter or phone within 4 business days after the clinic has received the results. If you do not hear from us within 7 days, please contact the clinic through [facility] or phone. If you have a critical or abnormal lab result, we will notify you by phone as soon as possible.  Submit refill requests through [employer] or call your pharmacy and they will forward the refill request to us. Please allow 3 business days for your refill to be completed.          Additional Information About Your Visit        MyChart Information     [employer] gives you secure access to your electronic health record. If you see a primary care provider, you can also send messages to your care team and make appointments. If you have questions, please call your primary care clinic.  If you do not have a primary care provider, please call [phone] and they will assist you.        Care EveryWhere ID     This is your  "Care EveryWhere ID. This could be used by other organizations to access your [facility] medical records  [id number]        Your Vitals Were     Pulse Temperature Respirations Height Pulse Oximetry BMI (Body Mass Index)    63 97.5  F (36.4  C) (Tympanic) 16 5' 6\" (1.676 m) 99% 33.89 kg/m2       Blood Pressure from Last 3 Encounters:   08/17/18 130/72   08/11/18 144/71   08/08/18 122/88    Weight from Last 3 Encounters:   08/17/18 210 lb (95.3 kg)   08/08/18 214 lb (97.1 kg)   08/03/18 214 lb 8 oz (97.3 kg)              Today, you had the following     No orders found for display       Primary Care Provider Office Phone # Fax #    Lanie Costello [phone] [address]  [facility] [address]        Equal Access to Services     [facility] : Hadii mikki zuritao Sorosita, waaxda luqadaha, qaybta kaalmada adeegyada, erika rodriguez . So [facility] [phone].    ATENCIÓN: Si habla español, tiene a garcia disposición servicios gratuitos de asistencia lingüística. Llame al [phone].    We comply with applicable federal civil rights laws and [place] laws. We do not discriminate on the basis of race, color, national origin, age, disability, sex, sexual orientation, or gender identity.            Thank you!     Thank you for choosing [facility]  for your care. Our goal is always to provide you with excellent care. Hearing back from our patients is one way we can continue to improve our services. Please take a few minutes to complete the written survey that you may receive in the mail after your visit with us. Thank you!             Your Updated Medication List - Protect others around you: Learn how to safely use, store and throw away your medicines at www.disposemymeds.org.          This list is accurate as of 8/17/18  2:24 PM.  Always use your most recent med list.                   Brand Name Dispense Instructions for use Diagnosis    ASPIRIN PO      Take " 81 mg by mouth daily        atorvastatin 20 MG tablet    LIPITOR    90 tablet    TAKE ONE TABLET BY MOUTH ONCE DAILY    Hyperlipidemia LDL goal <100       * blood glucose calibration solution     1 Bottle    Use as directed    Impaired fasting glucose       * blood glucose calibration solution     1 each    Use to calibrate blood glucose monitor as directed.    Impaired fasting glucose       * blood glucose monitoring test strip    ONETOUCH ULTRA    100 strip    Use to test blood sugar daily or as directed.    Impaired fasting glucose       * blood glucose monitoring test strip    ONETOUCH ULTRA    100 strip    Use to test blood sugars one time daily or as directed.    Elevated glucose, Type 2 diabetes mellitus without complication, without long-term current use of insulin (H)       calcium + D 600-200 MG-UNIT Tabs   Generic drug:  calcium carbonate-vitamin D      Take 600 mg by mouth 2 times daily.        celecoxib 200 MG capsule    celeBREX    90 capsule    TAKE 1 CAPSULE BY MOUTH ONCE DAILY    RA (rheumatoid arthritis) (H)       folic acid 1 MG tablet    FOLVITE     Take 2 tablets by mouth daily        KLOR-CON 20 MEQ CR tablet   Generic drug:  potassium chloride SA     90 tablet    TAKE ONE TABLET BY MOUTH ONCE DAILY WITH FOOD    Hypertension, unspecified type       METHOTREXATE PO      Inject 12.5 mg as directed once a week.        Multi-vitamin Tabs tablet      Take  by mouth daily.        omeprazole 40 MG capsule    priLOSEC    90 capsule    TAKE ONE CAPSULE BY MOUTH ONCE DAILY BEFORE A MEAL    Gastroesophageal reflux disease, esophagitis presence not specified       pramipexole 0.25 MG tablet    MIRAPEX    270 tablet    TAKE ONE TABLET BY MOUTH AT 5 PM AND TWO TABLETS AT 9 PM    Restless legs syndrome (RLS)       predniSONE 1 MG tablet    DELTASONE     Take 1 mg by mouth daily Take 3 tabs po daily        PROBIOTIC DAILY PO      Take 1 capsule by mouth daily        propranolol 20 MG tablet    INDERAL    90  tablet    TAKE ONE TABLET BY MOUTH ONCE DAILY    Hypertension, unspecified type       RITUXAN IV           venlafaxine 75 MG 24 hr capsule    EFFEXOR-XR    90 capsule    TAKE 1 CAPSULE BY MOUTH ONCE DAILY. DUE  FOR  FOLLOW  UP  VISIT    MDD (major depressive disorder)       VITAMIN B 12 PO      Take 500 mg by mouth daily.        * Notice:  This list has 4 medication(s) that are the same as other medications prescribed for you. Read the directions carefully, and ask your doctor or other care provider to review them with you.

## 2018-08-17 NOTE — NURSING NOTE
"Chief Complaint   Patient presents with     Urgent Care follow up       Initial /72 (BP Location: Left arm, Patient Position: Sitting, Cuff Size: Adult Regular)  Pulse 63  Temp 97.5  F (36.4  C) (Tympanic)  Resp 16  Ht 5' 6\" (1.676 m)  Wt 210 lb (95.3 kg)  SpO2 99%  BMI 33.89 kg/m2 Estimated body mass index is 33.89 kg/(m^2) as calculated from the following:    Height as of this encounter: 5' 6\" (1.676 m).    Weight as of this encounter: 210 lb (95.3 kg).  Medication Reconciliation: complete    Estela Antony LPN  "

## 2018-08-18 ASSESSMENT — PATIENT HEALTH QUESTIONNAIRE - PHQ9: SUM OF ALL RESPONSES TO PHQ QUESTIONS 1-9: 0

## 2018-08-18 ASSESSMENT — ANXIETY QUESTIONNAIRES: GAD7 TOTAL SCORE: 0

## 2018-08-22 ENCOUNTER — TELEPHONE (OUTPATIENT)
Dept: FAMILY MEDICINE | Facility: OTHER | Age: 68
End: 2018-08-22

## 2018-08-22 NOTE — TELEPHONE ENCOUNTER
12:34 PM    Reason for Call: OVERBOOK    Patient is having the following symptoms: Constipation / abdominal pain / back pain  for   2 weeks / was seen last Friday    The patient is requesting an appointment for today or tomorrow  with  Dr. Costello    Was an appointment offered for this call? No    Preferred method for responding to this message: [phone]    If we cannot reach you directly, may we leave a detailed response at the number you provided? Yes      Evelin Kim

## 2018-08-23 NOTE — PROGRESS NOTES
SUBJECTIVE:   Radha Fox is a 68 year old female who presents to clinic today for the following health issues:        Abdominal Pain      Duration: 3-4 months     Description (location/character/radiation): right lower side        Associated flank pain: right lower back pain     Intensity:  8/10    Accompanying signs and symptoms:        Fever/Chills: no        Gas/Bloating: YES       Nausea/vomitting: no        Diarrhea: YES       Dysuria or Hematuria: no     History (previous similar pain/trauma/previous testing): blood work and ct scan     Precipitating or alleviating factors:       Pain worse with eating/BM/urination: none        Pain relieved by BM: no     Therapies tried and outcome: miralax and senna s     LMP:  not applicable    Constipation    Duration: 2 weeks     Description:       Frequency of bowel movements: 24 hours       Consistency of stool: soft watery stool     Intensity:  8/10    Accompanying signs and symptoms: abdominal pain        Abdominal pain: YES       Rectal pain: no        Blood in stool: no        Nausea/vomitting: no     History:        Similar problems in past: YES- ct scan     Precipitating or alleviating factors: none        Medications worsening symptoms: no     Therapies tried and outcome: miralax and senna s- little outcome        Chronic laxative use: no     Having runny stools twice per day, small amounts.    Has tried Miralax, Senna, lots of water.  Would like to try bowel prep.  CT with large stool right colon.      Of note, overdue for a1c.      Problem list and histories reviewed & adjusted, as indicated.  Additional history: as documented    Current Outpatient Prescriptions   Medication     ASPIRIN PO     atorvastatin (LIPITOR) 20 MG tablet     Blood Glucose Calibration (ACCU-CHEK ANGELICA) SOLN     Blood Glucose Calibration (ACCU-CHEK COMPACT BLUE CONTROL) LIQD     blood glucose monitoring (ONETOUCH ULTRA) test strip     blood glucose monitoring (ONETOUCH ULTRA)  test strip     calcium carbonate-vitamin D (CALCIUM + D) 600-200 MG-UNIT TABS     celecoxib (CELEBREX) 200 MG capsule     Cyanocobalamin (VITAMIN B 12 PO)     folic acid (FOLVITE) 1 MG tablet     KLOR-CON 20 MEQ CR tablet     Methotrexate Sodium (METHOTREXATE PO)     multivitamin, therapeutic with minerals (MULTI-VITAMIN) TABS     Na Sulfate-K Sulfate-Mg Sulf (SUPREP BOWEL PREP) solution     omeprazole (PRILOSEC) 40 MG capsule     polyethylene glycol (MIRALAX/GLYCOLAX) Packet     pramipexole (MIRAPEX) 0.25 MG tablet     predniSONE (DELTASONE) 1 MG tablet     Probiotic Product (PROBIOTIC DAILY PO)     propranolol (INDERAL) 20 MG tablet     RiTUXimab (RITUXAN IV)     Sennosides-Docusate Sodium (SENNA S PO)     venlafaxine (EFFEXOR-XR) 75 MG 24 hr capsule     No current facility-administered medications for this visit.        Patient Active Problem List   Diagnosis     RA (rheumatoid arthritis) (H)     Recurrent UTI     Hyperlipidemia     Depressive disorder, not elsewhere classified     Contact dermatitis and other eczema, due to unspecified cause     Diffuse connective tissue disease (H)     Arthropathy     Coronary atherosclerosis     Sinusitis, chronic     Esophageal reflux     Restless legs syndrome (RLS)     Osteoporosis     Obesity     Insomnia     Abnormal glucose     History of sinus surgery     Simple chronic serous otitis media     Dysfunction of eustachian tube     Mixed hearing loss, unilateral     SNHL (sensory-neural hearing loss), asymmetrical     S/P myringotomy with insertion of tube     History of chronic sinusitis     Rheumatoid arthritis of multiple sites without rheumatoid factor (H)     ACP (advance care planning)     Diabetes mellitus type 2, diet-controlled (H)     Chronic cough     Type 2 diabetes mellitus without complication (H)     Past Surgical History:   Procedure Laterality Date     APPENDECTOMY  1961     CHOLECYSTECTOMY  1973     COLONOSCOPY N/A 3/23/2015    Procedure: COLONOSCOPY;   Surgeon: Clarisa Larkin MD;  Location: [facility]     endoscopic sphenoidotomy  10/2011    Right     ETHMOIDECTOMY Bilateral 9/27/2016    Procedure: ETHMOIDECTOMY;  Surgeon: Aracelis Ayon MD;  Location: [facility]     excision bilateral telma bullosa  10/2011     GENITOURINARY SURGERY       HYSTERECTOMY      benign     LAPAROSCOPY DIAGNOSTIC (GENERAL)       ORTHOPEDIC SURGERY      left foot 5th metarsal fracture screws placed     SEPTOPLASTY N/A 9/27/2016    Procedure: SEPTOPLASTY;  Surgeon: Aracelis Ayon MD;  Location: [facility]     TOT Internal urethrotomy  2010     TUBAL LIGATION         Social History   Substance Use Topics     Smoking status: Never Smoker     Smokeless tobacco: Never Used     Alcohol use No     Family History   Problem Relation Age of Onset     C.A.D. Father 83     Other - See Comments Father      CHF     Cerebrovascular Disease Father      CVA     Diabetes Father      Hypertension Father      Coronary Artery Disease Father      Cerebrovascular Disease Mother 72     CVA     HEART DISEASE Mother      Heart Issue     Hyperlipidemia Mother      Coronary Artery Disease Mother      Diabetes Brother      Diabetes Sister      Colon Cancer No family hx of      Breast Cancer No family hx of      Asthma No family hx of      Thyroid Disease No family hx of            Reviewed and updated as needed this visit by clinical staff  Tobacco  Allergies  Meds       Reviewed and updated as needed this visit by Provider  Allergies  Meds         ROS:  CONSTITUTIONAL:NEGATIVE for fever, chills, change in weight  INTEGUMENTARY/SKIN: NEGATIVE for worrisome rashes  GI: POSITIVE for abdominal pain right lower and constipation and NEGATIVE for diarrhea, nausea, poor appetite, vomiting and weight loss  : negative for, dysuria and hematuria  ENDOCRINE: POSITIVE  for diabetes    OBJECTIVE:     /76 (BP Location: Right arm, Patient Position: Chair, Cuff Size: Adult Large)  Pulse 61  Temp 97.1  F (36.2  C)  (Tympanic)  Wt 213 lb (96.6 kg)  SpO2 98%  BMI 34.38 kg/m2  Body mass index is 34.38 kg/(m^2).  GENERAL: alert, no distress and over weight  ABDOMEN: no organomegaly or masses, bowel sounds normal and soft; mild tenderness RLQ; no rebound or guarding; no hard stool palpated  PSYCH: mentation appears normal, affect normal/bright      ASSESSMENT/PLAN:   (K59.00) Constipation, unspecified constipation type  (primary encounter diagnosis)  Comment: negative CT other than stool in right colon - large amount; doubt enema/suppository would help if not impacted distally?  Normal labs and no sign of infection or mass.  Plan: Na Sulfate-K Sulfate-Mg Sulf (SUPREP BOWEL         PREP) solution        If not responding, will discuss with general surgery regarding other options.    (E11.9) Type 2 diabetes mellitus without complication, without long-term current use of insulin (H)  Plan: Hemoglobin A1c    Patient Instructions   Will call with lab results.  Bowel prep today.  Call if no response over the weekend.              Lanie Duggan MD  [facility]

## 2018-08-24 ENCOUNTER — OFFICE VISIT (OUTPATIENT)
Dept: FAMILY MEDICINE | Facility: OTHER | Age: 68
End: 2018-08-24
Attending: FAMILY MEDICINE
Payer: MEDICARE

## 2018-08-24 VITALS
SYSTOLIC BLOOD PRESSURE: 132 MMHG | WEIGHT: 213 LBS | OXYGEN SATURATION: 98 % | DIASTOLIC BLOOD PRESSURE: 76 MMHG | HEART RATE: 61 BPM | BODY MASS INDEX: 34.38 KG/M2 | TEMPERATURE: 97.1 F

## 2018-08-24 DIAGNOSIS — K59.00 CONSTIPATION, UNSPECIFIED CONSTIPATION TYPE: Primary | ICD-10-CM

## 2018-08-24 DIAGNOSIS — E11.9 TYPE 2 DIABETES MELLITUS WITHOUT COMPLICATION, WITHOUT LONG-TERM CURRENT USE OF INSULIN (H): ICD-10-CM

## 2018-08-24 LAB
EST. AVERAGE GLUCOSE BLD GHB EST-MCNC: 151 MG/DL
HBA1C MFR BLD: 6.9 % (ref 0–5.6)

## 2018-08-24 PROCEDURE — 40000788 ZZHCL STATISTIC ESTIMATED AVERAGE GLUCOSE: Mod: ZL | Performed by: FAMILY MEDICINE

## 2018-08-24 PROCEDURE — 36415 COLL VENOUS BLD VENIPUNCTURE: CPT | Mod: ZL | Performed by: FAMILY MEDICINE

## 2018-08-24 PROCEDURE — 83036 HEMOGLOBIN GLYCOSYLATED A1C: CPT | Mod: ZL | Performed by: FAMILY MEDICINE

## 2018-08-24 PROCEDURE — 99213 OFFICE O/P EST LOW 20 MIN: CPT | Performed by: FAMILY MEDICINE

## 2018-08-24 PROCEDURE — G0463 HOSPITAL OUTPT CLINIC VISIT: HCPCS

## 2018-08-24 RX ORDER — POLYETHYLENE GLYCOL 3350 17 G/17G
1 POWDER, FOR SOLUTION ORAL DAILY
COMMUNITY
End: 2018-12-31

## 2018-08-24 RX ORDER — SODIUM, POTASSIUM,MAG SULFATES 17.5-3.13G
1 SOLUTION, RECONSTITUTED, ORAL ORAL 2 TIMES DAILY
Qty: 2 BOTTLE | Refills: 0 | Status: SHIPPED | OUTPATIENT
Start: 2018-08-24 | End: 2018-08-30

## 2018-08-24 ASSESSMENT — ANXIETY QUESTIONNAIRES
5. BEING SO RESTLESS THAT IT IS HARD TO SIT STILL: NOT AT ALL
3. WORRYING TOO MUCH ABOUT DIFFERENT THINGS: NOT AT ALL
7. FEELING AFRAID AS IF SOMETHING AWFUL MIGHT HAPPEN: NOT AT ALL
2. NOT BEING ABLE TO STOP OR CONTROL WORRYING: NOT AT ALL
6. BECOMING EASILY ANNOYED OR IRRITABLE: NOT AT ALL
4. TROUBLE RELAXING: NOT AT ALL
1. FEELING NERVOUS, ANXIOUS, OR ON EDGE: NOT AT ALL
GAD7 TOTAL SCORE: 0

## 2018-08-24 ASSESSMENT — PAIN SCALES - GENERAL: PAINLEVEL: EXTREME PAIN (8)

## 2018-08-24 NOTE — NURSING NOTE
"Chief Complaint   Patient presents with     Constipation     Abdominal Pain       Initial /76 (BP Location: Right arm, Patient Position: Chair, Cuff Size: Adult Large)  Pulse 61  Temp 97.1  F (36.2  C) (Tympanic)  Wt 213 lb (96.6 kg)  SpO2 98%  BMI 34.38 kg/m2 Estimated body mass index is 34.38 kg/(m^2) as calculated from the following:    Height as of 8/17/18: 5' 6\" (1.676 m).    Weight as of this encounter: 213 lb (96.6 kg).  Medication Reconciliation: complete    Kiana Gutierrez MA  "

## 2018-08-24 NOTE — MR AVS SNAPSHOT
After Visit Summary   8/24/2018    Radha Fox    MRN: [id number]           Patient Information     Date Of Birth          1950        Visit Information        Provider Department      8/24/2018 2:15 PM Lanie Costello MD [facility]        Today's Diagnoses     Constipation, unspecified constipation type    -  1    Type 2 diabetes mellitus without complication, without long-term current use of insulin (H)          Care Instructions    Will call with lab results.  Bowel prep today.  Call if no response over the weekend.            Follow-ups after your visit        Your next 10 appointments already scheduled     Dec 03, 2018  9:30 AM CST   Level 5 with HC INF RM 3314   [facility]    [address]  [facility] [id number] [phone]              Who to contact     If you have questions or need follow up information about today's clinic visit or your schedule please contact [facility] directly at [phone].  Normal or non-critical lab and imaging results will be communicated to you by PulmOnehart, letter or phone within 4 business days after the clinic has received the results. If you do not hear from us within 7 days, please contact the clinic through Embracet or phone. If you have a critical or abnormal lab result, we will notify you by phone as soon as possible.  Submit refill requests through CoreTrace or call your pharmacy and they will forward the refill request to us. Please allow 3 business days for your refill to be completed.          Additional Information About Your Visit        MyChart Information     CoreTrace gives you secure access to your electronic health record. If you see a primary care provider, you can also send messages to your care team and make appointments. If you have questions, please call your primary care clinic.  If you do not have a primary care provider, please call [phone] and  they will assist you.        Care EveryWhere ID     This is your Care EveryWhere ID. This could be used by other organizations to access your [place] medical records  [id number]        Your Vitals Were     Pulse Temperature Pulse Oximetry BMI (Body Mass Index)          61 97.1  F (36.2  C) (Tympanic) 98% 34.38 kg/m2         Blood Pressure from Last 3 Encounters:   08/24/18 132/76   08/17/18 130/72   08/11/18 144/71    Weight from Last 3 Encounters:   08/24/18 213 lb (96.6 kg)   08/17/18 210 lb (95.3 kg)   08/08/18 214 lb (97.1 kg)              We Performed the Following     Hemoglobin A1c          Today's Medication Changes          These changes are accurate as of 8/24/18  2:51 PM.  If you have any questions, ask your nurse or doctor.               Start taking these medicines.        Dose/Directions    Na Sulfate-K Sulfate-Mg Sulf solution   Commonly known as:  SUPREP BOWEL PREP   Used for:  Constipation, unspecified constipation type   Started by:  Lanie Costello MD        Dose:  1 Bottle   Take 177 mLs (1 Bottle) by mouth 2 times daily   Quantity:  2 Bottle   Refills:  0            Where to get your medicines      These medications were sent to [id number] - [place], [place] - [address]   [address], [place] [address]     Phone:  [phone]     Na Sulfate-K Sulfate-Mg Sulf solution                Primary Care Provider Office Phone # Fax #    Lanie Costello [phone]       [address] [place] [address]        Equal Access to Services     [facility] AH: Hadii aad ku hadasho Soomaali, waaxda luqadaha, qaybta kaalmada adeegyada, erika marley. So [facility] [phone].    ATENCIÓN: Si habla español, tiene a garcia disposición servicios gratuitos de asistencia lingüística. Llame al [phone].    We comply with applicable federal civil rights laws and [place] laws. We do not discriminate on the basis of race, color, national origin, age,  disability, sex, sexual orientation, or gender identity.            Thank you!     Thank you for choosing [facility]  for your care. Our goal is always to provide you with excellent care. Hearing back from our patients is one way we can continue to improve our services. Please take a few minutes to complete the written survey that you may receive in the mail after your visit with us. Thank you!             Your Updated Medication List - Protect others around you: Learn how to safely use, store and throw away your medicines at www.disposemymeds.org.          This list is accurate as of 8/24/18  2:51 PM.  Always use your most recent med list.                   Brand Name Dispense Instructions for use Diagnosis    ASPIRIN PO      Take 81 mg by mouth daily        atorvastatin 20 MG tablet    LIPITOR    90 tablet    TAKE ONE TABLET BY MOUTH ONCE DAILY    Hyperlipidemia LDL goal <100       * blood glucose calibration solution     1 Bottle    Use as directed    Impaired fasting glucose       * blood glucose calibration solution     1 each    Use to calibrate blood glucose monitor as directed.    Impaired fasting glucose       * blood glucose monitoring test strip    ONETOUCH ULTRA    100 strip    Use to test blood sugar daily or as directed.    Impaired fasting glucose       * blood glucose monitoring test strip    ONETOUCH ULTRA    100 strip    Use to test blood sugars one time daily or as directed.    Elevated glucose, Type 2 diabetes mellitus without complication, without long-term current use of insulin (H)       calcium + D 600-200 MG-UNIT Tabs   Generic drug:  calcium carbonate-vitamin D      Take 600 mg by mouth 2 times daily.        celecoxib 200 MG capsule    celeBREX    90 capsule    TAKE 1 CAPSULE BY MOUTH ONCE DAILY    RA (rheumatoid arthritis) (H)       folic acid 1 MG tablet    FOLVITE     Take 2 tablets by mouth daily        KLOR-CON 20 MEQ CR tablet   Generic drug:  potassium chloride SA      90 tablet    TAKE ONE TABLET BY MOUTH ONCE DAILY WITH FOOD    Hypertension, unspecified type       METHOTREXATE PO      Inject 12.5 mg as directed once a week.        Multi-vitamin Tabs tablet      Take  by mouth daily.        Na Sulfate-K Sulfate-Mg Sulf solution    SUPREP BOWEL PREP    2 Bottle    Take 177 mLs (1 Bottle) by mouth 2 times daily    Constipation, unspecified constipation type       omeprazole 40 MG capsule    priLOSEC    90 capsule    TAKE ONE CAPSULE BY MOUTH ONCE DAILY BEFORE A MEAL    Gastroesophageal reflux disease, esophagitis presence not specified       polyethylene glycol Packet    MIRALAX/GLYCOLAX     Take 1 packet by mouth daily        pramipexole 0.25 MG tablet    MIRAPEX    270 tablet    TAKE ONE TABLET BY MOUTH AT 5 PM AND TWO TABLETS AT 9 PM    Restless legs syndrome (RLS)       predniSONE 1 MG tablet    DELTASONE     Take 1 mg by mouth daily Take 3 tabs po daily        PROBIOTIC DAILY PO      Take 1 capsule by mouth daily        propranolol 20 MG tablet    INDERAL    90 tablet    TAKE ONE TABLET BY MOUTH ONCE DAILY    Hypertension, unspecified type       RITUXAN IV           SENNA S PO      Take 4 tablets by mouth daily        venlafaxine 75 MG 24 hr capsule    EFFEXOR-XR    90 capsule    TAKE 1 CAPSULE BY MOUTH ONCE DAILY. DUE  FOR  FOLLOW  UP  VISIT    MDD (major depressive disorder)       VITAMIN B 12 PO      Take 500 mg by mouth daily.        * Notice:  This list has 4 medication(s) that are the same as other medications prescribed for you. Read the directions carefully, and ask your doctor or other care provider to review them with you.

## 2018-08-25 ASSESSMENT — ANXIETY QUESTIONNAIRES: GAD7 TOTAL SCORE: 0

## 2018-08-25 ASSESSMENT — PATIENT HEALTH QUESTIONNAIRE - PHQ9: SUM OF ALL RESPONSES TO PHQ QUESTIONS 1-9: 0

## 2018-08-28 ENCOUNTER — TELEPHONE (OUTPATIENT)
Dept: FAMILY MEDICINE | Facility: OTHER | Age: 68
End: 2018-08-28

## 2018-08-28 NOTE — TELEPHONE ENCOUNTER
8:45 AM    Reason for Call: Phone Call    Description: pt called and wanted a call back regarding follow up. Please call    Was an appointment offered for this call? No  If yes : Appointment type              Date    Preferred method for responding to this message: Telephone Call  What is your phone number ?[phone]    If we cannot reach you directly, may we leave a detailed response at the number you provided? Yes    Can this message wait until your PCP/provider returns, if available today? YES, provider is out    Kim Gerber

## 2018-08-28 NOTE — TELEPHONE ENCOUNTER
Called marco back and she states that she was in last Friday and Dr. Costello wanted her to f/u this week so she is looking to get in this week. Please call her back with appt time and date. Thank you

## 2018-08-28 NOTE — PROGRESS NOTES
SUBJECTIVE:   Radha Fox is a 68 year old female who presents to clinic today for the following health issues:      Abdominal Pain    Duration: 1 week follow up     Description (location/character/radiation): right lower side radiating into right lower back        Associated flank pain: right lower back     Intensity:  5/10    Accompanying signs and symptoms:        Fever/Chills: no        Gas/Bloating: YES- bloating        Nausea/vomitting: no        Diarrhea: no        Dysuria or Hematuria: no     History (previous similar pain/trauma/previous testing): bloodwork, urine, ct scan     Precipitating or alleviating factors:       Pain worse with eating/BM/urination: none- worse when bending or standing for length of time        Pain relieved by BM: no     Therapies tried and outcome: None    LMP:  not applicable    Did bowel prep 1 bottle Friday, 1Sunday (today is Thursday).  Had good results as far as moving her bowels, but continues to have that same right sided pain, bloating, discomfort.  No vomiting, fevers, bleeding, change in urine.    CT from 2 weeks ago without contrast with stool in right colon.  Last colonoscopy 2015.    Problem list and histories reviewed & adjusted, as indicated.  Additional history: as documented    Current Outpatient Prescriptions   Medication     ASPIRIN PO     atorvastatin (LIPITOR) 20 MG tablet     Blood Glucose Calibration (ACCU-CHEK ANGELICA) SOLN     Blood Glucose Calibration (ACCU-CHEK COMPACT BLUE CONTROL) LIQD     blood glucose monitoring (ONETOUCH ULTRA) test strip     blood glucose monitoring (ONETOUCH ULTRA) test strip     calcium carbonate-vitamin D (CALCIUM + D) 600-200 MG-UNIT TABS     celecoxib (CELEBREX) 200 MG capsule     Cyanocobalamin (VITAMIN B 12 PO)     folic acid (FOLVITE) 1 MG tablet     KLOR-CON 20 MEQ CR tablet     Methotrexate Sodium (METHOTREXATE PO)     multivitamin, therapeutic with minerals (MULTI-VITAMIN) TABS     omeprazole (PRILOSEC) 40 MG  capsule     polyethylene glycol (MIRALAX/GLYCOLAX) Packet     pramipexole (MIRAPEX) 0.25 MG tablet     predniSONE (DELTASONE) 1 MG tablet     Probiotic Product (PROBIOTIC DAILY PO)     propranolol (INDERAL) 20 MG tablet     RiTUXimab (RITUXAN IV)     venlafaxine (EFFEXOR-XR) 75 MG 24 hr capsule     No current facility-administered medications for this visit.        Patient Active Problem List   Diagnosis     RA (rheumatoid arthritis) (H)     Recurrent UTI     Hyperlipidemia     Depressive disorder, not elsewhere classified     Contact dermatitis and other eczema, due to unspecified cause     Diffuse connective tissue disease (H)     Arthropathy     Coronary atherosclerosis     Sinusitis, chronic     Esophageal reflux     Restless legs syndrome (RLS)     Osteoporosis     Obesity     Insomnia     Abnormal glucose     History of sinus surgery     Simple chronic serous otitis media     Dysfunction of eustachian tube     Mixed hearing loss, unilateral     SNHL (sensory-neural hearing loss), asymmetrical     S/P myringotomy with insertion of tube     History of chronic sinusitis     Rheumatoid arthritis of multiple sites without rheumatoid factor (H)     ACP (advance care planning)     Diabetes mellitus type 2, diet-controlled (H)     Chronic cough     Type 2 diabetes mellitus without complication (H)     Past Surgical History:   Procedure Laterality Date     APPENDECTOMY  1961     CHOLECYSTECTOMY  1973     COLONOSCOPY N/A 3/23/2015    Procedure: COLONOSCOPY;  Surgeon: Clarisa Larkin MD;  Location: [facility] OR     endoscopic sphenoidotomy  10/2011    Right     ETHMOIDECTOMY Bilateral 9/27/2016    Procedure: ETHMOIDECTOMY;  Surgeon: Aracelis Ayon MD;  Location: [facility] OR     excision bilateral telma bullosa  10/2011     GENITOURINARY SURGERY       HYSTERECTOMY      benign     LAPAROSCOPY DIAGNOSTIC (GENERAL)       ORTHOPEDIC SURGERY      left foot 5th metarsal fracture screws placed     SEPTOPLASTY N/A 9/27/2016     Procedure: SEPTOPLASTY;  Surgeon: Aracelis Ayon MD;  Location: [facility] OR     TOT Internal urethrotomy  2010     TUBAL LIGATION         Social History   Substance Use Topics     Smoking status: Never Smoker     Smokeless tobacco: Never Used     Alcohol use No     Family History   Problem Relation Age of Onset     C.A.D. Father 83     Other - See Comments Father      CHF     Cerebrovascular Disease Father      CVA     Diabetes Father      Hypertension Father      Coronary Artery Disease Father      Cerebrovascular Disease Mother 72     CVA     HEART DISEASE Mother      Heart Issue     Hyperlipidemia Mother      Coronary Artery Disease Mother      Diabetes Brother      Diabetes Sister      Colon Cancer No family hx of      Breast Cancer No family hx of      Asthma No family hx of      Thyroid Disease No family hx of            Reviewed and updated as needed this visit by clinical staff       Reviewed and updated as needed this visit by Provider         ROS:  Constitutional, HEENT, cardiovascular, pulmonary, gi and gu systems are negative, except as otherwise noted.    OBJECTIVE:     /74 (BP Location: Right arm, Patient Position: Chair, Cuff Size: Adult Large)  Pulse 61  Temp 96.9  F (36.1  C) (Tympanic)  Wt 212 lb (96.2 kg)  SpO2 99%  BMI 34.22 kg/m2  Body mass index is 34.22 kg/(m^2).  GENERAL: alert, no distress and over weight  RESP: lungs clear to auscultation - no rales, rhonchi or wheezes  CV: regular rate and rhythm, normal S1 S2, no S3 or S4, no murmur, click or rub, no peripheral edema and peripheral pulses strong  ABDOMEN: no organomegaly or masses, bowel sounds normal and tender to palpation entire right side, upper and lower; no rebound or guarding  MS: no gross musculoskeletal defects noted, no edema  PSYCH: mentation appears normal, affect normal/bright      ASSESSMENT/PLAN:   (R10.9) Right sided abdominal pain  (primary encounter diagnosis)  Comment: prior CT without contrast with stool  only; labs reassuring at other visits; s/p appendectomy, hysterectomy, cholecystectomy.    Plan: CT Abdomen Pelvis w Contrast        Discussed with Dr. Myrick, radiology.  Reviewed noncontrast CT.  Given no improvement, will proceed with contrast CT to evaluate for additional causes.  However, if no etiology identified, will suggest consideration of colonoscopy.  Patient agrees with plan.        Lanie Duggan MD  [facility]

## 2018-08-30 ENCOUNTER — HOSPITAL ENCOUNTER (OUTPATIENT)
Dept: CT IMAGING | Facility: HOSPITAL | Age: 68
Discharge: HOME | End: 2018-08-30
Attending: FAMILY MEDICINE | Admitting: FAMILY MEDICINE
Payer: MEDICARE

## 2018-08-30 ENCOUNTER — OFFICE VISIT (OUTPATIENT)
Dept: FAMILY MEDICINE | Facility: OTHER | Age: 68
End: 2018-08-30
Attending: FAMILY MEDICINE
Payer: MEDICARE

## 2018-08-30 VITALS
WEIGHT: 212 LBS | SYSTOLIC BLOOD PRESSURE: 130 MMHG | HEART RATE: 61 BPM | TEMPERATURE: 96.9 F | DIASTOLIC BLOOD PRESSURE: 74 MMHG | BODY MASS INDEX: 34.22 KG/M2 | OXYGEN SATURATION: 99 %

## 2018-08-30 DIAGNOSIS — R10.9 RIGHT SIDED ABDOMINAL PAIN: ICD-10-CM

## 2018-08-30 DIAGNOSIS — R10.9 RIGHT SIDED ABDOMINAL PAIN: Primary | ICD-10-CM

## 2018-08-30 LAB
ALBUMIN SERPL-MCNC: 3.7 G/DL (ref 3.4–5)
ALP SERPL-CCNC: 71 U/L (ref 40–150)
ALT SERPL W P-5'-P-CCNC: 30 U/L (ref 0–50)
ANION GAP SERPL CALCULATED.3IONS-SCNC: 5 MMOL/L (ref 3–14)
AST SERPL W P-5'-P-CCNC: 18 U/L (ref 0–45)
BASOPHILS # BLD AUTO: 0 10E9/L (ref 0–0.2)
BASOPHILS NFR BLD AUTO: 0.3 %
BILIRUB SERPL-MCNC: 0.3 MG/DL (ref 0.2–1.3)
BUN SERPL-MCNC: 20 MG/DL (ref 7–30)
CALCIUM SERPL-MCNC: 8.3 MG/DL (ref 8.5–10.1)
CHLORIDE SERPL-SCNC: 106 MMOL/L (ref 94–109)
CO2 SERPL-SCNC: 29 MMOL/L (ref 20–32)
CREAT SERPL-MCNC: 0.72 MG/DL (ref 0.52–1.04)
CRP SERPL-MCNC: <2.9 MG/L (ref 0–8)
DIFFERENTIAL METHOD BLD: ABNORMAL
EOSINOPHIL # BLD AUTO: 0.3 10E9/L (ref 0–0.7)
EOSINOPHIL NFR BLD AUTO: 2.4 %
ERYTHROCYTE [DISTWIDTH] IN BLOOD BY AUTOMATED COUNT: 14.2 % (ref 10–15)
GFR SERPL CREATININE-BSD FRML MDRD: 81 ML/MIN/1.7M2
GLUCOSE SERPL-MCNC: 98 MG/DL (ref 70–99)
HCT VFR BLD AUTO: 40.1 % (ref 35–47)
HGB BLD-MCNC: 13.3 G/DL (ref 11.7–15.7)
IMM GRANULOCYTES # BLD: 0 10E9/L (ref 0–0.4)
IMM GRANULOCYTES NFR BLD: 0.4 %
LYMPHOCYTES # BLD AUTO: 0.9 10E9/L (ref 0.8–5.3)
LYMPHOCYTES NFR BLD AUTO: 8.6 %
MCH RBC QN AUTO: 33.1 PG (ref 26.5–33)
MCHC RBC AUTO-ENTMCNC: 33.2 G/DL (ref 31.5–36.5)
MCV RBC AUTO: 100 FL (ref 78–100)
MONOCYTES # BLD AUTO: 1.1 10E9/L (ref 0–1.3)
MONOCYTES NFR BLD AUTO: 9.6 %
NEUTROPHILS # BLD AUTO: 8.6 10E9/L (ref 1.6–8.3)
NEUTROPHILS NFR BLD AUTO: 78.7 %
NRBC # BLD AUTO: 0 10*3/UL
NRBC BLD AUTO-RTO: 0 /100
PLATELET # BLD AUTO: 253 10E9/L (ref 150–450)
POTASSIUM SERPL-SCNC: 4.5 MMOL/L (ref 3.4–5.3)
PROT SERPL-MCNC: 7 G/DL (ref 6.8–8.8)
RBC # BLD AUTO: 4.02 10E12/L (ref 3.8–5.2)
SODIUM SERPL-SCNC: 140 MMOL/L (ref 133–144)
WBC # BLD AUTO: 11 10E9/L (ref 4–11)

## 2018-08-30 PROCEDURE — G0463 HOSPITAL OUTPT CLINIC VISIT: HCPCS

## 2018-08-30 PROCEDURE — 74177 CT ABD & PELVIS W/CONTRAST: CPT | Mod: TC

## 2018-08-30 PROCEDURE — 36415 COLL VENOUS BLD VENIPUNCTURE: CPT | Mod: ZL | Performed by: FAMILY MEDICINE

## 2018-08-30 PROCEDURE — G0463 HOSPITAL OUTPT CLINIC VISIT: HCPCS | Mod: 25

## 2018-08-30 PROCEDURE — 99213 OFFICE O/P EST LOW 20 MIN: CPT | Performed by: FAMILY MEDICINE

## 2018-08-30 PROCEDURE — 86140 C-REACTIVE PROTEIN: CPT | Mod: ZL | Performed by: FAMILY MEDICINE

## 2018-08-30 PROCEDURE — 85025 COMPLETE CBC W/AUTO DIFF WBC: CPT | Mod: ZL | Performed by: FAMILY MEDICINE

## 2018-08-30 PROCEDURE — 80053 COMPREHEN METABOLIC PANEL: CPT | Mod: ZL | Performed by: FAMILY MEDICINE

## 2018-08-30 PROCEDURE — 25000128 H RX IP 250 OP 636: Performed by: RADIOLOGY

## 2018-08-30 RX ORDER — IOPAMIDOL 612 MG/ML
100 INJECTION, SOLUTION INTRAVASCULAR ONCE
Status: COMPLETED | OUTPATIENT
Start: 2018-08-30 | End: 2018-08-30

## 2018-08-30 RX ADMIN — DIATRIZOATE MEGLUMINE AND DIATRIZOATE SODIUM 30 ML: 660; 100 SOLUTION ORAL; RECTAL at 13:34

## 2018-08-30 RX ADMIN — IOPAMIDOL 100 ML: 612 INJECTION, SOLUTION INTRAVENOUS at 13:34

## 2018-08-30 ASSESSMENT — ANXIETY QUESTIONNAIRES
5. BEING SO RESTLESS THAT IT IS HARD TO SIT STILL: NOT AT ALL
GAD7 TOTAL SCORE: 0
6. BECOMING EASILY ANNOYED OR IRRITABLE: NOT AT ALL
3. WORRYING TOO MUCH ABOUT DIFFERENT THINGS: NOT AT ALL
1. FEELING NERVOUS, ANXIOUS, OR ON EDGE: NOT AT ALL
4. TROUBLE RELAXING: NOT AT ALL
2. NOT BEING ABLE TO STOP OR CONTROL WORRYING: NOT AT ALL
7. FEELING AFRAID AS IF SOMETHING AWFUL MIGHT HAPPEN: NOT AT ALL

## 2018-08-30 ASSESSMENT — PAIN SCALES - GENERAL: PAINLEVEL: MODERATE PAIN (5)

## 2018-08-30 NOTE — MR AVS SNAPSHOT
After Visit Summary   8/30/2018    Radha Fox    MRN: [id number]           Patient Information     Date Of Birth          1950        Visit Information        Provider Department      8/30/2018 11:00 AM L[facility] Costello MD [facility]        Today's Diagnoses     Right sided abdominal pain    -  1      Care Instructions    CT today.          Follow-ups after your visit        Your next 10 appointments already scheduled     Dec 03, 2018  9:30 AM CST   Level 5 with HC INF RM 3314   [facility] [place] ([facility] [place] )    [address]  [place] [address]   [phone]              Future tests that were ordered for you today     Open Future Orders        Priority Expected Expires Ordered    CT Abdomen Pelvis w Contrast STAT  8/30/2019 8/30/2018            Who to contact     If you have questions or need follow up information about today's clinic visit or your schedule please contact [facility] directly at [phone].  Normal or non-critical lab and imaging results will be communicated to you by H[employer]i [employer] Electronichart, letter or phone within 4 business days after the clinic has received the results. If you do not hear from us within 7 days, please contact the clinic through Table[employer] or phone. If you have a critical or abnormal lab result, we will notify you by phone as soon as possible.  Submit refill requests through Mineralist or call your pharmacy and they will forward the refill request to us. Please allow 3 business days for your refill to be completed.          Additional Information About Your Visit        MyChart Information     Mineralist gives you secure access to your electronic health record. If you see a primary care provider, you can also send messages to your care team and make appointments. If you have questions, please call your primary care clinic.  If you do not have a primary care provider, please call [phone] and they will assist  you.        Care EveryWhere ID     This is your Care EveryWhere ID. This could be used by other organizations to access your [place] medical records  [id number]        Your Vitals Were     Pulse Temperature Pulse Oximetry BMI (Body Mass Index)          61 96.9  F (36.1  C) (Tympanic) 99% 34.22 kg/m2         Blood Pressure from Last 3 Encounters:   08/30/18 130/74   08/24/18 132/76   08/17/18 130/72    Weight from Last 3 Encounters:   08/30/18 212 lb (96.2 kg)   08/24/18 213 lb (96.6 kg)   08/17/18 210 lb (95.3 kg)              We Performed the Following     CT Abdomen Pelvis w Contrast        Primary Care Provider Office Phone # Fax #    Lanie Costello [phone] [address] [facility] [address]        Equal Access to Services     [facility]: Hadii aad jarvis hadasho Soomaali, waaxda luqadaha, qaybta kaalmada adeegyada, erika sureshin haytarikn cammie rodriguez . So [facility] [phone].    ATENCIÓN: Si habla español, tiene a garcia disposición servicios gratuitos de asistencia lingüística. Llame al [phone].    We comply with applicable federal civil rights laws and [place] laws. We do not discriminate on the basis of race, color, national origin, age, disability, sex, sexual orientation, or gender identity.            Thank you!     Thank you for choosing [facility]  for your care. Our goal is always to provide you with excellent care. Hearing back from our patients is one way we can continue to improve our services. Please take a few minutes to complete the written survey that you may receive in the mail after your visit with us. Thank you!             Your Updated Medication List - Protect others around you: Learn how to safely use, store and throw away your medicines at www.disposemymeds.org.          This list is accurate as of 8/30/18  1:04 PM.  Always use your most recent med list.                   Brand Name Dispense Instructions for use Diagnosis    ASPIRIN PO       Take 81 mg by mouth daily        atorvastatin 20 MG tablet    LIPITOR    90 tablet    TAKE ONE TABLET BY MOUTH ONCE DAILY    Hyperlipidemia LDL goal <100       * blood glucose calibration solution     1 Bottle    Use as directed    Impaired fasting glucose       * blood glucose calibration solution     1 each    Use to calibrate blood glucose monitor as directed.    Impaired fasting glucose       * blood glucose monitoring test strip    ONETOUCH ULTRA    100 strip    Use to test blood sugar daily or as directed.    Impaired fasting glucose       * blood glucose monitoring test strip    ONETOUCH ULTRA    100 strip    Use to test blood sugars one time daily or as directed.    Elevated glucose, Type 2 diabetes mellitus without complication, without long-term current use of insulin (H)       calcium + D 600-200 MG-UNIT Tabs   Generic drug:  calcium carbonate-vitamin D      Take 600 mg by mouth 2 times daily.        celecoxib 200 MG capsule    celeBREX    90 capsule    TAKE 1 CAPSULE BY MOUTH ONCE DAILY    RA (rheumatoid arthritis) (H)       folic acid 1 MG tablet    FOLVITE     Take 2 tablets by mouth daily        KLOR-CON 20 MEQ CR tablet   Generic drug:  potassium chloride SA     90 tablet    TAKE ONE TABLET BY MOUTH ONCE DAILY WITH FOOD    Hypertension, unspecified type       METHOTREXATE PO      Inject 12.5 mg as directed once a week.        Multi-vitamin Tabs tablet      Take  by mouth daily.        omeprazole 40 MG capsule    priLOSEC    90 capsule    TAKE ONE CAPSULE BY MOUTH ONCE DAILY BEFORE A MEAL    Gastroesophageal reflux disease, esophagitis presence not specified       polyethylene glycol Packet    MIRALAX/GLYCOLAX     Take 1 packet by mouth daily        pramipexole 0.25 MG tablet    MIRAPEX    270 tablet    TAKE ONE TABLET BY MOUTH AT 5 PM AND TWO TABLETS AT 9 PM    Restless legs syndrome (RLS)       predniSONE 1 MG tablet    DELTASONE     Take 1 mg by mouth daily Take 3 tabs po daily         PROBIOTIC DAILY PO      Take 1 capsule by mouth daily        propranolol 20 MG tablet    INDERAL    90 tablet    TAKE ONE TABLET BY MOUTH ONCE DAILY    Hypertension, unspecified type       RITUXAN IV           venlafaxine 75 MG 24 hr capsule    EFFEXOR-XR    90 capsule    TAKE 1 CAPSULE BY MOUTH ONCE DAILY. DUE  FOR  FOLLOW  UP  VISIT    MDD (major depressive disorder)       VITAMIN B 12 PO      Take 500 mg by mouth daily.        * Notice:  This list has 4 medication(s) that are the same as other medications prescribed for you. Read the directions carefully, and ask your doctor or other care provider to review them with you.

## 2018-08-30 NOTE — NURSING NOTE
"Chief Complaint   Patient presents with     Abdominal Pain       Initial /74 (BP Location: Right arm, Patient Position: Chair, Cuff Size: Adult Large)  Pulse 61  Temp 96.9  F (36.1  C) (Tympanic)  Wt 212 lb (96.2 kg)  SpO2 99%  BMI 34.22 kg/m2 Estimated body mass index is 34.22 kg/(m^2) as calculated from the following:    Height as of 8/17/18: 5' 6\" (1.676 m).    Weight as of this encounter: 212 lb (96.2 kg).  Medication Reconciliation: complete    Kiana Gutierrez MA  "

## 2018-08-31 ASSESSMENT — ANXIETY QUESTIONNAIRES: GAD7 TOTAL SCORE: 0

## 2018-08-31 ASSESSMENT — PATIENT HEALTH QUESTIONNAIRE - PHQ9: SUM OF ALL RESPONSES TO PHQ QUESTIONS 1-9: 0

## 2018-10-23 ENCOUNTER — MEDICAL CORRESPONDENCE (OUTPATIENT)
Dept: HEALTH INFORMATION MANAGEMENT | Facility: CLINIC | Age: 68
End: 2018-10-23

## 2018-10-23 DIAGNOSIS — M06.09 RHEUMATOID ARTHRITIS OF MULTIPLE SITES WITHOUT RHEUMATOID FACTOR (H): Primary | ICD-10-CM

## 2018-10-23 DIAGNOSIS — Z79.899 LONG TERM USE OF DRUG: ICD-10-CM

## 2018-10-27 DIAGNOSIS — K21.9 GASTROESOPHAGEAL REFLUX DISEASE, ESOPHAGITIS PRESENCE NOT SPECIFIED: ICD-10-CM

## 2018-10-27 DIAGNOSIS — G25.81 RESTLESS LEGS SYNDROME (RLS): ICD-10-CM

## 2018-10-27 DIAGNOSIS — M06.9 RA (RHEUMATOID ARTHRITIS) (H): ICD-10-CM

## 2018-10-27 DIAGNOSIS — F32.9 MDD (MAJOR DEPRESSIVE DISORDER): ICD-10-CM

## 2018-10-27 DIAGNOSIS — E78.5 HYPERLIPIDEMIA LDL GOAL <100: ICD-10-CM

## 2018-10-27 DIAGNOSIS — I10 HYPERTENSION, UNSPECIFIED TYPE: ICD-10-CM

## 2018-10-29 RX ORDER — OMEPRAZOLE 40 MG/1
CAPSULE, DELAYED RELEASE ORAL
Qty: 90 CAPSULE | Refills: 0 | Status: SHIPPED | OUTPATIENT
Start: 2018-10-29 | End: 2019-02-02

## 2018-10-29 RX ORDER — CELECOXIB 200 MG/1
CAPSULE ORAL
Qty: 90 CAPSULE | Refills: 0 | Status: SHIPPED | OUTPATIENT
Start: 2018-10-29 | End: 2019-02-14

## 2018-10-29 RX ORDER — VENLAFAXINE HYDROCHLORIDE 75 MG/1
CAPSULE, EXTENDED RELEASE ORAL
Qty: 90 CAPSULE | Refills: 0 | Status: SHIPPED | OUTPATIENT
Start: 2018-10-29 | End: 2019-03-14

## 2018-10-29 RX ORDER — POTASSIUM CHLORIDE 1500 MG/1
TABLET, EXTENDED RELEASE ORAL
Qty: 90 TABLET | Refills: 1 | Status: SHIPPED | OUTPATIENT
Start: 2018-10-29 | End: 2019-06-14

## 2018-10-29 RX ORDER — PROPRANOLOL HYDROCHLORIDE 20 MG/1
TABLET ORAL
Qty: 90 TABLET | Refills: 1 | Status: SHIPPED | OUTPATIENT
Start: 2018-10-29 | End: 2019-05-07

## 2018-10-29 RX ORDER — ATORVASTATIN CALCIUM 20 MG/1
TABLET, FILM COATED ORAL
Qty: 90 TABLET | Refills: 0 | Status: SHIPPED | OUTPATIENT
Start: 2018-10-29 | End: 2019-02-02

## 2018-10-29 RX ORDER — PRAMIPEXOLE DIHYDROCHLORIDE 0.25 MG/1
TABLET ORAL
Qty: 270 TABLET | Refills: 1 | Status: SHIPPED | OUTPATIENT
Start: 2018-10-29 | End: 2019-05-15

## 2018-11-01 DIAGNOSIS — M06.09 RHEUMATOID ARTHRITIS OF MULTIPLE SITES WITHOUT RHEUMATOID FACTOR (H): ICD-10-CM

## 2018-11-01 DIAGNOSIS — Z79.899 LONG TERM USE OF DRUG: ICD-10-CM

## 2018-11-01 DIAGNOSIS — Z79.899 ENCOUNTER FOR LONG-TERM (CURRENT) USE OF MEDICATIONS: ICD-10-CM

## 2018-11-01 LAB
ALBUMIN SERPL-MCNC: 3.6 G/DL (ref 3.4–5)
ALT SERPL W P-5'-P-CCNC: 37 U/L (ref 0–50)
BASOPHILS # BLD AUTO: 0 10E9/L (ref 0–0.2)
BASOPHILS NFR BLD AUTO: 0.3 %
CREAT SERPL-MCNC: 0.9 MG/DL (ref 0.52–1.04)
CRP SERPL-MCNC: 5.6 MG/L (ref 0–8)
DIFFERENTIAL METHOD BLD: ABNORMAL
EOSINOPHIL # BLD AUTO: 0.2 10E9/L (ref 0–0.7)
EOSINOPHIL NFR BLD AUTO: 1.7 %
ERYTHROCYTE [DISTWIDTH] IN BLOOD BY AUTOMATED COUNT: 13.9 % (ref 10–15)
ERYTHROCYTE [SEDIMENTATION RATE] IN BLOOD BY WESTERGREN METHOD: 17 MM/H (ref 0–30)
GFR SERPL CREATININE-BSD FRML MDRD: 63 ML/MIN/1.7M2
HCT VFR BLD AUTO: 40.3 % (ref 35–47)
HGB BLD-MCNC: 13.5 G/DL (ref 11.7–15.7)
IMM GRANULOCYTES # BLD: 0 10E9/L (ref 0–0.4)
IMM GRANULOCYTES NFR BLD: 0.3 %
LYMPHOCYTES # BLD AUTO: 0.9 10E9/L (ref 0.8–5.3)
LYMPHOCYTES NFR BLD AUTO: 7.9 %
MCH RBC QN AUTO: 33.4 PG (ref 26.5–33)
MCHC RBC AUTO-ENTMCNC: 33.5 G/DL (ref 31.5–36.5)
MCV RBC AUTO: 100 FL (ref 78–100)
MONOCYTES # BLD AUTO: 1 10E9/L (ref 0–1.3)
MONOCYTES NFR BLD AUTO: 8 %
NEUTROPHILS # BLD AUTO: 9.8 10E9/L (ref 1.6–8.3)
NEUTROPHILS NFR BLD AUTO: 81.8 %
NRBC # BLD AUTO: 0 10*3/UL
NRBC BLD AUTO-RTO: 0 /100
PLATELET # BLD AUTO: 264 10E9/L (ref 150–450)
RBC # BLD AUTO: 4.04 10E12/L (ref 3.8–5.2)
WBC # BLD AUTO: 11.9 10E9/L (ref 4–11)

## 2018-11-01 PROCEDURE — 85652 RBC SED RATE AUTOMATED: CPT | Mod: ZL | Performed by: NURSE PRACTITIONER

## 2018-11-01 PROCEDURE — 82565 ASSAY OF CREATININE: CPT | Mod: ZL | Performed by: NURSE PRACTITIONER

## 2018-11-01 PROCEDURE — 84460 ALANINE AMINO (ALT) (SGPT): CPT | Mod: ZL | Performed by: NURSE PRACTITIONER

## 2018-11-01 PROCEDURE — 86140 C-REACTIVE PROTEIN: CPT | Mod: ZL | Performed by: NURSE PRACTITIONER

## 2018-11-01 PROCEDURE — 85025 COMPLETE CBC W/AUTO DIFF WBC: CPT | Mod: ZL | Performed by: NURSE PRACTITIONER

## 2018-11-01 PROCEDURE — 36415 COLL VENOUS BLD VENIPUNCTURE: CPT | Mod: ZL | Performed by: NURSE PRACTITIONER

## 2018-11-01 PROCEDURE — 82040 ASSAY OF SERUM ALBUMIN: CPT | Mod: ZL | Performed by: NURSE PRACTITIONER

## 2018-11-29 DIAGNOSIS — M06.9 RHEUMATOID ARTHRITIS, INVOLVING UNSPECIFIED SITE, UNSPECIFIED RHEUMATOID FACTOR PRESENCE: ICD-10-CM

## 2018-11-29 DIAGNOSIS — Z79.899 ENCOUNTER FOR LONG-TERM (CURRENT) USE OF MEDICATIONS: ICD-10-CM

## 2018-11-29 DIAGNOSIS — M06.09 RHEUMATOID ARTHRITIS OF MULTIPLE SITES WITHOUT RHEUMATOID FACTOR (H): ICD-10-CM

## 2018-11-29 LAB
ALBUMIN SERPL-MCNC: 3.6 G/DL (ref 3.4–5)
ALT SERPL W P-5'-P-CCNC: 32 U/L (ref 0–50)
BASOPHILS # BLD AUTO: 0 10E9/L (ref 0–0.2)
BASOPHILS NFR BLD AUTO: 0.3 %
CREAT SERPL-MCNC: 0.83 MG/DL (ref 0.52–1.04)
CRP SERPL-MCNC: 6.1 MG/L (ref 0–8)
DIFFERENTIAL METHOD BLD: ABNORMAL
EOSINOPHIL # BLD AUTO: 0.3 10E9/L (ref 0–0.7)
EOSINOPHIL NFR BLD AUTO: 2.1 %
ERYTHROCYTE [DISTWIDTH] IN BLOOD BY AUTOMATED COUNT: 14.1 % (ref 10–15)
ERYTHROCYTE [SEDIMENTATION RATE] IN BLOOD BY WESTERGREN METHOD: 17 MM/H (ref 0–30)
GFR SERPL CREATININE-BSD FRML MDRD: 68 ML/MIN/1.7M2
HCT VFR BLD AUTO: 41.4 % (ref 35–47)
HGB BLD-MCNC: 13.7 G/DL (ref 11.7–15.7)
IMM GRANULOCYTES # BLD: 0 10E9/L (ref 0–0.4)
IMM GRANULOCYTES NFR BLD: 0.3 %
LYMPHOCYTES # BLD AUTO: 0.9 10E9/L (ref 0.8–5.3)
LYMPHOCYTES NFR BLD AUTO: 7.6 %
MCH RBC QN AUTO: 33.3 PG (ref 26.5–33)
MCHC RBC AUTO-ENTMCNC: 33.1 G/DL (ref 31.5–36.5)
MCV RBC AUTO: 101 FL (ref 78–100)
MONOCYTES # BLD AUTO: 1.3 10E9/L (ref 0–1.3)
MONOCYTES NFR BLD AUTO: 10.5 %
NEUTROPHILS # BLD AUTO: 9.6 10E9/L (ref 1.6–8.3)
NEUTROPHILS NFR BLD AUTO: 79.2 %
NRBC # BLD AUTO: 0 10*3/UL
NRBC BLD AUTO-RTO: 0 /100
PLATELET # BLD AUTO: 258 10E9/L (ref 150–450)
RBC # BLD AUTO: 4.12 10E12/L (ref 3.8–5.2)
WBC # BLD AUTO: 12.2 10E9/L (ref 4–11)

## 2018-11-29 PROCEDURE — 36415 COLL VENOUS BLD VENIPUNCTURE: CPT | Mod: ZL | Performed by: NURSE PRACTITIONER

## 2018-11-29 PROCEDURE — 82040 ASSAY OF SERUM ALBUMIN: CPT | Mod: ZL | Performed by: NURSE PRACTITIONER

## 2018-11-29 PROCEDURE — 82565 ASSAY OF CREATININE: CPT | Mod: ZL | Performed by: NURSE PRACTITIONER

## 2018-11-29 PROCEDURE — 85025 COMPLETE CBC W/AUTO DIFF WBC: CPT | Mod: ZL | Performed by: NURSE PRACTITIONER

## 2018-11-29 PROCEDURE — 86140 C-REACTIVE PROTEIN: CPT | Mod: ZL | Performed by: NURSE PRACTITIONER

## 2018-11-29 PROCEDURE — 85652 RBC SED RATE AUTOMATED: CPT | Mod: ZL | Performed by: NURSE PRACTITIONER

## 2018-11-29 PROCEDURE — 84460 ALANINE AMINO (ALT) (SGPT): CPT | Mod: ZL | Performed by: NURSE PRACTITIONER

## 2018-11-30 NOTE — PROGRESS NOTES
11-30-18 1510: Labs done today for Rituxan infusion on Monday. WBC and ANC outside of ordered parameters. Patient is on daily Prednisone. Call to patient and she denies any recent illness or concerns, no antibiotics currently. Call to Dr Parekh office and spoke with his nurse and updated on all. TORB with ok to treat Monday, unless presents with s/sx infection at infusion appointment.          Patient tolerated infusion well, no signs or symptoms of adverse reaction noted.  Patient denies pain nor discomfort.     IV removed, catheter intact.  Site clean, dry and intact.  No signs or symptoms of infiltration or infection.  Covered with a sterile bandage, slight pressure applied for 30 seconds.  Pt instructed to leave bandage intact for a minimum of one hour, and to call with questions or concerns.  Declined copy of appointments, discharge instructions, and after visit summary (AVS).  Patient states understanding, discharged ambulatory.

## 2018-12-03 ENCOUNTER — INFUSION THERAPY VISIT (OUTPATIENT)
Dept: INFUSION THERAPY | Facility: OTHER | Age: 68
End: 2018-12-03
Attending: FAMILY MEDICINE
Payer: MEDICARE

## 2018-12-03 VITALS
HEART RATE: 60 BPM | RESPIRATION RATE: 18 BRPM | TEMPERATURE: 96.9 F | HEIGHT: 66 IN | WEIGHT: 209.6 LBS | DIASTOLIC BLOOD PRESSURE: 64 MMHG | OXYGEN SATURATION: 95 % | BODY MASS INDEX: 33.68 KG/M2 | SYSTOLIC BLOOD PRESSURE: 131 MMHG

## 2018-12-03 DIAGNOSIS — M06.9 RHEUMATOID ARTHRITIS, INVOLVING UNSPECIFIED SITE, UNSPECIFIED RHEUMATOID FACTOR PRESENCE: ICD-10-CM

## 2018-12-03 DIAGNOSIS — M06.09 RHEUMATOID ARTHRITIS OF MULTIPLE SITES WITHOUT RHEUMATOID FACTOR (H): Primary | ICD-10-CM

## 2018-12-03 DIAGNOSIS — M06.09 RHEUMATOID ARTHRITIS OF MULTIPLE SITES WITH NEGATIVE RHEUMATOID FACTOR (H): ICD-10-CM

## 2018-12-03 PROCEDURE — 96365 THER/PROPH/DIAG IV INF INIT: CPT

## 2018-12-03 PROCEDURE — 96367 TX/PROPH/DG ADDL SEQ IV INF: CPT

## 2018-12-03 PROCEDURE — 96375 TX/PRO/DX INJ NEW DRUG ADDON: CPT

## 2018-12-03 PROCEDURE — 96366 THER/PROPH/DIAG IV INF ADDON: CPT

## 2018-12-03 PROCEDURE — 96413 CHEMO IV INFUSION 1 HR: CPT

## 2018-12-03 PROCEDURE — 96415 CHEMO IV INFUSION ADDL HR: CPT

## 2018-12-03 PROCEDURE — 25000128 H RX IP 250 OP 636: Performed by: FAMILY MEDICINE

## 2018-12-03 RX ORDER — METHYLPREDNISOLONE SODIUM SUCCINATE 40 MG/ML
40 INJECTION, POWDER, LYOPHILIZED, FOR SOLUTION INTRAMUSCULAR; INTRAVENOUS ONCE
Status: CANCELLED
Start: 2018-12-03 | End: 2018-12-03

## 2018-12-03 RX ORDER — METHYLPREDNISOLONE SODIUM SUCCINATE 125 MG/2ML
100 INJECTION, POWDER, LYOPHILIZED, FOR SOLUTION INTRAMUSCULAR; INTRAVENOUS ONCE
Status: COMPLETED | OUTPATIENT
Start: 2018-12-03 | End: 2018-12-03

## 2018-12-03 RX ORDER — METHYLPREDNISOLONE SODIUM SUCCINATE 125 MG/2ML
100 INJECTION, POWDER, LYOPHILIZED, FOR SOLUTION INTRAMUSCULAR; INTRAVENOUS ONCE
Status: CANCELLED
Start: 2018-12-03 | End: 2018-12-03

## 2018-12-03 RX ADMIN — METHYLPREDNISOLONE SODIUM SUCCINATE 100 MG: 125 INJECTION, POWDER, FOR SOLUTION INTRAMUSCULAR; INTRAVENOUS at 10:20

## 2018-12-03 RX ADMIN — SODIUM CHLORIDE 250 ML: 9 INJECTION, SOLUTION INTRAVENOUS at 10:20

## 2018-12-03 RX ADMIN — RITUXIMAB 1000 MG: 10 INJECTION, SOLUTION INTRAVENOUS at 11:17

## 2018-12-03 RX ADMIN — FAMOTIDINE 20 MG: 20 INJECTION, SOLUTION INTRAVENOUS at 10:25

## 2018-12-03 NOTE — MR AVS SNAPSHOT
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  RITUXIMAB (ri TUX i mab) is a monoclonal antibody. This medicine changes the way the body's immune system works. It is used commonly to treat non-Hodgkin lymphoma and other conditions. In cancer cells, this drug targets a specific protein within cancer cells and stops the cancer cells from growing. It is also used to treat rheumatoid arthritis (RA). In RA, this medicine slows the inflammatory process and help reduce joint pain and swelling. This medicine is often used with other cancer or arthritis medications.  This medicine may be used for other purposes; ask your health care provider or pharmacist if you have questions.  What should I tell my health care provider before I take this medicine?  They need to know if you have any of these conditions:    blood disorders    heart disease    history of hepatitis B    infection (especially a virus infection such as chickenpox, cold sores, or herpes)    irregular heartbeat    kidney disease    lung or breathing disease, like asthma    lupus    an unusual or allergic reaction to rituximab, mouse proteins, other medicines, foods, dyes, or preservatives    pregnant or trying to get pregnant    breast-feeding  How should I use this medicine?  This medicine is for infusion into a vein. It is administered in a hospital or clinic by a  specially trained health care professional.  A special MedGuide will be given to you by the pharmacist with each prescription and refill. Be sure to read this information carefully each time.  Talk to your pediatrician regarding the use of this medicine in children. This medicine is not approved for use in children.  Overdosage: If you think you have taken too much of this medicine contact a poison control center or emergency room at once.  NOTE: This medicine is only for you. Do not share this medicine with others.  What if I miss a dose?  It is important not to miss a dose. Call your doctor or health care professional if you are unable to keep an appointment.  What may interact with this medicine?    cisplatin    medicines for blood pressure    some other medicines for arthritis    vaccines  This list may not describe all possible interactions. Give your health care provider a list of all the medicines, herbs, non-prescription drugs, or dietary supplements you use. Also tell them if you smoke, drink alcohol, or use illegal drugs. Some items may interact with your medicine.  What should I watch for while using this medicine?  Report any side effects that you notice during your treatment right away, such as changes in your breathing, fever, chills, dizziness or lightheadedness. These effects are more common with the first dose.  Visit your prescriber or health care professional for checks on your progress. You will need to have regular blood work. Report any other side effects. The side effects of this medicine can continue after you finish your treatment. Continue your course of treatment even though you feel ill unless your doctor tells you to stop.  Call your doctor or health care professional for advice if you get a fever, chills or sore throat, or other symptoms of a cold or flu. Do not treat yourself. This drug decreases your body's ability to fight infections. Try to avoid being around people who are  sick.  This medicine may increase your risk to bruise or bleed. Call your doctor or health care professional if you notice any unusual bleeding.  Be careful brushing and flossing your teeth or using a toothpick because you may get an infection or bleed more easily. If you have any dental work done, tell your dentist you are receiving this medicine.  Avoid taking products that contain aspirin, acetaminophen, ibuprofen, naproxen, or ketoprofen unless instructed by your doctor. These medicines may hide a fever.  Do not become pregnant while taking this medicine. Women should inform their doctor if they wish to become pregnant or think they might be pregnant. There is a potential for serious side effects to an unborn child. Talk to your health care professional or pharmacist for more information. Do not breast-feed an infant while taking this medicine.  What side effects may I notice from receiving this medicine?  Side effects that you should report to your doctor or health care professional as soon as possible:    allergic reactions like skin rash, itching or hives, swelling of the face, lips, or tongue    low blood counts - this medicine may decrease the number of white blood cells, red blood cells and platelets. You may be at increased risk for infections and bleeding.    signs of infection - fever or chills, cough, sore throat, pain or difficulty passing urine    signs of decreased platelets or bleeding - bruising, pinpoint red spots on the skin, black, tarry stools, blood in the urine    signs of decreased red blood cells - unusually weak or tired, fainting spells, lightheadedness    breathing problems    confused, not responsive    chest pain    fast, irregular heartbeat    feeling faint or lightheaded, falls    mouth sores    redness, blistering, peeling or loosening of the skin, including inside the mouth    stomach pain    swelling of the ankles, feet, or hands    trouble passing urine or change in the amount  of urine  Side effects that usually do not require medical attention (report to your doctor or other health care professional if they continue or are bothersome):    anxiety    headache    loss of appetite    muscle aches    nausea    night sweats  This list may not describe all possible side effects. Call your doctor for medical advice about side effects. You may report side effects to FDA at 4-303-WTL-2696.  Where should I keep my medicine?  This drug is given in a hospital or clinic and will not be stored at home.  NOTE:This sheet is a summary. It may not cover all possible information. If you have questions about this medicine, talk to your doctor, pharmacist, or health care provider. Copyright  2016 [employer]                Follow-ups after your visit        Your next 10 appointments already scheduled     Dec 06, 2018  1:45 PM CST   (Arrive by 1:30 PM)   Return Visit with Aracelis Ayon MD   [facility]    [address]  [facility] [address]   [phone]              Who to contact     If you have questions or need follow up information about today's clinic visit or your schedule please contact [facility] directly at [phone].  Normal or non-critical lab and imaging results will be communicated to you by MyChart, letter or phone within 4 business days after the clinic has received the results. If you do not hear from us within 7 days, please contact the clinic through MyChart or phone. If you have a critical or abnormal lab result, we will notify you by phone as soon as possible.  Submit refill requests through [employer] or call your pharmacy and they will forward the refill request to us. Please allow 3 business days for your refill to be completed.          Additional Information About Your Visit        [employer] Information     [employer] gives you secure access to your electronic health record. If you see a primary  "care provider, you can also send messages to your care team and make appointments. If you have questions, please call your primary care clinic.  If you do not have a primary care provider, please call [phone] and they will assist you.        Care EveryWhere ID     This is your Care EveryWhere ID. This could be used by other organizations to access your [facility] medical records  [id number]        Your Vitals Were     Pulse Temperature Respirations Height Pulse Oximetry BMI (Body Mass Index)    60 96.9  F (36.1  C) (Oral) 18 1.676 m (5' 6\") 95% 33.83 kg/m2       Blood Pressure from Last 3 Encounters:   12/03/18 131/64   08/30/18 130/74   08/24/18 132/76    Weight from Last 3 Encounters:   12/03/18 95.1 kg (209 lb 9.6 oz)   08/30/18 96.2 kg (212 lb)   08/24/18 96.6 kg (213 lb)              Today, you had the following     No orders found for display       Primary Care Provider Office Phone # Fax #    Lanie Costello [phone] [address] [facility] [address]        Equal Access to Services     FOUZIA TAMAYO AH: Hadii mikki conley hadasho Soomaali, waaxda luqadaha, qaybta kaalmada adeegyada, erika marley. So [facility] [phone].    ATENCIÓN: Si habla español, tiene a garcia disposición servicios gratuitos de asistencia lingüística. Llame al [phone].    We comply with applicable federal civil rights laws and [place] laws. We do not discriminate on the basis of race, color, national origin, age, disability, sex, sexual orientation, or gender identity.            Thank you!     Thank you for choosing [facility]  for your care. Our goal is always to provide you with excellent care. Hearing back from our patients is one way we can continue to improve our services. Please take a few minutes to complete the written survey that you may receive in the mail after your visit with us. Thank you!             Your Updated Medication List - Protect " others around you: Learn how to safely use, store and throw away your medicines at www.disposemymeds.org.          This list is accurate as of 12/3/18  3:24 PM.  Always use your most recent med list.                   Brand Name Dispense Instructions for use Diagnosis    ASPIRIN PO      Take 81 mg by mouth daily        atorvastatin 20 MG tablet    LIPITOR    90 tablet    TAKE 1 TABLET BY MOUTH ONCE DAILY    Hyperlipidemia LDL goal <100       * blood glucose calibration solution     1 Bottle    Use as directed    Impaired fasting glucose       * blood glucose calibration solution     1 each    Use to calibrate blood glucose monitor as directed.    Impaired fasting glucose       * blood glucose monitoring test strip    ONETOUCH ULTRA    100 strip    Use to test blood sugar daily or as directed.    Impaired fasting glucose       * blood glucose monitoring test strip    ONETOUCH ULTRA    100 strip    Use to test blood sugars one time daily or as directed.    Elevated glucose, Type 2 diabetes mellitus without complication, without long-term current use of insulin (H)       calcium + D 600-200 MG-UNIT Tabs   Generic drug:  calcium carbonate-vitamin D      Take 600 mg by mouth 2 times daily.        celecoxib 200 MG capsule    celeBREX    90 capsule    TAKE 1 CAPSULE BY MOUTH ONCE DAILY    RA (rheumatoid arthritis) (H)       folic acid 1 MG tablet    FOLVITE     Take 2 tablets by mouth daily        KLOR-CON 20 MEQ CR tablet   Generic drug:  potassium chloride ER     90 tablet    TAKE 1 TABLET BY MOUTH ONCE DAILY WITH  FOOD    Hypertension, unspecified type       METHOTREXATE PO      Inject 12.5 mg as directed once a week.        Multi-vitamin tablet      Take  by mouth daily.        omeprazole 40 MG DR capsule    priLOSEC    90 capsule    TAKE 1 CAPSULE BY MOUTH ONCE DAILY BEFORE  A  MEAL    Gastroesophageal reflux disease, esophagitis presence not specified       polyethylene glycol packet    MIRALAX/GLYCOLAX     Take 1  packet by mouth daily        pramipexole 0.25 MG tablet    MIRAPEX    270 tablet    TAKE 1 TABLET BY MOUTH AT 5PM AND 2 TABLETS AT 9PM    Restless legs syndrome (RLS)       predniSONE 1 MG tablet    DELTASONE     Take 1 mg by mouth daily Take 3 tabs po daily        PROBIOTIC DAILY PO      Take 1 capsule by mouth daily        propranolol 20 MG tablet    INDERAL    90 tablet    TAKE 1 TABLET BY MOUTH ONCE DAILY    Hypertension, unspecified type       RITUXAN IV           venlafaxine 75 MG 24 hr capsule    EFFEXOR-XR    90 capsule    TAKE 1 CAPSULE BY MOUTH ONCE DAILY **DUE  FOR  A  FOLLOW  UP  VISIT**    MDD (major depressive disorder)       VITAMIN B 12 PO      Take 500 mg by mouth daily.        * Notice:  This list has 4 medication(s) that are the same as other medications prescribed for you. Read the directions carefully, and ask your doctor or other care provider to review them with you.

## 2018-12-03 NOTE — PATIENT INSTRUCTIONS
Rituximab Solution for injection  What is this medicine?  RITUXIMAB (ri TUX i mab) is a monoclonal antibody. This medicine changes the way the body's immune system works. It is used commonly to treat non-Hodgkin lymphoma and other conditions. In cancer cells, this drug targets a specific protein within cancer cells and stops the cancer cells from growing. It is also used to treat rheumatoid arthritis (RA). In RA, this medicine slows the inflammatory process and help reduce joint pain and swelling. This medicine is often used with other cancer or arthritis medications.  This medicine may be used for other purposes; ask your health care provider or pharmacist if you have questions.  What should I tell my health care provider before I take this medicine?  They need to know if you have any of these conditions:    blood disorders    heart disease    history of hepatitis B    infection (especially a virus infection such as chickenpox, cold sores, or herpes)    irregular heartbeat    kidney disease    lung or breathing disease, like asthma    lupus    an unusual or allergic reaction to rituximab, mouse proteins, other medicines, foods, dyes, or preservatives    pregnant or trying to get pregnant    breast-feeding  How should I use this medicine?  This medicine is for infusion into a vein. It is administered in a hospital or clinic by a specially trained health care professional.  A special MedGuide will be given to you by the pharmacist with each prescription and refill. Be sure to read this information carefully each time.  Talk to your pediatrician regarding the use of this medicine in children. This medicine is not approved for use in children.  Overdosage: If you think you have taken too much of this medicine contact a poison control center or emergency room at once.  NOTE: This medicine is only for you. Do not share this medicine with others.  What if I miss a dose?  It is important not to miss a dose. Call your  doctor or health care professional if you are unable to keep an appointment.  What may interact with this medicine?    cisplatin    medicines for blood pressure    some other medicines for arthritis    vaccines  This list may not describe all possible interactions. Give your health care provider a list of all the medicines, herbs, non-prescription drugs, or dietary supplements you use. Also tell them if you smoke, drink alcohol, or use illegal drugs. Some items may interact with your medicine.  What should I watch for while using this medicine?  Report any side effects that you notice during your treatment right away, such as changes in your breathing, fever, chills, dizziness or lightheadedness. These effects are more common with the first dose.  Visit your prescriber or health care professional for checks on your progress. You will need to have regular blood work. Report any other side effects. The side effects of this medicine can continue after you finish your treatment. Continue your course of treatment even though you feel ill unless your doctor tells you to stop.  Call your doctor or health care professional for advice if you get a fever, chills or sore throat, or other symptoms of a cold or flu. Do not treat yourself. This drug decreases your body's ability to fight infections. Try to avoid being around people who are sick.  This medicine may increase your risk to bruise or bleed. Call your doctor or health care professional if you notice any unusual bleeding.  Be careful brushing and flossing your teeth or using a toothpick because you may get an infection or bleed more easily. If you have any dental work done, tell your dentist you are receiving this medicine.  Avoid taking products that contain aspirin, acetaminophen, ibuprofen, naproxen, or ketoprofen unless instructed by your doctor. These medicines may hide a fever.  Do not become pregnant while taking this medicine. Women should inform their doctor if  they wish to become pregnant or think they might be pregnant. There is a potential for serious side effects to an unborn child. Talk to your health care professional or pharmacist for more information. Do not breast-feed an infant while taking this medicine.  What side effects may I notice from receiving this medicine?  Side effects that you should report to your doctor or health care professional as soon as possible:    allergic reactions like skin rash, itching or hives, swelling of the face, lips, or tongue    low blood counts - this medicine may decrease the number of white blood cells, red blood cells and platelets. You may be at increased risk for infections and bleeding.    signs of infection - fever or chills, cough, sore throat, pain or difficulty passing urine    signs of decreased platelets or bleeding - bruising, pinpoint red spots on the skin, black, tarry stools, blood in the urine    signs of decreased red blood cells - unusually weak or tired, fainting spells, lightheadedness    breathing problems    confused, not responsive    chest pain    fast, irregular heartbeat    feeling faint or lightheaded, falls    mouth sores    redness, blistering, peeling or loosening of the skin, including inside the mouth    stomach pain    swelling of the ankles, feet, or hands    trouble passing urine or change in the amount of urine  Side effects that usually do not require medical attention (report to your doctor or other health care professional if they continue or are bothersome):    anxiety    headache    loss of appetite    muscle aches    nausea    night sweats  This list may not describe all possible side effects. Call your doctor for medical advice about side effects. You may report side effects to FDA at 9-455-AYP-3745.  Where should I keep my medicine?  This drug is given in a hospital or clinic and will not be stored at home.  NOTE:This sheet is a summary. It may not cover all possible information. If you  have questions about this medicine, talk to your doctor, pharmacist, or health care provider. Copyright  2016 [employer]

## 2018-12-03 NOTE — PROGRESS NOTES
Patient is a 69 y/o female here accompanied by self today for infusion of rituxan 1000mg per order of Dr. Costello under the supervision of Dr. Sanchez.  Patient meets parameters for today's infusion. Patient denies any fever, chills or antibiotics.  Rheumatology questionnaire completed.  Patient identified with two identifiers, order verified, and verbal consent for today's infusion obtained from patient.  +++Written consent for treatment is on file and valid.+++    Recent lab values:  WBC: 12.2, HGB: 13.7, PLT: 258, ANC: 9.6,  ALT: 32, Creatinine: 0.83.  Patient meets order parameters for today's treatment.  TC placed to [facility] as patient states that she was going to be switched back to every 6 months day 1 and 15. Informed them that our current orders are for every 4 months on day 1. She will fax new orders.     24 gauge angio cath inserted into left hand.  Immediate blood return noted.  IV secured with sterile, transparent dressing and tape.  Patient tolerated well, denies pain or discomfort at this time.  Flushes easily without resistance, no signs or symptoms of infiltration or infection.   Patient denies questions or concerns regarding infusion and/or medication(s) being administered.         1117 IV pump verified with rituxan 1000 mg dose, drug, and rate of administration.  Infusion administered per protocol.

## 2018-12-05 ENCOUNTER — TELEPHONE (OUTPATIENT)
Dept: INFUSION THERAPY | Facility: OTHER | Age: 68
End: 2018-12-05

## 2018-12-05 NOTE — TELEPHONE ENCOUNTER
Faxed orders received per MARIA L Dobson for patient to switch to Rituximab Day 1 and Day 15 every 6 months. Patient had Day 1 on 12-3, next due 12-17. Called patient to schedule accordingly and she is in agreement with plan.

## 2018-12-06 ENCOUNTER — OFFICE VISIT (OUTPATIENT)
Dept: OTOLARYNGOLOGY | Facility: OTHER | Age: 68
End: 2018-12-06
Attending: OTOLARYNGOLOGY
Payer: MEDICARE

## 2018-12-06 VITALS
WEIGHT: 209.66 LBS | DIASTOLIC BLOOD PRESSURE: 64 MMHG | BODY MASS INDEX: 33.69 KG/M2 | OXYGEN SATURATION: 97 % | HEIGHT: 66 IN | HEART RATE: 64 BPM | TEMPERATURE: 96.9 F | SYSTOLIC BLOOD PRESSURE: 116 MMHG

## 2018-12-06 DIAGNOSIS — J32.0 CHRONIC MAXILLARY SINUSITIS: Primary | ICD-10-CM

## 2018-12-06 DIAGNOSIS — Z98.890 HISTORY OF ENDOSCOPIC SINUS SURGERY: ICD-10-CM

## 2018-12-06 PROCEDURE — 87070 CULTURE OTHR SPECIMN AEROBIC: CPT | Mod: ZL | Performed by: OTOLARYNGOLOGY

## 2018-12-06 PROCEDURE — 87186 SC STD MICRODIL/AGAR DIL: CPT | Mod: ZL | Performed by: OTOLARYNGOLOGY

## 2018-12-06 PROCEDURE — 87205 SMEAR GRAM STAIN: CPT | Mod: ZL | Performed by: OTOLARYNGOLOGY

## 2018-12-06 PROCEDURE — 87077 CULTURE AEROBIC IDENTIFY: CPT | Mod: ZL | Performed by: OTOLARYNGOLOGY

## 2018-12-06 PROCEDURE — 31237 NSL/SINS NDSC SURG BX POLYPC: CPT | Performed by: OTOLARYNGOLOGY

## 2018-12-06 PROCEDURE — 87102 FUNGUS ISOLATION CULTURE: CPT | Mod: ZL | Performed by: OTOLARYNGOLOGY

## 2018-12-06 PROCEDURE — G0463 HOSPITAL OUTPT CLINIC VISIT: HCPCS

## 2018-12-06 PROCEDURE — 99213 OFFICE O/P EST LOW 20 MIN: CPT | Mod: 25 | Performed by: OTOLARYNGOLOGY

## 2018-12-06 RX ORDER — BUDESONIDE 0.5 MG/2ML
INHALANT ORAL
Qty: 3 BOX | Refills: 11 | Status: SHIPPED | OUTPATIENT
Start: 2018-12-06 | End: 2018-12-31

## 2018-12-06 ASSESSMENT — PAIN SCALES - GENERAL: PAINLEVEL: MILD PAIN (3)

## 2018-12-06 NOTE — MR AVS SNAPSHOT
After Visit Summary   12/6/2018    Radha Fox    MRN: [id number]           Patient Information     Date Of Birth          1950        Visit Information        Provider Department      12/6/2018 1:45 PM Aracelis Ayon MD [facility] [place]        Care Instructions    Thank you for allowing Dr. Ayon and our ENT team to participate in your care.  If your medications are too expensive, please give the nurse a call.  We can possibly change this medication.  If you have a scheduling or an appointment question please contact our Health Unit Coordinator at their direct line [phone].   ALL nursing questions or concerns can be directed to your ENT nurse at: [phone] - Candy    Use Budesonide Rinses Twice Daily for 1 month  We will call you with the culture results  Follow up in 1 month      Budesonide nasal saline irrigation per instructions:  -Obtain Hamlet Med Sinus rinse over the counter.    -Use warm distilled water and 2 packets of the salt solution that comes with the bottle, dissolve in bottle up to the 240 mL mary.  -Add 1 vial of budesonide.  -Irrigate each side of your nose leaning over the sink, using 1/3 to 1/2 the volume of the bottle in each nostril every irrigation.  Irrigate 2 times daily.  -If additional rinses are needed/recommended, you may use the plan Hamlet Med Sinus irrigation without the use of added budesonide.             Follow-ups after your visit        Follow-up notes from your care team     Return in about 4 weeks (around 1/3/2019).      Your next 10 appointments already scheduled     Dec 17, 2018 10:00 AM CST   Level 5 with HC INF RM [id number]   [facility] [place] ([facility] [place] )    [address] [place] [address]   [phone]              Who to contact     If you have questions or need follow up information about today's clinic visit or your schedule please contact [facility]  "ROSALINDA directly at [phone].  Normal or non-critical lab and imaging results will be communicated to you by MyChart, letter or phone within 4 business days after the clinic has received the results. If you do not hear from us within 7 days, please contact the clinic through [employer] or phone. If you have a critical or abnormal lab result, we will notify you by phone as soon as possible.  Submit refill requests through [employer] or call your pharmacy and they will forward the refill request to us. Please allow 3 business days for your refill to be completed.          Additional Information About Your Visit        [employer] Information     [employer] gives you secure access to your electronic health record. If you see a primary care provider, you can also send messages to your care team and make appointments. If you have questions, please call your primary care clinic.  If you do not have a primary care provider, please call [phone] and they will assist you.        Care EveryWhere ID     This is your Care EveryWhere ID. This could be used by other organizations to access your [place] medical records  [id number]        Your Vitals Were     Pulse Temperature Height Pulse Oximetry BMI (Body Mass Index)       64 96.9  F (36.1  C) (Tympanic) 1.676 m (5' 6\") 97% 33.84 kg/m2        Blood Pressure from Last 3 Encounters:   12/06/18 116/64   12/03/18 131/64   08/30/18 130/74    Weight from Last 3 Encounters:   12/06/18 95.1 kg (209 lb 10.5 oz)   12/03/18 95.1 kg (209 lb 9.6 oz)   08/30/18 96.2 kg (212 lb)              Today, you had the following     No orders found for display       Primary Care Provider Office Phone # Fax #    Lanie Costello [phone] [address] [place] [address]        Equal Access to Services     [facility] RONI : Richard zuritao Sorosita, waaxda luqadaha, qaybta kaalmada ademeryada, erika marley. So [facility] [phone].    ATENCIÓN: Si " andrey lewis, tiene a garcia disposición servicios gratuitos de asistencia lingüística. Carleen steiner [phone].    We comply with applicable federal civil rights laws and [place] laws. We do not discriminate on the basis of race, color, national origin, age, disability, sex, sexual orientation, or gender identity.            Thank you!     Thank you for choosing [facility]  for your care. Our goal is always to provide you with excellent care. Hearing back from our patients is one way we can continue to improve our services. Please take a few minutes to complete the written survey that you may receive in the mail after your visit with us. Thank you!             Your Updated Medication List - Protect others around you: Learn how to safely use, store and throw away your medicines at www.disposemymeds.org.          This list is accurate as of 12/6/18  2:15 PM.  Always use your most recent med list.                   Brand Name Dispense Instructions for use Diagnosis    ASPIRIN PO      Take 81 mg by mouth daily        atorvastatin 20 MG tablet    LIPITOR    90 tablet    TAKE 1 TABLET BY MOUTH ONCE DAILY    Hyperlipidemia LDL goal <100       * blood glucose calibration solution     1 Bottle    Use as directed    Impaired fasting glucose       * blood glucose calibration solution     1 each    Use to calibrate blood glucose monitor as directed.    Impaired fasting glucose       * blood glucose monitoring test strip    ONETOUCH ULTRA    100 strip    Use to test blood sugar daily or as directed.    Impaired fasting glucose       * blood glucose monitoring test strip    ONETOUCH ULTRA    100 strip    Use to test blood sugars one time daily or as directed.    Elevated glucose, Type 2 diabetes mellitus without complication, without long-term current use of insulin (H)       calcium + D 600-200 MG-UNIT Tabs   Generic drug:  calcium carbonate-vitamin D      Take 600 mg by mouth 2 times daily.        celecoxib  200 MG capsule    celeBREX    90 capsule    TAKE 1 CAPSULE BY MOUTH ONCE DAILY    RA (rheumatoid arthritis) (H)       folic acid 1 MG tablet    FOLVITE     Take 2 tablets by mouth daily        KLOR-CON 20 MEQ CR tablet   Generic drug:  potassium chloride ER     90 tablet    TAKE 1 TABLET BY MOUTH ONCE DAILY WITH  FOOD    Hypertension, unspecified type       METHOTREXATE PO      Inject 12.5 mg as directed once a week.        Multi-vitamin tablet      Take  by mouth daily.        omeprazole 40 MG DR capsule    priLOSEC    90 capsule    TAKE 1 CAPSULE BY MOUTH ONCE DAILY BEFORE  A  MEAL    Gastroesophageal reflux disease, esophagitis presence not specified       polyethylene glycol packet    MIRALAX/GLYCOLAX     Take 1 packet by mouth daily        pramipexole 0.25 MG tablet    MIRAPEX    270 tablet    TAKE 1 TABLET BY MOUTH AT 5PM AND 2 TABLETS AT 9PM    Restless legs syndrome (RLS)       predniSONE 1 MG tablet    DELTASONE     Take 1 mg by mouth daily Take 3 tabs po daily        PROBIOTIC DAILY PO      Take 1 capsule by mouth daily        propranolol 20 MG tablet    INDERAL    90 tablet    TAKE 1 TABLET BY MOUTH ONCE DAILY    Hypertension, unspecified type       RITUXAN IV           venlafaxine 75 MG 24 hr capsule    EFFEXOR-XR    90 capsule    TAKE 1 CAPSULE BY MOUTH ONCE DAILY **DUE  FOR  A  FOLLOW  UP  VISIT**    MDD (major depressive disorder)       VITAMIN B 12 PO      Take 500 mg by mouth daily.        * Notice:  This list has 4 medication(s) that are the same as other medications prescribed for you. Read the directions carefully, and ask your doctor or other care provider to review them with you.

## 2018-12-06 NOTE — LETTER
"    12/6/2018         RE: Radha Fox  [address]  [place] [address]        Dear Colleague,    Thank you for referring your patient, Radha Fox, to the [facility]. Please see a copy of my visit note below.    Otolaryngology Progress Note      History of Present Illness   Patient presents with:  Ent Problem: sinus issues; follow up chronic ethmoidal/maxillary sinusitis, FESS/septo 9/27/16      Radha Fox is a 68 year old female  Presents for follow-up of chronic sinusitis  She does notice the return of copious postnasal drainage.  This is most noticeable when she is supine there is some mild left maxillary pressure no anosmia no congestion.  No purulent rhinorrhea  Afebrile  Rheumatoid arthritis has been stable  No new environmental exposures    Post distant sinus surgery above in 2016    She is still on Rituxan IV for rheumatoid arthritis  She was last seen 3-17 by Jo for chronic cough  And postnasal drainage.  Sinus endoscopy is negative at that point she is instructed on continuing Neomed irrigation and Flonase and follow-up in X months to a year  I last saw her November 3, 2016      PROCEDURES PERFORMED 9/27/16:   1.  Bilateral total ethmoidectomy     2.  Bilateral maxillary antrostomy with tissue removal    3.  Septoplasty/nasal septal reconstruction with cartilage reinsertion    Sinus procedures above performed using [employer]  navigation.  FINDINGS:  Copious mucopurulent fluid left maxillary sinus, thick polypoid degeneration anterior and posterior ethmoid cavity  FINAL DIAGNOSIS:   Sinus, FESS   - Inflammatory nasal polyp no eos    Physical Exam  /64 (BP Location: Right arm, Patient Position: Sitting, Cuff Size: Adult Large)  Pulse 64  Temp 96.9  F (36.1  C) (Tympanic)  Ht 1.676 m (5' 6\")  Wt 95.1 kg (209 lb 10.5 oz)  SpO2 97%  BMI 33.84 kg/m2  General - The patient is well nourished and well developed, and appears to have good nutritional " status.  Alert and oriented to person and place, interactive.  Head and Face - Normocephalic and atraumatic, with no gross asymmetry noted of the contour of the facial features.  The facial nerve is intact, with strong symmetric movements.  Neck-no palpable lymphadenopathy or thyroid mass.  Trachea is midline.  Eyes - Extraocular movements intact.   Ears- External auditory canals are patent, tympanic membranes are intact without effusion or worrisome retractions   Nose - Nasal mucosa is pink and moist with no abnormal mucus.  The septum was grossly midline and non-obstructive, turbinates of normal size and position.  No polyps, masses, or purulence noted on examination.  Mouth - Examination of the oral cavity shows pink, healthy, moist mucosa.  No lesions or ulceration noted.  The dentition are in good repair.  The tongue is mobile and midline.  Throat - The walls of the oropharynx were smooth, pink, moist, symmetric, and had no lesions or ulcerations.  The tonsillar pillars and soft palate were symmetric.  The uvula was midline on elevation.        To evaluate the nose and sinuses in the post operative state, I performed rigid nasal endoscopy. The LPN had previously sprayed both nares with lidocaine and neosynephrine.    I began with the LEFT side using a 0 degree rigid nasal endoscope, and then similarly examined the RIGHT side    Findings:  Inferior turbinates:  lateralized  Middle turbinate and middle meatus: Purulent discharge suction debrided and collected for culture from the left maxillary antrostomy which is widely patent draining into the floor of the nasopharynx  Antrostomy patent and clear right  Ethmoid cavity clear bilaterally  Mucosa is  healthy throughout without polyps nor polypoid degeneration    Impression/Plan  Radha Fox is a 68 year old female    ICD-10-CM    1. Chronic maxillary sinusitis J32.0 Sinus Culture Aerobic Bacterial     Gram stain     Fungus Culture, non-blood     budesonide  (PULMICORT) 0.5 MG/2ML neb solution   2. History of endoscopic sinus surgery Z98.890            Use Budesonide Rinses Twice Daily for 1 month  We will call you with the culture results  I gave her the option of starting antibiotics prior to the culture results but overall her rheumatoid is stable so I would prefer to wait and start culture directed antibiotics she agrees    Follow up in 1 month  Would not recommend any additional CAT scans unless she sees me first as her antrostomies are widely patent.  This should be able to be treated medically no indication for surgery at this juncture she is happy to hear this      Budesonide nasal saline irrigation per instructions:  -Obtain Hamlet Med Sinus rinse over the counter.    -Use warm distilled water and 2 packets of the salt solution that comes with the bottle, dissolve in bottle up to the 240 mL mary.  -Add 1 vial of budesonide.  -Irrigate each side of your nose leaning over the sink, using 1/3 to 1/2 the volume of the bottle in each nostril every irrigation.  Irrigate 2 times daily.  -If additional rinses are needed/recommended, you may use the plan Hamlet Med Sinus irrigation without the use of added budesonide.       Aracelis Ayon D.O.  Otolaryngology/Head and Neck Surgery  Allergy            Again, thank you for allowing me to participate in the care of your patient.        Sincerely,        Aracelis Ayon MD

## 2018-12-06 NOTE — NURSING NOTE
"Chief Complaint   Patient presents with     Ent Problem     sinus issues; follow up chronic ethmoidal/maxillary sinusitis, FESS/septo 9/27/16       Initial /64 (BP Location: Right arm, Patient Position: Sitting, Cuff Size: Adult Large)  Pulse 64  Temp 96.9  F (36.1  C) (Tympanic)  Ht 1.676 m (5' 6\")  Wt 95.1 kg (209 lb 10.5 oz)  SpO2 97%  BMI 33.84 kg/m2 Estimated body mass index is 33.84 kg/(m^2) as calculated from the following:    Height as of this encounter: 1.676 m (5' 6\").    Weight as of this encounter: 95.1 kg (209 lb 10.5 oz).  Medication Reconciliation: complete    Soha Barnard LPN  "

## 2018-12-06 NOTE — PATIENT INSTRUCTIONS
Thank you for allowing Dr. Ayon and our ENT team to participate in your care.  If your medications are too expensive, please give the nurse a call.  We can possibly change this medication.  If you have a scheduling or an appointment question please contact our Health Unit Coordinator at their direct line [phone].   ALL nursing questions or concerns can be directed to your ENT nurse at: [phone] - Candy    Use Budesonide Rinses Twice Daily for 1 month  We will call you with the culture results  Follow up in 1 month      Budesonide nasal saline irrigation per instructions:  -Obtain Hamlet Med Sinus rinse over the counter.    -Use warm distilled water and 2 packets of the salt solution that comes with the bottle, dissolve in bottle up to the 240 mL mary.  -Add 1 vial of budesonide.  -Irrigate each side of your nose leaning over the sink, using 1/3 to 1/2 the volume of the bottle in each nostril every irrigation.  Irrigate 2 times daily.  -If additional rinses are needed/recommended, you may use the plan Hamlet Med Sinus irrigation without the use of added budesonide.

## 2018-12-06 NOTE — PROGRESS NOTES
"Otolaryngology Progress Note      History of Present Illness   Patient presents with:  Ent Problem: sinus issues; follow up chronic ethmoidal/maxillary sinusitis, FESS/septo 9/27/16      Radha Fox is a 68 year old female  Presents for follow-up of chronic sinusitis  She does notice the return of copious postnasal drainage.  This is most noticeable when she is supine there is some mild left maxillary pressure no anosmia no congestion.  No purulent rhinorrhea  Afebrile  Rheumatoid arthritis has been stable  No new environmental exposures    Post distant sinus surgery above in 2016    She is still on Rituxan IV for rheumatoid arthritis  She was last seen 3-17 by Jo for chronic cough  And postnasal drainage.  Sinus endoscopy is negative at that point she is instructed on continuing Neomed irrigation and Flonase and follow-up in X months to a year  I last saw her November 3, 2016      PROCEDURES PERFORMED 9/27/16:   1.  Bilateral total ethmoidectomy     2.  Bilateral maxillary antrostomy with tissue removal    3.  Septoplasty/nasal septal reconstruction with cartilage reinsertion    Sinus procedures above performed using [employer]  navigation.  FINDINGS:  Copious mucopurulent fluid left maxillary sinus, thick polypoid degeneration anterior and posterior ethmoid cavity  FINAL DIAGNOSIS:   Sinus, FESS   - Inflammatory nasal polyp no eos    Physical Exam  /64 (BP Location: Right arm, Patient Position: Sitting, Cuff Size: Adult Large)  Pulse 64  Temp 96.9  F (36.1  C) (Tympanic)  Ht 1.676 m (5' 6\")  Wt 95.1 kg (209 lb 10.5 oz)  SpO2 97%  BMI 33.84 kg/m2  General - The patient is well nourished and well developed, and appears to have good nutritional status.  Alert and oriented to person and place, interactive.  Head and Face - Normocephalic and atraumatic, with no gross asymmetry noted of the contour of the facial features.  The facial nerve is intact, with strong symmetric movements.  Neck-no palpable " lymphadenopathy or thyroid mass.  Trachea is midline.  Eyes - Extraocular movements intact.   Ears- External auditory canals are patent, tympanic membranes are intact without effusion or worrisome retractions   Nose - Nasal mucosa is pink and moist with no abnormal mucus.  The septum was grossly midline and non-obstructive, turbinates of normal size and position.  No polyps, masses, or purulence noted on examination.  Mouth - Examination of the oral cavity shows pink, healthy, moist mucosa.  No lesions or ulceration noted.  The dentition are in good repair.  The tongue is mobile and midline.  Throat - The walls of the oropharynx were smooth, pink, moist, symmetric, and had no lesions or ulcerations.  The tonsillar pillars and soft palate were symmetric.  The uvula was midline on elevation.        To evaluate the nose and sinuses in the post operative state, I performed rigid nasal endoscopy. The LPN had previously sprayed both nares with lidocaine and neosynephrine.    I began with the LEFT side using a 0 degree rigid nasal endoscope, and then similarly examined the RIGHT side    Findings:  Inferior turbinates:  lateralized  Middle turbinate and middle meatus: Purulent discharge suction debrided and collected for culture from the left maxillary antrostomy which is widely patent draining into the floor of the nasopharynx  Antrostomy patent and clear right  Ethmoid cavity clear bilaterally  Mucosa is  healthy throughout without polyps nor polypoid degeneration    Impression/Plan  Radha Fox is a 68 year old female    ICD-10-CM    1. Chronic maxillary sinusitis J32.0 Sinus Culture Aerobic Bacterial     Gram stain     Fungus Culture, non-blood     budesonide (PULMICORT) 0.5 MG/2ML neb solution   2. History of endoscopic sinus surgery Z98.890            Use Budesonide Rinses Twice Daily for 1 month  We will call you with the culture results  I gave her the option of starting antibiotics prior to the culture  results but overall her rheumatoid is stable so I would prefer to wait and start culture directed antibiotics she agrees    Follow up in 1 month  Would not recommend any additional CAT scans unless she sees me first as her antrostomies are widely patent.  This should be able to be treated medically no indication for surgery at this juncture she is happy to hear this      Budesonide nasal saline irrigation per instructions:  -Obtain Hamlet Med Sinus rinse over the counter.    -Use warm distilled water and 2 packets of the salt solution that comes with the bottle, dissolve in bottle up to the 240 mL mary.  -Add 1 vial of budesonide.  -Irrigate each side of your nose leaning over the sink, using 1/3 to 1/2 the volume of the bottle in each nostril every irrigation.  Irrigate 2 times daily.  -If additional rinses are needed/recommended, you may use the plan Hamlet Med Sinus irrigation without the use of added budesonide.       Aracelis Ayon D.O.  Otolaryngology/Head and Neck Surgery  Allergy

## 2018-12-07 LAB
GRAM STN SPEC: ABNORMAL
SPECIMEN SOURCE: ABNORMAL

## 2018-12-09 DIAGNOSIS — J32.9 CHRONIC SINUSITIS, UNSPECIFIED LOCATION: Primary | ICD-10-CM

## 2018-12-09 LAB
BACTERIA SPEC CULT: ABNORMAL
SPECIMEN SOURCE: ABNORMAL

## 2018-12-09 RX ORDER — CEFDINIR 300 MG/1
300 CAPSULE ORAL 2 TIMES DAILY
Qty: 20 CAPSULE | Refills: 0 | Status: SHIPPED | OUTPATIENT
Start: 2018-12-09 | End: 2018-12-12 | Stop reason: SINTOL

## 2018-12-10 ENCOUNTER — TELEPHONE (OUTPATIENT)
Dept: FAMILY MEDICINE | Facility: OTHER | Age: 68
End: 2018-12-10

## 2018-12-10 NOTE — TELEPHONE ENCOUNTER
Patient called to state she is being treated for a sinus infection.  Rituximab infusion rescheduled for December 21st when she will have completed antibiotic.

## 2018-12-10 NOTE — TELEPHONE ENCOUNTER
Perfect, just wanted to make sure it needed to be moved with a nurse before changing.  Moved to 12-21-18 at 9:00am.

## 2018-12-10 NOTE — RESULT ENCOUNTER NOTE
Sinus culture came back positive. Sent in Omnicef for her. She has tolerated this medication in the past without any difficulty.

## 2018-12-12 ENCOUNTER — TELEPHONE (OUTPATIENT)
Dept: OTOLARYNGOLOGY | Facility: OTHER | Age: 68
End: 2018-12-12

## 2018-12-12 DIAGNOSIS — J32.0 CHRONIC MAXILLARY SINUSITIS: Primary | ICD-10-CM

## 2018-12-12 RX ORDER — DOXYCYCLINE 100 MG/1
100 CAPSULE ORAL 2 TIMES DAILY
Qty: 14 CAPSULE | Refills: 0 | Status: SHIPPED | OUTPATIENT
Start: 2018-12-12 | End: 2019-02-02

## 2018-12-12 NOTE — TELEPHONE ENCOUNTER
Stop Omnicef. Start doxycycline and complete as prescribed. Take 1 hour before or 4 hours after any multivitamin/mineral supplementation.

## 2018-12-12 NOTE — TELEPHONE ENCOUNTER
This patient was recently prescribed Omnicef. This is making her feel very sick (nausea, vomiting). She is requesting a different antibiotic be called in. Please Advise.

## 2018-12-14 NOTE — TELEPHONE ENCOUNTER
Received co-signed orders for rituxan infusion from Dr. Costello. Orders accurate and and patient is scheduled for 12-21-18. Orders sent to scanning.

## 2018-12-17 ENCOUNTER — TELEPHONE (OUTPATIENT)
Dept: OTOLARYNGOLOGY | Facility: OTHER | Age: 68
End: 2018-12-17

## 2018-12-17 NOTE — TELEPHONE ENCOUNTER
12/17/18 Received PA request from [facility] for Budesonide 0.5mg/2ml suspension. Submitted as urgent request through Replaced by [facility]. Waiting for response.

## 2018-12-17 NOTE — TELEPHONE ENCOUNTER
"DENIAL 12/17/18 Received denial from [facility] for Budesonide. Denial reason: \"Patient does not have an FDA approved condition for this drug.\" No alternatives given. Pharmacy advised. Forms scanned to [employer].   "

## 2018-12-19 DIAGNOSIS — J32.9 CHRONIC SINUSITIS, UNSPECIFIED LOCATION: Primary | ICD-10-CM

## 2018-12-19 DIAGNOSIS — Z98.890 HISTORY OF SINUS SURGERY: ICD-10-CM

## 2018-12-20 DIAGNOSIS — Z79.899 ENCOUNTER FOR LONG-TERM (CURRENT) USE OF MEDICATIONS: ICD-10-CM

## 2018-12-20 DIAGNOSIS — M06.9 RHEUMATOID ARTHRITIS, INVOLVING UNSPECIFIED SITE, UNSPECIFIED RHEUMATOID FACTOR PRESENCE: ICD-10-CM

## 2018-12-20 DIAGNOSIS — M06.09 RHEUMATOID ARTHRITIS OF MULTIPLE SITES WITHOUT RHEUMATOID FACTOR (H): ICD-10-CM

## 2018-12-20 LAB
ALBUMIN SERPL-MCNC: 3.6 G/DL (ref 3.4–5)
ALT SERPL W P-5'-P-CCNC: 34 U/L (ref 0–50)
BASOPHILS # BLD AUTO: 0 10E9/L (ref 0–0.2)
BASOPHILS NFR BLD AUTO: 0.3 %
CREAT SERPL-MCNC: 0.77 MG/DL (ref 0.52–1.04)
CRP SERPL-MCNC: 2.9 MG/L (ref 0–8)
DIFFERENTIAL METHOD BLD: ABNORMAL
EOSINOPHIL # BLD AUTO: 0.3 10E9/L (ref 0–0.7)
EOSINOPHIL NFR BLD AUTO: 2.2 %
ERYTHROCYTE [DISTWIDTH] IN BLOOD BY AUTOMATED COUNT: 14.4 % (ref 10–15)
ERYTHROCYTE [SEDIMENTATION RATE] IN BLOOD BY WESTERGREN METHOD: 16 MM/H (ref 0–30)
GFR SERPL CREATININE-BSD FRML MDRD: 79 ML/MIN/{1.73_M2}
HCT VFR BLD AUTO: 38.8 % (ref 35–47)
HGB BLD-MCNC: 13.2 G/DL (ref 11.7–15.7)
IMM GRANULOCYTES # BLD: 0.1 10E9/L (ref 0–0.4)
IMM GRANULOCYTES NFR BLD: 0.4 %
LYMPHOCYTES # BLD AUTO: 1 10E9/L (ref 0.8–5.3)
LYMPHOCYTES NFR BLD AUTO: 8.2 %
MCH RBC QN AUTO: 34.1 PG (ref 26.5–33)
MCHC RBC AUTO-ENTMCNC: 34 G/DL (ref 31.5–36.5)
MCV RBC AUTO: 100 FL (ref 78–100)
MONOCYTES # BLD AUTO: 1.1 10E9/L (ref 0–1.3)
MONOCYTES NFR BLD AUTO: 9.9 %
NEUTROPHILS # BLD AUTO: 9.1 10E9/L (ref 1.6–8.3)
NEUTROPHILS NFR BLD AUTO: 79 %
NRBC # BLD AUTO: 0 10*3/UL
NRBC BLD AUTO-RTO: 0 /100
PLATELET # BLD AUTO: 244 10E9/L (ref 150–450)
RBC # BLD AUTO: 3.87 10E12/L (ref 3.8–5.2)
WBC # BLD AUTO: 11.6 10E9/L (ref 4–11)

## 2018-12-20 PROCEDURE — 86140 C-REACTIVE PROTEIN: CPT | Mod: ZL

## 2018-12-20 PROCEDURE — 85025 COMPLETE CBC W/AUTO DIFF WBC: CPT | Mod: ZL

## 2018-12-20 PROCEDURE — 82040 ASSAY OF SERUM ALBUMIN: CPT | Mod: ZL

## 2018-12-20 PROCEDURE — 82565 ASSAY OF CREATININE: CPT | Mod: ZL

## 2018-12-20 PROCEDURE — 36415 COLL VENOUS BLD VENIPUNCTURE: CPT | Mod: ZL

## 2018-12-20 PROCEDURE — 85652 RBC SED RATE AUTOMATED: CPT | Mod: ZL

## 2018-12-20 PROCEDURE — 84460 ALANINE AMINO (ALT) (SGPT): CPT | Mod: ZL

## 2018-12-21 ENCOUNTER — INFUSION THERAPY VISIT (OUTPATIENT)
Dept: INFUSION THERAPY | Facility: OTHER | Age: 68
End: 2018-12-21
Attending: FAMILY MEDICINE
Payer: MEDICARE

## 2018-12-21 VITALS
TEMPERATURE: 97.4 F | HEIGHT: 66 IN | HEART RATE: 63 BPM | DIASTOLIC BLOOD PRESSURE: 61 MMHG | BODY MASS INDEX: 34.07 KG/M2 | SYSTOLIC BLOOD PRESSURE: 145 MMHG | WEIGHT: 212 LBS | RESPIRATION RATE: 18 BRPM | OXYGEN SATURATION: 96 %

## 2018-12-21 DIAGNOSIS — M06.9 RHEUMATOID ARTHRITIS, INVOLVING UNSPECIFIED SITE, UNSPECIFIED RHEUMATOID FACTOR PRESENCE: ICD-10-CM

## 2018-12-21 DIAGNOSIS — Z79.899 ENCOUNTER FOR LONG-TERM (CURRENT) USE OF MEDICATIONS: ICD-10-CM

## 2018-12-21 DIAGNOSIS — M06.09 RHEUMATOID ARTHRITIS OF MULTIPLE SITES WITH NEGATIVE RHEUMATOID FACTOR (H): ICD-10-CM

## 2018-12-21 DIAGNOSIS — M06.09 RHEUMATOID ARTHRITIS OF MULTIPLE SITES WITHOUT RHEUMATOID FACTOR (H): Primary | ICD-10-CM

## 2018-12-21 PROCEDURE — 96375 TX/PRO/DX INJ NEW DRUG ADDON: CPT

## 2018-12-21 PROCEDURE — 25000128 H RX IP 250 OP 636: Performed by: FAMILY MEDICINE

## 2018-12-21 PROCEDURE — 96365 THER/PROPH/DIAG IV INF INIT: CPT

## 2018-12-21 PROCEDURE — 96415 CHEMO IV INFUSION ADDL HR: CPT

## 2018-12-21 PROCEDURE — 96413 CHEMO IV INFUSION 1 HR: CPT

## 2018-12-21 PROCEDURE — 96367 TX/PROPH/DG ADDL SEQ IV INF: CPT

## 2018-12-21 PROCEDURE — 96366 THER/PROPH/DIAG IV INF ADDON: CPT

## 2018-12-21 RX ORDER — METHYLPREDNISOLONE SODIUM SUCCINATE 125 MG/2ML
100 INJECTION, POWDER, LYOPHILIZED, FOR SOLUTION INTRAMUSCULAR; INTRAVENOUS ONCE
Status: CANCELLED
Start: 2018-12-21 | End: 2018-12-21

## 2018-12-21 RX ORDER — METHYLPREDNISOLONE SODIUM SUCCINATE 125 MG/2ML
100 INJECTION, POWDER, LYOPHILIZED, FOR SOLUTION INTRAMUSCULAR; INTRAVENOUS ONCE
Status: COMPLETED | OUTPATIENT
Start: 2018-12-21 | End: 2018-12-21

## 2018-12-21 RX ORDER — METHYLPREDNISOLONE SODIUM SUCCINATE 40 MG/ML
40 INJECTION, POWDER, LYOPHILIZED, FOR SOLUTION INTRAMUSCULAR; INTRAVENOUS ONCE
Status: CANCELLED
Start: 2018-12-21 | End: 2018-12-21

## 2018-12-21 RX ADMIN — SODIUM CHLORIDE 250 ML: 9 INJECTION, SOLUTION INTRAVENOUS at 10:05

## 2018-12-21 RX ADMIN — RITUXIMAB 1000 MG: 10 INJECTION, SOLUTION INTRAVENOUS at 10:41

## 2018-12-21 RX ADMIN — FAMOTIDINE 20 MG: 20 INJECTION, SOLUTION INTRAVENOUS at 10:05

## 2018-12-21 RX ADMIN — METHYLPREDNISOLONE SODIUM SUCCINATE 100 MG: 125 INJECTION, POWDER, FOR SOLUTION INTRAMUSCULAR; INTRAVENOUS at 10:07

## 2018-12-21 ASSESSMENT — MIFFLIN-ST. JEOR: SCORE: 1513.13

## 2018-12-21 NOTE — PATIENT INSTRUCTIONS
Patient Education     Rituximab Solution for injection  What is this medicine?  RITUXIMAB (ri TUX i mab) is a monoclonal antibody. This medicine changes the way the body's immune system works. It is used commonly to treat non-Hodgkin lymphoma and other conditions. In cancer cells, this drug targets a specific protein within cancer cells and stops the cancer cells from growing. It is also used to treat rheumatoid arthritis (RA). In RA, this medicine slows the inflammatory process and help reduce joint pain and swelling. This medicine is often used with other cancer or arthritis medications.  This medicine may be used for other purposes; ask your health care provider or pharmacist if you have questions.  What should I tell my health care provider before I take this medicine?  They need to know if you have any of these conditions:    blood disorders    heart disease    history of hepatitis B    infection (especially a virus infection such as chickenpox, cold sores, or herpes)    irregular heartbeat    kidney disease    lung or breathing disease, like asthma    lupus    an unusual or allergic reaction to rituximab, mouse proteins, other medicines, foods, dyes, or preservatives    pregnant or trying to get pregnant    breast-feeding  How should I use this medicine?  This medicine is for infusion into a vein. It is administered in a hospital or clinic by a specially trained health care professional.  A special MedGuide will be given to you by the pharmacist with each prescription and refill. Be sure to read this information carefully each time.  Talk to your pediatrician regarding the use of this medicine in children. This medicine is not approved for use in children.  Overdosage: If you think you have taken too much of this medicine contact a poison control center or emergency room at once.  NOTE: This medicine is only for you. Do not share this medicine with others.  What if I miss a dose?  It is important not to miss a  dose. Call your doctor or health care professional if you are unable to keep an appointment.  What may interact with this medicine?    cisplatin    medicines for blood pressure    some other medicines for arthritis    vaccines  This list may not describe all possible interactions. Give your health care provider a list of all the medicines, herbs, non-prescription drugs, or dietary supplements you use. Also tell them if you smoke, drink alcohol, or use illegal drugs. Some items may interact with your medicine.  What should I watch for while using this medicine?  Report any side effects that you notice during your treatment right away, such as changes in your breathing, fever, chills, dizziness or lightheadedness. These effects are more common with the first dose.  Visit your prescriber or health care professional for checks on your progress. You will need to have regular blood work. Report any other side effects. The side effects of this medicine can continue after you finish your treatment. Continue your course of treatment even though you feel ill unless your doctor tells you to stop.  Call your doctor or health care professional for advice if you get a fever, chills or sore throat, or other symptoms of a cold or flu. Do not treat yourself. This drug decreases your body's ability to fight infections. Try to avoid being around people who are sick.  This medicine may increase your risk to bruise or bleed. Call your doctor or health care professional if you notice any unusual bleeding.  Be careful brushing and flossing your teeth or using a toothpick because you may get an infection or bleed more easily. If you have any dental work done, tell your dentist you are receiving this medicine.  Avoid taking products that contain aspirin, acetaminophen, ibuprofen, naproxen, or ketoprofen unless instructed by your doctor. These medicines may hide a fever.  Do not become pregnant while taking this medicine. Women should inform  their doctor if they wish to become pregnant or think they might be pregnant. There is a potential for serious side effects to an unborn child. Talk to your health care professional or pharmacist for more information. Do not breast-feed an infant while taking this medicine.  What side effects may I notice from receiving this medicine?  Side effects that you should report to your doctor or health care professional as soon as possible:    allergic reactions like skin rash, itching or hives, swelling of the face, lips, or tongue    low blood counts - this medicine may decrease the number of white blood cells, red blood cells and platelets. You may be at increased risk for infections and bleeding.    signs of infection - fever or chills, cough, sore throat, pain or difficulty passing urine    signs of decreased platelets or bleeding - bruising, pinpoint red spots on the skin, black, tarry stools, blood in the urine    signs of decreased red blood cells - unusually weak or tired, fainting spells, lightheadedness    breathing problems    confused, not responsive    chest pain    fast, irregular heartbeat    feeling faint or lightheaded, falls    mouth sores    redness, blistering, peeling or loosening of the skin, including inside the mouth    stomach pain    swelling of the ankles, feet, or hands    trouble passing urine or change in the amount of urine  Side effects that usually do not require medical attention (report to your doctor or other health care professional if they continue or are bothersome):    anxiety    headache    loss of appetite    muscle aches    nausea    night sweats  This list may not describe all possible side effects. Call your doctor for medical advice about side effects. You may report side effects to FDA at 8-862-TAE-6914.  Where should I keep my medicine?  This drug is given in a hospital or clinic and will not be stored at home.  NOTE:This sheet is a summary. It may not cover all possible  information. If you have questions about this medicine, talk to your doctor, pharmacist, or health care provider. Copyright  2016 Gold Standard

## 2018-12-21 NOTE — PROGRESS NOTES
IV removed, catheter intact.  Site clean, dry and intact.  No signs or symptoms of infiltration or infection.  Covered with a sterile bandage, slight pressure applied for 30 seconds.  Pt instructed to leave bandage intact for a minimum of one hour, and to call with questions or concerns.  Copy of appointments, discharge instructions, and after visit summary (AVS) provided to patient.  Patient states understanding, discharged ambulatory.

## 2018-12-21 NOTE — PROGRESS NOTES
Patient is a 68 year old female here accompanied by self today for infusion of rituximab per order of Dr Costello under the supervision of Lovely Dobson.  Patient finished abx Wednesday for sinus infection, diagnosed and treated by ENT per her report. Reports sx have resolved well, just clear drainage now. Denies any other sx. Afebrile and vitals WDL. White count slightly elevated and ANC elevated, but this is not outside the normal range for patient. Call to [facility]. Dr Acevedo on call at clinic today and nurse reviewed all with MD nurse. MARILEE with ok to treat this date. Patient identified with two identifiers, order verified, and verbal consent for today's infusion obtained from patient.      12-20-18 lab values:  WBC: 11.6, HGB: 13.2, PLT: 244, ANC: 9.1,  ALT: 34, Creatinine: 0.77.  Patient meets order parameters for today's treatment.     24 gauge angio cath inserted into right.  Immediate blood return noted.  IV secured with sterile, transparent dressing and tape.  Patient tolerated well, denies pain or discomfort at this time.  Flushes easily without resistance, no signs or symptoms of infiltration or infection.   Patient denies questions or concerns regarding infusion and/or medication(s) being administered.      1041: IV pump verified with dose, drug, and rate of administration.  Infusion administered per protocol.    Scheduled patient for next infusions in June, blue cards given, phone message to [facility] [place] on schedule June 3rd and 18th at 0900.    Faxed labs, Francois, and visit notes to [facility]. Francois sent to [facility].

## 2018-12-31 ENCOUNTER — HOSPITAL ENCOUNTER (EMERGENCY)
Facility: HOSPITAL | Age: 68
Discharge: HOME | End: 2018-12-31
Attending: PHYSICIAN ASSISTANT | Admitting: PHYSICIAN ASSISTANT
Payer: MEDICARE

## 2018-12-31 VITALS
SYSTOLIC BLOOD PRESSURE: 155 MMHG | HEART RATE: 65 BPM | WEIGHT: 212 LBS | BODY MASS INDEX: 34.23 KG/M2 | RESPIRATION RATE: 18 BRPM | DIASTOLIC BLOOD PRESSURE: 73 MMHG | OXYGEN SATURATION: 97 % | TEMPERATURE: 97.6 F

## 2018-12-31 DIAGNOSIS — H66.002 ACUTE SUPPURATIVE OTITIS MEDIA OF LEFT EAR WITHOUT SPONTANEOUS RUPTURE OF TYMPANIC MEMBRANE, RECURRENCE NOT SPECIFIED: ICD-10-CM

## 2018-12-31 DIAGNOSIS — J01.01 ACUTE RECURRENT MAXILLARY SINUSITIS: ICD-10-CM

## 2018-12-31 PROCEDURE — 99213 OFFICE O/P EST LOW 20 MIN: CPT | Mod: Z6 | Performed by: PHYSICIAN ASSISTANT

## 2018-12-31 PROCEDURE — G0463 HOSPITAL OUTPT CLINIC VISIT: HCPCS

## 2018-12-31 RX ORDER — LEVOFLOXACIN 500 MG/1
500 TABLET, FILM COATED ORAL DAILY
Qty: 7 TABLET | Refills: 0 | Status: SHIPPED | OUTPATIENT
Start: 2018-12-31 | End: 2019-02-02

## 2018-12-31 ASSESSMENT — ENCOUNTER SYMPTOMS
SORE THROAT: 1
HEADACHES: 1
GASTROINTESTINAL NEGATIVE: 1
FATIGUE: 1
COUGH: 1
FACIAL SWELLING: 0
PSYCHIATRIC NEGATIVE: 1
SINUS PAIN: 1
FEVER: 1
SINUS PRESSURE: 1

## 2018-12-31 NOTE — DISCHARGE INSTRUCTIONS
1. Consider drying out congestion with the solution (1spray in both nostrils 2x daily for 3-5 days). Do another bottle while on antibiotics.   2. Use a saline spray/Neti Pot/sinus flush ([facility] Sinus Rinse) 2-3 times daily to irrigate sinuses/mucosal tissue. This dilutes and moves secretions.   3. Tylenol or ibuprofen for pain and fevers as needed.   4. Plenty of fluids and rest as needed.  * Back here sooner with: fevers, change in chest symptoms, worsening of pain despite treatment.

## 2018-12-31 NOTE — ED PROVIDER NOTES
"  History     Chief Complaint   Patient presents with     Cough     \"for a little over 2 weeks\"      Otalgia     \"started yesterday\"      Sinusitis     \"thinks has a sinus infection\"      HPI  Radha Fox is a 68 year old female  With Hx DM, RA, recurrent UTIs who presents to the urgent care with recurrence of sinusitis. She reports right sided facial pain for 4 days. She was treated 2x prior with last round antibiotic on 12/06. She had culture done that revealed staph aureus at that time. She reports significantly worse pain and bilateral ear pain, R>L for 3 days. She has been doing her neti pot without any relief. NO fevers. NO facial swelling/redness. She adds she has been chilled and tired.     Problem List:    Patient Active Problem List    Diagnosis Date Noted     Type 2 diabetes mellitus without complication (H) 10/30/2017     Priority: Medium     Chronic cough 08/17/2016     Priority: Medium     ACP (advance care planning) 07/21/2016     Priority: Medium     Advance Care Planning 7/21/2016: ACP Review of Chart / Resources Provided:  Reviewed chart for advance care plan.  Radha Fox has no plan or code status on file. Discussed available resources and provided with information. Confirmed code status reflects current choices pending further ACP discussions.  Confirmed/documented legally designated decision makers.  Added by Gregoria Gregorio             Diabetes mellitus type 2, diet-controlled (H)      Priority: Medium     Rheumatoid arthritis of multiple sites without rheumatoid factor (H) 11/10/2015     Priority: Medium     SNHL (sensory-neural hearing loss), asymmetrical 07/08/2014     Priority: Medium     negative MRI IAC 7/2014       S/P myringotomy with insertion of tube 07/08/2014     Priority: Medium     History of chronic sinusitis 07/08/2014     Priority: Medium     History of sinus surgery 05/28/2014     Priority: Medium     Simple chronic serous otitis media 05/28/2014     " Priority: Medium     Problem list name updated by automated process. Provider to review       Dysfunction of eustachian tube 05/28/2014     Priority: Medium     Mixed hearing loss, unilateral 05/28/2014     Priority: Medium     Abnormal glucose 01/22/2014     Priority: Medium     Problem list name updated by automated process. Provider to review       Recurrent UTI 03/20/2013     Priority: Medium     RA (rheumatoid arthritis) (H) 03/12/2013     Priority: Medium     Insomnia 01/24/2013     Priority: Medium     Problem list name updated by automated process. Provider to review       Osteoporosis 01/19/2012     Priority: Medium     Problem list name updated by automated process. Provider to review       Obesity 01/19/2012     Priority: Medium     Problem list name updated by automated process. Provider to review       Restless legs syndrome (RLS) 04/21/2011     Priority: Medium     Esophageal reflux 11/12/2010     Priority: Medium     Contact dermatitis and other eczema, due to unspecified cause 03/05/2009     Priority: Medium     Coronary atherosclerosis 09/27/2005     Priority: Medium     Problem list name updated by automated process. Provider to review       Hyperlipidemia 04/13/2005     Priority: Medium     Problem list name updated by automated process. Provider to review       Depressive disorder, not elsewhere classified 01/11/2002     Priority: Medium     Sinusitis, chronic 09/05/2000     Priority: Medium     Problem list name updated by automated process. Provider to review       Arthropathy 05/01/2000     Priority: Medium     Problem list name updated by automated process. Provider to review       Diffuse connective tissue disease (H) 04/04/2000     Priority: Medium     Problem list name updated by automated process. Provider to review          Past Medical History:    Past Medical History:   Diagnosis Date     Abnormal PFT      Arthropathy, unspecified, site unspecified 05/01/2000     Chest pain,  unspecified 2005     Contact dermatitis and other eczema, due to unspecified cause 03/05/2009     Coronary atherosclerosis of unspecified type of vessel, native or graft 09/27/2005     Depressive disorder, not elsewhere classified 01/11/2002     Diabetes mellitus type 2, diet-controlled (H)      Esophageal reflux 11/12/2010     H/O: hysterectomy 1977     Impaired fasting glucose 11/12/2010     Insomnia, unspecified 01/24/2013     Leukocytosis, unspecified 12/22/2007     Obesity, unspecified 01/19/2012     Osteoporosis, unspecified 01/19/2012     Other and unspecified hyperlipidemia 04/13/2005     Other dyspnea and respiratory abnormality 01/19/2012     Restless legs syndrome (RLS) 04/21/2011     Rheumatoid arthritis(714.0) 11/09/1999     Unspecified diffuse connective tissue disease 04/04/2000     Unspecified sinusitis (chronic) 09/05/2000       Past Surgical History:    Past Surgical History:   Procedure Laterality Date     APPENDECTOMY  1961     CHOLECYSTECTOMY  1973     COLONOSCOPY N/A 3/23/2015    Procedure: COLONOSCOPY;  Surgeon: Clarisa Larkin MD;  Location: [facility] OR     endoscopic sphenoidotomy  10/2011    Right     ETHMOIDECTOMY Bilateral 9/27/2016    Procedure: ETHMOIDECTOMY;  Surgeon: Aracelis Ayon MD;  Location: [facility] OR     excision bilateral telma bullosa  10/2011     GENITOURINARY SURGERY       HYSTERECTOMY      benign     LAPAROSCOPY DIAGNOSTIC (GENERAL)       ORTHOPEDIC SURGERY      left foot 5th metarsal fracture screws placed     SEPTOPLASTY N/A 9/27/2016    Procedure: SEPTOPLASTY;  Surgeon: Aracelis Ayon MD;  Location: [facility] OR     TOT Internal urethrotomy  2010     TUBAL LIGATION         Family History:    Family History   Problem Relation Age of Onset     C.A.D. Father 83     Other - See Comments Father         CHF     Cerebrovascular Disease Father         CVA     Diabetes Father      Hypertension Father      Coronary Artery Disease Father      Cerebrovascular Disease Mother 72         CVA     Heart Disease Mother         Heart Issue     Hyperlipidemia Mother      Coronary Artery Disease Mother      Diabetes Brother      Diabetes Sister      Colon Cancer No family hx of      Breast Cancer No family hx of      Asthma No family hx of      Thyroid Disease No family hx of        Social History:  Marital Status:   [2]  Social History     Tobacco Use     Smoking status: Never Smoker     Smokeless tobacco: Never Used   Substance Use Topics     Alcohol use: No     Alcohol/week: 0.0 oz     Drug use: No        Medications:      ASPIRIN PO   atorvastatin (LIPITOR) 20 MG tablet   Blood Glucose Calibration (ACCU-CHEK ANGELICA) SOLN   Blood Glucose Calibration (ACCU-CHEK COMPACT BLUE CONTROL) LIQD   blood glucose monitoring (ONETOUCH ULTRA) test strip   blood glucose monitoring (ONETOUCH ULTRA) test strip   calcium carbonate-vitamin D (CALCIUM + D) 600-200 MG-UNIT TABS   celecoxib (CELEBREX) 200 MG capsule   COMPOUNDED NON-CONTROLLED SUBSTANCE (CMPD RX) - PHARMACY TO MIX COMPOUNDED MEDICATION   Cyanocobalamin (VITAMIN B 12 PO)   folic acid (FOLVITE) 1 MG tablet   KLOR-CON 20 MEQ CR tablet   levofloxacin (LEVAQUIN) 500 MG tablet   Methotrexate Sodium (METHOTREXATE PO)   multivitamin, therapeutic with minerals (MULTI-VITAMIN) TABS   omeprazole (PRILOSEC) 40 MG capsule   pramipexole (MIRAPEX) 0.25 MG tablet   predniSONE (DELTASONE) 1 MG tablet   Probiotic Product (PROBIOTIC DAILY PO)   propranolol (INDERAL) 20 MG tablet   RiTUXimab (RITUXAN IV)   venlafaxine (EFFEXOR-XR) 75 MG 24 hr capsule         Review of Systems   Constitutional: Positive for fatigue and fever.   HENT: Positive for congestion, ear pain, postnasal drip, sinus pressure, sinus pain and sore throat. Negative for ear discharge and facial swelling.    Respiratory: Positive for cough.    Cardiovascular: Positive for chest pain (intermittent for a few days with laying down).   Gastrointestinal: Negative.    Genitourinary: Negative.     Neurological: Positive for headaches.   Psychiatric/Behavioral: Negative.        Physical Exam   BP: 155/73  Pulse: 65  Temp: 97.6  F (36.4  C)  Resp: 18  Weight: 96.2 kg (212 lb)  SpO2: 97 %      Physical Exam   Constitutional: She appears well-developed and well-nourished. No distress.   HENT:   Head: Normocephalic and atraumatic.   Maxillary sinus tenderness; R>L. Left TM erythematous, retracted. Serous otitis right. OP cobblestoning. NO trismus.    Eyes: Conjunctivae are normal.   Cardiovascular: Normal rate, regular rhythm and normal heart sounds.   Pulmonary/Chest: Effort normal and breath sounds normal. She has no wheezes. She has no rales.   Skin: Skin is warm and dry.   Psychiatric: She has a normal mood and affect.   Nursing note and vitals reviewed.      ED Course        Procedures      No results found for this or any previous visit (from the past 24 hour(s)).    Medications - No data to display    Assessments & Plan (with Medical Decision Making)     I have reviewed the nursing notes.  I have reviewed the findings, diagnosis, plan and need for follow up with the patient.       Medication List      Started    levofloxacin 500 MG tablet  Commonly known as:  LEVAQUIN  500 mg, Oral, DAILY            Final diagnoses:   Acute recurrent maxillary sinusitis   Acute suppurative otitis media of left ear without spontaneous rupture of tympanic membrane, recurrence not specified   Will treat as above. Continue neti pot, gargles. Discussed probiotics with recent antibiotic use. Radha has a f/u with ENT in 4 days, but will return here or ER with significant worsening despite treatment. Patient verbally educated and given appropriate education sheets for each of the diagnoses and has no questions.    Towards end of visit Radha mentioned intermittent CP over past few days that occurs when she lays flat, but denies any pains/discomfort in the UC.  I recommended that she return here with persistence, changes/worsening  of symptoms (hernandez with exertion) or Sx NOT controlled with prilosec 40mg or remaining upright for 3 hrs after meal.      Carlos Cutler PA-C   12/31/2018   3:34 PM       12/31/2018   [facility]     Carlos Cutler PA  12/31/18 1537       Carlos Cutler PA  12/31/18 1613

## 2018-12-31 NOTE — ED AVS SNAPSHOT
[facility]  [address] [address]  Phone:  [phone]                                    Radha Fox   MRN: [id number]    Department:  [facility]   Date of Visit:  12/31/2018           After Visit Summary Signature Page    I have received my discharge instructions, and my questions have been answered. I have discussed any challenges I see with this plan with the nurse or doctor.    ..........................................................................................................................................  Patient/Patient Representative Signature      ..........................................................................................................................................  Patient Representative Print Name and Relationship to Patient    ..................................................               ................................................  Date                                   Time    ..........................................................................................................................................  Reviewed by Signature/Title    ...................................................              ..............................................  Date                                               Time          22EPIC Rev 08/18

## 2018-12-31 NOTE — ED TRIAGE NOTES
Pt presents with bilateral ear pain for 2 days, sinus pressure, headache and drainage for 2-1/2 weeks. States she also has a productive cough bringing up yellow sputum and pain to bronchial area for 3 days.

## 2019-01-03 ENCOUNTER — OFFICE VISIT (OUTPATIENT)
Dept: OTOLARYNGOLOGY | Facility: OTHER | Age: 69
End: 2019-01-03
Attending: PHYSICIAN ASSISTANT
Payer: MEDICARE

## 2019-01-03 VITALS
BODY MASS INDEX: 35.32 KG/M2 | SYSTOLIC BLOOD PRESSURE: 124 MMHG | WEIGHT: 212 LBS | TEMPERATURE: 97.1 F | OXYGEN SATURATION: 99 % | HEIGHT: 65 IN | HEART RATE: 61 BPM | DIASTOLIC BLOOD PRESSURE: 72 MMHG

## 2019-01-03 DIAGNOSIS — J32.0 CHRONIC MAXILLARY SINUSITIS: Primary | ICD-10-CM

## 2019-01-03 DIAGNOSIS — J32.2 CHRONIC ETHMOIDAL SINUSITIS: ICD-10-CM

## 2019-01-03 DIAGNOSIS — Z98.890 HISTORY OF ENDOSCOPIC SINUS SURGERY: ICD-10-CM

## 2019-01-03 PROCEDURE — G0463 HOSPITAL OUTPT CLINIC VISIT: HCPCS

## 2019-01-03 PROCEDURE — 31231 NASAL ENDOSCOPY DX: CPT | Performed by: PHYSICIAN ASSISTANT

## 2019-01-03 PROCEDURE — 31231 NASAL ENDOSCOPY DX: CPT

## 2019-01-03 PROCEDURE — 99213 OFFICE O/P EST LOW 20 MIN: CPT | Mod: 25 | Performed by: PHYSICIAN ASSISTANT

## 2019-01-03 PROCEDURE — G0463 HOSPITAL OUTPT CLINIC VISIT: HCPCS | Mod: 25

## 2019-01-03 RX ORDER — LEVOFLOXACIN 500 MG/1
500 TABLET, FILM COATED ORAL DAILY
Qty: 3 TABLET | Refills: 0 | Status: SHIPPED | OUTPATIENT
Start: 2019-01-03 | End: 2019-02-02

## 2019-01-03 ASSESSMENT — PAIN SCALES - GENERAL: PAINLEVEL: NO PAIN (0)

## 2019-01-03 ASSESSMENT — MIFFLIN-ST. JEOR: SCORE: 1497.51

## 2019-01-03 NOTE — LETTER
1/3/2019         RE: Radha Fox  [address]  [place] [address]        Dear Colleague,    Thank you for referring your patient, Radha Fox, to the [facility]. Please see a copy of my visit note below.    Chief Complaint   Patient presents with     Sinusitis     chronic maxillary sinusitis- culture, Budesonide, Omnicef     Patient returns for sinusitis check.   She was last seen on 12/6/18 and had exam/ suctioning completed. She was treated w/ Doxy for 7 days for some relief. She had Bilateral ET, cough, sinusitis and treated in ED with oral Levaquin.   She has been on levaquin since Monday for 7 days  Radha has felt improvement, but is not clear at this time.   She has a dull headaches, chest congestion. She had no wheeze or rales at her last visit.     Medication rinse was changed Dexamethasone rinse BID at this time. She has a a few refills left.     She is still on Rituxan IV for rheumatoid arthritis  And postnasal drainage.  Sinus endoscopy is negative at that point she is instructed on continuing Neomed irrigation and Flonase      PROCEDURES PERFORMED 9/27/16:   1.  Bilateral total ethmoidectomy     2.  Bilateral maxillary antrostomy with tissue removal    3.  Septoplasty/nasal septal reconstruction with cartilage reinsertion    Sinus procedures above performed using [employer]  navigation.  FINDINGS:  Copious mucopurulent fluid left maxillary sinus, thick polypoid degeneration anterior and posterior ethmoid cavity  FINAL DIAGNOSIS:   Sinus, FESS   - Inflammatory nasal polyp no eos    Susceptibility     Staphylococcus aureus (1)     Antibiotic Interpretation Sensitivity Method Status   CLINDAMYCIN Sensitive <=0.25 ug/mL BLAKE Final   ERYTHROMYCIN Sensitive <=0.25 ug/mL BLAKE Final   GENTAMICIN Sensitive <=0.5 ug/mL BLAKE Final   OXACILLIN Sensitive 0.5 ug/mL BLAKE Final   PENICILLIN Sensitive 0.12 ug/mL BLAKE Final   TETRACYCLINE Sensitive <=1 ug/mL BLAKE Final    Trimethoprim/Sulfa Sensitive <=0.5/9.5 ug/mL BLAKE Final   VANCOMYCIN Sensitive           Past Medical History:   Diagnosis Date     Abnormal PFT     moderate restriction/parenchymal; moderate diffusion defect; minimal obstruction     Arthropathy, unspecified, site unspecified 05/01/2000     Chest pain, unspecified 2005    Resolved     Contact dermatitis and other eczema, due to unspecified cause 03/05/2009     Coronary atherosclerosis of unspecified type of vessel, native or graft 09/27/2005     Depressive disorder, not elsewhere classified 01/11/2002     Diabetes mellitus type 2, diet-controlled (H)      Esophageal reflux 11/12/2010     H/O: hysterectomy 1977     Impaired fasting glucose 11/12/2010     Insomnia, unspecified 01/24/2013     Leukocytosis, unspecified 12/22/2007     Obesity, unspecified 01/19/2012     Osteoporosis, unspecified 01/19/2012     Other and unspecified hyperlipidemia 04/13/2005     Other dyspnea and respiratory abnormality 01/19/2012    reduced DLCO     Restless legs syndrome (RLS) 04/21/2011     Rheumatoid arthritis(714.0) 11/09/1999     Unspecified diffuse connective tissue disease 04/04/2000     Unspecified sinusitis (chronic) 09/05/2000        Allergies   Allergen Reactions     Neomycin-Polymyxin-Hc      Rash behind ear after ear drops     Penicillins Hives and Itching     One time large local reaction after IM PCN in 1970, no airway symptoms     Plaquenil [Hydroxychloroquine Sulfate] Hives     Current Outpatient Medications   Medication     ASPIRIN PO     atorvastatin (LIPITOR) 20 MG tablet     Blood Glucose Calibration (ACCU-CHEK ANGELICA) SOLN     Blood Glucose Calibration (ACCU-CHEK COMPACT BLUE CONTROL) LIQD     blood glucose monitoring (ONETOUCH ULTRA) test strip     blood glucose monitoring (ONETOUCH ULTRA) test strip     calcium carbonate-vitamin D (CALCIUM + D) 600-200 MG-UNIT TABS     celecoxib (CELEBREX) 200 MG capsule     COMPOUNDED NON-CONTROLLED SUBSTANCE (CMPD RX) - PHARMACY  "TO MIX COMPOUNDED MEDICATION     Cyanocobalamin (VITAMIN B 12 PO)     folic acid (FOLVITE) 1 MG tablet     KLOR-CON 20 MEQ CR tablet     levofloxacin (LEVAQUIN) 500 MG tablet     Methotrexate Sodium (METHOTREXATE PO)     multivitamin, therapeutic with minerals (MULTI-VITAMIN) TABS     omeprazole (PRILOSEC) 40 MG capsule     pramipexole (MIRAPEX) 0.25 MG tablet     predniSONE (DELTASONE) 1 MG tablet     Probiotic Product (PROBIOTIC DAILY PO)     propranolol (INDERAL) 20 MG tablet     RiTUXimab (RITUXAN IV)     venlafaxine (EFFEXOR-XR) 75 MG 24 hr capsule     No current facility-administered medications for this visit.       ROS: 10 point ROS neg other than the symptoms noted above in the HPI.  /72 (BP Location: Right arm, Patient Position: Sitting, Cuff Size: Adult Large)   Pulse 61   Temp 97.1  F (36.2  C) (Tympanic)   Ht 1.651 m (5' 5\")   Wt 96.2 kg (212 lb)   SpO2 99%   BMI 35.28 kg/m     General - The patient is well nourished and well developed, and appears to have good nutritional status.  Alert and oriented to person and place, interactive.  Head and Face - Normocephalic and atraumatic, with no gross asymmetry noted of the contour of the facial features.  The facial nerve is intact, with strong symmetric movements.  Neck-no palpable lymphadenopathy or thyroid mass.  Trachea is midline.  Eyes - Extraocular movements intact.   Ears- External auditory canals are patent, tympanic membranes are intact without effusion or worrisome retractions   Nose - Nasal mucosa is pink and moist with no abnormal mucus.  The septum was grossly midline and non-obstructive, turbinates of normal size and position.  No polyps, masses, or purulence noted on examination.  Mouth - Examination of the oral cavity shows pink, healthy, moist mucosa.  No lesions or ulceration noted.  The dentition are in good repair.  The tongue is mobile and midline.  Throat - The walls of the oropharynx were smooth, pink, moist, symmetric, and " had no lesions or ulcerations.  The tonsillar pillars and soft palate were symmetric.  The uvula was midline on elevation.          To evaluate the nose and sinuses in the post operative state, I performed rigid nasal endoscopy. The LPN had previously sprayed both nares with lidocaine and neosynephrine.     I began with the LEFT side using a 0 degree rigid nasal endoscope, and then similarly examined the RIGHT side     Findings:  Inferior turbinates:  lateralized  Middle turbinate and middle meatus: Bilateral- maxillary antrostomy are widely patent. There is no active drainage at this time.   Antrostomy patent and clear right  Ethmoid cavity clear bilaterally  Mucosa is  healthy throughout without polyps nor polypoid degeneration    Heart- Regular rate  Lungs- Clear anterior and posterior.     ASSESSMENT:    ICD-10-CM    1. Chronic maxillary sinusitis J32.0 levofloxacin (LEVAQUIN) 500 MG tablet   2. History of endoscopic sinus surgery Z98.890 levofloxacin (LEVAQUIN) 500 MG tablet   3. Chronic ethmoidal sinusitis J32.2        If she has continued chest discomfort at sternal region- present to UC/ ED for evaluation. She denies worsening symptoms. See ED note from 12/31/18    Complete Levaquin for a total of 10 days.   Continue with Dex rinse for an additional 2 weeks. If worsening or recurrent sinuitis, consider Budesonide mail order Rx.     Recheck in 3-4 weeks  Sinuses are widely patent and clear on exam today.       Jo Paulino PA-C  ENT  [facility]  [phone]        Again, thank you for allowing me to participate in the care of your patient.        Sincerely,        Jo Paulino PA-C

## 2019-01-03 NOTE — NURSING NOTE
"Chief Complaint   Patient presents with     Sinusitis     chronic maxillary sinusitis- culture, Budesonide, Omnicef     Scope 1206QB  Scope 120KXE not used, but sent to sterile processing as it did not have a scope guard    Initial /72 (BP Location: Right arm, Patient Position: Sitting, Cuff Size: Adult Large)   Pulse 61   Temp 97.1  F (36.2  C) (Tympanic)   Ht 1.651 m (5' 5\")   Wt 96.2 kg (212 lb)   SpO2 99%   BMI 35.28 kg/m   Estimated body mass index is 35.28 kg/m  as calculated from the following:    Height as of this encounter: 1.651 m (5' 5\").    Weight as of this encounter: 96.2 kg (212 lb).  Medication Reconciliation: complete    Soha Barnard LPN  "

## 2019-01-03 NOTE — PROGRESS NOTES
Chief Complaint   Patient presents with     Sinusitis     chronic maxillary sinusitis- culture, Budesonide, Omnicef     Patient returns for sinusitis check.   She was last seen on 12/6/18 and had exam/ suctioning completed. She was treated w/ Doxy for 7 days for some relief. She had Bilateral ET, cough, sinusitis and treated in ED with oral Levaquin.   She has been on levaquin since Monday for 7 days  Radha has felt improvement, but is not clear at this time.   She has a dull headaches, chest congestion. She had no wheeze or rales at her last visit.     Medication rinse was changed Dexamethasone rinse BID at this time. She has a a few refills left.     She is still on Rituxan IV for rheumatoid arthritis  And postnasal drainage.  Sinus endoscopy is negative at that point she is instructed on continuing Neomed irrigation and Flonase      PROCEDURES PERFORMED 9/27/16:   1.  Bilateral total ethmoidectomy     2.  Bilateral maxillary antrostomy with tissue removal    3.  Septoplasty/nasal septal reconstruction with cartilage reinsertion    Sinus procedures above performed using [employer]  navigation.  FINDINGS:  Copious mucopurulent fluid left maxillary sinus, thick polypoid degeneration anterior and posterior ethmoid cavity  FINAL DIAGNOSIS:   Sinus, FESS   - Inflammatory nasal polyp no eos    Susceptibility     Staphylococcus aureus (1)     Antibiotic Interpretation Sensitivity Method Status   CLINDAMYCIN Sensitive <=0.25 ug/mL BLAKE Final   ERYTHROMYCIN Sensitive <=0.25 ug/mL BLAKE Final   GENTAMICIN Sensitive <=0.5 ug/mL BLAKE Final   OXACILLIN Sensitive 0.5 ug/mL BLAKE Final   PENICILLIN Sensitive 0.12 ug/mL BLAKE Final   TETRACYCLINE Sensitive <=1 ug/mL BLAKE Final   Trimethoprim/Sulfa Sensitive <=0.5/9.5 ug/mL BLAKE Final   VANCOMYCIN Sensitive           Past Medical History:   Diagnosis Date     Abnormal PFT     moderate restriction/parenchymal; moderate diffusion defect; minimal obstruction     Arthropathy, unspecified, site  unspecified 05/01/2000     Chest pain, unspecified 2005    Resolved     Contact dermatitis and other eczema, due to unspecified cause 03/05/2009     Coronary atherosclerosis of unspecified type of vessel, native or graft 09/27/2005     Depressive disorder, not elsewhere classified 01/11/2002     Diabetes mellitus type 2, diet-controlled (H)      Esophageal reflux 11/12/2010     H/O: hysterectomy 1977     Impaired fasting glucose 11/12/2010     Insomnia, unspecified 01/24/2013     Leukocytosis, unspecified 12/22/2007     Obesity, unspecified 01/19/2012     Osteoporosis, unspecified 01/19/2012     Other and unspecified hyperlipidemia 04/13/2005     Other dyspnea and respiratory abnormality 01/19/2012    reduced DLCO     Restless legs syndrome (RLS) 04/21/2011     Rheumatoid arthritis(714.0) 11/09/1999     Unspecified diffuse connective tissue disease 04/04/2000     Unspecified sinusitis (chronic) 09/05/2000        Allergies   Allergen Reactions     Neomycin-Polymyxin-Hc      Rash behind ear after ear drops     Penicillins Hives and Itching     One time large local reaction after IM PCN in 1970, no airway symptoms     Plaquenil [Hydroxychloroquine Sulfate] Hives     Current Outpatient Medications   Medication     ASPIRIN PO     atorvastatin (LIPITOR) 20 MG tablet     Blood Glucose Calibration (ACCU-CHEK ANGELICA) SOLN     Blood Glucose Calibration (ACCU-CHEK COMPACT BLUE CONTROL) LIQD     blood glucose monitoring (ONETOUCH ULTRA) test strip     blood glucose monitoring (ONETOUCH ULTRA) test strip     calcium carbonate-vitamin D (CALCIUM + D) 600-200 MG-UNIT TABS     celecoxib (CELEBREX) 200 MG capsule     COMPOUNDED NON-CONTROLLED SUBSTANCE (CMPD RX) - PHARMACY TO MIX COMPOUNDED MEDICATION     Cyanocobalamin (VITAMIN B 12 PO)     folic acid (FOLVITE) 1 MG tablet     KLOR-CON 20 MEQ CR tablet     levofloxacin (LEVAQUIN) 500 MG tablet     Methotrexate Sodium (METHOTREXATE PO)     multivitamin, therapeutic with minerals  "(MULTI-VITAMIN) TABS     omeprazole (PRILOSEC) 40 MG capsule     pramipexole (MIRAPEX) 0.25 MG tablet     predniSONE (DELTASONE) 1 MG tablet     Probiotic Product (PROBIOTIC DAILY PO)     propranolol (INDERAL) 20 MG tablet     RiTUXimab (RITUXAN IV)     venlafaxine (EFFEXOR-XR) 75 MG 24 hr capsule     No current facility-administered medications for this visit.       ROS: 10 point ROS neg other than the symptoms noted above in the HPI.  /72 (BP Location: Right arm, Patient Position: Sitting, Cuff Size: Adult Large)   Pulse 61   Temp 97.1  F (36.2  C) (Tympanic)   Ht 1.651 m (5' 5\")   Wt 96.2 kg (212 lb)   SpO2 99%   BMI 35.28 kg/m    General - The patient is well nourished and well developed, and appears to have good nutritional status.  Alert and oriented to person and place, interactive.  Head and Face - Normocephalic and atraumatic, with no gross asymmetry noted of the contour of the facial features.  The facial nerve is intact, with strong symmetric movements.  Neck-no palpable lymphadenopathy or thyroid mass.  Trachea is midline.  Eyes - Extraocular movements intact.   Ears- External auditory canals are patent, tympanic membranes are intact without effusion or worrisome retractions   Nose - Nasal mucosa is pink and moist with no abnormal mucus.  The septum was grossly midline and non-obstructive, turbinates of normal size and position.  No polyps, masses, or purulence noted on examination.  Mouth - Examination of the oral cavity shows pink, healthy, moist mucosa.  No lesions or ulceration noted.  The dentition are in good repair.  The tongue is mobile and midline.  Throat - The walls of the oropharynx were smooth, pink, moist, symmetric, and had no lesions or ulcerations.  The tonsillar pillars and soft palate were symmetric.  The uvula was midline on elevation.          To evaluate the nose and sinuses in the post operative state, I performed rigid nasal endoscopy. The LPN had previously sprayed " both nares with lidocaine and neosynephrine.     I began with the LEFT side using a 0 degree rigid nasal endoscope, and then similarly examined the RIGHT side     Findings:  Inferior turbinates:  lateralized  Middle turbinate and middle meatus: Bilateral- maxillary antrostomy are widely patent. There is no active drainage at this time.   Antrostomy patent and clear right  Ethmoid cavity clear bilaterally  Mucosa is  healthy throughout without polyps nor polypoid degeneration    Heart- Regular rate  Lungs- Clear anterior and posterior.     ASSESSMENT:    ICD-10-CM    1. Chronic maxillary sinusitis J32.0 levofloxacin (LEVAQUIN) 500 MG tablet   2. History of endoscopic sinus surgery Z98.890 levofloxacin (LEVAQUIN) 500 MG tablet   3. Chronic ethmoidal sinusitis J32.2        If she has continued chest discomfort at sternal region- present to UC/ ED for evaluation. She denies worsening symptoms. See ED note from 12/31/18    Complete Levaquin for a total of 10 days.   Continue with Dex rinse for an additional 2 weeks. If worsening or recurrent sinuitis, consider Budesonide mail order Rx.     Recheck in 3-4 weeks  Sinuses are widely patent and clear on exam today.       Jo Paulino PA-C  ENT  [facility] [place]  [phone]

## 2019-01-07 ENCOUNTER — TRANSFERRED RECORDS (OUTPATIENT)
Dept: HEALTH INFORMATION MANAGEMENT | Facility: CLINIC | Age: 69
End: 2019-01-07

## 2019-02-02 ENCOUNTER — APPOINTMENT (OUTPATIENT)
Dept: GENERAL RADIOLOGY | Facility: HOSPITAL | Age: 69
End: 2019-02-02
Payer: MEDICARE

## 2019-02-02 ENCOUNTER — APPOINTMENT (OUTPATIENT)
Dept: ULTRASOUND IMAGING | Facility: HOSPITAL | Age: 69
End: 2019-02-02
Payer: MEDICARE

## 2019-02-02 ENCOUNTER — HOSPITAL ENCOUNTER (EMERGENCY)
Facility: HOSPITAL | Age: 69
Discharge: HOME | End: 2019-02-02
Attending: FAMILY MEDICINE | Admitting: FAMILY MEDICINE
Payer: MEDICARE

## 2019-02-02 VITALS
HEART RATE: 58 BPM | SYSTOLIC BLOOD PRESSURE: 148 MMHG | RESPIRATION RATE: 20 BRPM | DIASTOLIC BLOOD PRESSURE: 78 MMHG | OXYGEN SATURATION: 97 % | TEMPERATURE: 98.1 F

## 2019-02-02 DIAGNOSIS — E78.5 HYPERLIPIDEMIA LDL GOAL <100: ICD-10-CM

## 2019-02-02 DIAGNOSIS — R10.31 ABDOMINAL PAIN, RIGHT LOWER QUADRANT: ICD-10-CM

## 2019-02-02 DIAGNOSIS — K21.9 GASTROESOPHAGEAL REFLUX DISEASE, ESOPHAGITIS PRESENCE NOT SPECIFIED: ICD-10-CM

## 2019-02-02 LAB
ALBUMIN UR-MCNC: NEGATIVE MG/DL
ANION GAP SERPL CALCULATED.3IONS-SCNC: 8 MMOL/L (ref 3–14)
APPEARANCE UR: CLEAR
BACTERIA #/AREA URNS HPF: ABNORMAL /HPF
BASOPHILS # BLD AUTO: 0 10E9/L (ref 0–0.2)
BASOPHILS NFR BLD AUTO: 0.4 %
BILIRUB UR QL STRIP: NEGATIVE
BUN SERPL-MCNC: 28 MG/DL (ref 7–30)
CALCIUM SERPL-MCNC: 9 MG/DL (ref 8.5–10.1)
CHLORIDE SERPL-SCNC: 109 MMOL/L (ref 94–109)
CO2 SERPL-SCNC: 27 MMOL/L (ref 20–32)
COLOR UR AUTO: YELLOW
CREAT SERPL-MCNC: 0.83 MG/DL (ref 0.52–1.04)
CRP SERPL-MCNC: 6.6 MG/L (ref 0–8)
DIFFERENTIAL METHOD BLD: ABNORMAL
EOSINOPHIL # BLD AUTO: 0.3 10E9/L (ref 0–0.7)
EOSINOPHIL NFR BLD AUTO: 3.3 %
ERYTHROCYTE [DISTWIDTH] IN BLOOD BY AUTOMATED COUNT: 14.1 % (ref 10–15)
GFR SERPL CREATININE-BSD FRML MDRD: 73 ML/MIN/{1.73_M2}
GLUCOSE SERPL-MCNC: 120 MG/DL (ref 70–99)
GLUCOSE UR STRIP-MCNC: NEGATIVE MG/DL
HCT VFR BLD AUTO: 37.4 % (ref 35–47)
HGB BLD-MCNC: 12.8 G/DL (ref 11.7–15.7)
HGB UR QL STRIP: NEGATIVE
IMM GRANULOCYTES # BLD: 0 10E9/L (ref 0–0.4)
IMM GRANULOCYTES NFR BLD: 0.4 %
KETONES UR STRIP-MCNC: NEGATIVE MG/DL
LEUKOCYTE ESTERASE UR QL STRIP: ABNORMAL
LYMPHOCYTES # BLD AUTO: 0.9 10E9/L (ref 0.8–5.3)
LYMPHOCYTES NFR BLD AUTO: 9.6 %
MCH RBC QN AUTO: 34.6 PG (ref 26.5–33)
MCHC RBC AUTO-ENTMCNC: 34.2 G/DL (ref 31.5–36.5)
MCV RBC AUTO: 101 FL (ref 78–100)
MONOCYTES # BLD AUTO: 1.3 10E9/L (ref 0–1.3)
MONOCYTES NFR BLD AUTO: 13.5 %
MUCOUS THREADS #/AREA URNS LPF: PRESENT /LPF
NEUTROPHILS # BLD AUTO: 7.1 10E9/L (ref 1.6–8.3)
NEUTROPHILS NFR BLD AUTO: 72.8 %
NITRATE UR QL: NEGATIVE
NRBC # BLD AUTO: 0 10*3/UL
NRBC BLD AUTO-RTO: 0 /100
PH UR STRIP: 5.5 PH (ref 4.7–8)
PLATELET # BLD AUTO: 233 10E9/L (ref 150–450)
POTASSIUM SERPL-SCNC: 4 MMOL/L (ref 3.4–5.3)
RBC # BLD AUTO: 3.7 10E12/L (ref 3.8–5.2)
RBC #/AREA URNS AUTO: 2 /HPF (ref 0–2)
SODIUM SERPL-SCNC: 144 MMOL/L (ref 133–144)
SOURCE: ABNORMAL
SP GR UR STRIP: 1.02 (ref 1–1.03)
SQUAMOUS #/AREA URNS AUTO: 1 /HPF (ref 0–1)
UROBILINOGEN UR STRIP-MCNC: NORMAL MG/DL (ref 0–2)
WBC # BLD AUTO: 9.8 10E9/L (ref 4–11)
WBC #/AREA URNS AUTO: 6 /HPF (ref 0–5)

## 2019-02-02 PROCEDURE — 36415 COLL VENOUS BLD VENIPUNCTURE: CPT | Performed by: FAMILY MEDICINE

## 2019-02-02 PROCEDURE — 85025 COMPLETE CBC W/AUTO DIFF WBC: CPT | Performed by: FAMILY MEDICINE

## 2019-02-02 PROCEDURE — 86140 C-REACTIVE PROTEIN: CPT | Performed by: FAMILY MEDICINE

## 2019-02-02 PROCEDURE — 80048 BASIC METABOLIC PNL TOTAL CA: CPT | Performed by: FAMILY MEDICINE

## 2019-02-02 PROCEDURE — 74019 RADEX ABDOMEN 2 VIEWS: CPT | Mod: TC

## 2019-02-02 PROCEDURE — 99285 EMERGENCY DEPT VISIT HI MDM: CPT | Mod: 25

## 2019-02-02 PROCEDURE — 81001 URINALYSIS AUTO W/SCOPE: CPT | Performed by: FAMILY MEDICINE

## 2019-02-02 PROCEDURE — 76830 TRANSVAGINAL US NON-OB: CPT | Mod: TC

## 2019-02-02 PROCEDURE — 99284 EMERGENCY DEPT VISIT MOD MDM: CPT | Mod: Z6 | Performed by: FAMILY MEDICINE

## 2019-02-02 NOTE — ED AVS SNAPSHOT
[facility]  [address] [address]  Phone:  [phone]                                    Radha Fox   MRN: [id number]    Department:  [facility]   Date of Visit:  2/2/2019           After Visit Summary Signature Page    I have received my discharge instructions, and my questions have been answered. I have discussed any challenges I see with this plan with the nurse or doctor.    ..........................................................................................................................................  Patient/Patient Representative Signature      ..........................................................................................................................................  Patient Representative Print Name and Relationship to Patient    ..................................................               ................................................  Date                                   Time    ..........................................................................................................................................  Reviewed by Signature/Title    ...................................................              ..............................................  Date                                               Time          22EPIC Rev 08/18

## 2019-02-02 NOTE — ED PROVIDER NOTES
History     Chief Complaint   Patient presents with     Abdominal Pain     RLQ pain for 2 weeks     HPI  Radha Fox is a 69 year old female who presents the emergency room with right lower quadrant pain that is been going on intermittently for a month.  The initial pain went away for period of time after she did some bowel care and had several large bowel movements, she was initially told that she was constipated.  She has been continuing to take medication for her bowels and has not been constipated but the pain came back approximately 2 weeks ago and over the last 24-48 hours it has gotten significantly worse.  She has no appendix, she has no gallbladder, but does have her uterus and ovaries, and has a history of ESBL bladder infections.    Allergies:  Allergies   Allergen Reactions     Neomycin-Polymyxin-Hc      Rash behind ear after ear drops     Penicillins Hives and Itching     One time large local reaction after IM PCN in 1970, no airway symptoms     Plaquenil [Hydroxychloroquine Sulfate] Hives       Problem List:    Patient Active Problem List    Diagnosis Date Noted     Type 2 diabetes mellitus without complication (H) 10/30/2017     Priority: Medium     Chronic cough 08/17/2016     Priority: Medium     ACP (advance care planning) 07/21/2016     Priority: Medium     Advance Care Planning 7/21/2016: ACP Review of Chart / Resources Provided:  Reviewed chart for advance care plan.  Radha Fox has no plan or code status on file. Discussed available resources and provided with information. Confirmed code status reflects current choices pending further ACP discussions.  Confirmed/documented legally designated decision makers.  Added by Gregoria Gregorio             Diabetes mellitus type 2, diet-controlled (H)      Priority: Medium     Rheumatoid arthritis of multiple sites without rheumatoid factor (H) 11/10/2015     Priority: Medium     SNHL (sensory-neural hearing loss), asymmetrical  07/08/2014     Priority: Medium     negative MRI IAC 7/2014       S/P myringotomy with insertion of tube 07/08/2014     Priority: Medium     History of chronic sinusitis 07/08/2014     Priority: Medium     History of sinus surgery 05/28/2014     Priority: Medium     Simple chronic serous otitis media 05/28/2014     Priority: Medium     Problem list name updated by automated process. Provider to review       Dysfunction of eustachian tube 05/28/2014     Priority: Medium     Mixed hearing loss, unilateral 05/28/2014     Priority: Medium     Abnormal glucose 01/22/2014     Priority: Medium     Problem list name updated by automated process. Provider to review       Recurrent UTI 03/20/2013     Priority: Medium     RA (rheumatoid arthritis) (H) 03/12/2013     Priority: Medium     Insomnia 01/24/2013     Priority: Medium     Problem list name updated by automated process. Provider to review       Osteoporosis 01/19/2012     Priority: Medium     Problem list name updated by automated process. Provider to review       Obesity 01/19/2012     Priority: Medium     Problem list name updated by automated process. Provider to review       Restless legs syndrome (RLS) 04/21/2011     Priority: Medium     Esophageal reflux 11/12/2010     Priority: Medium     Contact dermatitis and other eczema, due to unspecified cause 03/05/2009     Priority: Medium     Coronary atherosclerosis 09/27/2005     Priority: Medium     Problem list name updated by automated process. Provider to review       Hyperlipidemia 04/13/2005     Priority: Medium     Problem list name updated by automated process. Provider to review       Depressive disorder, not elsewhere classified 01/11/2002     Priority: Medium     Sinusitis, chronic 09/05/2000     Priority: Medium     Problem list name updated by automated process. Provider to review       Arthropathy 05/01/2000     Priority: Medium     Problem list name updated by automated process. Provider to review        Diffuse connective tissue disease (H) 04/04/2000     Priority: Medium     Problem list name updated by automated process. Provider to review          Past Medical History:    Past Medical History:   Diagnosis Date     Abnormal PFT      Arthropathy, unspecified, site unspecified 05/01/2000     Chest pain, unspecified 2005     Contact dermatitis and other eczema, due to unspecified cause 03/05/2009     Coronary atherosclerosis of unspecified type of vessel, native or graft 09/27/2005     Depressive disorder, not elsewhere classified 01/11/2002     Diabetes mellitus type 2, diet-controlled (H)      Esophageal reflux 11/12/2010     H/O: hysterectomy 1977     Impaired fasting glucose 11/12/2010     Insomnia, unspecified 01/24/2013     Leukocytosis, unspecified 12/22/2007     Obesity, unspecified 01/19/2012     Osteoporosis, unspecified 01/19/2012     Other and unspecified hyperlipidemia 04/13/2005     Other dyspnea and respiratory abnormality 01/19/2012     Restless legs syndrome (RLS) 04/21/2011     Rheumatoid arthritis(714.0) 11/09/1999     Unspecified diffuse connective tissue disease 04/04/2000     Unspecified sinusitis (chronic) 09/05/2000       Past Surgical History:    Past Surgical History:   Procedure Laterality Date     APPENDECTOMY  1961     CHOLECYSTECTOMY  1973     COLONOSCOPY N/A 3/23/2015    Procedure: COLONOSCOPY;  Surgeon: Clarisa Larkin MD;  Location: [facility]     endoscopic sphenoidotomy  10/2011    Right     ETHMOIDECTOMY Bilateral 9/27/2016    Procedure: ETHMOIDECTOMY;  Surgeon: Aracelis Ayon MD;  Location: [facility]     excision bilateral telma bullosa  10/2011     GENITOURINARY SURGERY       HYSTERECTOMY      benign     LAPAROSCOPY DIAGNOSTIC (GENERAL)       ORTHOPEDIC SURGERY      left foot 5th metarsal fracture screws placed     SEPTOPLASTY N/A 9/27/2016    Procedure: SEPTOPLASTY;  Surgeon: Aracelis Ayon MD;  Location: [facility]     third metatarsal fracture Left 01/2019    stress  fracture; Dr. King     TOT Internal urethrotomy  2010     TUBAL LIGATION         Family History:    Family History   Problem Relation Age of Onset     C.A.D. Father 83     Other - See Comments Father         CHF     Cerebrovascular Disease Father         CVA     Diabetes Father      Hypertension Father      Coronary Artery Disease Father      Cerebrovascular Disease Mother 72        CVA     Heart Disease Mother         Heart Issue     Hyperlipidemia Mother      Coronary Artery Disease Mother      Diabetes Brother      Diabetes Sister      Colon Cancer No family hx of      Breast Cancer No family hx of      Asthma No family hx of      Thyroid Disease No family hx of        Social History:  Marital Status:   [2]  Social History     Tobacco Use     Smoking status: Never Smoker     Smokeless tobacco: Never Used   Substance Use Topics     Alcohol use: No     Alcohol/week: 0.0 oz     Drug use: No        Medications:      ASPIRIN PO   Blood Glucose Calibration (ACCU-CHEK ANGELICA) SOLN   Blood Glucose Calibration (ACCU-CHEK COMPACT BLUE CONTROL) LIQD   blood glucose monitoring (ONETOUCH ULTRA) test strip   blood glucose monitoring (ONETOUCH ULTRA) test strip   calcium carbonate-vitamin D (CALCIUM + D) 600-200 MG-UNIT TABS   celecoxib (CELEBREX) 200 MG capsule   Cyanocobalamin (VITAMIN B 12 PO)   folic acid (FOLVITE) 1 MG tablet   KLOR-CON 20 MEQ CR tablet   Methotrexate Sodium (METHOTREXATE PO)   multivitamin, therapeutic with minerals (MULTI-VITAMIN) TABS   pramipexole (MIRAPEX) 0.25 MG tablet   predniSONE (DELTASONE) 1 MG tablet   Probiotic Product (PROBIOTIC DAILY PO)   propranolol (INDERAL) 20 MG tablet   venlafaxine (EFFEXOR-XR) 75 MG 24 hr capsule   atorvastatin (LIPITOR) 20 MG tablet   omeprazole (PRILOSEC) 40 MG DR capsule   RiTUXimab (RITUXAN IV)         Review of Systems   Constitutional: Positive for activity change and fatigue. Negative for fever.   HENT: Negative.    Respiratory: Negative for shortness  of breath.    Cardiovascular: Negative for chest pain and palpitations.   Gastrointestinal: Positive for abdominal pain. Negative for constipation, diarrhea, nausea and vomiting.   Genitourinary: Negative for dysuria, pelvic pain and urgency.   Musculoskeletal: Negative.    Neurological: Negative.    Psychiatric/Behavioral: Negative.        Physical Exam   BP: 148/78  Pulse: 58  Heart Rate: 54  Temp: 96.5  F (35.8  C)  Resp: 20  SpO2: 97 %      Physical Exam   Constitutional: She is oriented to person, place, and time. She appears well-developed and well-nourished. No distress.   HENT:   Head: Normocephalic and atraumatic.   Neck: Normal range of motion. Neck supple.   Cardiovascular: Regular rhythm and normal heart sounds.   No murmur heard.  Pulmonary/Chest: Effort normal and breath sounds normal. No respiratory distress.   Abdominal: Soft. Bowel sounds are normal. She exhibits no distension. There is tenderness in the right lower quadrant. There is no rebound, no guarding and no CVA tenderness.   Musculoskeletal: Normal range of motion.   Neurological: She is alert and oriented to person, place, and time.   Skin: Skin is warm and dry. Capillary refill takes less than 2 seconds.   Psychiatric: She has a normal mood and affect.   Nursing note and vitals reviewed.      ED Course        Procedures    No results found for this or any previous visit (from the past 24 hour(s)).    Medications - No data to display    Assessments & Plan (with Medical Decision Making)   Abdominal x-ray is negative for obstruction, has normal bowel gas pattern.  Ultrasound is not revealing as far as her ovaries are concerned, according to the read they did not see them.  Patient's labs are largely normal, her glucose is 120 and her urine shows small leukocyte esterase but only 6 white cells and no bacteria.  We will not treat for UTI.  Discussed the inability to CT the patient with the patient, machine is currently broken.  She is confident  that since she had pain for almost 2 weeks she will not need to have it imminently, is fine with waiting until Monday to have this test done.  Given her labs and negative imaging I am also comfortable with this.  She will follow-up with Dr. Costello after her scan.    I have reviewed the nursing notes.    I have reviewed the findings, diagnosis, plan and need for follow up with the patient.    Final diagnoses:   Abdominal pain, right lower quadrant       2/2/2019   [facility]     Allison Teixeira MD  02/02/19 1640       Allison Teixeira MD  02/04/19 [id number]

## 2019-02-02 NOTE — ED NOTES
"Pt states she has had this pain before \"and they said I was constipated\". Pt stated when pain started again she has been taking stool softeners \"and I feel like I am cleaned out but the pain continues\". \"Almost like the pain is catching on something\". Denies any nausea or vomiting. States history of partial hysterectomy but still has ovaries. Also has had gallbladder and appendix out in the past.  "

## 2019-02-02 NOTE — ED NOTES
Pt reports her abdominal pain is 2/10. Discharge teaching done, AVS reviewed with pt, questions answered. Pt verbalized understanding and is agreeable with discharge plan. Pt discharged to home with plan on coming in on Monday for CT abdomen..

## 2019-02-04 ENCOUNTER — TRANSFERRED RECORDS (OUTPATIENT)
Dept: HEALTH INFORMATION MANAGEMENT | Facility: CLINIC | Age: 69
End: 2019-02-04

## 2019-02-04 RX ORDER — OMEPRAZOLE 40 MG/1
CAPSULE, DELAYED RELEASE ORAL
Qty: 90 CAPSULE | Refills: 0 | Status: SHIPPED | OUTPATIENT
Start: 2019-02-04 | End: 2019-06-03

## 2019-02-04 RX ORDER — ATORVASTATIN CALCIUM 20 MG/1
TABLET, FILM COATED ORAL
Qty: 90 TABLET | Refills: 0 | Status: SHIPPED | OUTPATIENT
Start: 2019-02-04 | End: 2019-06-03

## 2019-02-04 ASSESSMENT — ENCOUNTER SYMPTOMS
NEUROLOGICAL NEGATIVE: 1
PALPITATIONS: 0
VOMITING: 0
ACTIVITY CHANGE: 1
MUSCULOSKELETAL NEGATIVE: 1
FEVER: 0
ABDOMINAL PAIN: 1
FATIGUE: 1
DYSURIA: 0
SHORTNESS OF BREATH: 0
DIARRHEA: 0
CONSTIPATION: 0
PSYCHIATRIC NEGATIVE: 1
NAUSEA: 0

## 2019-02-07 ENCOUNTER — HOSPITAL ENCOUNTER (OUTPATIENT)
Dept: CT IMAGING | Facility: HOSPITAL | Age: 69
Discharge: HOME | End: 2019-02-07
Attending: FAMILY MEDICINE | Admitting: FAMILY MEDICINE
Payer: MEDICARE

## 2019-02-07 DIAGNOSIS — R10.31 ABDOMINAL PAIN, RIGHT LOWER QUADRANT: ICD-10-CM

## 2019-02-07 PROCEDURE — 74177 CT ABD & PELVIS W/CONTRAST: CPT | Mod: TC

## 2019-02-07 PROCEDURE — 25500064 ZZH RX 255 OP 636: Performed by: RADIOLOGY

## 2019-02-07 RX ORDER — IOPAMIDOL 612 MG/ML
100 INJECTION, SOLUTION INTRAVASCULAR ONCE
Status: COMPLETED | OUTPATIENT
Start: 2019-02-07 | End: 2019-02-07

## 2019-02-07 RX ADMIN — IOPAMIDOL 100 ML: 612 INJECTION, SOLUTION INTRAVENOUS at 13:32

## 2019-02-07 RX ADMIN — DIATRIZOATE MEGLUMINE AND DIATRIZOATE SODIUM 30 ML: 660; 100 SOLUTION ORAL; RECTAL at 13:31

## 2019-02-07 NOTE — PROGRESS NOTES
SUBJECTIVE:   Radha Fox is a 69 year old female who presents to clinic today for the following health issues:    Results - CT      Duration: Completed on 2/7/19    Description (location/character/radiation): CT - Abdomen Pelvis w/ contrast; as well as xray and abdominal ultrasound     Labs negative - cbc, bmp, UA, crp    Intensity:  mild, 3/10    Accompanying signs and symptoms: Abdominal pain right lower quadrant, switches from dull ache to sharp pain.     History (similar episodes/previous evaluation): Ongoing pain for one month.     Precipitating or alleviating factors: None    Therapies tried and outcome: None    S/p cholecystectomy, hysterectomy, appendectomy    Last colonoscopy 2015    Still having pain - aching at times, sharp at times, sometimes positional; ache is fairly constant; worse with prolonged standing    Does not radiate    No nausea, vomiting    No fevers    Bowels moving regularly    No bladder symptoms    RUQ started hurting some these past couple weeks         Problem list and histories reviewed & adjusted, as indicated.  Additional history: as documented    Current Outpatient Medications   Medication     ASPIRIN PO     atorvastatin (LIPITOR) 20 MG tablet     Blood Glucose Calibration (ACCU-CHEK ANGELICA) SOLN     Blood Glucose Calibration (ACCU-CHEK COMPACT BLUE CONTROL) LIQD     blood glucose monitoring (ONETOUCH ULTRA) test strip     blood glucose monitoring (ONETOUCH ULTRA) test strip     calcium carbonate-vitamin D (CALCIUM + D) 600-200 MG-UNIT TABS     celecoxib (CELEBREX) 200 MG capsule     Cyanocobalamin (VITAMIN B 12 PO)     folic acid (FOLVITE) 1 MG tablet     KLOR-CON 20 MEQ CR tablet     Methotrexate Sodium (METHOTREXATE PO)     multivitamin, therapeutic with minerals (MULTI-VITAMIN) TABS     omeprazole (PRILOSEC) 40 MG DR capsule     pramipexole (MIRAPEX) 0.25 MG tablet     predniSONE (DELTASONE) 1 MG tablet     Probiotic Product (PROBIOTIC DAILY PO)     propranolol  (INDERAL) 20 MG tablet     RiTUXimab (RITUXAN IV)     venlafaxine (EFFEXOR-XR) 75 MG 24 hr capsule     No current facility-administered medications for this visit.        Patient Active Problem List   Diagnosis     RA (rheumatoid arthritis) (H)     Recurrent UTI     Hyperlipidemia     Depressive disorder, not elsewhere classified     Contact dermatitis and other eczema, due to unspecified cause     Diffuse connective tissue disease (H)     Arthropathy     Coronary atherosclerosis     Sinusitis, chronic     Esophageal reflux     Restless legs syndrome (RLS)     Osteoporosis     Obesity     Insomnia     Abnormal glucose     History of sinus surgery     Simple chronic serous otitis media     Dysfunction of eustachian tube     Mixed hearing loss, unilateral     SNHL (sensory-neural hearing loss), asymmetrical     S/P myringotomy with insertion of tube     History of chronic sinusitis     Rheumatoid arthritis of multiple sites without rheumatoid factor (H)     ACP (advance care planning)     Diabetes mellitus type 2, diet-controlled (H)     Chronic cough     Type 2 diabetes mellitus without complication (H)     Past Surgical History:   Procedure Laterality Date     APPENDECTOMY  1961     CHOLECYSTECTOMY  1973     COLONOSCOPY N/A 3/23/2015    Procedure: COLONOSCOPY;  Surgeon: Clarisa Larkin MD;  Location: [facility]     endoscopic sphenoidotomy  10/2011    Right     ETHMOIDECTOMY Bilateral 9/27/2016    Procedure: ETHMOIDECTOMY;  Surgeon: Aracelis Ayon MD;  Location: [facility]     excision bilateral telma bullosa  10/2011     GENITOURINARY SURGERY       HYSTERECTOMY      benign     LAPAROSCOPY DIAGNOSTIC (GENERAL)       ORTHOPEDIC SURGERY      left foot 5th metarsal fracture screws placed     SEPTOPLASTY N/A 9/27/2016    Procedure: SEPTOPLASTY;  Surgeon: Aracelis Ayon MD;  Location: [facility]     third metatarsal fracture Left 01/2019    stress fracture; Dr. King     TOT Internal urethrotomy  2010     TUBAL  LIGATION         Social History     Tobacco Use     Smoking status: Never Smoker     Smokeless tobacco: Never Used   Substance Use Topics     Alcohol use: No     Alcohol/week: 0.0 oz     Family History   Problem Relation Age of Onset     C.A.D. Father 83     Other - See Comments Father         CHF     Cerebrovascular Disease Father         CVA     Diabetes Father      Hypertension Father      Coronary Artery Disease Father      Cerebrovascular Disease Mother 72        CVA     Heart Disease Mother         Heart Issue     Hyperlipidemia Mother      Coronary Artery Disease Mother      Diabetes Brother      Diabetes Sister      Colon Cancer No family hx of      Breast Cancer No family hx of      Asthma No family hx of      Thyroid Disease No family hx of            Reviewed and updated as needed this visit by clinical staff       Reviewed and updated as needed this visit by Provider         ROS:  Constitutional, HEENT, cardiovascular, pulmonary, gi and gu systems are negative, except as otherwise noted.    OBJECTIVE:     /62 (BP Location: Left arm, Patient Position: Chair, Cuff Size: Adult Large)   Pulse 58   Temp 96.6  F (35.9  C) (Tympanic)   Wt 96.3 kg (212 lb 6.4 oz)   SpO2 97%   BMI 35.35 kg/m    Body mass index is 35.35 kg/m .  GENERAL: alert, no distress and over weight  NECK: no adenopathy, no asymmetry, masses, or scars and thyroid normal to palpation  RESP: lungs clear to auscultation - no rales, rhonchi or wheezes  CV: regular rate and rhythm, normal S1 S2, no S3 or S4, no murmur, click or rub, no peripheral edema and peripheral pulses strong  ABDOMEN: no organomegaly or masses, bowel sounds normal and normal bowel sounds; soft; mild tenderness RLQ, mild to moderate RUQ, old RUQ surgical scar from open cholecystectomy  MS: normal muscle tone and right anterior lower ribs tender to light touch, just below ribs  SKIN: no suspicious lesions or rashes  NEURO: Normal strength and tone, mentation intact  and speech normal  PSYCH: mentation appears normal, affect normal/bright    Diagnostic Test Results:  No results found for this or any previous visit (from the past 24 hour(s)).    ASSESSMENT/PLAN:     (R10.11) Right upper quadrant abdominal pain  (primary encounter diagnosis)  Plan: GENERAL SURG ADULT REFERRAL  (R10.31) Abdominal pain, right lower quadrant  Plan: GENERAL SURG ADULT REFERRAL  Comment: pain was RLQ, now RUQ too; jaden was open not laparoscopic; no obvious hernia; negative CT, xray, US, and labs; will ask general surgery to evaluate - repeat exam to review and correlate with imaging; follow up sooner if progression of symptoms; doubt any radicular component from spine, and would be at more than 1 level    (R07.81) Rib pain on right side  Comment: new; no fall or injury; ?monitor for rash - potential start of shingles?; xray done as patient has had other stress fractures, etc  Plan: XR Ribs & Chest Rt 3vw                  Lanie Duggan MD  [facility] [place]

## 2019-02-11 ENCOUNTER — OFFICE VISIT (OUTPATIENT)
Dept: FAMILY MEDICINE | Facility: OTHER | Age: 69
End: 2019-02-11
Attending: FAMILY MEDICINE
Payer: MEDICARE

## 2019-02-11 ENCOUNTER — ANCILLARY PROCEDURE (OUTPATIENT)
Dept: GENERAL RADIOLOGY | Facility: OTHER | Age: 69
End: 2019-02-11
Attending: FAMILY MEDICINE
Payer: MEDICARE

## 2019-02-11 VITALS
TEMPERATURE: 96.6 F | BODY MASS INDEX: 35.35 KG/M2 | OXYGEN SATURATION: 97 % | SYSTOLIC BLOOD PRESSURE: 124 MMHG | WEIGHT: 212.4 LBS | DIASTOLIC BLOOD PRESSURE: 62 MMHG | HEART RATE: 58 BPM

## 2019-02-11 DIAGNOSIS — R10.31 ABDOMINAL PAIN, RIGHT LOWER QUADRANT: ICD-10-CM

## 2019-02-11 DIAGNOSIS — R07.81 RIB PAIN ON RIGHT SIDE: ICD-10-CM

## 2019-02-11 DIAGNOSIS — R10.11 RIGHT UPPER QUADRANT ABDOMINAL PAIN: Primary | ICD-10-CM

## 2019-02-11 PROCEDURE — 71101 X-RAY EXAM UNILAT RIBS/CHEST: CPT | Mod: TC,RT

## 2019-02-11 PROCEDURE — 99213 OFFICE O/P EST LOW 20 MIN: CPT | Performed by: FAMILY MEDICINE

## 2019-02-11 PROCEDURE — G0463 HOSPITAL OUTPT CLINIC VISIT: HCPCS

## 2019-02-11 ASSESSMENT — PAIN SCALES - GENERAL: PAINLEVEL: MILD PAIN (3)

## 2019-02-11 NOTE — PATIENT INSTRUCTIONS
Will call with notable findings on xray.  Referral to general surgery for additional evaluation/exam.

## 2019-02-11 NOTE — NURSING NOTE
"Chief Complaint   Patient presents with     CT Results       Initial /62 (BP Location: Left arm, Patient Position: Chair, Cuff Size: Adult Large)   Pulse 58   Temp 96.6  F (35.9  C) (Tympanic)   Wt 96.3 kg (212 lb 6.4 oz)   SpO2 97%   BMI 35.35 kg/m   Estimated body mass index is 35.35 kg/m  as calculated from the following:    Height as of 1/3/19: 1.651 m (5' 5\").    Weight as of this encounter: 96.3 kg (212 lb 6.4 oz).  Medication Reconciliation: complete    Yessenia Sterling LPN    "

## 2019-02-14 ENCOUNTER — OFFICE VISIT (OUTPATIENT)
Dept: SURGERY | Facility: OTHER | Age: 69
End: 2019-02-14
Attending: FAMILY MEDICINE
Payer: MEDICARE

## 2019-02-14 VITALS
BODY MASS INDEX: 34.07 KG/M2 | HEIGHT: 66 IN | DIASTOLIC BLOOD PRESSURE: 60 MMHG | SYSTOLIC BLOOD PRESSURE: 110 MMHG | WEIGHT: 212 LBS | TEMPERATURE: 96.6 F

## 2019-02-14 DIAGNOSIS — Z01.818 PRE-OP EXAM: ICD-10-CM

## 2019-02-14 DIAGNOSIS — R10.84 ABDOMINAL PAIN, GENERALIZED: Primary | ICD-10-CM

## 2019-02-14 DIAGNOSIS — E11.8 TYPE 2 DIABETES MELLITUS WITH COMPLICATION, UNSPECIFIED WHETHER LONG TERM INSULIN USE: ICD-10-CM

## 2019-02-14 PROCEDURE — G0463 HOSPITAL OUTPT CLINIC VISIT: HCPCS

## 2019-02-14 PROCEDURE — 93010 ELECTROCARDIOGRAM REPORT: CPT | Performed by: INTERNAL MEDICINE

## 2019-02-14 PROCEDURE — 93005 ELECTROCARDIOGRAM TRACING: CPT | Performed by: INTERNAL MEDICINE

## 2019-02-14 PROCEDURE — 99204 OFFICE O/P NEW MOD 45 MIN: CPT | Performed by: SURGERY

## 2019-02-14 ASSESSMENT — PAIN SCALES - GENERAL: PAINLEVEL: MILD PAIN (2)

## 2019-02-14 ASSESSMENT — MIFFLIN-ST. JEOR: SCORE: 1508.38

## 2019-02-14 NOTE — H&P (VIEW-ONLY)
CLINIC NOTE - CONSULT  2/14/2019    Patient:Radha Fox  Referring Physician: Lanie Costello MD    Reason for Referral: Abdominal Pain    This is a 69 year old female with several months of abdominal pain. The pain is describe as RLQ in location.  The pain is described as dull, aching and colicky.     Associated symptoms include: none.      Aggravating factors include: nothing     Alleviating factors include: None    She was originally seen in had a CT which showed constipation.  She did a bowel cleanse which did not really alleviate the pain.  The pain slowly went away.  Approximately 2 or 3 weeks ago the pain started again.  CT was obtained which showed no abnormality.  She is status post cholecystectomy appendectomy and partial hysterectomy.  Skin no vomiting.  No fevers no chills.  No obstructive symptoms.    Past Medical History:  Past Medical History:   Diagnosis Date     Abnormal PFT     moderate restriction/parenchymal; moderate diffusion defect; minimal obstruction     Arthropathy, unspecified, site unspecified 05/01/2000     Chest pain, unspecified 2005    Resolved     Contact dermatitis and other eczema, due to unspecified cause 03/05/2009     Coronary atherosclerosis of unspecified type of vessel, native or graft 09/27/2005     Depressive disorder, not elsewhere classified 01/11/2002     Diabetes mellitus type 2, diet-controlled (H)      Esophageal reflux 11/12/2010     H/O: hysterectomy 1977     Impaired fasting glucose 11/12/2010     Insomnia, unspecified 01/24/2013     Leukocytosis, unspecified 12/22/2007     Obesity, unspecified 01/19/2012     Osteoporosis, unspecified 01/19/2012     Other and unspecified hyperlipidemia 04/13/2005     Other dyspnea and respiratory abnormality 01/19/2012    reduced DLCO     Restless legs syndrome (RLS) 04/21/2011     Rheumatoid arthritis(714.0) 11/09/1999     Unspecified diffuse connective tissue disease 04/04/2000     Unspecified sinusitis (chronic)  09/05/2000       Past Surgical History:  Past Surgical History:   Procedure Laterality Date     APPENDECTOMY  1961     CHOLECYSTECTOMY  1973     COLONOSCOPY N/A 3/23/2015    Procedure: COLONOSCOPY;  Surgeon: Clarisa Larkin MD;  Location: [facility]     endoscopic sphenoidotomy  10/2011    Right     ETHMOIDECTOMY Bilateral 9/27/2016    Procedure: ETHMOIDECTOMY;  Surgeon: Aracelis Ayon MD;  Location: [facility]     excision bilateral telma bullosa  10/2011     GENITOURINARY SURGERY       HYSTERECTOMY      benign     LAPAROSCOPY DIAGNOSTIC (GENERAL)       ORTHOPEDIC SURGERY      left foot 5th metarsal fracture screws placed     SEPTOPLASTY N/A 9/27/2016    Procedure: SEPTOPLASTY;  Surgeon: Aracelis Ayon MD;  Location: [facility]     third metatarsal fracture Left 01/2019    stress fracture; Dr. King     TOT Internal urethrotomy  2010     TUBAL LIGATION         Family History History:  Family History   Problem Relation Age of Onset     C.A.D. Father 83     Other - See Comments Father         CHF     Cerebrovascular Disease Father         CVA     Diabetes Father      Hypertension Father      Coronary Artery Disease Father      Cerebrovascular Disease Mother 72        CVA     Heart Disease Mother         Heart Issue     Hyperlipidemia Mother      Coronary Artery Disease Mother      Diabetes Brother      Diabetes Sister      Colon Cancer No family hx of      Breast Cancer No family hx of      Asthma No family hx of      Thyroid Disease No family hx of        History of Tobacco Use:  History   Smoking Status     Never Smoker   Smokeless Tobacco     Never Used       Current Medications:  Current Outpatient Medications   Medication Sig Dispense Refill     ASPIRIN PO Take 81 mg by mouth daily       atorvastatin (LIPITOR) 20 MG tablet TAKE 1 TABLET BY MOUTH ONCE DAILY. DUE  FOR  ANNUAL  LABS 90 tablet 0     Blood Glucose Calibration (ACCU-CHEK ANGELICA) SOLN Use as directed 1 Bottle 0     Blood Glucose Calibration  (ACCU-CHEK COMPACT BLUE CONTROL) LIQD Use to calibrate blood glucose monitor as directed. 1 each 0     blood glucose monitoring (ONETOUCH ULTRA) test strip Use to test blood sugars one time daily or as directed. 100 strip 2     blood glucose monitoring (ONETOUCH ULTRA) test strip Use to test blood sugar daily or as directed. 100 strip 2     calcium carbonate-vitamin D (CALCIUM + D) 600-200 MG-UNIT TABS Take 600 mg by mouth 2 times daily.       celecoxib (CELEBREX) 200 MG capsule TAKE 1 CAPSULE BY MOUTH ONCE DAILY 90 capsule 0     Cyanocobalamin (VITAMIN B 12 PO) Take 500 mg by mouth daily.       folic acid (FOLVITE) 1 MG tablet Take 2 tablets by mouth daily       KLOR-CON 20 MEQ CR tablet TAKE 1 TABLET BY MOUTH ONCE DAILY WITH  FOOD 90 tablet 1     Methotrexate Sodium (METHOTREXATE PO) Inject 12.5 mg as directed once a week.       multivitamin, therapeutic with minerals (MULTI-VITAMIN) TABS Take  by mouth daily.       omeprazole (PRILOSEC) 40 MG DR capsule TAKE 1 CAPSULE BY MOUTH ONCE DAILY BEFORE A MEAL 90 capsule 0     pramipexole (MIRAPEX) 0.25 MG tablet TAKE 1 TABLET BY MOUTH AT 5PM AND 2 TABLETS AT 9PM 270 tablet 1     predniSONE (DELTASONE) 1 MG tablet Take 1 mg by mouth daily Take 3 tabs po daily       Probiotic Product (PROBIOTIC DAILY PO) Take 1 capsule by mouth daily       propranolol (INDERAL) 20 MG tablet TAKE 1 TABLET BY MOUTH ONCE DAILY 90 tablet 1     RiTUXimab (RITUXAN IV)        venlafaxine (EFFEXOR-XR) 75 MG 24 hr capsule TAKE 1 CAPSULE BY MOUTH ONCE DAILY **DUE  FOR  A  FOLLOW  UP  VISIT** 90 capsule 0       Allergies:  Allergies   Allergen Reactions     Neomycin-Polymyxin-Hc      Rash behind ear after ear drops     Penicillins Hives and Itching     One time large local reaction after IM PCN in 1970, no airway symptoms     Plaquenil [Hydroxychloroquine Sulfate] Hives       ROS:  Pertinent items are noted in HPI.  All other systems are negative.  A comprehensive review of systems was  "negative.    PHYSICAL EXAM:     Vital signs: /60 (BP Location: Right arm, Patient Position: Chair, Cuff Size: Adult Large)   Temp 96.6  F (35.9  C) (Tympanic)   Ht 1.676 m (5' 6\")   Wt 96.2 kg (212 lb)   BMI 34.22 kg/m     Weight: [unfilled]   BMI: Body mass index is 34.22 kg/m .   General: Normal, healthy, cooperative, in no acute distress, alert, overweight   Skin: no jaundice   HEENT: PERRLA, EOMI and Trachea midline   Neck: supple, without adenopathy, full range of motion   Lungs: clear to auscultation   CV: Regular rate and rhythm without murmer   Abdominal: abdomen is soft without significant tenderness, masses, organomegaly or guarding, mild tenderness in the low right pelvic area   Extremities: No cyanosis, clubbing or edema noted bilaterally in Upper and Lower Extremities   Neurological: without deficit    LABORATORY:  CBC RESULTS:   Recent Labs   Lab Test 02/02/19  1129   WBC 9.8   RBC 3.70*   HGB 12.8   HCT 37.4   *   MCH 34.6*   MCHC 34.2   RDW 14.1          Last Comprehensive Metabolic Panel:  Sodium   Date Value Ref Range Status   02/02/2019 144 133 - 144 mmol/L Final     Potassium   Date Value Ref Range Status   02/02/2019 4.0 3.4 - 5.3 mmol/L Final     Chloride   Date Value Ref Range Status   02/02/2019 109 94 - 109 mmol/L Final     Carbon Dioxide   Date Value Ref Range Status   02/02/2019 27 20 - 32 mmol/L Final     Anion Gap   Date Value Ref Range Status   02/02/2019 8 3 - 14 mmol/L Final     Glucose   Date Value Ref Range Status   02/02/2019 120 (H) 70 - 99 mg/dL Final     Urea Nitrogen   Date Value Ref Range Status   02/02/2019 28 7 - 30 mg/dL Final     Creatinine   Date Value Ref Range Status   02/02/2019 0.83 0.52 - 1.04 mg/dL Final     GFR Estimate   Date Value Ref Range Status   02/02/2019 73 >60 mL/min/[1.73_m2] Final     Comment:     Non  GFR Calc  Starting 12/18/2018, serum creatinine based estimated GFR (eGFR) will be   calculated using the Chronic " Kidney Disease Epidemiology Collaboration   (CKD-EPI) equation.       Calcium   Date Value Ref Range Status   02/02/2019 9.0 8.5 - 10.1 mg/dL Final     Bilirubin Total   Date Value Ref Range Status   08/30/2018 0.3 0.2 - 1.3 mg/dL Final     Alkaline Phosphatase   Date Value Ref Range Status   08/30/2018 71 40 - 150 U/L Final     ALT   Date Value Ref Range Status   12/20/2018 34 0 - 50 U/L Final     AST   Date Value Ref Range Status   08/30/2018 18 0 - 45 U/L Final       PRIOR PERTINENT RADIOLOGY STUDIES:   EXAMINATION: CT ABDOMEN PELVIS W CONTRAST, 2/7/2019 1:40 PM     TECHNIQUE:  Helical CT images from the lung bases through the  symphysis pubis were obtained  with IV contrast. Contrast dose: ISOVUE  300 100ML     COMPARISON: none     HISTORY: See the Clinical Information for Interpreting Provider; RLQ  PAIN; Abdominal pain, right lower quadrant     FINDINGS:     There is dependent atelectasis at the lung bases.     The liver is free of masses or biliary ductal enlargement. No  gallbladder was visualized.     The the spleen and pancreas appear normal.     The adrenal glands are normal.     There are some small cysts seen in both kidneys.     The periaortic lymph nodes are normal in caliber.     No intraperitoneal masses or inflammatory changes are noted.  The appendix was not visualized  In the pelvis the bladder and rectum appear normal.     Degenerative present in the thoracic and lumbar spines                                                                      IMPRESSION: No intraperitoneal masses or inflammatory changes are  noted.     ROLANDO NG MD    ASSESSMENT:   69 year old female with abdominal pain.    PLAN:   At this point I do not have a reason for abdominal pain.  Her last colonoscopy was in 2015 which by report was negative.  Discussed with her abdominal pain and the limitations of our studies.  I did recommend a colonoscopy to rule that out. She is amenable to that.  We will go ahead and  schedule the procedure.    The risks, benefits, and alternatives to the planned procedure were fully discussed with the patient and/or the patient's representative(s). The risks of bleeding, infection, death, missing pathology, the need for additional procedures intra-operatively, the possible need for intra-operative consults, the possible need for transfusion therapy, cardiopulmonary compromise, the possible need for additional surgery for a complication were discussed with the patient and/or the patient's representative(s). The patient's and/or patient's representative(s) questions were addressed and answered. Informed consent was obtained from the patient and/or the patient's representative(s). The patient and/or the patient's representative(s) consent to proceed.

## 2019-02-14 NOTE — PATIENT INSTRUCTIONS
GUIDE TO YOUR COLONOSCOPY WITH GATORADE PREP  At [facility], we want to make sure that your procedure is as pleasant as possible. This guide is designed to answer any questions you might have and to walk you through the preparations you will need to make before your procedure. Should you have additional questions, please feel free to contact us. Contact numbers are listed below. Thank you for choosing [facility].    Clinic Health Unit Coordinator: [phone]  Clinic Nurse: [phone]  Surgery Education Nurse: [phone]    Date of Procedure: 2/21/19 with Dr. Gracia  Admit Time: Surgery Department will call you the day before your procedure by 5pm with your arrival time. If your surgery is on Monday, expect a call on Friday. If we were unable to reach you before 5PM, you may call admitting at [phone].    All questions or concerns can be directed to the clinic or surgery education nurse. If you have a scheduling or appointment question, or need to postpone your procedure, please call the Health Unit Coordinator between 8am and 4pm Monday through Friday. After hours or on weekends, please call [phone] to postpone.     Call the surgery nurse or your primary care provider if you should become ill within 1-2 weeks of your procedure and we will reschedule it when you are healthy. This includes signs or symptoms of a cold or the flu, fever, chills, sore throat, cough, chest congestion, productive cough, runny nose, cold symptoms or any other symptoms of illness.        tests needed EKG, chest x-ray.    COLONOSCOPY PREP    7 DAYS BEFORE THE EXAM:  Do not take Aspirin or other NSAIDS (Ibuprofen, Motrin, Aleve, Celebrex, Naproxen, etc) 7 days before your surgery. Tylenol is fine.  Stop taking fiber supplements, vitamins, and iron. Stop eating corn, nuts and seeds.  If you are prescribed a daily 81mg Aspirin, you may continue this.  If you are prescribed blood thinners (Aspirin,  "Coumadin/Warfarin, Plavix, etc) talk to your primary care provider. If you are a diabetic and take insulin, talk to your primary care provider.     Please call the Surgery Education Nurse at [phone] 1-2 weeks before your procedure and have an allergy and medication list ready     Arrange transportation with a responsible adult to drive you home and to stay with you for the next 4 hours after you arrive home for your safety. If you need to take a taxi or the bus, you must have a responsible adult to ride with you other than the . You will be cancelled if you do not have a responsible adult.    You have been ordered Gatorade Prep as your mechanical bowel prep. This is over the counter and does not require a prescription. Please purchase the following items:    Two 5 mg Dulcolax (bisacodyl) tablets   One 8.3 ounce (238 g) bottle of miralax   One 10 ounce bottle of magnesium citrate   One 64 ounce bottle of gatorade-No red or purple, not powdered.    2 DAYS BEFORE THE EXAM:   Begin a low fiber diet. No raw fruits and vegetables or whole grains. Please see the list of foods you can have and foods to avoid on page 3 of the separate \"Miralax, Dulcolax and Magnesium Citrate\" colonoscopy instruction packet. Drink at least 4-6 large glasses of sports drink each day until the procedure (not red or purple).    1 DAY BEFORE THE EXAM:  No solid food or milk products after 12:00 AM (midnight). Drink only clear liquids all day, at least 8-10 glasses. Please see the list of liquids you can have and what to avoid on page 2 of the separate \"Miralax, Dulcolax and Magnesium Citrate\" colonoscopy instruction packet.  No red or purple colors, milk products, or alcohol.     AT 12:00 PM NOON THE DAY BEFORE EXAM:  Take 2 Dulcolax tablets by mouth with clear liquids.    AT 6:00 PM THE DAY BEFORE EXAM:  Mix the bottle of Miralax and 64 ounces of Gatorade in a pitcher. Drink one 8 ounce glass every 15 minutes until gone. Stay near a " "toilet. You will have watery stools and may have cramping, bloating and nausea.    DAY OF COLONOSCOPY EXAM:   6 HOURS PRIOR TO EXAM DAY OF PROCEDURE:  Drink the bottle of magnesium citrate. Right after drinking this, drink one full glass of water. You many have clear liquids up until 3 hours before you check in at admitting.  If you need to take any medications after this, take them with a tiny sip of water. If you have asthma, bring your inhaler with you.    Wear comfortable clothes. No jewelry, body piercings, make-up, nail polish, hair spray, lotions, perfumes or colognes. Shower before you arrive. [place] in Admitting through the Portal Entrance. You must have a  with you and and adult available to stay with your for 4 hours at home. The medicine used in this test will make you sleepy. If you do not have someone, please reschedule or your test will be cancelled.  It is recommended that you do not drive for 24 hours after your test. Do not operate power equipment, drink alcohol, make important decisions or sign legal documents.     COLONOSCOPY FREQUENTLY ASKED QUESTIONS  What is a colonoscopy?    A colonoscopy is a test to look at the lining of your large intestine. The purpose of the exam is to check for abnormalities including growths called \"polyps\" that can lead to serious disease. A flexible scope is inserted into your rectum by the doctor to examine your large intestine.    What are polyps?  Polyps are abnormal growths on the lining of the colon. Most polyps are not cancerous, but some polyps have the potential to turn into cancer with time. Polyps can also bleed. For these reasons, most polyps are removed during a colonoscopy and sent to the laboratory for microscopic examination.    What preparation is needed?  The colon must be completely clean for the procedure to be performed. You may be given one or two different prep solutions to cleanse your bowel. You will also need to follow a clear liquid " diet the day before your procedure.    What happens after the procedure?  After your procedure is complete, you will be taken back to your day surgery room where you will be monitored for approximately 1 hour. You can expect to feel drowsy for several hours afterward. You may experience some cramping or bloating due to the air introduced into your colon during the exam. You will not be able to drive or operate machinery the rest of the day. You will be given written discharge instructions and appropriate learning material before you go home. You must have an adult to stay at home with you for the next 4 hours after you leave the hospital for your safety.    When will I find out the results of my test?  Your surgeon will talk to you and your designated  before you leave and usually the preliminary results can be given to you at that time. If a biopsy was taken during your procedure, it will be sent to the laboratory for examination. Results usually take one week. You will be contacted by phone or by letter with results.      TIPS FOR COLON CLEANSING BEFORE YOUR COLONOSCOPY  To get accurate results from your exam, your colon must be completely clean and empty. Please follow your doctor's instructions. If you do not, you may need to repeat both the exam and colon-cleansing process.    The medicine you take may cause bloating, nausea and other discomfort. Follow these tips to make the process as easy as possible:     You may use alcohol-free baby wipes to ease anal irritation. You may also use Vaseline to help protect the skin. Other options include Tucks wipes, hemorrhoid treatments and hydrocortisone cream.     You may wish to squeeze some lemon juice into your preparation or add a packet of Crystal Light lemon-lime or ice tea flavor. (remember to not use red or purple)    To chill the solution, put it in your refrigerator or set it in a bowl of ice. Do not add ice in your glass. You may remove the preparation  from the refrigerator 15-30 minutes before drinking.    Quickly drink one whole glass every 5 to 10 minutes. It may help to use a timer. If the liquid is too salty, use a straw.    Stay near a toilet! You will have diarrhea (loose watery stools) and may also have chills. Dress for comfort. Expect to feel discomfort until the stool clears from your colon. This usually takes about 2 to 4 hours.    Even when you are sitting on the toilet, keep drinking a glass of solution every 10-15 minutes. If you have nausea or vomiting, rinse your mouth with water. Take a break for 15 to 30 minutes, and then keep drinking the solution.    Some people find it helpful to suck on a wedge of lemon or lime. You may also try sucking on hard candy (not red or purple) or washing your mouth out with water, clear soda or mouthwash.    If you followed your doctor's orders and your stool is a clear or yellow liquid, you are ready for the exam. If you are not sure if your colon is clean, please call your clinic and ask to speak to a nurse.

## 2019-02-19 DIAGNOSIS — Z79.899 ENCOUNTER FOR LONG-TERM (CURRENT) USE OF HIGH-RISK MEDICATION: ICD-10-CM

## 2019-02-19 DIAGNOSIS — M06.09 RHEUMATOID ARTHRITIS OF MULTIPLE SITES WITHOUT RHEUMATOID FACTOR (H): Primary | ICD-10-CM

## 2019-02-20 ENCOUNTER — ANESTHESIA EVENT (OUTPATIENT)
Dept: SURGERY | Facility: HOSPITAL | Age: 69
End: 2019-02-20
Payer: MEDICARE

## 2019-02-20 NOTE — ANESTHESIA PREPROCEDURE EVALUATION
Anesthesia Pre-Procedure Evaluation    Patient: Radha Fox   MRN: [id number] : 1950          Preoperative Diagnosis: ABDOMINAL PAIN    Procedure(s):  COLONOSCOPY    Past Medical History:   Diagnosis Date     Abnormal PFT     moderate restriction/parenchymal; moderate diffusion defect; minimal obstruction     Arthropathy, unspecified, site unspecified 2000     Chest pain, unspecified     Resolved     Contact dermatitis and other eczema, due to unspecified cause 2009     Coronary atherosclerosis of unspecified type of vessel, native or graft 2005     Depressive disorder, not elsewhere classified 2002     Diabetes mellitus type 2, diet-controlled (H)      Esophageal reflux 2010     H/O: hysterectomy 1977     Impaired fasting glucose 2010     Insomnia, unspecified 2013     Leukocytosis, unspecified 2007     Obesity, unspecified 2012     Osteoporosis, unspecified 2012     Other and unspecified hyperlipidemia 2005     Other dyspnea and respiratory abnormality 2012    reduced DLCO     Restless legs syndrome (RLS) 2011     Rheumatoid arthritis(714.0) 1999     Unspecified diffuse connective tissue disease 2000     Unspecified sinusitis (chronic) 2000     Past Surgical History:   Procedure Laterality Date     APPENDECTOMY       CHOLECYSTECTOMY  1973     COLONOSCOPY N/A 3/23/2015    Procedure: COLONOSCOPY;  Surgeon: Clarisa Larkin MD;  Location: [facility]     endoscopic sphenoidotomy  10/2011    Right     ETHMOIDECTOMY Bilateral 2016    Procedure: ETHMOIDECTOMY;  Surgeon: Aracelis Ayon MD;  Location: [facility]     excision bilateral telma bullosa  10/2011     GENITOURINARY SURGERY       HYSTERECTOMY      benign     LAPAROSCOPY DIAGNOSTIC (GENERAL)       ORTHOPEDIC SURGERY      left foot 5th metarsal fracture screws placed     SEPTOPLASTY N/A 2016    Procedure: SEPTOPLASTY;  Surgeon: Nany  MD Aracelis;  Location: [facility]     third metatarsal fracture Left 01/2019    stress fracture; Dr. King     TOT Internal urethrotomy  2010     TUBAL LIGATION         Anesthesia Evaluation     . Pt has had prior anesthetic.     No history of anesthetic complications          ROS/MED HX    ENT/Pulmonary:     (+)CHLOE risk factors hypertension, obese, other ENT- s/p FESS, , . Other pulmonary disease PFT: moderate restriction/parenchymal; moderate diffusion defect; minimal obstruction . .    Neurologic:     (+)other neuro Restless legs syndrome, SNHL (sensory-neural hearing loss),     Cardiovascular:     (+) Dyslipidemia, hypertension--CAD, --. : . . . :. .       METS/Exercise Tolerance:     Hematologic:  - neg hematologic  ROS       Musculoskeletal:   (+) arthritis, , , other musculoskeletal- Rheumatoid      GI/Hepatic:     (+) GERD bowel prep,       Renal/Genitourinary:  - ROS Renal section negative       Endo:     (+) type II DM Obesity, .      Psychiatric:     (+) psychiatric history depression      Infectious Disease:   (+) Other Infectious Disease ESBL      Malignancy:      - no malignancy   Other:    - neg other ROS                      Physical Exam  Normal systems: cardiovascular, pulmonary and dental    Airway   Mallampati: I  TM distance: <3 FB  Neck ROM: full  Comment: Increased neck soft tissue     Dental     Cardiovascular   Rhythm and rate: regular and normal      Pulmonary    breath sounds clear to auscultation            Lab Results   Component Value Date    WBC 9.8 02/02/2019    HGB 12.8 02/02/2019    HCT 37.4 02/02/2019     02/02/2019    CRP 6.6 02/02/2019    SED 16 12/20/2018     02/02/2019    POTASSIUM 4.0 02/02/2019    CHLORIDE 109 02/02/2019    CO2 27 02/02/2019    BUN 28 02/02/2019    CR 0.83 02/02/2019     (H) 02/02/2019    SRUTHI 9.0 02/02/2019    MAG 1.7 02/03/2015    ALBUMIN 3.6 12/20/2018    PROTTOTAL 7.0 08/30/2018    ALT 34 12/20/2018    AST 18 08/30/2018    ALKPHOS 71  "08/30/2018    BILITOTAL 0.3 08/30/2018    TSH 5.49 (H) 09/27/2017    T4 1.03 09/27/2017       Preop Vitals  BP Readings from Last 3 Encounters:   02/14/19 110/60   02/11/19 124/62   02/02/19 148/78    Pulse Readings from Last 3 Encounters:   02/11/19 58   02/02/19 58   01/03/19 61      Resp Readings from Last 3 Encounters:   02/02/19 20   12/31/18 18   12/21/18 18    SpO2 Readings from Last 3 Encounters:   02/11/19 97%   02/02/19 97%   01/03/19 99%      Temp Readings from Last 1 Encounters:   02/14/19 96.6  F (35.9  C) (Tympanic)    Ht Readings from Last 1 Encounters:   02/14/19 1.676 m (5' 6\")      Wt Readings from Last 1 Encounters:   02/14/19 96.2 kg (212 lb)    Estimated body mass index is 34.22 kg/m  as calculated from the following:    Height as of 2/14/19: 1.676 m (5' 6\").    Weight as of 2/14/19: 96.2 kg (212 lb).       Anesthesia Plan      History & Physical Review  History and physical reviewed and following examination; no interval change.    ASA Status:  3 .    NPO Status:  > 8 hours    Plan for MAC with Intravenous and Propofol induction. Maintenance will be TIVA.  Reason for MAC:  Chronic cardiopulmonary disease (G9) and Other - see comments  PONV prophylaxis:  Ondansetron (or other 5HT-3)  Surgeon requests deep sedation. Patient is an ASA 3. Will provide MAC.      Postoperative Care  Postoperative pain management:  IV analgesics.      Consents  Anesthetic plan, risks, benefits and alternatives discussed with:  Patient..                 Cedric Kaufman MD  "

## 2019-02-21 ENCOUNTER — ANESTHESIA (OUTPATIENT)
Dept: SURGERY | Facility: HOSPITAL | Age: 69
End: 2019-02-21
Payer: MEDICARE

## 2019-02-21 ENCOUNTER — HOSPITAL ENCOUNTER (OUTPATIENT)
Facility: HOSPITAL | Age: 69
Discharge: HOME | End: 2019-02-21
Attending: SURGERY | Admitting: SURGERY
Payer: MEDICARE

## 2019-02-21 VITALS
SYSTOLIC BLOOD PRESSURE: 107 MMHG | TEMPERATURE: 97 F | OXYGEN SATURATION: 96 % | DIASTOLIC BLOOD PRESSURE: 74 MMHG | RESPIRATION RATE: 20 BRPM

## 2019-02-21 PROCEDURE — 25000128 H RX IP 250 OP 636: Performed by: NURSE ANESTHETIST, CERTIFIED REGISTERED

## 2019-02-21 PROCEDURE — 45378 DIAGNOSTIC COLONOSCOPY: CPT | Performed by: NURSE ANESTHETIST, CERTIFIED REGISTERED

## 2019-02-21 PROCEDURE — 37000008 ZZH ANESTHESIA TECHNICAL FEE, 1ST 30 MIN: Performed by: SURGERY

## 2019-02-21 PROCEDURE — 71000027 ZZH RECOVERY PHASE 2 EACH 15 MINS: Performed by: SURGERY

## 2019-02-21 PROCEDURE — 40000306 ZZH STATISTIC PRE PROC ASSESS II: Performed by: SURGERY

## 2019-02-21 PROCEDURE — 45378 DIAGNOSTIC COLONOSCOPY: CPT | Performed by: ANESTHESIOLOGY

## 2019-02-21 PROCEDURE — 25800030 ZZH RX IP 258 OP 636: Performed by: NURSE ANESTHETIST, CERTIFIED REGISTERED

## 2019-02-21 PROCEDURE — 36000050 ZZH SURGERY LEVEL 2 1ST 30 MIN: Performed by: SURGERY

## 2019-02-21 PROCEDURE — 25000125 ZZHC RX 250: Performed by: NURSE ANESTHETIST, CERTIFIED REGISTERED

## 2019-02-21 PROCEDURE — 45378 DIAGNOSTIC COLONOSCOPY: CPT | Performed by: SURGERY

## 2019-02-21 RX ORDER — SODIUM CHLORIDE, SODIUM LACTATE, POTASSIUM CHLORIDE, CALCIUM CHLORIDE 600; 310; 30; 20 MG/100ML; MG/100ML; MG/100ML; MG/100ML
INJECTION, SOLUTION INTRAVENOUS CONTINUOUS PRN
Status: DISCONTINUED | OUTPATIENT
Start: 2019-02-21 | End: 2019-02-21

## 2019-02-21 RX ORDER — LIDOCAINE HYDROCHLORIDE 20 MG/ML
INJECTION, SOLUTION INFILTRATION; PERINEURAL PRN
Status: DISCONTINUED | OUTPATIENT
Start: 2019-02-21 | End: 2019-02-21

## 2019-02-21 RX ORDER — PROPOFOL 10 MG/ML
INJECTION, EMULSION INTRAVENOUS PRN
Status: DISCONTINUED | OUTPATIENT
Start: 2019-02-21 | End: 2019-02-21

## 2019-02-21 RX ADMIN — PROPOFOL 20 MG: 10 INJECTION, EMULSION INTRAVENOUS at 14:34

## 2019-02-21 RX ADMIN — PROPOFOL 30 MG: 10 INJECTION, EMULSION INTRAVENOUS at 14:32

## 2019-02-21 RX ADMIN — PROPOFOL 30 MG: 10 INJECTION, EMULSION INTRAVENOUS at 14:30

## 2019-02-21 RX ADMIN — LIDOCAINE HYDROCHLORIDE 40 MG: 20 INJECTION, SOLUTION INFILTRATION; PERINEURAL at 14:26

## 2019-02-21 RX ADMIN — PROPOFOL 20 MG: 10 INJECTION, EMULSION INTRAVENOUS at 14:37

## 2019-02-21 RX ADMIN — PROPOFOL 20 MG: 10 INJECTION, EMULSION INTRAVENOUS at 14:33

## 2019-02-21 RX ADMIN — PROPOFOL 20 MG: 10 INJECTION, EMULSION INTRAVENOUS at 14:38

## 2019-02-21 RX ADMIN — PROPOFOL 50 MG: 10 INJECTION, EMULSION INTRAVENOUS at 14:27

## 2019-02-21 RX ADMIN — PROPOFOL 20 MG: 10 INJECTION, EMULSION INTRAVENOUS at 14:35

## 2019-02-21 RX ADMIN — PROPOFOL 30 MG: 10 INJECTION, EMULSION INTRAVENOUS at 14:28

## 2019-02-21 RX ADMIN — PROPOFOL 20 MG: 10 INJECTION, EMULSION INTRAVENOUS at 14:40

## 2019-02-21 RX ADMIN — SODIUM CHLORIDE, POTASSIUM CHLORIDE, SODIUM LACTATE AND CALCIUM CHLORIDE: 600; 310; 30; 20 INJECTION, SOLUTION INTRAVENOUS at 13:17

## 2019-02-21 RX ADMIN — PROPOFOL 20 MG: 10 INJECTION, EMULSION INTRAVENOUS at 14:42

## 2019-02-21 RX ADMIN — PROPOFOL 20 MG: 10 INJECTION, EMULSION INTRAVENOUS at 14:36

## 2019-02-21 NOTE — OP NOTE
REPORT OF OPERATION  DATE OF PROCEDURE: 2/21/2019    PATIENT: Radha Fox    SURGERY PREFORMED: Colonoscopy    PREOPERATIVE DIAGNOSIS: persistant RLQ pain    POSTOPERATIVE DIAGNOSIS:    Same   Normal colonoscopy   Diverticulosis was identified.   Hemorrhoids  were  identified.    SURGEON: Milton Gracia MD    ASSISTANTS: None    ANESTHESIA: Monitored Anesthesia Care    COMPLICATIONS: None apparent    TRANSFUSIONS: None    TISSUE TO PATHOLOGY: None    FINDINGS: Normal colonoscopy.  Diverticulosis was identified.  Hemorrhoids were not  identified.    INDICATIONS: This is a 69 year old female in need of a colonoscopy for persistant RLQ pain.  The patient will be taken to the endoscopy suite for that procedure.    DESCRIPTIONS OF PROCEDURE IN DETAIL: After consent was obtained the patient was taken to the endoscopy suite and placed in the left lateral decubitus position.  The patient was identified and the correct patient was confirmed.  Monitored Anesthesia Care was given.  A time out was preformed verifying the correct patient and the correct procedure.  The entire operative team was in agreement.  All necessary equipment and supplies were in the room.    Rectal exam was preformed and no lesions of the anal canal were noted.  The colonoscope was inserted into the anus and passed without difficulty to the cecum.  The cecum was identified by the ileocecal valve, the coalescence of the tinea and the appendiceal orifice.  Upon withdrawal all walls of the colon were visualized.  There were no polyps, masses or evidence of colitis seen.  Diverticulosis was seen.  Upon reaching the rectum the scope was retroflexed and internal hemorrhoids  were  seen.  The scope was straightened back out and removed from the patient.  The patient was then taken to the recovery room in stable condition tolerating the procedure well.      Prep: fair    Withdrawal time was 8 minutes.    It is recommended that the patient have another  colonoscopy in 10 years.

## 2019-02-21 NOTE — DISCHARGE INSTRUCTIONS

## 2019-02-21 NOTE — OR NURSING
Drank fluids no nausea or vomiting IV dced.Patient and responsible adult given discharge instructions with no questions regarding instructions. Heydi score 20 Pain level 0/10.  Discharged from unit via ambulatory. Patient discharged to home.

## 2019-02-21 NOTE — ANESTHESIA CARE TRANSFER NOTE
Patient: Radha Fox    Procedure(s):  COLONOSCOPY    Diagnosis: ABDOMINAL PAIN  Diagnosis Additional Information: No value filed.    Anesthesia Type:   MAC     Note:  Airway :Nasal Cannula  Patient transferred to:Phase II  Handoff Report: Identifed the Patient, Identified the Reponsible Provider, Reviewed the pertinent medical history, Discussed the surgical course, Reviewed Intra-OP anesthesia mangement and issues during anesthesia, Set expectations for post-procedure period and Allowed opportunity for questions and acknowledgement of understanding      Vitals: (Last set prior to Anesthesia Care Transfer)    CRNA VITALS  2/21/2019 1414 - 2/21/2019 1450      2/21/2019             Pulse:  80    Ht Rate:  80    SpO2:  98 %    Resp Rate (set):  8                Electronically Signed By: SUSHMA Sanchez CRNA  February 21, 2019  2:50 PM

## 2019-02-22 DIAGNOSIS — Z79.899 ENCOUNTER FOR LONG-TERM (CURRENT) USE OF HIGH-RISK MEDICATION: ICD-10-CM

## 2019-02-22 DIAGNOSIS — M06.09 RHEUMATOID ARTHRITIS OF MULTIPLE SITES WITHOUT RHEUMATOID FACTOR (H): ICD-10-CM

## 2019-02-22 LAB
ALBUMIN SERPL-MCNC: 3.6 G/DL (ref 3.4–5)
ALT SERPL W P-5'-P-CCNC: 35 U/L (ref 0–50)
AST SERPL W P-5'-P-CCNC: 15 U/L (ref 0–45)
BASOPHILS # BLD AUTO: 0 10E9/L (ref 0–0.2)
BASOPHILS NFR BLD AUTO: 0.3 %
CALCIUM SERPL-MCNC: 9.6 MG/DL (ref 8.5–10.1)
CREAT SERPL-MCNC: 0.97 MG/DL (ref 0.52–1.04)
CRP SERPL-MCNC: 13.5 MG/L (ref 0–8)
DIFFERENTIAL METHOD BLD: ABNORMAL
EOSINOPHIL # BLD AUTO: 0.2 10E9/L (ref 0–0.7)
EOSINOPHIL NFR BLD AUTO: 1.5 %
ERYTHROCYTE [DISTWIDTH] IN BLOOD BY AUTOMATED COUNT: 13.9 % (ref 10–15)
ERYTHROCYTE [SEDIMENTATION RATE] IN BLOOD BY WESTERGREN METHOD: 18 MM/H (ref 0–30)
GFR SERPL CREATININE-BSD FRML MDRD: 60 ML/MIN/{1.73_M2}
HCT VFR BLD AUTO: 41.3 % (ref 35–47)
HGB BLD-MCNC: 14 G/DL (ref 11.7–15.7)
IMM GRANULOCYTES # BLD: 0.1 10E9/L (ref 0–0.4)
IMM GRANULOCYTES NFR BLD: 0.5 %
LYMPHOCYTES # BLD AUTO: 0.5 10E9/L (ref 0.8–5.3)
LYMPHOCYTES NFR BLD AUTO: 4.7 %
MCH RBC QN AUTO: 34.3 PG (ref 26.5–33)
MCHC RBC AUTO-ENTMCNC: 33.9 G/DL (ref 31.5–36.5)
MCV RBC AUTO: 101 FL (ref 78–100)
MONOCYTES # BLD AUTO: 1.1 10E9/L (ref 0–1.3)
MONOCYTES NFR BLD AUTO: 10 %
NEUTROPHILS # BLD AUTO: 9.4 10E9/L (ref 1.6–8.3)
NEUTROPHILS NFR BLD AUTO: 83 %
NRBC # BLD AUTO: 0 10*3/UL
NRBC BLD AUTO-RTO: 0 /100
PLATELET # BLD AUTO: 267 10E9/L (ref 150–450)
RBC # BLD AUTO: 4.08 10E12/L (ref 3.8–5.2)
WBC # BLD AUTO: 11.3 10E9/L (ref 4–11)

## 2019-02-22 PROCEDURE — 82565 ASSAY OF CREATININE: CPT | Mod: ZL | Performed by: INTERNAL MEDICINE

## 2019-02-22 PROCEDURE — 82306 VITAMIN D 25 HYDROXY: CPT | Mod: ZL | Performed by: INTERNAL MEDICINE

## 2019-02-22 PROCEDURE — 85652 RBC SED RATE AUTOMATED: CPT | Mod: ZL | Performed by: INTERNAL MEDICINE

## 2019-02-22 PROCEDURE — 82310 ASSAY OF CALCIUM: CPT | Mod: ZL | Performed by: INTERNAL MEDICINE

## 2019-02-22 PROCEDURE — 84460 ALANINE AMINO (ALT) (SGPT): CPT | Mod: ZL | Performed by: INTERNAL MEDICINE

## 2019-02-22 PROCEDURE — 85025 COMPLETE CBC W/AUTO DIFF WBC: CPT | Mod: ZL | Performed by: INTERNAL MEDICINE

## 2019-02-22 PROCEDURE — 82040 ASSAY OF SERUM ALBUMIN: CPT | Mod: ZL | Performed by: INTERNAL MEDICINE

## 2019-02-22 PROCEDURE — 84450 TRANSFERASE (AST) (SGOT): CPT | Mod: ZL | Performed by: INTERNAL MEDICINE

## 2019-02-22 PROCEDURE — 86140 C-REACTIVE PROTEIN: CPT | Mod: ZL | Performed by: INTERNAL MEDICINE

## 2019-02-22 PROCEDURE — 36415 COLL VENOUS BLD VENIPUNCTURE: CPT | Mod: ZL | Performed by: INTERNAL MEDICINE

## 2019-02-22 NOTE — ANESTHESIA POSTPROCEDURE EVALUATION
Patient: Radha Fox    Procedure(s):  COLONOSCOPY    Diagnosis:ABDOMINAL PAIN  Diagnosis Additional Information: No value filed.    Anesthesia Type:  MAC    Note:  Anesthesia Post Evaluation    Patient location during evaluation: Phase 2  Patient participation: Able to fully participate in evaluation  Level of consciousness: awake and alert  Pain management: adequate  Airway patency: patent  Cardiovascular status: acceptable  Respiratory status: acceptable  Hydration status: stable  PONV: none     Anesthetic complications: None          Last vitals:  Vitals:    02/21/19 1505 02/21/19 1510 02/21/19 1515   BP:  107/74    Resp: 20     Temp:   97  F (36.1  C)   SpO2:  96% 96%         Electronically Signed By: Cedric Kaufman MD  February 22, 2019  7:40 AM

## 2019-02-26 ENCOUNTER — TRANSFERRED RECORDS (OUTPATIENT)
Dept: HEALTH INFORMATION MANAGEMENT | Facility: CLINIC | Age: 69
End: 2019-02-26

## 2019-02-27 LAB — DEPRECATED CALCIDIOL+CALCIFEROL SERPL-MC: 58 UG/L (ref 20–75)

## 2019-03-14 DIAGNOSIS — F32.9 MDD (MAJOR DEPRESSIVE DISORDER): ICD-10-CM

## 2019-03-14 NOTE — TELEPHONE ENCOUNTER
Effexor  Last Written Prescription Date: 10/29/18  Last Fill Quantity: 90 # of Refills: 0  Last Office Visit: 2/11/19

## 2019-03-15 RX ORDER — VENLAFAXINE HYDROCHLORIDE 75 MG/1
CAPSULE, EXTENDED RELEASE ORAL
Qty: 90 CAPSULE | Refills: 0 | Status: SHIPPED | OUTPATIENT
Start: 2019-03-15 | End: 2019-06-14

## 2019-03-29 NOTE — PROGRESS NOTES
[facility] - [place]  [address]  [place] [address]  [phone]  Dept: [phone]    PRE-OP EVALUATION:  Today's date: 2019    Radha Fox (: 1950) presents for pre-operative evaluation assessment as requested by Dr. King.  She requires evaluation and anesthesia risk assessment prior to undergoing surgery/procedure for treatment of left foot - great toe arthrosis/arthritis    Proposed Surgery/ Procedure: Left foot surgery - Great Toe Fusion   Date of Surgery/ Procedure: 19  Time of Surgery/ Procedure: [facility]  Hospital/Surgical Facility: [facility]   Primary Physician: Lanie Costello  Type of Anesthesia Anticipated: to be determined    Patient has a Health Care Directive or Living Will:  NO    1. NO - Do you have a history of heart attack, stroke, stent, bypass or surgery on an artery in the head, neck, heart or legs?  2. NO - Do you ever have any pain or discomfort in your chest?  3. NO - Do you have a history of  Heart Failure?  4. NO - Are you troubled by shortness of breath when: walking on the level, up a slight hill or at night?  5. YES - Do you currently have a cold, bronchitis or other respiratory infection?  Sinus drainage; green; 2 weeks; no fevers; no dyspnea/wheeze; last antibiotics Levaquin x 10 days (from ER and ENT) 19  6. YES - Do you have a cough, shortness of breath or wheezing? Minimal cough from drainage  7. NO - Do you sometimes get pains in the calves of your legs when you walk?  8. NO - Do you or anyone in your family have previous history of blood clots?  9. NO - Do you or does anyone in your family have a serious bleeding problem such as prolonged bleeding following surgeries or cuts?  10. NO - Have you ever had problems with anemia or been told to take iron pills?  11. NO - Have you had any abnormal blood loss such as black, tarry or bloody stools, or abnormal vaginal bleeding?  12. NO - Have you ever had a blood  transfusion?  13. NO - Have you or any of your relatives ever had problems with anesthesia?  14. NO - Do you have sleep apnea, excessive snoring or daytime drowsiness?  15. NO - Do you have any prosthetic heart valves?  16. NO - Do you have prosthetic joints?  17. NO - Is there any chance that you may be pregnant?      RESPIRATORY SYMPTOMS      Duration: Ongoing for two weeks    Description  nasal congestion, rhinorrhea, facial pain/pressure, cough and fatigue/malaise    Severity: mild    Accompanying signs and symptoms: None    History (predisposing factors):  none    Precipitating or alleviating factors: None    Therapies tried and outcome:  nasal spray/washes         HPI:     HPI related to upcoming procedure: Patient with great toe arthritis, s/p injection, but with ongoing pain.      See problem list for active medical problems.  Problems all longstanding and stable, except as noted/documented.  See ROS for pertinent symptoms related to these conditions.                                                                                                                                                            DIABETES - Patient has a longstanding history of DiabetesType Type II . Patient is being treated with diet and denies significant side effects. Control has been good. Complicating factors include but are not limited to: hyperlipidemia and obesity and inactive lifestyle.                                                                                                                               HYPERLIPIDEMIA - Patient has a long history of significant Hyperlipidemia requiring medication for treatment with recent good control. Patient reports no problems or side effects with the medication.                                                                                                                                                          MEDICAL HISTORY:     Patient Active Problem List    Diagnosis Date  Noted     Type 2 diabetes mellitus without complication (H) 10/30/2017     Priority: Medium     Chronic cough 08/17/2016     Priority: Medium     ACP (advance care planning) 07/21/2016     Priority: Medium     Advance Care Planning 7/21/2016: ACP Review of Chart / Resources Provided:  Reviewed chart for advance care plan.  Radha Fox has no plan or code status on file. Discussed available resources and provided with information. Confirmed code status reflects current choices pending further ACP discussions.  Confirmed/documented legally designated decision makers.  Added by Gregoria Gregorio             Diabetes mellitus type 2, diet-controlled (H)      Priority: Medium     Rheumatoid arthritis of multiple sites without rheumatoid factor (H) 11/10/2015     Priority: Medium     SNHL (sensory-neural hearing loss), asymmetrical 07/08/2014     Priority: Medium     negative MRI IAC 7/2014       S/P myringotomy with insertion of tube 07/08/2014     Priority: Medium     History of chronic sinusitis 07/08/2014     Priority: Medium     History of sinus surgery 05/28/2014     Priority: Medium     Simple chronic serous otitis media 05/28/2014     Priority: Medium     Problem list name updated by automated process. Provider to review       Dysfunction of eustachian tube 05/28/2014     Priority: Medium     Mixed hearing loss, unilateral 05/28/2014     Priority: Medium     Abnormal glucose 01/22/2014     Priority: Medium     Problem list name updated by automated process. Provider to review       Recurrent UTI 03/20/2013     Priority: Medium     RA (rheumatoid arthritis) (H) 03/12/2013     Priority: Medium     Insomnia 01/24/2013     Priority: Medium     Problem list name updated by automated process. Provider to review       Osteoporosis 01/19/2012     Priority: Medium     Problem list name updated by automated process. Provider to review       Obesity 01/19/2012     Priority: Medium     Problem list name updated by  automated process. Provider to review       Restless legs syndrome (RLS) 04/21/2011     Priority: Medium     Esophageal reflux 11/12/2010     Priority: Medium     Contact dermatitis and other eczema, due to unspecified cause 03/05/2009     Priority: Medium     Coronary atherosclerosis 09/27/2005     Priority: Medium     Problem list name updated by automated process. Provider to review       Hyperlipidemia 04/13/2005     Priority: Medium     Problem list name updated by automated process. Provider to review       Depressive disorder, not elsewhere classified 01/11/2002     Priority: Medium     Sinusitis, chronic 09/05/2000     Priority: Medium     Problem list name updated by automated process. Provider to review       Arthropathy 05/01/2000     Priority: Medium     Problem list name updated by automated process. Provider to review       Diffuse connective tissue disease (H) 04/04/2000     Priority: Medium     Problem list name updated by automated process. Provider to review        Past Medical History:   Diagnosis Date     Abnormal PFT     moderate restriction/parenchymal; moderate diffusion defect; minimal obstruction     Arthropathy, unspecified, site unspecified 05/01/2000     Chest pain, unspecified 2005    Resolved     Contact dermatitis and other eczema, due to unspecified cause 03/05/2009     Coronary atherosclerosis of unspecified type of vessel, native or graft 09/27/2005     Depressive disorder, not elsewhere classified 01/11/2002     Diabetes mellitus type 2, diet-controlled (H)      Esophageal reflux 11/12/2010     H/O: hysterectomy 1977     Impaired fasting glucose 11/12/2010     Insomnia, unspecified 01/24/2013     Leukocytosis, unspecified 12/22/2007     Obesity, unspecified 01/19/2012     Osteoporosis, unspecified 01/19/2012    Dr Mcgovern; Prolia; DEXA 2/20198 stable     Other and unspecified hyperlipidemia 04/13/2005     Other dyspnea and respiratory abnormality 01/19/2012    reduced DLCO      Restless legs syndrome (RLS) 04/21/2011     Rheumatoid arthritis(714.0) 11/09/1999     Unspecified diffuse connective tissue disease 04/04/2000     Unspecified sinusitis (chronic) 09/05/2000     Past Surgical History:   Procedure Laterality Date     APPENDECTOMY  1961     CHOLECYSTECTOMY  1973     COLONOSCOPY N/A 3/23/2015    Procedure: COLONOSCOPY;  Surgeon: Clarisa Larkin MD;  Location: [facility]     COLONOSCOPY N/A 2/21/2019    Procedure: COLONOSCOPY;  Surgeon: Milton Gracia MD;  Location: [facility] OR     endoscopic sphenoidotomy  10/2011    Right     ETHMOIDECTOMY Bilateral 9/27/2016    Procedure: ETHMOIDECTOMY;  Surgeon: Aracelis Ayon MD;  Location: [facility]     excision bilateral telma bullosa  10/2011     GENITOURINARY SURGERY       HYSTERECTOMY      benign     LAPAROSCOPY DIAGNOSTIC (GENERAL)       ORTHOPEDIC SURGERY      left foot 5th metarsal fracture screws placed     SEPTOPLASTY N/A 9/27/2016    Procedure: SEPTOPLASTY;  Surgeon: Aracelis Ayon MD;  Location: [facility] OR     third metatarsal fracture Left 01/2019    stress fracture; Dr. King     TOT Internal urethrotomy  2010     TUBAL LIGATION       Current Outpatient Medications   Medication Sig Dispense Refill     ASPIRIN PO Take 81 mg by mouth daily       atorvastatin (LIPITOR) 20 MG tablet TAKE 1 TABLET BY MOUTH ONCE DAILY. DUE  FOR  ANNUAL  LABS 90 tablet 0     Blood Glucose Calibration (ACCU-CHEK ANGELICA) SOLN Use as directed 1 Bottle 0     Blood Glucose Calibration (ACCU-CHEK COMPACT BLUE CONTROL) LIQD Use to calibrate blood glucose monitor as directed. 1 each 0     blood glucose monitoring (ONETOUCH ULTRA) test strip Use to test blood sugars one time daily or as directed. 100 strip 2     blood glucose monitoring (ONETOUCH ULTRA) test strip Use to test blood sugar daily or as directed. 100 strip 2     calcium carbonate-vitamin D (CALCIUM + D) 600-200 MG-UNIT TABS Take 600 mg by mouth 2 times daily.       celecoxib (CELEBREX) 200 MG  capsule TAKE 1 CAPSULE BY MOUTH ONCE DAILY 90 capsule 1     Cyanocobalamin (VITAMIN B 12 PO) Take 500 mg by mouth daily.       folic acid (FOLVITE) 1 MG tablet Take 2 tablets by mouth daily       KLOR-CON 20 MEQ CR tablet TAKE 1 TABLET BY MOUTH ONCE DAILY WITH  FOOD 90 tablet 1     levofloxacin (LEVAQUIN) 500 MG tablet Take 1 tablet (500 mg) by mouth daily 5 tablet 0     Methotrexate Sodium (METHOTREXATE PO) Inject 12.5 mg as directed once a week.       multivitamin, therapeutic with minerals (MULTI-VITAMIN) TABS Take  by mouth daily.       omeprazole (PRILOSEC) 40 MG DR capsule TAKE 1 CAPSULE BY MOUTH ONCE DAILY BEFORE A MEAL 90 capsule 0     pramipexole (MIRAPEX) 0.25 MG tablet TAKE 1 TABLET BY MOUTH AT 5PM AND 2 TABLETS AT 9PM 270 tablet 1     predniSONE (DELTASONE) 1 MG tablet Take 1 mg by mouth daily Take 3 tabs po daily       Probiotic Product (PROBIOTIC DAILY PO) Take 1 capsule by mouth daily       propranolol (INDERAL) 20 MG tablet TAKE 1 TABLET BY MOUTH ONCE DAILY 90 tablet 1     RiTUXimab (RITUXAN IV)        venlafaxine (EFFEXOR-XR) 75 MG 24 hr capsule TAKE 1 CAPSULE BY MOUTH ONCE DAILY 90 capsule 0     OTC products: None, except as noted above    Allergies   Allergen Reactions     Neomycin-Polymyxin-Hc      Rash behind ear after ear drops     Penicillins Hives and Itching     One time large local reaction after IM PCN in 1970, no airway symptoms     Plaquenil [Hydroxychloroquine Sulfate] Hives      Latex Allergy: NO    Social History     Tobacco Use     Smoking status: Never Smoker     Smokeless tobacco: Never Used   Substance Use Topics     Alcohol use: No     Alcohol/week: 0.0 oz     History   Drug Use No       REVIEW OF SYSTEMS:   Constitutional, neuro, ENT, endocrine, pulmonary, cardiac, gastrointestinal, genitourinary, musculoskeletal, integument and psychiatric systems are negative, except as otherwise noted.    EXAM:   /72 (BP Location: Left arm, Patient Position: Chair, Cuff Size: Adult  Large)   Pulse 66   Temp 97.4  F (36.3  C) (Tympanic)   Wt 94.8 kg (209 lb)   SpO2 95%   BMI 33.73 kg/m      GENERAL APPEARANCE: alert, no distress, cooperative and over weight     EYES: EOMI, PERRL     HENT: ear canals and TM's normal and nose and mouth without ulcers or lesions     NECK: no adenopathy, no asymmetry, masses, or scars and thyroid normal to palpation     RESP: lungs clear to auscultation - no rales, rhonchi or wheezes     CV: regular rates and rhythm, normal S1 S2, no S3 or S4 and no murmur, click or rub     ABDOMEN:  soft, nontender, no HSM or masses and bowel sounds normal     MS: gait normal, peripheral pulses normal and no edema     SKIN: no suspicious lesions or rashes     NEURO: Normal strength and tone, sensory exam grossly normal, mentation intact and speech normal     PSYCH: mentation appears normal. and affect normal/bright     LYMPHATICS: No cervical adenopathy     Diabetic foot exam: normal DP and PT pulses, no trophic changes or ulcerative lesions, normal sensory exam and normal monofilament exam      DIAGNOSTICS:   EKG: sinus bradycardia, left axis deviation, nonspecific T wave abnormalities, unchanged from previous tracings, from 2016 to 2/14/19    A1c, CBC, BMP pending.    Will return for fasting lipids - overdue.      Recent Labs   Lab Test 02/22/19  1443 02/02/19  1129  08/30/18  1416 08/24/18  1450  09/27/17  1033   HGB 14.0 12.8   < > 13.3  --    < >  --     233   < > 253  --    < >  --    NA  --  144  --  140  --    < > 141   POTASSIUM  --  4.0  --  4.5  --    < > 4.2   CR 0.97 0.83   < > 0.72  --    < > 0.80   A1C  --   --   --   --  6.9*  --  6.3*    < > = values in this interval not displayed.        IMPRESSION:   Reason for surgery/procedure: left great toe arthrosis; fusion  Diagnosis/reason for consult: cardiopulmonary clearance    The proposed surgical procedure is considered INTERMEDIATE risk.    REVISED CARDIAC RISK INDEX  The patient has the following  serious cardiovascular risks for perioperative complications such as (MI, PE, VFib and 3  AV Block):  No serious cardiac risks  INTERPRETATION: 0 risks: Class I (very low risk - 0.4% complication rate)    The patient has the following additional risks for perioperative complications:  No identified additional risks      ICD-10-CM    1. Preop general physical exam Z01.818 Hemoglobin A1c     Basic metabolic panel     CBC with platelets and differential     Hemoglobin A1c   2. Arthritis of big toe M19.079    3. Diabetes mellitus type 2, diet-controlled (H) E11.9 Hemoglobin A1c     Albumin Random Urine Quantitative with Creat Ratio     Basic metabolic panel     Lipid Profile (Chol, Trig, HDL, LDL calc)     CBC with platelets and differential     Albumin Random Urine Quantitative with Creat Ratio     Hemoglobin A1c   4. Hyperlipidemia, unspecified hyperlipidemia type E78.5    5. Acute sinusitis, recurrence not specified, unspecified location J01.90 levofloxacin (LEVAQUIN) 500 MG tablet       RECOMMENDATIONS:       --Patient is to take all scheduled medications on the day of surgery EXCEPT for modifications listed below.  Supplements, Aspirin, NSAIDs on hold.    APPROVAL GIVEN to proceed with proposed procedure, without further diagnostic evaluation       Signed Electronically by: Lanie Duggan MD    Copy of this evaluation report is provided to requesting physician.    [place] Preop Guidelines    Revised Cardiac Risk Index

## 2019-04-04 ENCOUNTER — TELEPHONE (OUTPATIENT)
Dept: FAMILY MEDICINE | Facility: OTHER | Age: 69
End: 2019-04-04

## 2019-04-04 ENCOUNTER — OFFICE VISIT (OUTPATIENT)
Dept: FAMILY MEDICINE | Facility: OTHER | Age: 69
End: 2019-04-04
Attending: FAMILY MEDICINE
Payer: MEDICARE

## 2019-04-04 VITALS
WEIGHT: 209 LBS | HEART RATE: 66 BPM | OXYGEN SATURATION: 95 % | BODY MASS INDEX: 33.73 KG/M2 | TEMPERATURE: 97.4 F | DIASTOLIC BLOOD PRESSURE: 72 MMHG | SYSTOLIC BLOOD PRESSURE: 114 MMHG

## 2019-04-04 DIAGNOSIS — E11.9 DIABETES MELLITUS TYPE 2, DIET-CONTROLLED (H): ICD-10-CM

## 2019-04-04 DIAGNOSIS — Z01.818 PREOP GENERAL PHYSICAL EXAM: Primary | ICD-10-CM

## 2019-04-04 DIAGNOSIS — E78.5 HYPERLIPIDEMIA, UNSPECIFIED HYPERLIPIDEMIA TYPE: ICD-10-CM

## 2019-04-04 DIAGNOSIS — M19.079 ARTHRITIS OF BIG TOE: ICD-10-CM

## 2019-04-04 DIAGNOSIS — J01.90 ACUTE SINUSITIS, RECURRENCE NOT SPECIFIED, UNSPECIFIED LOCATION: ICD-10-CM

## 2019-04-04 LAB
ANION GAP SERPL CALCULATED.3IONS-SCNC: 7 MMOL/L (ref 3–14)
BASOPHILS # BLD AUTO: 0 10E9/L (ref 0–0.2)
BASOPHILS NFR BLD AUTO: 0.4 %
BUN SERPL-MCNC: 28 MG/DL (ref 7–30)
CALCIUM SERPL-MCNC: 9.2 MG/DL (ref 8.5–10.1)
CHLORIDE SERPL-SCNC: 107 MMOL/L (ref 94–109)
CO2 SERPL-SCNC: 28 MMOL/L (ref 20–32)
CREAT SERPL-MCNC: 0.81 MG/DL (ref 0.52–1.04)
CREAT UR-MCNC: 130 MG/DL
DIFFERENTIAL METHOD BLD: ABNORMAL
EOSINOPHIL # BLD AUTO: 0.2 10E9/L (ref 0–0.7)
EOSINOPHIL NFR BLD AUTO: 1.8 %
ERYTHROCYTE [DISTWIDTH] IN BLOOD BY AUTOMATED COUNT: 13.9 % (ref 10–15)
GFR SERPL CREATININE-BSD FRML MDRD: 75 ML/MIN/{1.73_M2}
GLUCOSE SERPL-MCNC: 135 MG/DL (ref 70–99)
HBA1C MFR BLD: 6.7 % (ref 0–5.6)
HCT VFR BLD AUTO: 40.8 % (ref 35–47)
HGB BLD-MCNC: 13.5 G/DL (ref 11.7–15.7)
IMM GRANULOCYTES # BLD: 0.1 10E9/L (ref 0–0.4)
IMM GRANULOCYTES NFR BLD: 0.5 %
LYMPHOCYTES # BLD AUTO: 1 10E9/L (ref 0.8–5.3)
LYMPHOCYTES NFR BLD AUTO: 10 %
MCH RBC QN AUTO: 33.6 PG (ref 26.5–33)
MCHC RBC AUTO-ENTMCNC: 33.1 G/DL (ref 31.5–36.5)
MCV RBC AUTO: 102 FL (ref 78–100)
MICROALBUMIN UR-MCNC: 35 MG/L
MICROALBUMIN/CREAT UR: 27.23 MG/G CR (ref 0–25)
MONOCYTES # BLD AUTO: 0.9 10E9/L (ref 0–1.3)
MONOCYTES NFR BLD AUTO: 9.2 %
NEUTROPHILS # BLD AUTO: 7.9 10E9/L (ref 1.6–8.3)
NEUTROPHILS NFR BLD AUTO: 78.1 %
NRBC # BLD AUTO: 0 10*3/UL
NRBC BLD AUTO-RTO: 0 /100
PLATELET # BLD AUTO: 260 10E9/L (ref 150–450)
POTASSIUM SERPL-SCNC: 4.6 MMOL/L (ref 3.4–5.3)
RBC # BLD AUTO: 4.02 10E12/L (ref 3.8–5.2)
SODIUM SERPL-SCNC: 142 MMOL/L (ref 133–144)
WBC # BLD AUTO: 10.1 10E9/L (ref 4–11)

## 2019-04-04 PROCEDURE — G0463 HOSPITAL OUTPT CLINIC VISIT: HCPCS

## 2019-04-04 PROCEDURE — 36415 COLL VENOUS BLD VENIPUNCTURE: CPT | Mod: ZL | Performed by: FAMILY MEDICINE

## 2019-04-04 PROCEDURE — 82043 UR ALBUMIN QUANTITATIVE: CPT | Mod: ZL | Performed by: FAMILY MEDICINE

## 2019-04-04 PROCEDURE — 83036 HEMOGLOBIN GLYCOSYLATED A1C: CPT | Mod: ZL | Performed by: FAMILY MEDICINE

## 2019-04-04 PROCEDURE — 80048 BASIC METABOLIC PNL TOTAL CA: CPT | Mod: ZL | Performed by: FAMILY MEDICINE

## 2019-04-04 PROCEDURE — 85025 COMPLETE CBC W/AUTO DIFF WBC: CPT | Mod: ZL | Performed by: FAMILY MEDICINE

## 2019-04-04 PROCEDURE — 99214 OFFICE O/P EST MOD 30 MIN: CPT | Performed by: FAMILY MEDICINE

## 2019-04-04 RX ORDER — LEVOFLOXACIN 500 MG/1
500 TABLET, FILM COATED ORAL DAILY
Qty: 5 TABLET | Refills: 0 | Status: SHIPPED | OUTPATIENT
Start: 2019-04-04 | End: 2019-06-06

## 2019-04-04 ASSESSMENT — ANXIETY QUESTIONNAIRES
3. WORRYING TOO MUCH ABOUT DIFFERENT THINGS: NOT AT ALL
GAD7 TOTAL SCORE: 0
6. BECOMING EASILY ANNOYED OR IRRITABLE: NOT AT ALL
4. TROUBLE RELAXING: NOT AT ALL
2. NOT BEING ABLE TO STOP OR CONTROL WORRYING: NOT AT ALL
5. BEING SO RESTLESS THAT IT IS HARD TO SIT STILL: NOT AT ALL
7. FEELING AFRAID AS IF SOMETHING AWFUL MIGHT HAPPEN: NOT AT ALL
1. FEELING NERVOUS, ANXIOUS, OR ON EDGE: NOT AT ALL

## 2019-04-04 ASSESSMENT — PATIENT HEALTH QUESTIONNAIRE - PHQ9: SUM OF ALL RESPONSES TO PHQ QUESTIONS 1-9: 1

## 2019-04-04 ASSESSMENT — PAIN SCALES - GENERAL: PAINLEVEL: NO PAIN (0)

## 2019-04-04 NOTE — NURSING NOTE
"Chief Complaint   Patient presents with     Pre-Op Exam       Initial /72 (BP Location: Left arm, Patient Position: Chair, Cuff Size: Adult Large)   Pulse 66   Temp 97.4  F (36.3  C) (Tympanic)   Wt 94.8 kg (209 lb)   SpO2 95%   BMI 33.73 kg/m   Estimated body mass index is 33.73 kg/m  as calculated from the following:    Height as of 2/14/19: 1.676 m (5' 6\").    Weight as of this encounter: 94.8 kg (209 lb).  Medication Reconciliation: complete    Yessenia Sterling LPN    "

## 2019-04-05 ASSESSMENT — ANXIETY QUESTIONNAIRES: GAD7 TOTAL SCORE: 0

## 2019-04-22 ENCOUNTER — TRANSFERRED RECORDS (OUTPATIENT)
Dept: HEALTH INFORMATION MANAGEMENT | Facility: CLINIC | Age: 69
End: 2019-04-22

## 2019-04-29 ENCOUNTER — TRANSFERRED RECORDS (OUTPATIENT)
Dept: HEALTH INFORMATION MANAGEMENT | Facility: CLINIC | Age: 69
End: 2019-04-29

## 2019-05-07 DIAGNOSIS — I10 HYPERTENSION, UNSPECIFIED TYPE: ICD-10-CM

## 2019-05-07 RX ORDER — PROPRANOLOL HYDROCHLORIDE 20 MG/1
TABLET ORAL
Qty: 90 TABLET | Refills: 1 | Status: SHIPPED | OUTPATIENT
Start: 2019-05-07 | End: 2019-09-27

## 2019-05-15 DIAGNOSIS — G25.81 RESTLESS LEGS SYNDROME (RLS): ICD-10-CM

## 2019-05-15 NOTE — TELEPHONE ENCOUNTER
Mirapex       Last Written Prescription Date:  10/29/2018  Last Fill Quantity: 270,   # refills: 1  Last Office Visit: 4/4/2019  Future Office visit:

## 2019-05-16 RX ORDER — PRAMIPEXOLE DIHYDROCHLORIDE 0.25 MG/1
TABLET ORAL
Qty: 270 TABLET | Refills: 0 | Status: SHIPPED | OUTPATIENT
Start: 2019-05-16 | End: 2019-07-23

## 2019-05-20 ENCOUNTER — TRANSFERRED RECORDS (OUTPATIENT)
Dept: HEALTH INFORMATION MANAGEMENT | Facility: CLINIC | Age: 69
End: 2019-05-20

## 2019-05-27 DIAGNOSIS — E78.5 HYPERLIPIDEMIA LDL GOAL <100: ICD-10-CM

## 2019-05-27 DIAGNOSIS — K21.9 GASTROESOPHAGEAL REFLUX DISEASE, ESOPHAGITIS PRESENCE NOT SPECIFIED: ICD-10-CM

## 2019-05-29 RX ORDER — OMEPRAZOLE 40 MG/1
CAPSULE, DELAYED RELEASE ORAL
Qty: 90 CAPSULE | Refills: 0 | OUTPATIENT
Start: 2019-05-29

## 2019-05-29 RX ORDER — ATORVASTATIN CALCIUM 20 MG/1
TABLET, FILM COATED ORAL
Qty: 90 TABLET | Refills: 0 | OUTPATIENT
Start: 2019-05-29

## 2019-05-29 NOTE — TELEPHONE ENCOUNTER
Patient due for labs.    LDL Cholesterol Calculated   Date Value Ref Range Status   09/27/2017 61 <100 mg/dL Final     Comment:     Desirable:       <100 mg/dl

## 2019-05-29 NOTE — TELEPHONE ENCOUNTER
Atorvastatin      Last Written Prescription Date:  2/4/19  Last Fill Quantity: 90,   # refills: 0  Last Office Visit: 4/4/19  Future Office visit:       Routing refill request to provider for review/approval because:      Omeprazole      Last Written Prescription Date:  2/4/19  Last Fill Quantity: 90,   # refills: 0  Last Office Visit: 4/4/19  Future Office visit:       Routing refill request to provider for review/approval because:    TRE ROBLEDO

## 2019-05-30 ENCOUNTER — TRANSFERRED RECORDS (OUTPATIENT)
Dept: HEALTH INFORMATION MANAGEMENT | Facility: CLINIC | Age: 69
End: 2019-05-30

## 2019-05-30 DIAGNOSIS — M06.9 RHEUMATOID ARTHRITIS, INVOLVING UNSPECIFIED SITE, UNSPECIFIED RHEUMATOID FACTOR PRESENCE: Primary | ICD-10-CM

## 2019-05-30 DIAGNOSIS — E11.9 DIABETES MELLITUS TYPE 2, DIET-CONTROLLED (H): ICD-10-CM

## 2019-05-30 DIAGNOSIS — E11.9 DIABETES MELLITUS, TYPE 2 (H): Primary | ICD-10-CM

## 2019-05-30 LAB
ALBUMIN SERPL-MCNC: 3.6 G/DL (ref 3.4–5)
ALT SERPL W P-5'-P-CCNC: 30 U/L (ref 0–50)
BASOPHILS # BLD AUTO: 0 10E9/L (ref 0–0.2)
BASOPHILS NFR BLD AUTO: 0.3 %
CREAT SERPL-MCNC: 0.81 MG/DL (ref 0.52–1.04)
CRP SERPL-MCNC: 5.1 MG/L (ref 0–8)
DIFFERENTIAL METHOD BLD: ABNORMAL
EOSINOPHIL # BLD AUTO: 0.1 10E9/L (ref 0–0.7)
EOSINOPHIL NFR BLD AUTO: 1.1 %
ERYTHROCYTE [DISTWIDTH] IN BLOOD BY AUTOMATED COUNT: 14.1 % (ref 10–15)
ERYTHROCYTE [SEDIMENTATION RATE] IN BLOOD BY WESTERGREN METHOD: 19 MM/H (ref 0–30)
GFR SERPL CREATININE-BSD FRML MDRD: 75 ML/MIN/{1.73_M2}
HCT VFR BLD AUTO: 39.2 % (ref 35–47)
HGB BLD-MCNC: 13.3 G/DL (ref 11.7–15.7)
IMM GRANULOCYTES # BLD: 0.1 10E9/L (ref 0–0.4)
IMM GRANULOCYTES NFR BLD: 0.5 %
LYMPHOCYTES # BLD AUTO: 0.9 10E9/L (ref 0.8–5.3)
LYMPHOCYTES NFR BLD AUTO: 6.7 %
MCH RBC QN AUTO: 33.9 PG (ref 26.5–33)
MCHC RBC AUTO-ENTMCNC: 33.9 G/DL (ref 31.5–36.5)
MCV RBC AUTO: 100 FL (ref 78–100)
MONOCYTES # BLD AUTO: 1.1 10E9/L (ref 0–1.3)
MONOCYTES NFR BLD AUTO: 9 %
NEUTROPHILS # BLD AUTO: 10.4 10E9/L (ref 1.6–8.3)
NEUTROPHILS NFR BLD AUTO: 82.4 %
NRBC # BLD AUTO: 0 10*3/UL
NRBC BLD AUTO-RTO: 0 /100
PLATELET # BLD AUTO: 265 10E9/L (ref 150–450)
RBC # BLD AUTO: 3.92 10E12/L (ref 3.8–5.2)
WBC # BLD AUTO: 12.6 10E9/L (ref 4–11)

## 2019-05-30 PROCEDURE — 82565 ASSAY OF CREATININE: CPT | Performed by: FAMILY MEDICINE

## 2019-05-30 PROCEDURE — 36415 COLL VENOUS BLD VENIPUNCTURE: CPT | Performed by: FAMILY MEDICINE

## 2019-05-30 PROCEDURE — 85025 COMPLETE CBC W/AUTO DIFF WBC: CPT | Performed by: FAMILY MEDICINE

## 2019-05-30 PROCEDURE — 85652 RBC SED RATE AUTOMATED: CPT | Performed by: FAMILY MEDICINE

## 2019-05-30 PROCEDURE — 84460 ALANINE AMINO (ALT) (SGPT): CPT | Performed by: FAMILY MEDICINE

## 2019-05-30 PROCEDURE — 86140 C-REACTIVE PROTEIN: CPT | Performed by: FAMILY MEDICINE

## 2019-05-30 PROCEDURE — 82040 ASSAY OF SERUM ALBUMIN: CPT | Performed by: FAMILY MEDICINE

## 2019-05-30 NOTE — LETTER
May 30, 2019      Radha Fox  [address] [place] [address]   [place] [address]      Resulted Orders   *CBC with platelets differential   Result Value Ref Range    WBC 12.6 (H) 4.0 - 11.0 10e9/L    RBC Count 3.92 3.8 - 5.2 10e12/L    Hemoglobin 13.3 11.7 - 15.7 g/dL    Hematocrit 39.2 35.0 - 47.0 %     78 - 100 fl    MCH 33.9 (H) 26.5 - 33.0 pg    MCHC 33.9 31.5 - 36.5 g/dL    RDW 14.1 10.0 - 15.0 %    Platelet Count 265 150 - 450 10e9/L    Diff Method Automated Method     % Neutrophils 82.4 %    % Lymphocytes 6.7 %    % Monocytes 9.0 %    % Eosinophils 1.1 %    % Basophils 0.3 %    % Immature Granulocytes 0.5 %    Nucleated RBCs 0 0 /100    Absolute Neutrophil 10.4 (H) 1.6 - 8.3 10e9/L    Absolute Lymphocytes 0.9 0.8 - 5.3 10e9/L    Absolute Monocytes 1.1 0.0 - 1.3 10e9/L    Absolute Eosinophils 0.1 0.0 - 0.7 10e9/L    Absolute Basophils 0.0 0.0 - 0.2 10e9/L    Abs Immature Granulocytes 0.1 0 - 0.4 10e9/L    Absolute Nucleated RBC 0.0    Albumin level   Result Value Ref Range    Albumin 3.6 3.4 - 5.0 g/dL   ALT   Result Value Ref Range    ALT 30 0 - 50 U/L   Creatinine   Result Value Ref Range    Creatinine 0.81 0.52 - 1.04 mg/dL    GFR Estimate 75 >60 mL/min/[1.73_m2]      Comment:      Non  GFR Calc  Starting 12/18/2018, serum creatinine based estimated GFR (eGFR) will be   calculated using the Chronic Kidney Disease Epidemiology Collaboration   (CKD-EPI) equation.      GFR Estimate If Black 87 >60 mL/min/[1.73_m2]      Comment:       GFR Calc  Starting 12/18/2018, serum creatinine based estimated GFR (eGFR) will be   calculated using the Chronic Kidney Disease Epidemiology Collaboration   (CKD-EPI) equation.     CRP inflammation   Result Value Ref Range    CRP Inflammation 5.1 0.0 - 8.0 mg/L   Erythrocyte sedimentation rate auto   Result Value Ref Range    Sed Rate 19 0 - 30 mm/h       If you have any questions or concerns, please call the clinic at the number listed above.        Sincerely,        Lanie Duggan MD

## 2019-05-31 NOTE — PROGRESS NOTES
Call to [facility] and message left for Lovely Olya re elevated WBC outside given parameters, reminder of Prednisone dosing, and requesting ok to treat patient Monday as long as she is showing no s/sx infection/illness/recent abx. Awaiting return call.

## 2019-06-03 ENCOUNTER — INFUSION THERAPY VISIT (OUTPATIENT)
Dept: INFUSION THERAPY | Facility: OTHER | Age: 69
End: 2019-06-03
Attending: FAMILY MEDICINE
Payer: MEDICARE

## 2019-06-03 VITALS
BODY MASS INDEX: 33.28 KG/M2 | DIASTOLIC BLOOD PRESSURE: 50 MMHG | HEIGHT: 66 IN | TEMPERATURE: 97.3 F | HEART RATE: 62 BPM | SYSTOLIC BLOOD PRESSURE: 127 MMHG | WEIGHT: 207.1 LBS | OXYGEN SATURATION: 96 % | RESPIRATION RATE: 18 BRPM

## 2019-06-03 DIAGNOSIS — M06.09 RHEUMATOID ARTHRITIS OF MULTIPLE SITES WITHOUT RHEUMATOID FACTOR (H): ICD-10-CM

## 2019-06-03 DIAGNOSIS — M06.09 RHEUMATOID ARTHRITIS OF MULTIPLE SITES WITH NEGATIVE RHEUMATOID FACTOR (H): ICD-10-CM

## 2019-06-03 DIAGNOSIS — E78.5 HYPERLIPIDEMIA LDL GOAL <100: ICD-10-CM

## 2019-06-03 DIAGNOSIS — K21.9 GASTROESOPHAGEAL REFLUX DISEASE, ESOPHAGITIS PRESENCE NOT SPECIFIED: ICD-10-CM

## 2019-06-03 DIAGNOSIS — M06.9 RHEUMATOID ARTHRITIS, INVOLVING UNSPECIFIED SITE, UNSPECIFIED RHEUMATOID FACTOR PRESENCE: Primary | ICD-10-CM

## 2019-06-03 PROCEDURE — 96366 THER/PROPH/DIAG IV INF ADDON: CPT

## 2019-06-03 PROCEDURE — 25000128 H RX IP 250 OP 636: Performed by: FAMILY MEDICINE

## 2019-06-03 PROCEDURE — 96365 THER/PROPH/DIAG IV INF INIT: CPT

## 2019-06-03 PROCEDURE — 96367 TX/PROPH/DG ADDL SEQ IV INF: CPT

## 2019-06-03 PROCEDURE — 25800030 ZZH RX IP 258 OP 636: Performed by: FAMILY MEDICINE

## 2019-06-03 PROCEDURE — 96413 CHEMO IV INFUSION 1 HR: CPT

## 2019-06-03 PROCEDURE — 96415 CHEMO IV INFUSION ADDL HR: CPT

## 2019-06-03 PROCEDURE — 96375 TX/PRO/DX INJ NEW DRUG ADDON: CPT

## 2019-06-03 RX ORDER — METHYLPREDNISOLONE SODIUM SUCCINATE 125 MG/2ML
100 INJECTION, POWDER, LYOPHILIZED, FOR SOLUTION INTRAMUSCULAR; INTRAVENOUS ONCE
Status: CANCELLED
Start: 2019-06-03

## 2019-06-03 RX ORDER — METHYLPREDNISOLONE SODIUM SUCCINATE 40 MG/ML
40 INJECTION, POWDER, LYOPHILIZED, FOR SOLUTION INTRAMUSCULAR; INTRAVENOUS ONCE
Status: CANCELLED
Start: 2019-06-03

## 2019-06-03 RX ORDER — METHYLPREDNISOLONE SODIUM SUCCINATE 125 MG/2ML
100 INJECTION, POWDER, LYOPHILIZED, FOR SOLUTION INTRAMUSCULAR; INTRAVENOUS ONCE
Status: COMPLETED | OUTPATIENT
Start: 2019-06-03 | End: 2019-06-03

## 2019-06-03 RX ADMIN — FAMOTIDINE 20 MG: 20 INJECTION, SOLUTION INTRAVENOUS at 09:40

## 2019-06-03 RX ADMIN — RITUXIMAB 1000 MG: 10 INJECTION, SOLUTION INTRAVENOUS at 10:06

## 2019-06-03 RX ADMIN — METHYLPREDNISOLONE SODIUM SUCCINATE 100 MG: 125 INJECTION, POWDER, FOR SOLUTION INTRAMUSCULAR; INTRAVENOUS at 09:35

## 2019-06-03 RX ADMIN — SODIUM CHLORIDE 250 ML: 9 INJECTION, SOLUTION INTRAVENOUS at 09:35

## 2019-06-03 ASSESSMENT — PAIN SCALES - GENERAL: PAINLEVEL: NO PAIN (0)

## 2019-06-03 ASSESSMENT — MIFFLIN-ST. JEOR: SCORE: 1485.9

## 2019-06-03 NOTE — PROGRESS NOTES
Patient is a 69 year old female here accompanied by self today for infusion of Rituxan per order of Lovely Dobson.  Patient identified with two identifiers, order verified, and verbal consent for today's infusion obtained from patient.      5-30-19 lab values:  WBC: 12.6, HGB: 13.3, PLT: 265, ANC: 10.4, ALT: 30, Creatinine: 0.81, CRP 5.1, ESR 19.       24 gauge angio cath inserted into right posterior hand.  Immediate blood return noted.  IV secured with sterile, transparent dressing and tape.  Patient tolerated well, denies pain or discomfort at this time.  Flushes easily without resistance, no signs or symptoms of infiltration or infection.   Patient denies questions or concerns regarding infusion and/or medication(s) being administered.      1006: IV pump verified with dose, drug, and rate of administration.  Infusion administered per protocol.  Patient tolerated infusion well, no signs or symptoms of adverse reaction noted.  Patient denies pain nor discomfort.     IV removed, catheter intact.  Site clean, dry and intact.  No signs or symptoms of infiltration or infection.  Covered with a sterile bandage, slight pressure applied for 30 seconds.  Pt instructed to leave bandage intact for a minimum of one hour, and to call with questions or concerns.  Patient states understanding, discharged ambulatory.

## 2019-06-03 NOTE — PROGRESS NOTES
Francois assessment, visit notes and labs faxed to [facility].

## 2019-06-05 RX ORDER — OMEPRAZOLE 40 MG/1
CAPSULE, DELAYED RELEASE ORAL
Qty: 90 CAPSULE | Refills: 0 | Status: SHIPPED | OUTPATIENT
Start: 2019-06-05 | End: 2019-08-20

## 2019-06-05 RX ORDER — ATORVASTATIN CALCIUM 20 MG/1
TABLET, FILM COATED ORAL
Qty: 30 TABLET | Refills: 0 | Status: SHIPPED | OUTPATIENT
Start: 2019-06-05 | End: 2019-06-29

## 2019-06-06 ENCOUNTER — OFFICE VISIT (OUTPATIENT)
Dept: FAMILY MEDICINE | Facility: OTHER | Age: 69
End: 2019-06-06
Attending: FAMILY MEDICINE
Payer: MEDICARE

## 2019-06-06 VITALS
TEMPERATURE: 97.2 F | DIASTOLIC BLOOD PRESSURE: 82 MMHG | OXYGEN SATURATION: 96 % | BODY MASS INDEX: 33.49 KG/M2 | SYSTOLIC BLOOD PRESSURE: 110 MMHG | WEIGHT: 207.4 LBS | HEART RATE: 66 BPM

## 2019-06-06 DIAGNOSIS — J01.91 ACUTE RECURRENT SINUSITIS, UNSPECIFIED LOCATION: Primary | ICD-10-CM

## 2019-06-06 PROCEDURE — 99213 OFFICE O/P EST LOW 20 MIN: CPT | Performed by: FAMILY MEDICINE

## 2019-06-06 PROCEDURE — G0463 HOSPITAL OUTPT CLINIC VISIT: HCPCS

## 2019-06-06 RX ORDER — DOXYCYCLINE HYCLATE 100 MG
100 TABLET ORAL 2 TIMES DAILY
Qty: 14 TABLET | Refills: 0 | Status: SHIPPED | OUTPATIENT
Start: 2019-06-06 | End: 2019-06-27

## 2019-06-06 ASSESSMENT — PAIN SCALES - GENERAL: PAINLEVEL: MILD PAIN (3)

## 2019-06-06 NOTE — PROGRESS NOTES
Subjective     Radha Fox is a 69 year old female who presents to clinic today for the following health issues:    HPI   RESPIRATORY SYMPTOMS      Duration: Ongoing symptoms for 6 days    Description  nasal congestion, rhinorrhea, facial pain/pressure, cough, ear pain right, headache, fatigue/malaise, hoarse voice and conjunctival irritation    Severity: mild    Accompanying signs and symptoms: None    History (predisposing factors):  none    Precipitating or alleviating factors: None    Therapies tried and outcome:  Benadryl, Flonase, Nasal Rinse and Budesonide, ibuprofen - no improvement.     No nightly antihistamine    Levaquin 4/4/19, 1/3/19, 12/31/18    Allergy to PCN    RA - on Rituxan infusions - last infusion Monday -and methotrexate     Last ENT 1/2019    Last sinus surgery 2016 with Dr. Ayon    No fevers      Current Outpatient Medications   Medication     ASPIRIN PO     atorvastatin (LIPITOR) 20 MG tablet     Blood Glucose Calibration (ACCU-CHEK ANGELICA) SOLN     Blood Glucose Calibration (ACCU-CHEK COMPACT BLUE CONTROL) LIQD     blood glucose monitoring (ONETOUCH ULTRA) test strip     blood glucose monitoring (ONETOUCH ULTRA) test strip     calcium carbonate-vitamin D (CALCIUM + D) 600-200 MG-UNIT TABS     celecoxib (CELEBREX) 200 MG capsule     Cyanocobalamin (VITAMIN B 12 PO)     doxycycline hyclate (VIBRA-TABS) 100 MG tablet     folic acid (FOLVITE) 1 MG tablet     KLOR-CON 20 MEQ CR tablet     Methotrexate Sodium (METHOTREXATE PO)     multivitamin, therapeutic with minerals (MULTI-VITAMIN) TABS     omeprazole (PRILOSEC) 40 MG DR capsule     pramipexole (MIRAPEX) 0.25 MG tablet     predniSONE (DELTASONE) 1 MG tablet     Probiotic Product (PROBIOTIC DAILY PO)     propranolol (INDERAL) 20 MG tablet     RiTUXimab (RITUXAN IV)     venlafaxine (EFFEXOR-XR) 75 MG 24 hr capsule     No current facility-administered medications for this visit.        Patient Active Problem List   Diagnosis     RA  (rheumatoid arthritis) (H)     Recurrent UTI     Hyperlipidemia     Depressive disorder, not elsewhere classified     Contact dermatitis and other eczema, due to unspecified cause     Diffuse connective tissue disease (H)     Arthropathy     Coronary atherosclerosis     Sinusitis, chronic     Esophageal reflux     Restless legs syndrome (RLS)     Osteoporosis     Obesity     Insomnia     Abnormal glucose     History of sinus surgery     Simple chronic serous otitis media     Dysfunction of eustachian tube     Mixed hearing loss, unilateral     SNHL (sensory-neural hearing loss), asymmetrical     S/P myringotomy with insertion of tube     History of chronic sinusitis     Rheumatoid arthritis of multiple sites without rheumatoid factor (H)     ACP (advance care planning)     Diabetes mellitus type 2, diet-controlled (H)     Chronic cough     Type 2 diabetes mellitus without complication (H)     Past Surgical History:   Procedure Laterality Date     APPENDECTOMY  1961     CHOLECYSTECTOMY  1973     COLONOSCOPY N/A 3/23/2015    Procedure: COLONOSCOPY;  Surgeon: Clarisa Larkin MD;  Location: [facility]     COLONOSCOPY N/A 2/21/2019    Procedure: COLONOSCOPY;  Surgeon: Milton Gracia MD;  Location: [facility]     endoscopic sphenoidotomy  10/2011    Right     ETHMOIDECTOMY Bilateral 9/27/2016    Procedure: ETHMOIDECTOMY;  Surgeon: Aracelis Ayon MD;  Location: [facility]     excision bilateral telma bullosa  10/2011     GENITOURINARY SURGERY       HYSTERECTOMY      benign     LAPAROSCOPY DIAGNOSTIC (GENERAL)       ORTHOPEDIC SURGERY      left foot 5th metarsal fracture screws placed     ORTHOPEDIC SURGERY Left 04/22/2019    Dr. King; first MPJ fusion     SEPTOPLASTY N/A 9/27/2016    Procedure: SEPTOPLASTY;  Surgeon: Aracelis Ayon MD;  Location: [facility]     third metatarsal fracture Left 01/2019    stress fracture; Dr. King     TOT Internal urethrotomy  2010     TUBAL LIGATION         Social History      Tobacco Use     Smoking status: Never Smoker     Smokeless tobacco: Never Used   Substance Use Topics     Alcohol use: No     Alcohol/week: 0.0 oz     Family History   Problem Relation Age of Onset     C.A.D. Father 83     Other - See Comments Father         CHF     Cerebrovascular Disease Father         CVA     Diabetes Father      Hypertension Father      Coronary Artery Disease Father      Cerebrovascular Disease Mother 72        CVA     Heart Disease Mother         Heart Issue     Hyperlipidemia Mother      Coronary Artery Disease Mother      Diabetes Brother      Diabetes Sister      Colon Cancer No family hx of      Breast Cancer No family hx of      Asthma No family hx of      Thyroid Disease No family hx of              Reviewed and updated as needed this visit by Provider  Tobacco  Allergies  Meds  Med Hx  Surg Hx  Fam Hx  Soc Hx        Review of Systems   ROS COMP: Constitutional, HEENT, cardiovascular, pulmonary, gi and gu systems are negative, except as otherwise noted.      Objective    /82 (BP Location: Right arm, Patient Position: Chair, Cuff Size: Adult Large)   Pulse 66   Temp 97.2  F (36.2  C) (Tympanic)   Wt 94.1 kg (207 lb 6.4 oz)   SpO2 96%   BMI 33.49 kg/m    Body mass index is 33.49 kg/m .  Physical Exam   GENERAL: alert, no distress and over weight  EYES: Eyes grossly normal to inspection, PERRL and conjunctivae and sclerae normal  HENT: normal cephalic/atraumatic, both ears: TMs with dull light reflex, but non-erythematous, no visible effusion, nose and mouth without ulcers or lesions, oropharynx clear, oral mucous membranes moist, sinuses: maxillary, frontal, ethmoid tenderness on bilaterally   NECK: no adenopathy, no asymmetry, masses, or scars and thyroid normal to palpation  RESP: lungs clear to auscultation - no rales, rhonchi or wheezes  CV: regular rate and rhythm, normal S1 S2, no S3 or S4, no murmur, click or rub, no peripheral edema and peripheral pulses  "strong  MS: no gross musculoskeletal defects noted, no edema  PSYCH: mentation appears normal, affect normal/bright    Diagnostic Test Results:  none         Assessment & Plan       ICD-10-CM    1. Acute recurrent sinusitis, unspecified location J01.91 doxycycline hyclate (VIBRA-TABS) 100 MG tablet        Concern for recurrent nature of symptoms, frequent fluoroquinolone with prednisone, PCN allergy - risk of resistance to levaquin, risk of C difficile.  Will discuss with ENT - ?time for follow up, repeat imaging, procedural intervention?  She did quite well after her sinus surgery in 2016 for a stretch.    High risk given Rituxan infusions for her RA.    BMI:   Estimated body mass index is 33.49 kg/m  as calculated from the following:    Height as of 6/3/19: 1.676 m (5' 5.98\").    Weight as of this encounter: 94.1 kg (207 lb 6.4 oz).   Weight management plan: not addressed today        Patient Instructions   Start nightly Claritin or Zyrtec in place of Benadryl.  Continue nasal rinses, steroid spray.  Complete course of Doxycycline.  Consider Levaquin if not improving.  Will touch base with ENT to assess future plan - need for follow up, imaging, etc.        No follow-ups on file.    Lanie Duggan MD  [facility] - [place]      "

## 2019-06-06 NOTE — PATIENT INSTRUCTIONS
Start nightly Claritin or Zyrtec in place of Benadryl.  Continue nasal rinses, steroid spray.  Complete course of Doxycycline.  Consider Levaquin if not improving.  Will touch base with ENT to assess future plan - need for follow up, imaging, etc.

## 2019-06-06 NOTE — NURSING NOTE
"Chief Complaint   Patient presents with     URI       Initial /82 (BP Location: Right arm, Patient Position: Chair, Cuff Size: Adult Large)   Pulse 66   Temp 97.2  F (36.2  C) (Tympanic)   Wt 94.1 kg (207 lb 6.4 oz)   SpO2 96%   BMI 33.49 kg/m   Estimated body mass index is 33.49 kg/m  as calculated from the following:    Height as of 6/3/19: 1.676 m (5' 5.98\").    Weight as of this encounter: 94.1 kg (207 lb 6.4 oz).  Medication Reconciliation: complete    Yessenia Sterling LPN    "

## 2019-06-06 NOTE — Clinical Note
See today's note.  Not comfortable with frequent levaquin for her - 3 times in past 6 months for sinuses.  Trying doxy today.  Time for ENT follow up?  Imaging?  Did well after sinus surgery in 2016.  Just want to touch base with big picture -thank you!

## 2019-06-07 ENCOUNTER — TELEPHONE (OUTPATIENT)
Dept: OTOLARYNGOLOGY | Facility: OTHER | Age: 69
End: 2019-06-07

## 2019-06-07 DIAGNOSIS — J32.0 CHRONIC MAXILLARY SINUSITIS: Primary | ICD-10-CM

## 2019-06-07 DIAGNOSIS — Z98.890 HISTORY OF ENDOSCOPIC SINUS SURGERY: ICD-10-CM

## 2019-06-07 DIAGNOSIS — J32.2 CHRONIC ETHMOIDAL SINUSITIS: ICD-10-CM

## 2019-06-14 DIAGNOSIS — F32.9 MDD (MAJOR DEPRESSIVE DISORDER): ICD-10-CM

## 2019-06-14 DIAGNOSIS — I10 HYPERTENSION, UNSPECIFIED TYPE: ICD-10-CM

## 2019-06-17 ENCOUNTER — TRANSFERRED RECORDS (OUTPATIENT)
Dept: HEALTH INFORMATION MANAGEMENT | Facility: CLINIC | Age: 69
End: 2019-06-17

## 2019-06-17 RX ORDER — POTASSIUM CHLORIDE 1500 MG/1
TABLET, EXTENDED RELEASE ORAL
Qty: 90 TABLET | Refills: 0 | Status: SHIPPED | OUTPATIENT
Start: 2019-06-17 | End: 2019-08-20

## 2019-06-17 RX ORDER — VENLAFAXINE HYDROCHLORIDE 75 MG/1
CAPSULE, EXTENDED RELEASE ORAL
Qty: 90 CAPSULE | Refills: 0 | Status: SHIPPED | OUTPATIENT
Start: 2019-06-17 | End: 2019-09-27

## 2019-06-18 ENCOUNTER — INFUSION THERAPY VISIT (OUTPATIENT)
Dept: INFUSION THERAPY | Facility: OTHER | Age: 69
End: 2019-06-18
Attending: FAMILY MEDICINE
Payer: MEDICARE

## 2019-06-18 ENCOUNTER — HOSPITAL ENCOUNTER (OUTPATIENT)
Dept: CT IMAGING | Facility: HOSPITAL | Age: 69
Discharge: HOME | End: 2019-06-18
Attending: PHYSICIAN ASSISTANT | Admitting: PHYSICIAN ASSISTANT
Payer: MEDICARE

## 2019-06-18 VITALS
BODY MASS INDEX: 33.54 KG/M2 | HEART RATE: 65 BPM | TEMPERATURE: 97.7 F | OXYGEN SATURATION: 94 % | HEIGHT: 66 IN | WEIGHT: 208.7 LBS | DIASTOLIC BLOOD PRESSURE: 64 MMHG | SYSTOLIC BLOOD PRESSURE: 140 MMHG

## 2019-06-18 DIAGNOSIS — M06.9 RHEUMATOID ARTHRITIS, INVOLVING UNSPECIFIED SITE, UNSPECIFIED RHEUMATOID FACTOR PRESENCE: Primary | ICD-10-CM

## 2019-06-18 DIAGNOSIS — J32.2 CHRONIC ETHMOIDAL SINUSITIS: ICD-10-CM

## 2019-06-18 DIAGNOSIS — J32.0 CHRONIC MAXILLARY SINUSITIS: ICD-10-CM

## 2019-06-18 DIAGNOSIS — M06.09 RHEUMATOID ARTHRITIS OF MULTIPLE SITES WITH NEGATIVE RHEUMATOID FACTOR (H): ICD-10-CM

## 2019-06-18 DIAGNOSIS — E11.9 DIABETES MELLITUS, TYPE 2 (H): ICD-10-CM

## 2019-06-18 DIAGNOSIS — M06.9 RHEUMATOID ARTHRITIS, INVOLVING UNSPECIFIED SITE, UNSPECIFIED RHEUMATOID FACTOR PRESENCE: ICD-10-CM

## 2019-06-18 DIAGNOSIS — Z98.890 HISTORY OF ENDOSCOPIC SINUS SURGERY: ICD-10-CM

## 2019-06-18 DIAGNOSIS — M06.09 RHEUMATOID ARTHRITIS OF MULTIPLE SITES WITHOUT RHEUMATOID FACTOR (H): ICD-10-CM

## 2019-06-18 LAB
BASOPHILS # BLD AUTO: 0.1 10E9/L (ref 0–0.2)
BASOPHILS NFR BLD AUTO: 0.5 %
CHOLEST SERPL-MCNC: 194 MG/DL
CRP SERPL-MCNC: <2.9 MG/L (ref 0–8)
DIFFERENTIAL METHOD BLD: ABNORMAL
EOSINOPHIL # BLD AUTO: 0.2 10E9/L (ref 0–0.7)
EOSINOPHIL NFR BLD AUTO: 2.3 %
ERYTHROCYTE [DISTWIDTH] IN BLOOD BY AUTOMATED COUNT: 14.2 % (ref 10–15)
ERYTHROCYTE [SEDIMENTATION RATE] IN BLOOD BY WESTERGREN METHOD: 15 MM/H (ref 0–30)
HCT VFR BLD AUTO: 41.5 % (ref 35–47)
HDLC SERPL-MCNC: 78 MG/DL
HGB BLD-MCNC: 14.1 G/DL (ref 11.7–15.7)
IMM GRANULOCYTES # BLD: 0 10E9/L (ref 0–0.4)
IMM GRANULOCYTES NFR BLD: 0.3 %
LDLC SERPL CALC-MCNC: 90 MG/DL
LYMPHOCYTES # BLD AUTO: 1.9 10E9/L (ref 0.8–5.3)
LYMPHOCYTES NFR BLD AUTO: 17.5 %
MCH RBC QN AUTO: 33.3 PG (ref 26.5–33)
MCHC RBC AUTO-ENTMCNC: 34 G/DL (ref 31.5–36.5)
MCV RBC AUTO: 98 FL (ref 78–100)
MONOCYTES # BLD AUTO: 1.1 10E9/L (ref 0–1.3)
MONOCYTES NFR BLD AUTO: 10.5 %
NEUTROPHILS # BLD AUTO: 7.3 10E9/L (ref 1.6–8.3)
NEUTROPHILS NFR BLD AUTO: 68.9 %
NONHDLC SERPL-MCNC: 116 MG/DL
NRBC # BLD AUTO: 0 10*3/UL
NRBC BLD AUTO-RTO: 0 /100
PLATELET # BLD AUTO: 246 10E9/L (ref 150–450)
RBC # BLD AUTO: 4.23 10E12/L (ref 3.8–5.2)
TRIGL SERPL-MCNC: 129 MG/DL
WBC # BLD AUTO: 10.6 10E9/L (ref 4–11)

## 2019-06-18 PROCEDURE — 85025 COMPLETE CBC W/AUTO DIFF WBC: CPT | Mod: ZL | Performed by: FAMILY MEDICINE

## 2019-06-18 PROCEDURE — 96413 CHEMO IV INFUSION 1 HR: CPT

## 2019-06-18 PROCEDURE — 85652 RBC SED RATE AUTOMATED: CPT | Mod: ZL | Performed by: FAMILY MEDICINE

## 2019-06-18 PROCEDURE — 25000128 H RX IP 250 OP 636: Performed by: FAMILY MEDICINE

## 2019-06-18 PROCEDURE — 36415 COLL VENOUS BLD VENIPUNCTURE: CPT | Mod: ZL | Performed by: FAMILY MEDICINE

## 2019-06-18 PROCEDURE — 96367 TX/PROPH/DG ADDL SEQ IV INF: CPT

## 2019-06-18 PROCEDURE — 96415 CHEMO IV INFUSION ADDL HR: CPT

## 2019-06-18 PROCEDURE — 86140 C-REACTIVE PROTEIN: CPT | Mod: ZL | Performed by: FAMILY MEDICINE

## 2019-06-18 PROCEDURE — 96365 THER/PROPH/DIAG IV INF INIT: CPT

## 2019-06-18 PROCEDURE — 96375 TX/PRO/DX INJ NEW DRUG ADDON: CPT

## 2019-06-18 PROCEDURE — 70486 CT MAXILLOFACIAL W/O DYE: CPT | Mod: TC

## 2019-06-18 PROCEDURE — 25800030 ZZH RX IP 258 OP 636: Performed by: FAMILY MEDICINE

## 2019-06-18 PROCEDURE — 80061 LIPID PANEL: CPT | Mod: ZL | Performed by: FAMILY MEDICINE

## 2019-06-18 PROCEDURE — 96366 THER/PROPH/DIAG IV INF ADDON: CPT

## 2019-06-18 RX ORDER — METHYLPREDNISOLONE SODIUM SUCCINATE 125 MG/2ML
100 INJECTION, POWDER, LYOPHILIZED, FOR SOLUTION INTRAMUSCULAR; INTRAVENOUS ONCE
Status: COMPLETED | OUTPATIENT
Start: 2019-06-18 | End: 2019-06-18

## 2019-06-18 RX ORDER — METHYLPREDNISOLONE SODIUM SUCCINATE 40 MG/ML
40 INJECTION, POWDER, LYOPHILIZED, FOR SOLUTION INTRAMUSCULAR; INTRAVENOUS ONCE
Status: CANCELLED
Start: 2019-06-18

## 2019-06-18 RX ORDER — METHYLPREDNISOLONE SODIUM SUCCINATE 125 MG/2ML
100 INJECTION, POWDER, LYOPHILIZED, FOR SOLUTION INTRAMUSCULAR; INTRAVENOUS ONCE
Status: CANCELLED
Start: 2019-06-18

## 2019-06-18 RX ADMIN — METHYLPREDNISOLONE SODIUM SUCCINATE 100 MG: 125 INJECTION, POWDER, FOR SOLUTION INTRAMUSCULAR; INTRAVENOUS at 09:12

## 2019-06-18 RX ADMIN — RITUXIMAB 1000 MG: 10 INJECTION, SOLUTION INTRAVENOUS at 09:52

## 2019-06-18 RX ADMIN — FAMOTIDINE 20 MG: 20 INJECTION, SOLUTION INTRAVENOUS at 09:14

## 2019-06-18 RX ADMIN — SODIUM CHLORIDE 250 ML: 9 INJECTION, SOLUTION INTRAVENOUS at 09:12

## 2019-06-18 ASSESSMENT — MIFFLIN-ST. JEOR: SCORE: 1493.16

## 2019-06-18 NOTE — PROGRESS NOTES
Patient tolerated infusion well, no signs or symptoms of adverse reaction noted.  Patient denies pain nor discomfort.

## 2019-06-18 NOTE — PROGRESS NOTES
Jonathan to receive infusion per MARIA L Dobson after antibiotic treatment completion.  0952 IV pump verified with rituxan 1000 mg dose, drug, and rate of administration.  Infusion administered per protocol.

## 2019-06-18 NOTE — PROGRESS NOTES
Patient is a 69 year female here accompanied by self today for infusion of Rituxan per order of Lovely Dobson.  Patient meets parameters for today's infusion. Patient identified with two identifiers, order verified, and verbal consent for today's infusion obtained from patient.    Recent lab values:  WBC: 10.6, HGB: 14.1, PLT: 246, ANC: 7.3,  ALT: 1.9 .  Patient meets order parameters for today's treatment.     22 gauge angio cath inserted into RT hand.  Immediate blood return noted.  IV secured with sterile, transparent dressing and tape.  Patient tolerated well, denies pain or discomfort at this time.  Flushes easily without resistance, no signs or symptoms of infiltration or infection.   Patient denies questions or concerns regarding infusion and/or medication(s) being administered.     Patient tolerated infusion well, no signs or symptoms of adverse reaction noted.  Patient denies pain nor discomfort.     IV removed, catheter intact.  Site clean, dry and intact.  No signs or symptoms of infiltration or infection.  Covered with a sterile bandage, slight pressure applied for 30 seconds.  Pt instructed to leave bandage intact for a minimum of one hour, and to call with questions or concerns.  Copy of appointments, discharge instructions, and after visit summary (AVS) provided to patient.  Patient states understanding, discharged ambulatory.

## 2019-06-27 ENCOUNTER — OFFICE VISIT (OUTPATIENT)
Dept: OTOLARYNGOLOGY | Facility: OTHER | Age: 69
End: 2019-06-27
Attending: OTOLARYNGOLOGY
Payer: MEDICARE

## 2019-06-27 VITALS
HEIGHT: 66 IN | HEART RATE: 63 BPM | DIASTOLIC BLOOD PRESSURE: 72 MMHG | TEMPERATURE: 97.7 F | BODY MASS INDEX: 34.06 KG/M2 | OXYGEN SATURATION: 97 % | SYSTOLIC BLOOD PRESSURE: 118 MMHG | WEIGHT: 211.9 LBS

## 2019-06-27 DIAGNOSIS — M26.609 TMJ (TEMPOROMANDIBULAR JOINT SYNDROME): ICD-10-CM

## 2019-06-27 DIAGNOSIS — Z98.890 HISTORY OF ENDOSCOPIC SINUS SURGERY: Primary | ICD-10-CM

## 2019-06-27 DIAGNOSIS — J01.91 ACUTE RECURRENT SINUSITIS, UNSPECIFIED LOCATION: ICD-10-CM

## 2019-06-27 PROCEDURE — G0463 HOSPITAL OUTPT CLINIC VISIT: HCPCS

## 2019-06-27 PROCEDURE — 99214 OFFICE O/P EST MOD 30 MIN: CPT | Mod: 25 | Performed by: OTOLARYNGOLOGY

## 2019-06-27 PROCEDURE — 31231 NASAL ENDOSCOPY DX: CPT | Performed by: OTOLARYNGOLOGY

## 2019-06-27 PROCEDURE — G0463 HOSPITAL OUTPT CLINIC VISIT: HCPCS | Mod: 25

## 2019-06-27 RX ORDER — DOXYCYCLINE HYCLATE 100 MG
100 TABLET ORAL 2 TIMES DAILY
Qty: 14 TABLET | Refills: 0 | Status: SHIPPED | OUTPATIENT
Start: 2019-06-27 | End: 2019-09-09

## 2019-06-27 ASSESSMENT — MIFFLIN-ST. JEOR: SCORE: 1507.6

## 2019-06-27 ASSESSMENT — PAIN SCALES - GENERAL: PAINLEVEL: MILD PAIN (2)

## 2019-06-27 NOTE — NURSING NOTE
"Chief Complaint   Patient presents with     Follow Up     chronic maxillary sinusitis, chronic ethmoidal sinusitis       Initial /72   Pulse 63   Temp 97.7  F (36.5  C) (Tympanic)   Ht 1.676 m (5' 5.98\")   Wt 96.1 kg (211 lb 14.4 oz)   SpO2 97%   BMI 34.22 kg/m   Estimated body mass index is 34.22 kg/m  as calculated from the following:    Height as of this encounter: 1.676 m (5' 5.98\").    Weight as of this encounter: 96.1 kg (211 lb 14.4 oz).  Medication Reconciliation: complete  "

## 2019-06-27 NOTE — PROGRESS NOTES
"Otolaryngology Progress Note          Radha Fox is a 69 year old female  Presents for follow-up of chronic sinusitis    She was treated with doxycycline and symptoms of thick post nasal drainage, leading to cough , maxillary pressure some anosmia  Resolved with doxy and has continue flonase and katie med    Her other concern today is bilateral preauricular tenderness and clicking with jaw opening this is been going on about 3 weeks.  She denies known bruxism and she has not had previous problems with TMJ but she does have rheumatoid arthritis    She has seen her dentist, Dr. Albin DUQUE within the past 3 to 4 months with a negative exam    She denies any known heroic snoring or everton sleep apnea     recent CT sinus dated 6/18/2019 was negative and reviewed below  FESS/septo 9/27/16 with me    Rheumatoid arthritis has been stable    PROCEDURES PERFORMED 9/27/16:   1.  Bilateral total ethmoidectomy     2.  Bilateral maxillary antrostomy with tissue removal    3.  Septoplasty/nasal septal reconstruction with cartilage reinsertion    Sinus procedures above performed using [email or web address]  navigation.  FINDINGS:  Copious mucopurulent fluid left maxillary sinus, thick polypoid degeneration anterior and posterior ethmoid cavity  FINAL DIAGNOSIS:   Sinus, FESS   - Inflammatory nasal polyp no eos      Physical Exam  /72   Pulse 63   Temp 97.7  F (36.5  C) (Tympanic)   Ht 1.676 m (5' 5.98\")   Wt 96.1 kg (211 lb 14.4 oz)   SpO2 97%   BMI 34.22 kg/m    General - The patient is well nourished and well developed, and appears to have good nutritional status.  Alert and oriented to person and place, interactive.  Head and Face - Normocephalic and atraumatic, with no gross asymmetry noted of the contour of the facial features.  The facial nerve is intact, with strong symmetric movements.  Neck-no palpable lymphadenopathy or thyroid mass.  Trachea is midline.  Eyes - Extraocular movements intact.   TMJ-exquisite " tenderness to palpation of the pterygoids on intraoral exam and preauricular masseteric tenderness.  There is tenderness with jaw opening and an occasional click.  Class II occlusion small oral cavity  Ears- External auditory canals are patent, tympanic membranes are intact without effusion or worrisome retractions   Nose - Nasal mucosa is pink and moist with no abnormal mucus.  The septum was grossly midline and non-obstructive, turbinates of normal size and position.  No polyps, masses, or purulence noted on examination.  Mouth - Examination of the oral cavity shows pink, healthy, moist mucosa.  No lesions or ulceration noted.  The dentition are in good repair.  The tongue is mobile and midline.  Throat - The walls of the oropharynx were smooth, pink, moist, symmetric, and had no lesions or ulcerations.  The tonsillar pillars and soft palate were symmetric.  The uvula was midline on elevation.      To evaluate the nose and sinuses in the post operative state, I performed rigid nasal endoscopy. The LPN had previously sprayed both nares with lidocaine and neosynephrine.    I began with the LEFT side using a 0 degree rigid nasal endoscope, and then similarly examined the RIGHT side    Findings:  Inferior turbinates:  nonedematous  Middle turbinate and middle meatus:  No purulence, no polyposis  Ethmoidectomy site is clear  Maxillary antrostomies are clear  Mucosa is healthy throughout,  no polyps nor polypoid degeneration  Nasopharynx clear  The patient tolerated the procedure well    CT sinuses  6/18/2019 reveals clear frontal ethmoid maxillary and sphenoid sinuses the turbinates are not edematous surgical changes are noted consistent with prior maxillary antrostomy and ethmoidectomy    Impression/Plan  Radha Fox is a 69 year old female    ICD-10-CM    1. History of endoscopic sinus surgery Z98.890    2. Acute recurrent sinusitis, unspecified location J01.91 doxycycline hyclate (VIBRA-TABS) 100 MG tablet    3. TMJ (temporomandibular joint syndrome) M26.617          I reassured Radha her sinuses look perfect and her CAT scan is completely negative CAT scan was reviewed with her    Doxycycline was effective at 100 bid x 7 days , stop MV during doxy use.  I gave her a refill of this for future acute sinusitis    Start TMJ PT    Based on today's history and physical exam, I can find no evidence of middle ear pathology or eustachian tube dysfunction.  At this point my primary diagnosis is of temporomandibular syndrome.  I have discussed the etiology of TMJ, and the reasons why referred pain can mimic symptoms of ear disease, headaches, and even sinusitis.  i have given the patient an instructional sheet of things to be tried at home.  This includes warm compress, massage, ibuprofen use taken with meals, soft diet.  A referral to a TMJ specialist should it be needed, as well as CT temporal bone, may be ordered if otalgia persists.  Finally, I counseled the patient that should the therapy not help, or should the symptoms change, that they should return to me.      Continue Flonase  Use [facility] Med Nasal Saline Once Daily  Take claritin prn  Sinuses look excellent  Ask your dentist about an oral appliance for possible teeth grinding          Aracelis Ayon D.O.  Otolaryngology/Head and Neck Surgery  Allergy

## 2019-06-27 NOTE — LETTER
"    6/27/2019         RE: Radha Fox  [address]  [place] [address]        Dear Colleague,    Thank you for referring your patient, Radha Fox, to the [facility]. Please see a copy of my visit note below.    Otolaryngology Progress Note          Radha Fox is a 69 year old female  Presents for follow-up of chronic sinusitis    She was treated with doxycycline and symptoms of thick post nasal drainage, leading to cough , maxillary pressure some anosmia  Resolved with doxy and has continue flonase and katie med    Her other concern today is bilateral preauricular tenderness and clicking with jaw opening this is been going on about 3 weeks.  She denies known bruxism and she has not had previous problems with TMJ but she does have rheumatoid arthritis    She has seen her dentist, Dr. Albin DUQUE within the past 3 to 4 months with a negative exam    She denies any known heroic snoring or everton sleep apnea     recent CT sinus dated 6/18/2019 was negative and reviewed below  FESS/septo 9/27/16 with me    Rheumatoid arthritis has been stable    PROCEDURES PERFORMED 9/27/16:   1.  Bilateral total ethmoidectomy     2.  Bilateral maxillary antrostomy with tissue removal    3.  Septoplasty/nasal septal reconstruction with cartilage reinsertion    Sinus procedures above performed using [employer]  navigation.  FINDINGS:  Copious mucopurulent fluid left maxillary sinus, thick polypoid degeneration anterior and posterior ethmoid cavity  FINAL DIAGNOSIS:   Sinus, FESS   - Inflammatory nasal polyp no eos      Physical Exam  /72   Pulse 63   Temp 97.7  F (36.5  C) (Tympanic)   Ht 1.676 m (5' 5.98\")   Wt 96.1 kg (211 lb 14.4 oz)   SpO2 97%   BMI 34.22 kg/m     General - The patient is well nourished and well developed, and appears to have good nutritional status.  Alert and oriented to person and place, interactive.  Head and Face - Normocephalic and atraumatic, with no " gross asymmetry noted of the contour of the facial features.  The facial nerve is intact, with strong symmetric movements.  Neck-no palpable lymphadenopathy or thyroid mass.  Trachea is midline.  Eyes - Extraocular movements intact.   TMJ-exquisite tenderness to palpation of the pterygoids on intraoral exam and preauricular masseteric tenderness.  There is tenderness with jaw opening and an occasional click.  Class II occlusion small oral cavity  Ears- External auditory canals are patent, tympanic membranes are intact without effusion or worrisome retractions   Nose - Nasal mucosa is pink and moist with no abnormal mucus.  The septum was grossly midline and non-obstructive, turbinates of normal size and position.  No polyps, masses, or purulence noted on examination.  Mouth - Examination of the oral cavity shows pink, healthy, moist mucosa.  No lesions or ulceration noted.  The dentition are in good repair.  The tongue is mobile and midline.  Throat - The walls of the oropharynx were smooth, pink, moist, symmetric, and had no lesions or ulcerations.  The tonsillar pillars and soft palate were symmetric.  The uvula was midline on elevation.      To evaluate the nose and sinuses in the post operative state, I performed rigid nasal endoscopy. The LPN had previously sprayed both nares with lidocaine and neosynephrine.    I began with the LEFT side using a 0 degree rigid nasal endoscope, and then similarly examined the RIGHT side    Findings:  Inferior turbinates:  nonedematous  Middle turbinate and middle meatus:  No purulence, no polyposis  Ethmoidectomy site is clear  Maxillary antrostomies are clear  Mucosa is healthy throughout,  no polyps nor polypoid degeneration  Nasopharynx clear  The patient tolerated the procedure well    CT sinuses  6/18/2019 reveals clear frontal ethmoid maxillary and sphenoid sinuses the turbinates are not edematous surgical changes are noted consistent with prior maxillary antrostomy and  ethmoidectomy    Impression/Plan  Radha Fox is a 69 year old female    ICD-10-CM    1. History of endoscopic sinus surgery Z98.890    2. Acute recurrent sinusitis, unspecified location J01.91 doxycycline hyclate (VIBRA-TABS) 100 MG tablet   3. TMJ (temporomandibular joint syndrome) M26.609          I reassured Radha her sinuses look perfect and her CAT scan is completely negative CAT scan was reviewed with her    Doxycycline was effective at 100 bid x 7 days , stop MV during doxy use.  I gave her a refill of this for future acute sinusitis    Start TMJ PT    Based on today's history and physical exam, I can find no evidence of middle ear pathology or eustachian tube dysfunction.  At this point my primary diagnosis is of temporomandibular syndrome.  I have discussed the etiology of TMJ, and the reasons why referred pain can mimic symptoms of ear disease, headaches, and even sinusitis.  i have given the patient an instructional sheet of things to be tried at home.  This includes warm compress, massage, ibuprofen use taken with meals, soft diet.  A referral to a TMJ specialist should it be needed, as well as CT temporal bone, may be ordered if otalgia persists.  Finally, I counseled the patient that should the therapy not help, or should the symptoms change, that they should return to me.      Continue Flonase  Use [facility] Med Nasal Saline Once Daily  Take claritin prn  Sinuses look excellent  Ask your dentist about an oral appliance for possible teeth grinding          Aracelis Ayon D.O.  Otolaryngology/Head and Neck Surgery  Allergy            Again, thank you for allowing me to participate in the care of your patient.        Sincerely,        Aracelis Ayon MD

## 2019-06-29 DIAGNOSIS — E78.5 HYPERLIPIDEMIA LDL GOAL <100: ICD-10-CM

## 2019-07-01 RX ORDER — ATORVASTATIN CALCIUM 20 MG/1
TABLET, FILM COATED ORAL
Qty: 90 TABLET | Refills: 1 | Status: SHIPPED | OUTPATIENT
Start: 2019-07-01 | End: 2019-12-31

## 2019-07-18 ENCOUNTER — TELEPHONE (OUTPATIENT)
Dept: OTOLARYNGOLOGY | Facility: OTHER | Age: 69
End: 2019-07-18

## 2019-07-18 DIAGNOSIS — M26.609 TMJ (TEMPOROMANDIBULAR JOINT SYNDROME): Primary | ICD-10-CM

## 2019-07-18 NOTE — TELEPHONE ENCOUNTER
This patient is requesting a referral to TMJ physical therapy. Per Dr. Ayon's last note, this is ok. Referral signed.

## 2019-07-23 DIAGNOSIS — M06.9 RA (RHEUMATOID ARTHRITIS) (H): ICD-10-CM

## 2019-07-23 DIAGNOSIS — G25.81 RESTLESS LEGS SYNDROME (RLS): ICD-10-CM

## 2019-07-23 RX ORDER — PRAMIPEXOLE DIHYDROCHLORIDE 0.25 MG/1
TABLET ORAL
Qty: 270 TABLET | Refills: 0 | Status: SHIPPED | OUTPATIENT
Start: 2019-07-23 | End: 2019-09-27

## 2019-07-23 RX ORDER — CELECOXIB 200 MG/1
CAPSULE ORAL
Qty: 90 CAPSULE | Refills: 1 | Status: SHIPPED | OUTPATIENT
Start: 2019-07-23 | End: 2020-01-29

## 2019-07-24 ENCOUNTER — HOSPITAL ENCOUNTER (OUTPATIENT)
Dept: PHYSICAL THERAPY | Facility: HOSPITAL | Age: 69
Setting detail: THERAPIES SERIES
End: 2019-07-24
Attending: OTOLARYNGOLOGY
Payer: MEDICARE

## 2019-07-24 DIAGNOSIS — M26.609 TMJ (TEMPOROMANDIBULAR JOINT SYNDROME): ICD-10-CM

## 2019-07-24 PROCEDURE — 97162 PT EVAL MOD COMPLEX 30 MIN: CPT | Mod: GP

## 2019-07-24 PROCEDURE — 97140 MANUAL THERAPY 1/> REGIONS: CPT | Mod: GP

## 2019-07-24 PROCEDURE — 97110 THERAPEUTIC EXERCISES: CPT | Mod: GP

## 2019-07-24 NOTE — PROGRESS NOTES
Initial Physical Therapy Evaluations       Name: Radha Fox MRN# [id number]   Age: 69 year old YOB: 1950     Date of Consultation: July 24, 2019  Primary care provider: Lanie Costello    Referring Physician: Dr. Ayon  Orders: Eval and Treat  Medical Diagnosis: TMJ syndrome  Onset of Illness/Injury: 2 months ago    Reason for PT Visit: Patient is a 70 y/o female who presents with TMJ issues for the past 2 months. States that she has no prior history of TMJ issues. Also has been dealing with R sided neck pain - discomfort with turning head (looking over blind spots). Did have a history of a fall downstairs in November, but is unsure if this fall caused her neck pain. Jaw pain is on both sides - dull ache in nature. No reports of clicking or popping. Reports that she does not have a history of clenching or bruxism - no reports from her dentist. Has been using pain relievers - tylenol for the discomfort. Does have a history of rheumatoid arthritis - was 46 years old when she was diagnosed. Has a report of nasal surgery in the past as well.  Prior Level of Function: No prior history of jaw pain/issues   Pain: 1-3/10    Community Support/Living Environment/Employment History: Retired     Patient/Family Goal: Decrease pain in jaw    Fall Screen:   Have you fallen 2 or more times in the last year? No  Have you fallen and had an injury in the last year? No  Timed up & go:   Is patient a fall risk? No    Past Medical History:   Past Medical History:   Diagnosis Date     Abnormal PFT     moderate restriction/parenchymal; moderate diffusion defect; minimal obstruction     Arthropathy, unspecified, site unspecified 05/01/2000     Chest pain, unspecified 2005    Resolved     Contact dermatitis and other eczema, due to unspecified cause 03/05/2009     Coronary atherosclerosis of unspecified type of vessel, native or graft 09/27/2005     Depressive disorder, not elsewhere classified 01/11/2002      Diabetes mellitus type 2, diet-controlled (H)      Esophageal reflux 11/12/2010     H/O: hysterectomy 1977     Impaired fasting glucose 11/12/2010     Insomnia, unspecified 01/24/2013     Leukocytosis, unspecified 12/22/2007     Obesity, unspecified 01/19/2012     Osteoporosis, unspecified 01/19/2012    Dr Mcgovern; Prolia; DEXA 2/20198 stable     Other and unspecified hyperlipidemia 04/13/2005     Other dyspnea and respiratory abnormality 01/19/2012    reduced DLCO     Restless legs syndrome (RLS) 04/21/2011     Rheumatoid arthritis(714.0) 11/09/1999     Unspecified diffuse connective tissue disease 04/04/2000     Unspecified sinusitis (chronic) 09/05/2000       Past Surgical History:  Past Surgical History:   Procedure Laterality Date     APPENDECTOMY  1961     CHOLECYSTECTOMY  1973     COLONOSCOPY N/A 3/23/2015    Procedure: COLONOSCOPY;  Surgeon: Clarisa Larkin MD;  [facility]     COLONOSCOPY N/A 2/21/2019    Procedure: COLONOSCOPY;  Surgeon: Milton Gracia MD;  [facility]     endoscopic sphenoidotomy  10/2011    Right     ETHMOIDECTOMY Bilateral 9/27/2016    Procedure: ETHMOIDECTOMY;  Surgeon: Aracelis Ayon MD;  Location: [facility]     excision bilateral telma bullosa  10/2011     GENITOURINARY SURGERY       HYSTERECTOMY      benign     LAPAROSCOPY DIAGNOSTIC (GENERAL)       ORTHOPEDIC SURGERY      left foot 5th metarsal fracture screws placed     ORTHOPEDIC SURGERY Left 04/22/2019    Dr. King; first MPJ fusion     SEPTOPLASTY N/A 9/27/2016    Procedure: SEPTOPLASTY;  Surgeon: Aracelis Ayon MD;  Location: [facility]     third metatarsal fracture Left 01/2019    stress fracture; Dr. King     TOT Internal urethrotomy  2010     TUBAL LIGATION         Medications:   Current Outpatient Medications   Medication Sig     ASPIRIN PO Take 81 mg by mouth daily     atorvastatin (LIPITOR) 20 MG tablet TAKE 1 TABLET BY MOUTH ONCE DAILY - DUE FOR LABS     Blood Glucose Calibration (ACCU-CHEK  ANGELICA) SOLN Use as directed     Blood Glucose Calibration (ACCU-CHEK COMPACT BLUE CONTROL) LIQD Use to calibrate blood glucose monitor as directed.     blood glucose monitoring (ONETOUCH ULTRA) test strip Use to test blood sugars one time daily or as directed.     blood glucose monitoring (ONETOUCH ULTRA) test strip Use to test blood sugar daily or as directed.     calcium carbonate-vitamin D (CALCIUM + D) 600-200 MG-UNIT TABS Take 600 mg by mouth 2 times daily.     celecoxib (CELEBREX) 200 MG capsule TAKE 1 CAPSULE BY MOUTH ONCE DAILY     Cyanocobalamin (VITAMIN B 12 PO) Take 500 mg by mouth daily.     doxycycline hyclate (VIBRA-TABS) 100 MG tablet Take 1 tablet (100 mg) by mouth 2 times daily     folic acid (FOLVITE) 1 MG tablet Take 2 tablets by mouth daily     Methotrexate Sodium (METHOTREXATE PO) Inject 12.5 mg as directed once a week.     multivitamin, therapeutic with minerals (MULTI-VITAMIN) TABS Take  by mouth daily.     omeprazole (PRILOSEC) 40 MG DR capsule TAKE 1 CAPSULE BY MOUTH ONCE DAILY BEFORE  A  MEAL     potassium chloride ER (K-DUR/KLOR-CON M) 20 MEQ CR tablet TAKE 1 TABLET BY MOUTH ONCE DAILY WITH FOOD     pramipexole (MIRAPEX) 0.25 MG tablet TAKE ONE TABLET BY MOUTH AT 5 PM AND TWO TABLETS AT 9 PM     predniSONE (DELTASONE) 1 MG tablet Take 1 mg by mouth daily Take 3 tabs po daily     Probiotic Product (PROBIOTIC DAILY PO) Take 1 capsule by mouth daily     propranolol (INDERAL) 20 MG tablet TAKE 1 TABLET BY MOUTH ONCE DAILY     RiTUXimab (RITUXAN IV)      venlafaxine (EFFEXOR-XR) 75 MG 24 hr capsule TAKE 1 CAPSULE BY MOUTH ONCE DAILY     No current facility-administered medications for this encounter.        Imaging: No recent imaging taken of cervical spine or TMJ    Musculoskeletal Findings:     OBJECTIVE   Observation: Patient presents to department in no acute distress.     Palpation: Tenderness with palpation to bilateral masseter and pterygoid musculature - intraoral and extraoral  Also  has tenderness with palpation to R sided lower cervical spine    Posture: Forward head, protracted shoulders   Sitting Posture: Fair   Standing Posture: Good    Neurological Assessment:   -Deferred secondary to no neurological symptoms    Range of Motion/Strength:   TMJ Range of Motion:  Jaw openinmm  L deviation: 10 mm  R deviation: 5 mm    *No evidence of deviation or jaw with opening or closing of jaw    Cervical Spine Range of Motion   Active Motion   Flexion 40    Extension 30     Rotation Right 54    Rotation Left 63    Right upper extremity range of motion: WNL   Left upper extremity range of motion: WNL     Right upper extremity strength: WNL except middle trapezius 4/5  Left upper extremity strength: WNL except middle trapezius 4/5  Deep Neck Flexor Endurance Test: NT    Special Tests:   Cervical Spine Tests  Spurling: R - (-)  L -  (-)  Passive Intervertebral Movement Testing:  No reproduction or pain with central and unilateral PAs      Outcome Measures:   TMJ disability questionnaire: 18% disability    Prognosis/Plan of Care: Good  Appropriate for Physical Therapy Intervention: Yes     GOALS:   Short-term goals:  To be achieved in 2-3 weeks:    Instruct in home program  1.) Patient will be independent with a short-term home exercise program.  2.) Patient will understand and demonstrate improved posture and techniques such that patient places less strain over cervical spine.  3.) Patient will report a 25% or greater improvement in TMJ symptoms in order to allow for increased comfort with activities of daily living.        Long-term goals:  To be achieved in 6-8 weeks:    Self management of symptoms.  Pain free activities of daily living.  1.) Improve score on TMJ Disability Questionnaire by 50% to correlate with clinically significant change.  2.) Patient will report a 50% or greater improvement in TMJ symptoms in order to allow for increased comfort with activities of daily living.       Discharge  goals: Patient will be independent with a home program for self-management of symptoms.      Patient presents today with signs and symptoms consistent of bilateral TMJ dysfunction with associated muscular tension and pain. Therapy today consisted of evaluation, patient education, and therapeutic exercise. Patient would benefit from continued PT sessions addressing overall pain and dysfunction with use of appropriate interventions.    Treatment plan:  1.) Modalities including ultrasound, electrical stimulation, and mechanical traction as needed for pain management and increasing tissue extensibility  2.) Manual therapy to include cervical and thoracic spine joint and soft tissue mobilization for improved mobility and decreased pain  3.) Therapeutic exercise to consist of cervical stabilization and scapular strengthening and range of motion activities    Treatment Rendered/Intervention:   Evaluation completed as described above followed by discussion of exam findings and plan of care.    Therapeutic exercise: Patient instructed in and demonstrated proper performance of home exercise program consisting of:  Seated chin tucks, horizontal band abduction, TMJ muscular self release, and self jaw distraction    Educated in posture principles and neutral spine positioning. Patient was instructed in home use of heat and/or ice for pain management      Clinical Impressions:  Criteria for Skilled Therapeutic Intervention Met: Yes  PT Diagnosis: Bilateral TMJ dysfunction  Influenced by the following impairments: Pain, decreased opening  Functional limitations due to impairment: difficulty chewing foods and pain in neck with turning head  Clinical presentation: Stable/Uncomplicated  Clinical presentation rationale: clinical judgement  Clinical Decision making (complexity): Moderate Complexity  Predicted Duration of Therapy Intervention (days/wks): 8 weeks  Risks and Benefits of therapy have been explained: Yes  Patient, Family &  other staff in agreement with plan of care: Yes  Comments:       Total Evaluation Time: 50 minutes

## 2019-07-29 ENCOUNTER — TRANSFERRED RECORDS (OUTPATIENT)
Dept: HEALTH INFORMATION MANAGEMENT | Facility: CLINIC | Age: 69
End: 2019-07-29

## 2019-08-06 ENCOUNTER — HOSPITAL ENCOUNTER (OUTPATIENT)
Dept: PHYSICAL THERAPY | Facility: HOSPITAL | Age: 69
Setting detail: THERAPIES SERIES
End: 2019-08-06
Attending: OTOLARYNGOLOGY
Payer: MEDICARE

## 2019-08-06 PROCEDURE — 97140 MANUAL THERAPY 1/> REGIONS: CPT | Mod: GP

## 2019-08-06 PROCEDURE — 97110 THERAPEUTIC EXERCISES: CPT | Mod: GP

## 2019-08-09 ENCOUNTER — HOSPITAL ENCOUNTER (OUTPATIENT)
Dept: PHYSICAL THERAPY | Facility: HOSPITAL | Age: 69
Setting detail: THERAPIES SERIES
End: 2019-08-09
Attending: OTOLARYNGOLOGY
Payer: MEDICARE

## 2019-08-09 PROCEDURE — 97110 THERAPEUTIC EXERCISES: CPT | Mod: GP

## 2019-08-09 PROCEDURE — 97140 MANUAL THERAPY 1/> REGIONS: CPT | Mod: GP

## 2019-08-13 ENCOUNTER — HOSPITAL ENCOUNTER (OUTPATIENT)
Dept: PHYSICAL THERAPY | Facility: HOSPITAL | Age: 69
Setting detail: THERAPIES SERIES
End: 2019-08-13
Attending: OTOLARYNGOLOGY
Payer: MEDICARE

## 2019-08-13 PROCEDURE — 97140 MANUAL THERAPY 1/> REGIONS: CPT | Mod: GP

## 2019-08-15 ENCOUNTER — HOSPITAL ENCOUNTER (OUTPATIENT)
Dept: PHYSICAL THERAPY | Facility: HOSPITAL | Age: 69
Setting detail: THERAPIES SERIES
End: 2019-08-15
Attending: OTOLARYNGOLOGY
Payer: MEDICARE

## 2019-08-15 PROCEDURE — 97140 MANUAL THERAPY 1/> REGIONS: CPT | Mod: GP

## 2019-08-20 ENCOUNTER — HOSPITAL ENCOUNTER (OUTPATIENT)
Dept: PHYSICAL THERAPY | Facility: HOSPITAL | Age: 69
Setting detail: THERAPIES SERIES
End: 2019-08-20
Attending: OTOLARYNGOLOGY
Payer: MEDICARE

## 2019-08-20 DIAGNOSIS — I10 HYPERTENSION, UNSPECIFIED TYPE: ICD-10-CM

## 2019-08-20 DIAGNOSIS — K21.9 GASTROESOPHAGEAL REFLUX DISEASE, ESOPHAGITIS PRESENCE NOT SPECIFIED: ICD-10-CM

## 2019-08-20 PROCEDURE — 97140 MANUAL THERAPY 1/> REGIONS: CPT | Mod: GP

## 2019-08-20 PROCEDURE — 97110 THERAPEUTIC EXERCISES: CPT | Mod: GP

## 2019-08-22 ENCOUNTER — HOSPITAL ENCOUNTER (OUTPATIENT)
Dept: PHYSICAL THERAPY | Facility: HOSPITAL | Age: 69
Setting detail: THERAPIES SERIES
End: 2019-08-22
Attending: OTOLARYNGOLOGY
Payer: MEDICARE

## 2019-08-22 PROCEDURE — 97140 MANUAL THERAPY 1/> REGIONS: CPT | Mod: GP

## 2019-08-22 PROCEDURE — 97110 THERAPEUTIC EXERCISES: CPT | Mod: GP

## 2019-08-23 RX ORDER — OMEPRAZOLE 40 MG/1
CAPSULE, DELAYED RELEASE ORAL
Qty: 90 CAPSULE | Refills: 2 | Status: SHIPPED | OUTPATIENT
Start: 2019-08-23 | End: 2020-05-11

## 2019-08-23 RX ORDER — POTASSIUM CHLORIDE 1500 MG/1
TABLET, EXTENDED RELEASE ORAL
Qty: 90 TABLET | Refills: 1 | Status: SHIPPED | OUTPATIENT
Start: 2019-08-23

## 2019-08-27 ENCOUNTER — HOSPITAL ENCOUNTER (OUTPATIENT)
Dept: PHYSICAL THERAPY | Facility: HOSPITAL | Age: 69
Setting detail: THERAPIES SERIES
End: 2019-08-27
Attending: OTOLARYNGOLOGY
Payer: MEDICARE

## 2019-08-27 PROCEDURE — 97140 MANUAL THERAPY 1/> REGIONS: CPT | Mod: GP

## 2019-08-30 ENCOUNTER — HOSPITAL ENCOUNTER (OUTPATIENT)
Dept: PHYSICAL THERAPY | Facility: HOSPITAL | Age: 69
Setting detail: THERAPIES SERIES
End: 2019-08-30
Attending: OTOLARYNGOLOGY
Payer: MEDICARE

## 2019-08-30 PROCEDURE — 97140 MANUAL THERAPY 1/> REGIONS: CPT | Mod: GP

## 2019-09-05 NOTE — PROGRESS NOTES
Subjective     Rahda Fox is a 69 year old female who presents to clinic today for the following health issues:    HPI   Neck Pain      Duration: Ongoing since May     Description:  Location: Neck - right side; does not radiate down right UE; some tingling into scalp; no hand numbness/tingling/weakness  Radiation: Up right side of head     Intensity:  moderate    Accompanying signs and symptoms: None    History (similar episodes/previous evaluation): None - denies any injury or trauma to neck / head     Precipitating or alleviating factors: None    Therapies tried and outcome: PT, Aleve - some relieve     Has been doing PT with Kip here.  See below:    Has had injections in joints in the past for RA; would be open to injection in spine as well    No fevers    Hi Dr. Costello,   I have been seeing Radha for her TMJ issues per referral from Dr. Ayon. I have also been treating her cervical spine during this episode of care. Radha is going to make an appointment with you regarding her persistent unchanged R sided neck pain and I believe she may benefit from some further imaging to rule out any underlying issues. She is having most of R sided neck pain at night when she is sleeping and also has difficulty getting to sleep secondary to the pain. She will be making an appointment with you in the near future. Just wanted to keep you in the loop - thanks     Remote cervical xray 2015:  MILD MULTILEVEL DEGENERATIVE DISK DISEASE AND MILD-TO-MODERATE  FACET ARTHROSIS.    RESPIRATORY SYMPTOMS  Ongoing URI Symptoms / Allergies      Duration: Ongoing URI - intermittently since June.     Saw Dr. Costello and ENT    Description  nasal congestion, rhinorrhea, sore throat, facial pain/pressure, cough, headache and fatigue/malaise    Severity: mild    Accompanying signs and symptoms: None    History (predisposing factors):  none    Precipitating or alleviating factors: None    Therapies tried and outcome:  Doxycycline 2x  - states clears up for short while then symptoms return.     Saw ENT 6/2019 - gave repeat Doxycycline     Cough is from drainage; no wheezing or dyspnea    Drainage is yellow/clear; not green; not bloody    Last TSH mildly elevated at 5.49 2017 here.    Is diabetic.  Due for a1c as well.  Lab Results   Component Value Date    A1C 6.7 04/04/2019    A1C 6.9 08/24/2018    A1C 6.3 09/27/2017    A1C 6.5 07/21/2016    A1C 6.0 02/03/2015         Current Outpatient Medications   Medication     ASPIRIN PO     atorvastatin (LIPITOR) 20 MG tablet     Blood Glucose Calibration (ACCU-CHEK ANEGLICA) SOLN     Blood Glucose Calibration (ACCU-CHEK COMPACT BLUE CONTROL) LIQD     blood glucose monitoring (ONETOUCH ULTRA) test strip     blood glucose monitoring (ONETOUCH ULTRA) test strip     calcium carbonate-vitamin D (CALCIUM + D) 600-200 MG-UNIT TABS     celecoxib (CELEBREX) 200 MG capsule     Cyanocobalamin (VITAMIN B 12 PO)     folic acid (FOLVITE) 1 MG tablet     Methotrexate Sodium (METHOTREXATE PO)     multivitamin, therapeutic with minerals (MULTI-VITAMIN) TABS     omeprazole (PRILOSEC) 40 MG DR capsule     potassium chloride ER (K-DUR/KLOR-CON M) 20 MEQ CR tablet     pramipexole (MIRAPEX) 0.25 MG tablet     predniSONE (DELTASONE) 1 MG tablet     Probiotic Product (PROBIOTIC DAILY PO)     propranolol (INDERAL) 20 MG tablet     RiTUXimab (RITUXAN IV)     venlafaxine (EFFEXOR-XR) 75 MG 24 hr capsule     No current facility-administered medications for this visit.        Patient Active Problem List   Diagnosis     RA (rheumatoid arthritis) (H)     Recurrent UTI     Hyperlipidemia     Depressive disorder, not elsewhere classified     Contact dermatitis and other eczema, due to unspecified cause     Diffuse connective tissue disease (H)     Arthropathy     Coronary atherosclerosis     Sinusitis, chronic     Esophageal reflux     Restless legs syndrome (RLS)     Osteoporosis     Obesity     Insomnia     Abnormal glucose     History of  sinus surgery     Simple chronic serous otitis media     Dysfunction of eustachian tube     Mixed hearing loss, unilateral     SNHL (sensory-neural hearing loss), asymmetrical     S/P myringotomy with insertion of tube     History of chronic sinusitis     Rheumatoid arthritis of multiple sites without rheumatoid factor (H)     ACP (advance care planning)     Diabetes mellitus type 2, diet-controlled (H)     Chronic cough     Type 2 diabetes mellitus without complication (H)     Obesity (BMI 35.0-39.9) with comorbidity (H)     Past Surgical History:   Procedure Laterality Date     APPENDECTOMY  1961     CHOLECYSTECTOMY  1973     COLONOSCOPY N/A 3/23/2015    Procedure: COLONOSCOPY;  Surgeon: Clarisa Larkin MD;  [facility]     COLONOSCOPY N/A 2/21/2019    Procedure: COLONOSCOPY;  Surgeon: Milton Gracia MD;  [facility]     endoscopic sphenoidotomy  10/2011    Right     ETHMOIDECTOMY Bilateral 9/27/2016    Procedure: ETHMOIDECTOMY;  Surgeon: Aracelis Ayon MD;  Location: [facility]     excision bilateral telma bullosa  10/2011     GENITOURINARY SURGERY       HYSTERECTOMY      benign     LAPAROSCOPY DIAGNOSTIC (GENERAL)       ORTHOPEDIC SURGERY      left foot 5th metarsal fracture screws placed     ORTHOPEDIC SURGERY Left 04/22/2019    Dr. King; first MPJ fusion     SEPTOPLASTY N/A 9/27/2016    Procedure: SEPTOPLASTY;  Surgeon: Aracelis Ayon MD;  Location[facility]     third metatarsal fracture Left 01/2019    stress fracture; Dr. King     TOT Internal urethrotomy  2010     TUBAL LIGATION         Social History     Tobacco Use     Smoking status: Never Smoker     Smokeless tobacco: Never Used   Substance Use Topics     Alcohol use: No     Alcohol/week: 0.0 oz     Family History   Problem Relation Age of Onset     C.A.D. Father 83     Other - See Comments Father         CHF     Cerebrovascular Disease Father         CVA     Diabetes Father      Hypertension Father      Coronary Artery  Disease Father      Cerebrovascular Disease Mother 72        CVA     Heart Disease Mother         Heart Issue     Hyperlipidemia Mother      Coronary Artery Disease Mother      Diabetes Brother      Diabetes Sister      Colon Cancer No family hx of      Breast Cancer No family hx of      Asthma No family hx of      Thyroid Disease No family hx of              Reviewed and updated as needed this visit by Provider  Allergies  Meds         Review of Systems   ROS COMP: Constitutional, HEENT, cardiovascular, pulmonary, gi and gu systems are negative, except as otherwise noted.      Objective    /84 (BP Location: Left arm, Patient Position: Chair, Cuff Size: Adult Large)   Pulse 66   Temp 96  F (35.6  C) (Tympanic)   Wt 99 kg (218 lb 3.2 oz)   SpO2 97%   BMI 35.24 kg/m    Body mass index is 35.24 kg/m .  Physical Exam   GENERAL: alert, no distress and over weight  EYES: Eyes grossly normal to inspection, PERRL and conjunctivae and sclerae normal  HENT: normal cephalic/atraumatic, ear canals and TM's normal, nasal mucosa edematous  and oropharynx with post nasal drainage; mild tenderness to maxillary and frontal sinuses  NECK: no adenopathy, no asymmetry, masses, or scars and thyroid normal to palpation  RESP: lungs clear to auscultation - no rales, rhonchi or wheezes  CV: regular rate and rhythm, normal S1 S2, no S3 or S4, no murmur, click or rub, no peripheral edema and peripheral pulses strong  MS: neck with slight decrease in ROM; negative axial compression; full AROM UE bilaterally   SKIN: no suspicious lesions or rashes  NEURO: Normal strength and tone, mentation intact and speech normal  PSYCH: mentation appears normal, affect normal/bright    Diagnostic Test Results:  Labs reviewed in [facility]  Labs pending        Assessment & Plan       ICD-10-CM    1. Neck pain M54.2 MR Cervical Spine w/o Contrast   2. DDD (degenerative disc disease), cervical M50.30 MR Cervical Spine w/o Contrast   3. Morbid obesity  "(H) E66.01    4. Abnormal TSH R79.89 TSH with free T4 reflex   5. Diabetes mellitus type 2, diet-controlled (H) E11.9 Hemoglobin A1c   6. Other malaise  R53.81 TSH with free T4 reflex   7. Chronic sinusitis, unspecified location J32.9         BMI:   Estimated body mass index is 35.24 kg/m  as calculated from the following:    Height as of 6/27/19: 1.676 m (5' 5.98\").    Weight as of this encounter: 99 kg (218 lb 3.2 oz).     Suspect neck pain is more arthritic than discogenic given no radicular symptoms.  She would be open to injections.  MRI ordered.    Update thyroid and a1c labs.    For sinuses, chronic.  Frequent antibiotic use.  Used the refill from Dr. Ayon since June.  Doing sinus rinses, nasal spray . CT clear in 6/2019.  Will ask Dr Ayon if she should be seen back.  Caution with frequent antibiotic use.    Patient Instructions   Will call with lab results.  MRI cervical spine ordered.  Will touch base with Dr. Ayon regarding sinuses and get back to you.      No follow-ups on file.    Lanie Duggan MD  [facility] - [place]      "

## 2019-09-09 ENCOUNTER — OFFICE VISIT (OUTPATIENT)
Dept: FAMILY MEDICINE | Facility: OTHER | Age: 69
End: 2019-09-09
Attending: FAMILY MEDICINE
Payer: MEDICARE

## 2019-09-09 VITALS
TEMPERATURE: 96 F | DIASTOLIC BLOOD PRESSURE: 84 MMHG | BODY MASS INDEX: 35.24 KG/M2 | WEIGHT: 218.2 LBS | SYSTOLIC BLOOD PRESSURE: 110 MMHG | HEART RATE: 66 BPM | OXYGEN SATURATION: 97 %

## 2019-09-09 DIAGNOSIS — E11.9 DIABETES MELLITUS TYPE 2, DIET-CONTROLLED (H): ICD-10-CM

## 2019-09-09 DIAGNOSIS — M54.2 NECK PAIN: Primary | ICD-10-CM

## 2019-09-09 DIAGNOSIS — E66.01 MORBID OBESITY (H): ICD-10-CM

## 2019-09-09 DIAGNOSIS — R53.81 OTHER MALAISE: ICD-10-CM

## 2019-09-09 DIAGNOSIS — M50.30 DDD (DEGENERATIVE DISC DISEASE), CERVICAL: ICD-10-CM

## 2019-09-09 DIAGNOSIS — J32.9 CHRONIC SINUSITIS, UNSPECIFIED LOCATION: ICD-10-CM

## 2019-09-09 DIAGNOSIS — R79.89 ABNORMAL TSH: ICD-10-CM

## 2019-09-09 LAB
EST. AVERAGE GLUCOSE BLD GHB EST-MCNC: 148 MG/DL
HBA1C MFR BLD: 6.8 % (ref 0–5.6)
T4 FREE SERPL-MCNC: 0.91 NG/DL (ref 0.76–1.46)
TSH SERPL DL<=0.005 MIU/L-ACNC: 6.53 MU/L (ref 0.4–4)

## 2019-09-09 PROCEDURE — 83036 HEMOGLOBIN GLYCOSYLATED A1C: CPT | Mod: ZL | Performed by: FAMILY MEDICINE

## 2019-09-09 PROCEDURE — G0463 HOSPITAL OUTPT CLINIC VISIT: HCPCS

## 2019-09-09 PROCEDURE — 84439 ASSAY OF FREE THYROXINE: CPT | Mod: ZL | Performed by: FAMILY MEDICINE

## 2019-09-09 PROCEDURE — 40000788 ZZHCL STATISTIC ESTIMATED AVERAGE GLUCOSE: Mod: ZL | Performed by: FAMILY MEDICINE

## 2019-09-09 PROCEDURE — 36415 COLL VENOUS BLD VENIPUNCTURE: CPT | Mod: ZL | Performed by: FAMILY MEDICINE

## 2019-09-09 PROCEDURE — 99214 OFFICE O/P EST MOD 30 MIN: CPT | Performed by: FAMILY MEDICINE

## 2019-09-09 PROCEDURE — 84443 ASSAY THYROID STIM HORMONE: CPT | Mod: ZL | Performed by: FAMILY MEDICINE

## 2019-09-09 ASSESSMENT — ANXIETY QUESTIONNAIRES
7. FEELING AFRAID AS IF SOMETHING AWFUL MIGHT HAPPEN: NOT AT ALL
2. NOT BEING ABLE TO STOP OR CONTROL WORRYING: NOT AT ALL
4. TROUBLE RELAXING: NOT AT ALL
1. FEELING NERVOUS, ANXIOUS, OR ON EDGE: NOT AT ALL
5. BEING SO RESTLESS THAT IT IS HARD TO SIT STILL: NOT AT ALL
6. BECOMING EASILY ANNOYED OR IRRITABLE: NOT AT ALL
GAD7 TOTAL SCORE: 0
3. WORRYING TOO MUCH ABOUT DIFFERENT THINGS: NOT AT ALL

## 2019-09-09 ASSESSMENT — PAIN SCALES - GENERAL: PAINLEVEL: MILD PAIN (2)

## 2019-09-09 ASSESSMENT — PATIENT HEALTH QUESTIONNAIRE - PHQ9: SUM OF ALL RESPONSES TO PHQ QUESTIONS 1-9: 0

## 2019-09-09 NOTE — Clinical Note
Would you advise marco see you again?  Repeat antibiotics again?  Ongoing cough, sinus drainage; short term improvement with doxy, but has used the refill you gave her in June already.  Thank you!

## 2019-09-09 NOTE — PATIENT INSTRUCTIONS
Will call with lab results.  MRI cervical spine ordered.  Will touch base with Dr. Ayon regarding sinuses and get back to you.

## 2019-09-09 NOTE — NURSING NOTE
"Chief Complaint   Patient presents with     Neck Pain       Initial /84 (BP Location: Left arm, Patient Position: Chair, Cuff Size: Adult Large)   Pulse 66   Temp 96  F (35.6  C) (Tympanic)   Wt 99 kg (218 lb 3.2 oz)   SpO2 97%   BMI 35.24 kg/m   Estimated body mass index is 35.24 kg/m  as calculated from the following:    Height as of 6/27/19: 1.676 m (5' 5.98\").    Weight as of this encounter: 99 kg (218 lb 3.2 oz).  Medication Reconciliation: complete     Yessenia Sterling LPN      "

## 2019-09-10 ASSESSMENT — ANXIETY QUESTIONNAIRES: GAD7 TOTAL SCORE: 0

## 2019-09-12 ENCOUNTER — HOSPITAL ENCOUNTER (OUTPATIENT)
Dept: MRI IMAGING | Facility: HOSPITAL | Age: 69
Discharge: HOME | End: 2019-09-12
Attending: FAMILY MEDICINE | Admitting: FAMILY MEDICINE
Payer: MEDICARE

## 2019-09-12 ENCOUNTER — TELEPHONE (OUTPATIENT)
Dept: FAMILY MEDICINE | Facility: OTHER | Age: 69
End: 2019-09-12

## 2019-09-12 DIAGNOSIS — M50.30 DDD (DEGENERATIVE DISC DISEASE), CERVICAL: ICD-10-CM

## 2019-09-12 DIAGNOSIS — M47.819 DEGENERATIVE JOINT DISEASE OF SPINAL FACET JOINT: ICD-10-CM

## 2019-09-12 DIAGNOSIS — M54.2 NECK PAIN: ICD-10-CM

## 2019-09-12 DIAGNOSIS — M50.30 BULGE OF CERVICAL DISC WITHOUT MYELOPATHY: Primary | ICD-10-CM

## 2019-09-12 PROCEDURE — 72141 MRI NECK SPINE W/O DYE: CPT | Mod: TC

## 2019-09-12 NOTE — TELEPHONE ENCOUNTER
Patient notified of results.     Referral pending.     Yessenia Sterling LPN on 9/12/2019 at 1:51 PM

## 2019-09-13 ENCOUNTER — HOSPITAL ENCOUNTER (OUTPATIENT)
Dept: PHYSICAL THERAPY | Facility: HOSPITAL | Age: 69
Setting detail: THERAPIES SERIES
End: 2019-09-13
Attending: OTOLARYNGOLOGY
Payer: MEDICARE

## 2019-09-13 PROCEDURE — 97530 THERAPEUTIC ACTIVITIES: CPT | Mod: GP

## 2019-09-13 NOTE — PROGRESS NOTES
Outpatient Physical Therapy Progress Note     Patient: Radha Fox  : 1950    Beginning/End Dates of Reporting Period:  19 to 2019    Referring Provider: Dr. Ayon    Therapy Diagnosis: TMJ syndrome     Client Self Report:  Patient reports today for TMJ issues and R sided cervical spine pain. Reports that neck pain is still present. Did have an MRI taken of cervical spine that showed facet joint irritation and arthritis. Patient will be having consult for injection in roughly 2 weeks. TMJ feels back to normal.    Goals:  Short-term goals:  To be achieved in 2-3 weeks:     Instruct in home program  1.) Patient will be independent with a short-term home exercise program.MET  2.) Patient will understand and demonstrate improved posture and techniques such that patient places less strain over cervical spine. MET  3.) Patient will report a 25% or greater improvement in TMJ symptoms in order to allow for increased comfort with activities of daily living. MET        Long-term goals:  To be achieved in 6-8 weeks:     Self management of symptoms.  Pain free activities of daily living.  1.) Improve score on TMJ Disability Questionnaire by 50% to correlate with clinically significant change.  2.) Patient will report a 50% or greater improvement in TMJ symptoms in order to allow for increased comfort with activities of daily living.     Assessment:  Patient has been coming to therapy for both TMJ issues and cervical spine pain. Patient's TMJ issues have resolved but is still having cervical spine pain. Patient would benefit from continued therapy sessions addressing cervical spine pain following patient's injection, cervical spine strength and postural related awareness, and use of appropriate manual therapy techniques to maximize function.    Plan:  Continue therapy per current plan of care.    Discharge:  No

## 2019-09-24 ENCOUNTER — TELEPHONE (OUTPATIENT)
Dept: FAMILY MEDICINE | Facility: OTHER | Age: 69
End: 2019-09-24

## 2019-09-24 DIAGNOSIS — M06.09 RHEUMATOID ARTHRITIS OF MULTIPLE SITES WITHOUT RHEUMATOID FACTOR (H): Primary | ICD-10-CM

## 2019-09-24 DIAGNOSIS — J32.9 CHRONIC SINUSITIS, UNSPECIFIED LOCATION: Primary | ICD-10-CM

## 2019-09-24 LAB
ALT SERPL W P-5'-P-CCNC: 31 U/L (ref 0–50)
BASOPHILS # BLD AUTO: 0.1 10E9/L (ref 0–0.2)
BASOPHILS NFR BLD AUTO: 0.6 %
CALCIUM SERPL-MCNC: 9.2 MG/DL (ref 8.5–10.1)
CREAT SERPL-MCNC: 0.98 MG/DL (ref 0.52–1.04)
CRP SERPL-MCNC: <2.9 MG/L (ref 0–8)
DIFFERENTIAL METHOD BLD: ABNORMAL
EOSINOPHIL # BLD AUTO: 0.2 10E9/L (ref 0–0.7)
EOSINOPHIL NFR BLD AUTO: 2 %
ERYTHROCYTE [DISTWIDTH] IN BLOOD BY AUTOMATED COUNT: 13.7 % (ref 10–15)
ERYTHROCYTE [SEDIMENTATION RATE] IN BLOOD BY WESTERGREN METHOD: 14 MM/H (ref 0–30)
GFR SERPL CREATININE-BSD FRML MDRD: 59 ML/MIN/{1.73_M2}
HCT VFR BLD AUTO: 41 % (ref 35–47)
HGB BLD-MCNC: 13.7 G/DL (ref 11.7–15.7)
IMM GRANULOCYTES # BLD: 0 10E9/L (ref 0–0.4)
IMM GRANULOCYTES NFR BLD: 0.4 %
LYMPHOCYTES # BLD AUTO: 1.1 10E9/L (ref 0.8–5.3)
LYMPHOCYTES NFR BLD AUTO: 9.8 %
MCH RBC QN AUTO: 33.7 PG (ref 26.5–33)
MCHC RBC AUTO-ENTMCNC: 33.4 G/DL (ref 31.5–36.5)
MCV RBC AUTO: 101 FL (ref 78–100)
MONOCYTES # BLD AUTO: 1 10E9/L (ref 0–1.3)
MONOCYTES NFR BLD AUTO: 8.9 %
NEUTROPHILS # BLD AUTO: 8.4 10E9/L (ref 1.6–8.3)
NEUTROPHILS NFR BLD AUTO: 78.3 %
NRBC # BLD AUTO: 0 10*3/UL
NRBC BLD AUTO-RTO: 0 /100
PLATELET # BLD AUTO: 254 10E9/L (ref 150–450)
RBC # BLD AUTO: 4.07 10E12/L (ref 3.8–5.2)
WBC # BLD AUTO: 10.7 10E9/L (ref 4–11)

## 2019-09-24 PROCEDURE — 85025 COMPLETE CBC W/AUTO DIFF WBC: CPT | Mod: ZL | Performed by: INTERNAL MEDICINE

## 2019-09-24 PROCEDURE — 36415 COLL VENOUS BLD VENIPUNCTURE: CPT | Mod: ZL | Performed by: INTERNAL MEDICINE

## 2019-09-24 PROCEDURE — 84460 ALANINE AMINO (ALT) (SGPT): CPT | Mod: ZL | Performed by: INTERNAL MEDICINE

## 2019-09-24 PROCEDURE — 86431 RHEUMATOID FACTOR QUANT: CPT | Mod: ZL | Performed by: INTERNAL MEDICINE

## 2019-09-24 PROCEDURE — 86200 CCP ANTIBODY: CPT | Mod: ZL | Performed by: INTERNAL MEDICINE

## 2019-09-24 PROCEDURE — 85652 RBC SED RATE AUTOMATED: CPT | Mod: ZL | Performed by: INTERNAL MEDICINE

## 2019-09-24 PROCEDURE — 82310 ASSAY OF CALCIUM: CPT | Mod: ZL | Performed by: INTERNAL MEDICINE

## 2019-09-24 PROCEDURE — 82306 VITAMIN D 25 HYDROXY: CPT | Mod: ZL | Performed by: INTERNAL MEDICINE

## 2019-09-24 PROCEDURE — 82565 ASSAY OF CREATININE: CPT | Mod: ZL | Performed by: INTERNAL MEDICINE

## 2019-09-24 PROCEDURE — 86140 C-REACTIVE PROTEIN: CPT | Mod: ZL | Performed by: INTERNAL MEDICINE

## 2019-09-24 NOTE — TELEPHONE ENCOUNTER
Pt calls , reports was told  would be consulted as to sinus issues 9-9-2019    Pt has not herd back yet.    Please call pt.    Soha Gregorio LPN  .

## 2019-09-25 NOTE — TELEPHONE ENCOUNTER
Dr. Ayon, would you please offer your advise?  I cc'd you the chart from 9/9/19 visit with this note:    Would you advise marco see you again?  Repeat antibiotics again?  Ongoing cough, sinus drainage; short term improvement with doxy, but has used the refill you gave her in June already.  Thank you!

## 2019-09-25 NOTE — TELEPHONE ENCOUNTER
Please see note below.     (I no longer work in clinic - please route phone calls to  family practice)    Thanks.   Yessenia Sterling LPN

## 2019-09-26 ENCOUNTER — HOSPITAL ENCOUNTER (OUTPATIENT)
Dept: INTERVENTIONAL RADIOLOGY/VASCULAR | Facility: HOSPITAL | Age: 69
Discharge: HOME | End: 2019-09-26
Attending: FAMILY MEDICINE | Admitting: FAMILY MEDICINE
Payer: MEDICARE

## 2019-09-26 ENCOUNTER — HOSPITAL ENCOUNTER (OUTPATIENT)
Dept: INTERVENTIONAL RADIOLOGY/VASCULAR | Facility: HOSPITAL | Age: 69
End: 2019-09-26
Attending: FAMILY MEDICINE
Payer: MEDICARE

## 2019-09-26 DIAGNOSIS — M54.2 NECK PAIN: ICD-10-CM

## 2019-09-26 DIAGNOSIS — M47.819 DEGENERATIVE JOINT DISEASE OF SPINAL FACET JOINT: ICD-10-CM

## 2019-09-26 DIAGNOSIS — M50.30 BULGE OF CERVICAL DISC WITHOUT MYELOPATHY: ICD-10-CM

## 2019-09-26 DIAGNOSIS — M50.30 DDD (DEGENERATIVE DISC DISEASE), CERVICAL: ICD-10-CM

## 2019-09-26 DIAGNOSIS — M54.2 NECK PAIN ON RIGHT SIDE: ICD-10-CM

## 2019-09-26 LAB
CCP AB SER IA-ACNC: 1 U/ML
DEPRECATED CALCIDIOL+CALCIFEROL SERPL-MC: 51 UG/L (ref 20–75)
RHEUMATOID FACT SER NEPH-ACNC: <20 IU/ML (ref 0–20)

## 2019-09-26 PROCEDURE — 76000 FLUOROSCOPY <1 HR PHYS/QHP: CPT | Mod: TC

## 2019-09-26 PROCEDURE — G0463 HOSPITAL OUTPT CLINIC VISIT: HCPCS | Mod: TC

## 2019-09-27 ENCOUNTER — OFFICE VISIT (OUTPATIENT)
Dept: FAMILY MEDICINE | Facility: OTHER | Age: 69
End: 2019-09-27
Attending: FAMILY MEDICINE
Payer: MEDICARE

## 2019-09-27 VITALS
WEIGHT: 214 LBS | RESPIRATION RATE: 20 BRPM | HEART RATE: 66 BPM | TEMPERATURE: 97.4 F | BODY MASS INDEX: 34.56 KG/M2 | DIASTOLIC BLOOD PRESSURE: 70 MMHG | SYSTOLIC BLOOD PRESSURE: 130 MMHG | OXYGEN SATURATION: 97 %

## 2019-09-27 DIAGNOSIS — F32.9 MDD (MAJOR DEPRESSIVE DISORDER): ICD-10-CM

## 2019-09-27 DIAGNOSIS — J32.9 CHRONIC SINUSITIS, UNSPECIFIED LOCATION: ICD-10-CM

## 2019-09-27 DIAGNOSIS — G25.81 RESTLESS LEGS SYNDROME (RLS): ICD-10-CM

## 2019-09-27 DIAGNOSIS — R05.3 CHRONIC COUGH: Primary | ICD-10-CM

## 2019-09-27 DIAGNOSIS — I10 HYPERTENSION, UNSPECIFIED TYPE: ICD-10-CM

## 2019-09-27 DIAGNOSIS — M54.50 BILATERAL LOW BACK PAIN WITHOUT SCIATICA, UNSPECIFIED CHRONICITY: ICD-10-CM

## 2019-09-27 PROCEDURE — 99214 OFFICE O/P EST MOD 30 MIN: CPT | Performed by: FAMILY MEDICINE

## 2019-09-27 PROCEDURE — G0463 HOSPITAL OUTPT CLINIC VISIT: HCPCS

## 2019-09-27 RX ORDER — BUDESONIDE AND FORMOTEROL FUMARATE DIHYDRATE 80; 4.5 UG/1; UG/1
2 AEROSOL RESPIRATORY (INHALATION) 2 TIMES DAILY
Qty: 1 INHALER | Refills: 1 | Status: SHIPPED | OUTPATIENT
Start: 2019-09-27 | End: 2019-09-30

## 2019-09-27 ASSESSMENT — PAIN SCALES - GENERAL: PAINLEVEL: MILD PAIN (2)

## 2019-09-27 NOTE — NURSING NOTE
"Chief Complaint   Patient presents with     URI       Initial /70 (BP Location: Right arm, Patient Position: Chair, Cuff Size: Adult Regular)   Pulse 66   Temp 97.4  F (36.3  C) (Tympanic)   Resp 20   Wt 97.1 kg (214 lb)   SpO2 97%   BMI 34.56 kg/m   Estimated body mass index is 34.56 kg/m  as calculated from the following:    Height as of 6/27/19: 1.676 m (5' 5.98\").    Weight as of this encounter: 97.1 kg (214 lb).  Medication Reconciliation: complete  Christy Montaño LPN    "

## 2019-09-27 NOTE — PROGRESS NOTES
Subjective     Radha Fox is a 69 year old female who presents to clinic today for the following health issues:    HPI   RESPIRATORY SYMPTOMS      Duration: ongoing since spring    Description  nasal congestion, rhinorrhea, facial pain/pressure, cough, wheezing, headache, fatigue/malaise and hoarse voice    Severity: moderate    Accompanying signs and symptoms: Feels it has been getting worse. Is wondering if this is asthma,. Cannot get into ENT until 10-24-19    History (predisposing factors):  none    Precipitating or alleviating factors: None    Therapies tried and outcome:  Nasal rinse, anti histamines, nasal spray.    Yellow and clear phlegm    Fine at rest    Does cough at night quite a bit    Short of breath with activity    Had PFT 2016 - for chronic cough - minimal obstructive; moderate restrictive/diffusion.  Last chest xray 2016, then rib/chest 2/2019.    No tobacco.    Prior Albuterol inhaler.  No prior inhaled steriod.    Is on 3 mg prednisone for RA currently.    Has not had allergy testing with ENT.    At close of visit, patient requesting MRI of lumbar spine.  Will be having injections of cervical spine.  Would like for back if possible.  Has low back pain, bilateral, non-radicular, without numbness, tingling, or weakness.  Has done chiropractic, OTC treatments.  Pain is chronic.        Current Outpatient Medications   Medication     ASPIRIN PO     atorvastatin (LIPITOR) 20 MG tablet     Blood Glucose Calibration (ACCU-CHEK ANGELICA) SOLN     Blood Glucose Calibration (ACCU-CHEK COMPACT BLUE CONTROL) LIQD     blood glucose monitoring (ONETOUCH ULTRA) test strip     blood glucose monitoring (ONETOUCH ULTRA) test strip     calcium carbonate-vitamin D (CALCIUM + D) 600-200 MG-UNIT TABS     celecoxib (CELEBREX) 200 MG capsule     Cyanocobalamin (VITAMIN B 12 PO)     folic acid (FOLVITE) 1 MG tablet     Methotrexate Sodium (METHOTREXATE PO)     multivitamin, therapeutic with minerals (MULTI-VITAMIN)  TABS     omeprazole (PRILOSEC) 40 MG DR capsule     potassium chloride ER (K-DUR/KLOR-CON M) 20 MEQ CR tablet     pramipexole (MIRAPEX) 0.25 MG tablet     predniSONE (DELTASONE) 1 MG tablet     Probiotic Product (PROBIOTIC DAILY PO)     propranolol (INDERAL) 20 MG tablet     RiTUXimab (RITUXAN IV)     venlafaxine (EFFEXOR-XR) 75 MG 24 hr capsule     No current facility-administered medications for this visit.        Patient Active Problem List   Diagnosis     RA (rheumatoid arthritis) (H)     Recurrent UTI     Hyperlipidemia     Depressive disorder, not elsewhere classified     Contact dermatitis and other eczema, due to unspecified cause     Diffuse connective tissue disease (H)     Arthropathy     Coronary atherosclerosis     Sinusitis, chronic     Esophageal reflux     Restless legs syndrome (RLS)     Osteoporosis     Obesity     Insomnia     Abnormal glucose     History of sinus surgery     Simple chronic serous otitis media     Dysfunction of eustachian tube     Mixed hearing loss, unilateral     SNHL (sensory-neural hearing loss), asymmetrical     S/P myringotomy with insertion of tube     History of chronic sinusitis     Rheumatoid arthritis of multiple sites without rheumatoid factor (H)     ACP (advance care planning)     Diabetes mellitus type 2, diet-controlled (H)     Chronic cough     Type 2 diabetes mellitus without complication (H)     Obesity (BMI 35.0-39.9) with comorbidity (H)     Past Surgical History:   Procedure Laterality Date     APPENDECTOMY  1961     CHOLECYSTECTOMY  1973     COLONOSCOPY N/A 3/23/2015    Procedure: COLONOSCOPY;  Surgeon: Clarisa Larkin MD;  Location: [facility]     COLONOSCOPY N/A 2/21/2019    Procedure: COLONOSCOPY;  Surgeon: Milton Gracia MD;  Location: [facility]     endoscopic sphenoidotomy  10/2011    Right     ETHMOIDECTOMY Bilateral 9/27/2016    Procedure: ETHMOIDECTOMY;  Surgeon: Aracelis Ayon MD;  Location: [facility]     excision bilateral telma bullosa   10/2011     GENITOURINARY SURGERY       HYSTERECTOMY      benign     IR CONSULTATION FOR IR EXAM  9/26/2019     IR FLUORO 0-1 HOUR  9/26/2019     LAPAROSCOPY DIAGNOSTIC (GENERAL)       ORTHOPEDIC SURGERY      left foot 5th metarsal fracture screws placed     ORTHOPEDIC SURGERY Left 04/22/2019    Dr. King; first MPJ fusion     SEPTOPLASTY N/A 9/27/2016    Procedure: SEPTOPLASTY;  Surgeon: Aracelis Ayon MD;  Location: [facility]     third metatarsal fracture Left 01/2019    stress fracture; Dr. King     TOT Internal urethrotomy  2010     TUBAL LIGATION         Social History     Tobacco Use     Smoking status: Never Smoker     Smokeless tobacco: Never Used   Substance Use Topics     Alcohol use: No     Alcohol/week: 0.0 standard drinks     Family History   Problem Relation Age of Onset     C.A.D. Father 83     Other - See Comments Father         CHF     Cerebrovascular Disease Father         CVA     Diabetes Father      Hypertension Father      Coronary Artery Disease Father      Cerebrovascular Disease Mother 72        CVA     Heart Disease Mother         Heart Issue     Hyperlipidemia Mother      Coronary Artery Disease Mother      Diabetes Brother      Diabetes Sister      Colon Cancer No family hx of      Breast Cancer No family hx of      Asthma No family hx of      Thyroid Disease No family hx of              Reviewed and updated as needed this visit by Provider  Allergies  Meds         Review of Systems   ROS COMP: Constitutional, HEENT, cardiovascular, pulmonary, gi and gu systems are negative, except as otherwise noted.      Objective    /70 (BP Location: Right arm, Patient Position: Chair, Cuff Size: Adult Regular)   Pulse 66   Temp 97.4  F (36.3  C) (Tympanic)   Resp 20   Wt 97.1 kg (214 lb)   SpO2 97%   BMI 34.56 kg/m    Body mass index is 34.56 kg/m .  Physical Exam   GENERAL: obese, alert and no distress  EYES: Eyes grossly normal to inspection, PERRL and conjunctivae and sclerae  "normal  HENT: ear canals and TM's normal, nose and mouth without ulcers or lesions; mild tenderness to sinuses  NECK: no adenopathy, no asymmetry, masses, or scars and thyroid normal to palpation  RESP: lungs clear to auscultation - no rales, rhonchi or wheezes  CV: regular rate and rhythm, normal S1 S2, no S3 or S4, no murmur, click or rub, no peripheral edema and peripheral pulses strong  ABDOMEN: soft, nontender, no hepatosplenomegaly, no masses and bowel sounds normal  MS: tender across lower lumbar region, lumbar spine; negative SLR bilaterally; forward flexion to 90 degrees; toe/heel raise  SKIN: no suspicious lesions or rashes  NEURO: Normal strength and tone, mentation intact and speech normal  PSYCH: mentation appears normal, affect normal/bright    Diagnostic Test Results:  Labs reviewed in Epic        Assessment & Plan       ICD-10-CM    1. Chronic cough R05    2. Bilateral low back pain without sciatica, unspecified chronicity M54.5         BMI:   Estimated body mass index is 34.56 kg/m  as calculated from the following:    Height as of 6/27/19: 1.676 m (5' 5.98\").    Weight as of this encounter: 97.1 kg (214 lb).   Weight management plan: Discussed healthy diet and exercise guidelines        Patient Instructions   Follow up with ENT as scheduled 10/24.  Consider allergy testing there as well?    Update PFTs to compare from 2016.  Obtain chest CT to evaluate structure also.    Trial of Symbicort for cough, dyspnea.    MRI of lumbar spine ordered.         No follow-ups on file.    Lanie Duggan MD  [facility] - [place]        "

## 2019-09-27 NOTE — PATIENT INSTRUCTIONS
Follow up with ENT as scheduled 10/24.  Consider allergy testing there as well?    Update PFTs to compare from 2016.  Obtain chest CT to evaluate structure also.    Trial of Symbicort for cough, dyspnea.    MRI of lumbar spine ordered.

## 2019-09-30 ENCOUNTER — TELEPHONE (OUTPATIENT)
Dept: FAMILY MEDICINE | Facility: OTHER | Age: 69
End: 2019-09-30

## 2019-09-30 DIAGNOSIS — R05.3 CHRONIC COUGH: Primary | ICD-10-CM

## 2019-09-30 NOTE — TELEPHONE ENCOUNTER
Fax received from [facility], the pt is requesting an alternative to Symbicort, maybe Advair?

## 2019-10-01 ENCOUNTER — TELEPHONE (OUTPATIENT)
Dept: INTERVENTIONAL RADIOLOGY/VASCULAR | Facility: HOSPITAL | Age: 69
End: 2019-10-01

## 2019-10-01 ENCOUNTER — TELEPHONE (OUTPATIENT)
Dept: FAMILY MEDICINE | Facility: OTHER | Age: 69
End: 2019-10-01

## 2019-10-01 RX ORDER — PROPRANOLOL HYDROCHLORIDE 20 MG/1
TABLET ORAL
Qty: 90 TABLET | Refills: 1 | Status: SHIPPED | OUTPATIENT
Start: 2019-10-01 | End: 2020-04-22

## 2019-10-01 RX ORDER — PRAMIPEXOLE DIHYDROCHLORIDE 0.25 MG/1
TABLET ORAL
Qty: 270 TABLET | Refills: 1 | Status: SHIPPED | OUTPATIENT
Start: 2019-10-01 | End: 2020-05-11

## 2019-10-01 RX ORDER — VENLAFAXINE HYDROCHLORIDE 75 MG/1
CAPSULE, EXTENDED RELEASE ORAL
Qty: 90 CAPSULE | Refills: 0 | Status: SHIPPED | OUTPATIENT
Start: 2019-10-01 | End: 2020-01-29

## 2019-10-01 NOTE — TELEPHONE ENCOUNTER
Left message for patient in regards to upcoming injection appt on 10/14, also let patient know they will need to not have a flu shot or immunization 2 weeks before and 2 weeks after injection.

## 2019-10-01 NOTE — TELEPHONE ENCOUNTER
Received a PA from [facility] for Fluticasone-Salmeterol 100-50 mcg/dose Inhaler. Submitted on CMM. Waiting for a response.

## 2019-10-02 DIAGNOSIS — R05.3 CHRONIC COUGH: ICD-10-CM

## 2019-10-02 NOTE — TELEPHONE ENCOUNTER
Pharmacy didn't know an alternative which would be of less cost, they stated a PA has been submitted for coverage.

## 2019-10-02 NOTE — TELEPHONE ENCOUNTER
Please check with pharmacy what is on her formulary?  Seems to be having issues with cost/coverega with this category.

## 2019-10-02 NOTE — TELEPHONE ENCOUNTER
Patient calling wondering if there is a possibility that she could get a generic version of the Advair? Patient worried about the cost.    Please advise

## 2019-10-07 NOTE — TELEPHONE ENCOUNTER
Received a DENIAL from [employer] for Fluticasone-Salmeterol 100-50 mcg/dose inhaler.     Forms scanned to [facility].

## 2019-10-14 ENCOUNTER — HOSPITAL ENCOUNTER (OUTPATIENT)
Dept: INTERVENTIONAL RADIOLOGY/VASCULAR | Facility: HOSPITAL | Age: 69
End: 2019-10-14
Attending: FAMILY MEDICINE
Payer: MEDICARE

## 2019-10-14 ENCOUNTER — HOSPITAL ENCOUNTER (OUTPATIENT)
Dept: CT IMAGING | Facility: HOSPITAL | Age: 69
Discharge: HOME | End: 2019-10-14
Attending: FAMILY MEDICINE | Admitting: FAMILY MEDICINE
Payer: MEDICARE

## 2019-10-14 ENCOUNTER — HOSPITAL ENCOUNTER (OUTPATIENT)
Dept: MRI IMAGING | Facility: HOSPITAL | Age: 69
End: 2019-10-14
Attending: FAMILY MEDICINE
Payer: MEDICARE

## 2019-10-14 DIAGNOSIS — M54.2 NECK PAIN: ICD-10-CM

## 2019-10-14 DIAGNOSIS — M47.812 CERVICAL SPONDYLOSIS: ICD-10-CM

## 2019-10-14 DIAGNOSIS — M54.50 BILATERAL LOW BACK PAIN WITHOUT SCIATICA, UNSPECIFIED CHRONICITY: ICD-10-CM

## 2019-10-14 DIAGNOSIS — M47.819 DEGENERATIVE JOINT DISEASE OF SPINAL FACET JOINT: ICD-10-CM

## 2019-10-14 DIAGNOSIS — R05.3 CHRONIC COUGH: ICD-10-CM

## 2019-10-14 PROCEDURE — 64490 INJ PARAVERT F JNT C/T 1 LEV: CPT | Mod: TC

## 2019-10-14 PROCEDURE — 25000125 ZZHC RX 250

## 2019-10-14 PROCEDURE — 25000128 H RX IP 250 OP 636: Performed by: RADIOLOGY

## 2019-10-14 PROCEDURE — 71250 CT THORAX DX C-: CPT | Mod: TC

## 2019-10-14 PROCEDURE — 72148 MRI LUMBAR SPINE W/O DYE: CPT | Mod: TC

## 2019-10-14 RX ORDER — DEXAMETHASONE SODIUM PHOSPHATE 10 MG/ML
INJECTION, SOLUTION INTRAMUSCULAR; INTRAVENOUS
Status: DISCONTINUED
Start: 2019-10-14 | End: 2019-10-15 | Stop reason: HOSPADM

## 2019-10-14 RX ORDER — IOPAMIDOL 612 MG/ML
15 INJECTION, SOLUTION INTRATHECAL ONCE
Status: COMPLETED | OUTPATIENT
Start: 2019-10-14 | End: 2019-10-14

## 2019-10-14 RX ORDER — DEXAMETHASONE SODIUM PHOSPHATE 10 MG/ML
10 INJECTION, SOLUTION INTRAMUSCULAR; INTRAVENOUS ONCE
Status: COMPLETED | OUTPATIENT
Start: 2019-10-14 | End: 2019-10-14

## 2019-10-14 RX ORDER — LIDOCAINE HYDROCHLORIDE 10 MG/ML
INJECTION, SOLUTION EPIDURAL; INFILTRATION; INTRACAUDAL; PERINEURAL
Status: COMPLETED
Start: 2019-10-14 | End: 2019-10-14

## 2019-10-14 RX ADMIN — LIDOCAINE HYDROCHLORIDE 3.5 ML: 10 INJECTION, SOLUTION EPIDURAL; INFILTRATION; INTRACAUDAL; PERINEURAL at 15:37

## 2019-10-14 RX ADMIN — DEXAMETHASONE SODIUM PHOSPHATE 10 MG: 10 INJECTION, SOLUTION INTRAMUSCULAR; INTRAVENOUS at 15:36

## 2019-10-14 RX ADMIN — IOPAMIDOL 0.5 ML: 612 INJECTION, SOLUTION INTRATHECAL at 15:36

## 2019-10-14 NOTE — DISCHARGE INSTRUCTIONS
Home number on file [phone] (home)  Is it ok to leave a message at this number(s)? Yes    Dr. Danielle completed your procedure on 10/14/2019.    Current Pain Level (0-10 Scale): 2/10  Post Pain Level (0-10):  0/10    Radiology Discharge instructions for Steroid Injection    Activity Level:     Do not do any heavy activity or exercise for 24 hours.   Do not drive for 4 hours after your injection.  Diet:   Return to your normal diet.  Medications:   If you have stopped taking your Aspirin, Coumadin/Warfarin, Ibuprofen, or any   other blood thinner for this procedure you may resume in the morning unless   your primary care provider has given you other instructions.    Diabetics may see an increase in blood sugar after steroid injections. If you are concerned about your blood sugar, please contact your family doctor.    Site Care:  Remove the bandage and bathe or shower the morning after the procedure.      This is a Pain Management procedure.  You will be contacted in two weeks for follow up.    Call your Primary Care Provider if you have the following (if your primary care provider is not available please seek emergency care):   Nausea with vomiting   Severe headache   Drowsiness or confusion   Redness or drainage at the injection or puncture site   Temperature over 101 degrees F   Other concerns   Worsening back pain   Stiff neck

## 2019-10-14 NOTE — IP AVS SNAPSHOT
[facility]  [address] [address]  Phone:  [phone]  Fax:  [phone]                                    After Visit Summary   10/14/2019    Radha Fox    MRN: [id number]           After Visit Summary Signature Page    I have received my discharge instructions, and my questions have been answered. I have discussed any challenges I see with this plan with the nurse or doctor.    ..........................................................................................................................................  Patient/Patient Representative Signature      ..........................................................................................................................................  Patient Representative Print Name and Relationship to Patient    ..................................................               ................................................  Date                                   Time    ..........................................................................................................................................  Reviewed by Signature/Title    ...................................................              ..............................................  Date                                               Time          22EPIC Rev 08/18       
No

## 2019-10-16 ENCOUNTER — TELEPHONE (OUTPATIENT)
Dept: FAMILY MEDICINE | Facility: OTHER | Age: 69
End: 2019-10-16

## 2019-10-16 DIAGNOSIS — M47.817 SPONDYLOSIS WITHOUT MYELOPATHY OR RADICULOPATHY, LUMBOSACRAL REGION: ICD-10-CM

## 2019-10-16 DIAGNOSIS — M54.50 BILATERAL LOW BACK PAIN WITHOUT SCIATICA, UNSPECIFIED CHRONICITY: ICD-10-CM

## 2019-10-16 DIAGNOSIS — M47.819 DEGENERATIVE JOINT DISEASE OF SPINAL FACET JOINT: Primary | ICD-10-CM

## 2019-10-17 ENCOUNTER — TELEPHONE (OUTPATIENT)
Dept: FAMILY MEDICINE | Facility: OTHER | Age: 69
End: 2019-10-17

## 2019-10-17 ENCOUNTER — HOSPITAL ENCOUNTER (OUTPATIENT)
Dept: RESPIRATORY THERAPY | Facility: HOSPITAL | Age: 69
Discharge: HOME | End: 2019-10-17
Attending: FAMILY MEDICINE | Admitting: FAMILY MEDICINE
Payer: MEDICARE

## 2019-10-17 DIAGNOSIS — R05.3 CHRONIC COUGH: ICD-10-CM

## 2019-10-17 LAB
COHGB MFR BLD: 1.5 % (ref 0–2)
HGB BLD-MCNC: 13.6 G/DL (ref 11.7–15.7)

## 2019-10-17 PROCEDURE — 94060 EVALUATION OF WHEEZING: CPT

## 2019-10-17 PROCEDURE — 94060 EVALUATION OF WHEEZING: CPT | Mod: 26 | Performed by: INTERNAL MEDICINE

## 2019-10-17 PROCEDURE — 82375 ASSAY CARBOXYHB QUANT: CPT | Performed by: FAMILY MEDICINE

## 2019-10-17 PROCEDURE — 94729 DIFFUSING CAPACITY: CPT

## 2019-10-17 PROCEDURE — 94726 PLETHYSMOGRAPHY LUNG VOLUMES: CPT | Mod: 26 | Performed by: INTERNAL MEDICINE

## 2019-10-17 PROCEDURE — 85018 HEMOGLOBIN: CPT | Performed by: FAMILY MEDICINE

## 2019-10-17 PROCEDURE — 94729 DIFFUSING CAPACITY: CPT | Mod: 26 | Performed by: INTERNAL MEDICINE

## 2019-10-17 PROCEDURE — 36415 COLL VENOUS BLD VENIPUNCTURE: CPT | Performed by: FAMILY MEDICINE

## 2019-10-17 PROCEDURE — 25000125 ZZHC RX 250: Performed by: FAMILY MEDICINE

## 2019-10-17 PROCEDURE — 94726 PLETHYSMOGRAPHY LUNG VOLUMES: CPT

## 2019-10-17 RX ORDER — ALBUTEROL SULFATE 0.83 MG/ML
2.5 SOLUTION RESPIRATORY (INHALATION) ONCE
Status: COMPLETED | OUTPATIENT
Start: 2019-10-17 | End: 2019-10-17

## 2019-10-17 RX ADMIN — ALBUTEROL SULFATE 2.5 MG: 2.5 SOLUTION RESPIRATORY (INHALATION) at 09:58

## 2019-10-18 ENCOUNTER — TELEPHONE (OUTPATIENT)
Dept: FAMILY MEDICINE | Facility: OTHER | Age: 69
End: 2019-10-18

## 2019-10-21 ENCOUNTER — TRANSFERRED RECORDS (OUTPATIENT)
Dept: HEALTH INFORMATION MANAGEMENT | Facility: CLINIC | Age: 69
End: 2019-10-21

## 2019-10-23 ENCOUNTER — TELEPHONE (OUTPATIENT)
Dept: FAMILY MEDICINE | Facility: OTHER | Age: 69
End: 2019-10-23

## 2019-10-23 DIAGNOSIS — R05.3 CHRONIC COUGH: Primary | ICD-10-CM

## 2019-10-23 NOTE — TELEPHONE ENCOUNTER
Call with results of PFTs: mild obstruction, moderate restriction and diffusion.  Same as 2016 PFTs, so stable.    Offer pulmonology consult for ongoing cough.  Can wait until after ENT follow up if she wishes.

## 2019-10-28 ENCOUNTER — TELEPHONE (OUTPATIENT)
Dept: GENERAL RADIOLOGY | Facility: HOSPITAL | Age: 69
End: 2019-10-28

## 2019-10-28 NOTE — TELEPHONE ENCOUNTER
CONSULT PATIENT  PAIN INJECTION POST CALL    Procedure: C1-2 articulation injection   Radiologist(s): Dr. Daniel Danielle  Date of Procedure: 10/14/2019    I responded to the patient's questions/concerns.    Pre-procedure pain score was: 2 (See pre-procedure score)  Post-procedure pain score as of today is: 0  Where is the pain?  Helped a lot;  Hurts a little a bit off and on.  It also help pain in lower back some.    Is this new pain? No    Patient would not like to pursue another injection at this time.  The patient will contact IR at [phone] if that changes.  Patient wants to wait since she is feeling very good now and work on her lower back. Seeing provider for lower back.        Nargis Arriola

## 2019-11-01 ENCOUNTER — TRANSFERRED RECORDS (OUTPATIENT)
Dept: HEALTH INFORMATION MANAGEMENT | Facility: CLINIC | Age: 69
End: 2019-11-01

## 2019-11-12 ENCOUNTER — TELEPHONE (OUTPATIENT)
Dept: INTERVENTIONAL RADIOLOGY/VASCULAR | Facility: HOSPITAL | Age: 69
End: 2019-11-12

## 2019-11-12 NOTE — TELEPHONE ENCOUNTER
Left message to remind pt not to have a flu shot/immunization two weeks before or after injection.

## 2019-11-18 ENCOUNTER — TRANSFERRED RECORDS (OUTPATIENT)
Dept: HEALTH INFORMATION MANAGEMENT | Facility: CLINIC | Age: 69
End: 2019-11-18

## 2019-11-18 LAB — EJECTION FRACTION: 61.2 %

## 2019-11-19 ENCOUNTER — HOSPITAL ENCOUNTER (OUTPATIENT)
Dept: INTERVENTIONAL RADIOLOGY/VASCULAR | Facility: HOSPITAL | Age: 69
Discharge: HOME | End: 2019-11-19
Attending: FAMILY MEDICINE | Admitting: FAMILY MEDICINE
Payer: MEDICARE

## 2019-11-19 DIAGNOSIS — M47.817 SPONDYLOSIS WITHOUT MYELOPATHY OR RADICULOPATHY, LUMBOSACRAL REGION: ICD-10-CM

## 2019-11-19 DIAGNOSIS — M54.50 BILATERAL LOW BACK PAIN WITHOUT SCIATICA, UNSPECIFIED CHRONICITY: ICD-10-CM

## 2019-11-19 DIAGNOSIS — M47.819 DEGENERATIVE JOINT DISEASE OF SPINAL FACET JOINT: ICD-10-CM

## 2019-11-19 PROCEDURE — 25000128 H RX IP 250 OP 636: Performed by: RADIOLOGY

## 2019-11-19 PROCEDURE — 64493 INJ PARAVERT F JNT L/S 1 LEV: CPT | Mod: TC,50

## 2019-11-19 PROCEDURE — 25500064 ZZH RX 255 OP 636: Performed by: RADIOLOGY

## 2019-11-19 PROCEDURE — 25000125 ZZHC RX 250: Performed by: RADIOLOGY

## 2019-11-19 PROCEDURE — 64494 INJ PARAVERT F JNT L/S 2 LEV: CPT | Mod: TC,50

## 2019-11-19 RX ORDER — IOPAMIDOL 612 MG/ML
50 INJECTION, SOLUTION INTRAVASCULAR ONCE
Status: COMPLETED | OUTPATIENT
Start: 2019-11-19 | End: 2019-11-19

## 2019-11-19 RX ORDER — METHYLPREDNISOLONE ACETATE 80 MG/ML
INJECTION, SUSPENSION INTRA-ARTICULAR; INTRALESIONAL; INTRAMUSCULAR; SOFT TISSUE
Status: DISPENSED
Start: 2019-11-19 | End: 2019-11-19

## 2019-11-19 RX ORDER — METHYLPREDNISOLONE ACETATE 80 MG/ML
80 INJECTION, SUSPENSION INTRA-ARTICULAR; INTRALESIONAL; INTRAMUSCULAR; SOFT TISSUE ONCE
Status: COMPLETED | OUTPATIENT
Start: 2019-11-19 | End: 2019-11-19

## 2019-11-19 RX ORDER — LIDOCAINE HYDROCHLORIDE 10 MG/ML
INJECTION, SOLUTION EPIDURAL; INFILTRATION; INTRACAUDAL; PERINEURAL
Status: DISPENSED
Start: 2019-11-19 | End: 2019-11-19

## 2019-11-19 RX ORDER — METHYLPREDNISOLONE ACETATE 80 MG/ML
80 INJECTION, SUSPENSION INTRA-ARTICULAR; INTRALESIONAL; INTRAMUSCULAR; SOFT TISSUE ONCE
Status: DISCONTINUED | OUTPATIENT
Start: 2019-11-19 | End: 2019-11-20 | Stop reason: HOSPADM

## 2019-11-19 RX ADMIN — METHYLPREDNISOLONE ACETATE 160 MG: 80 INJECTION, SUSPENSION INTRA-ARTICULAR; INTRALESIONAL; INTRAMUSCULAR; SOFT TISSUE at 11:03

## 2019-11-19 RX ADMIN — LIDOCAINE HYDROCHLORIDE 7 ML: 10 INJECTION, SOLUTION EPIDURAL; INFILTRATION; INTRACAUDAL; PERINEURAL at 11:02

## 2019-11-19 RX ADMIN — IOPAMIDOL 2 ML: 612 INJECTION, SOLUTION INTRAVENOUS at 11:03

## 2019-11-19 NOTE — IP AVS SNAPSHOT
[facility]  [address] [address]  Phone:  [phone]  Fax:  [phone]                                    After Visit Summary   11/19/2019    Radha Fox    MRN: [id number]           After Visit Summary Signature Page    I have received my discharge instructions, and my questions have been answered. I have discussed any challenges I see with this plan with the nurse or doctor.    ..........................................................................................................................................  Patient/Patient Representative Signature      ..........................................................................................................................................  Patient Representative Print Name and Relationship to Patient    ..................................................               ................................................  Date                                   Time    ..........................................................................................................................................  Reviewed by Signature/Title    ...................................................              ..............................................  Date                                               Time          22EPIC Rev 08/18

## 2019-11-19 NOTE — DISCHARGE INSTRUCTIONS
Home number on file [phone] (home)  Is it ok to leave a message at this number(s)? Yes    Dr. Danielle completed your procedure on 11/19/2019.    Current Pain Level (0-10 Scale): 4/10  Post Pain Level (0-10):  2/10    Radiology Discharge instructions for Steroid Injection    Activity Level:     Do not do any heavy activity or exercise for 24 hours.   Do not drive for 4 hours after your injection.  Diet:   Return to your normal diet.  Medications:   If you have stopped taking your Aspirin, Coumadin/Warfarin, Ibuprofen, or any   other blood thinner for this procedure you may resume in the morning unless   your primary care provider has given you other instructions.    Diabetics may see an increase in blood sugar after steroid injections. If you are concerned about your blood sugar, please contact your family doctor.    Site Care:  Remove the bandage and bathe or shower the morning after the procedure.      Please allow two weeks to experience improvement in your pain.  If you have any further issues, please contact your provider.    Call your Primary Care Provider if you have the following (if your primary care provider is not available please seek emergency care):   Nausea with vomiting   Severe headache   Drowsiness or confusion   Redness or drainage at the injection or puncture site   Temperature over 101 degrees F   Other concerns   Worsening back pain   Stiff neck

## 2019-11-29 ENCOUNTER — NURSE TRIAGE (OUTPATIENT)
Dept: FAMILY MEDICINE | Facility: OTHER | Age: 69
End: 2019-11-29

## 2019-11-29 ENCOUNTER — HOSPITAL ENCOUNTER (EMERGENCY)
Facility: HOSPITAL | Age: 69
Discharge: HOME | End: 2019-11-29
Attending: PHYSICIAN ASSISTANT | Admitting: PHYSICIAN ASSISTANT
Payer: MEDICARE

## 2019-11-29 VITALS
DIASTOLIC BLOOD PRESSURE: 80 MMHG | BODY MASS INDEX: 33.96 KG/M2 | HEART RATE: 53 BPM | SYSTOLIC BLOOD PRESSURE: 154 MMHG | OXYGEN SATURATION: 98 % | RESPIRATION RATE: 18 BRPM | WEIGHT: 210.3 LBS | TEMPERATURE: 97.9 F

## 2019-11-29 DIAGNOSIS — Z79.899 ENCOUNTER FOR LONG-TERM (CURRENT) USE OF OTHER MEDICATIONS: ICD-10-CM

## 2019-11-29 DIAGNOSIS — R73.9 HYPERGLYCEMIA: ICD-10-CM

## 2019-11-29 DIAGNOSIS — M05.79 RHEUMATOID ARTHRITIS, SEROPOSITIVE, MULTIPLE SITES (H): Primary | ICD-10-CM

## 2019-11-29 LAB
ALBUMIN SERPL-MCNC: 3.4 G/DL (ref 3.4–5)
ALBUMIN UR-MCNC: NEGATIVE MG/DL
ALP SERPL-CCNC: 90 U/L (ref 40–150)
ALT SERPL W P-5'-P-CCNC: 38 U/L (ref 0–50)
AMORPH CRY #/AREA URNS HPF: ABNORMAL /HPF
ANION GAP SERPL CALCULATED.3IONS-SCNC: 4 MMOL/L (ref 3–14)
APPEARANCE UR: ABNORMAL
AST SERPL W P-5'-P-CCNC: 14 U/L (ref 0–45)
BACTERIA #/AREA URNS HPF: ABNORMAL /HPF
BASOPHILS # BLD AUTO: 0 10E9/L (ref 0–0.2)
BASOPHILS # BLD AUTO: 0 10E9/L (ref 0–0.2)
BASOPHILS NFR BLD AUTO: 0.2 %
BASOPHILS NFR BLD AUTO: 0.3 %
BILIRUB DIRECT SERPL-MCNC: 0.1 MG/DL (ref 0–0.2)
BILIRUB SERPL-MCNC: 0.4 MG/DL (ref 0.2–1.3)
BILIRUB UR QL STRIP: NEGATIVE
BUN SERPL-MCNC: 23 MG/DL (ref 7–30)
CALCIUM SERPL-MCNC: 9 MG/DL (ref 8.5–10.1)
CHLORIDE SERPL-SCNC: 105 MMOL/L (ref 94–109)
CK SERPL-CCNC: 37 U/L (ref 30–225)
CO2 SERPL-SCNC: 31 MMOL/L (ref 20–32)
COLOR UR AUTO: YELLOW
CREAT SERPL-MCNC: 0.75 MG/DL (ref 0.52–1.04)
CREAT SERPL-MCNC: 0.89 MG/DL (ref 0.52–1.04)
CRP SERPL-MCNC: 4.1 MG/L (ref 0–8)
DIFFERENTIAL METHOD BLD: ABNORMAL
DIFFERENTIAL METHOD BLD: ABNORMAL
EOSINOPHIL # BLD AUTO: 0.1 10E9/L (ref 0–0.7)
EOSINOPHIL # BLD AUTO: 0.1 10E9/L (ref 0–0.7)
EOSINOPHIL NFR BLD AUTO: 0.5 %
EOSINOPHIL NFR BLD AUTO: 0.7 %
ERYTHROCYTE [DISTWIDTH] IN BLOOD BY AUTOMATED COUNT: 14 % (ref 10–15)
ERYTHROCYTE [DISTWIDTH] IN BLOOD BY AUTOMATED COUNT: 14.3 % (ref 10–15)
ERYTHROCYTE [SEDIMENTATION RATE] IN BLOOD BY WESTERGREN METHOD: 11 MM/H (ref 0–30)
GFR SERPL CREATININE-BSD FRML MDRD: 66 ML/MIN/{1.73_M2}
GFR SERPL CREATININE-BSD FRML MDRD: 82 ML/MIN/{1.73_M2}
GLUCOSE BLDC GLUCOMTR-MCNC: 177 MG/DL (ref 70–99)
GLUCOSE BLDC GLUCOMTR-MCNC: 209 MG/DL (ref 70–99)
GLUCOSE BLDC GLUCOMTR-MCNC: 253 MG/DL (ref 70–99)
GLUCOSE SERPL-MCNC: 220 MG/DL (ref 70–99)
GLUCOSE UR STRIP-MCNC: >1000 MG/DL
HCT VFR BLD AUTO: 39.2 % (ref 35–47)
HCT VFR BLD AUTO: 41.9 % (ref 35–47)
HGB BLD-MCNC: 13.3 G/DL (ref 11.7–15.7)
HGB BLD-MCNC: 14.1 G/DL (ref 11.7–15.7)
HGB UR QL STRIP: NEGATIVE
IMM GRANULOCYTES # BLD: 0.1 10E9/L (ref 0–0.4)
IMM GRANULOCYTES # BLD: 0.1 10E9/L (ref 0–0.4)
IMM GRANULOCYTES NFR BLD: 0.6 %
IMM GRANULOCYTES NFR BLD: 0.7 %
KETONES UR STRIP-MCNC: NEGATIVE MG/DL
LEUKOCYTE ESTERASE UR QL STRIP: NEGATIVE
LYMPHOCYTES # BLD AUTO: 0.8 10E9/L (ref 0.8–5.3)
LYMPHOCYTES # BLD AUTO: 0.9 10E9/L (ref 0.8–5.3)
LYMPHOCYTES NFR BLD AUTO: 5.6 %
LYMPHOCYTES NFR BLD AUTO: 6.3 %
MCH RBC QN AUTO: 33.5 PG (ref 26.5–33)
MCH RBC QN AUTO: 33.5 PG (ref 26.5–33)
MCHC RBC AUTO-ENTMCNC: 33.7 G/DL (ref 31.5–36.5)
MCHC RBC AUTO-ENTMCNC: 33.9 G/DL (ref 31.5–36.5)
MCV RBC AUTO: 100 FL (ref 78–100)
MCV RBC AUTO: 99 FL (ref 78–100)
MONOCYTES # BLD AUTO: 1.3 10E9/L (ref 0–1.3)
MONOCYTES # BLD AUTO: 1.4 10E9/L (ref 0–1.3)
MONOCYTES NFR BLD AUTO: 9 %
MONOCYTES NFR BLD AUTO: 9.9 %
MUCOUS THREADS #/AREA URNS LPF: PRESENT /LPF
NEUTROPHILS # BLD AUTO: 11 10E9/L (ref 1.6–8.3)
NEUTROPHILS # BLD AUTO: 13.1 10E9/L (ref 1.6–8.3)
NEUTROPHILS NFR BLD AUTO: 82.4 %
NEUTROPHILS NFR BLD AUTO: 83.8 %
NITRATE UR QL: NEGATIVE
NRBC # BLD AUTO: 0 10*3/UL
NRBC # BLD AUTO: 0 10*3/UL
NRBC BLD AUTO-RTO: 0 /100
NRBC BLD AUTO-RTO: 0 /100
PH UR STRIP: 7.5 PH (ref 4.7–8)
PLATELET # BLD AUTO: 215 10E9/L (ref 150–450)
PLATELET # BLD AUTO: 239 10E9/L (ref 150–450)
POTASSIUM SERPL-SCNC: 4.2 MMOL/L (ref 3.4–5.3)
PROT SERPL-MCNC: 6.8 G/DL (ref 6.8–8.8)
RBC # BLD AUTO: 3.97 10E12/L (ref 3.8–5.2)
RBC # BLD AUTO: 4.21 10E12/L (ref 3.8–5.2)
RBC #/AREA URNS AUTO: 0 /HPF (ref 0–2)
SODIUM SERPL-SCNC: 140 MMOL/L (ref 133–144)
SOURCE: ABNORMAL
SP GR UR STRIP: 1.02 (ref 1–1.03)
SQUAMOUS #/AREA URNS AUTO: 1 /HPF (ref 0–1)
UROBILINOGEN UR STRIP-MCNC: NORMAL MG/DL (ref 0–2)
WBC # BLD AUTO: 13.3 10E9/L (ref 4–11)
WBC # BLD AUTO: 15.6 10E9/L (ref 4–11)
WBC #/AREA URNS AUTO: 0 /HPF (ref 0–5)

## 2019-11-29 PROCEDURE — 99284 EMERGENCY DEPT VISIT MOD MDM: CPT | Mod: Z6 | Performed by: PHYSICIAN ASSISTANT

## 2019-11-29 PROCEDURE — 86140 C-REACTIVE PROTEIN: CPT | Mod: ZL | Performed by: INTERNAL MEDICINE

## 2019-11-29 PROCEDURE — 85025 COMPLETE CBC W/AUTO DIFF WBC: CPT | Performed by: PHYSICIAN ASSISTANT

## 2019-11-29 PROCEDURE — 80076 HEPATIC FUNCTION PANEL: CPT | Mod: ZL | Performed by: INTERNAL MEDICINE

## 2019-11-29 PROCEDURE — 85652 RBC SED RATE AUTOMATED: CPT | Mod: ZL | Performed by: INTERNAL MEDICINE

## 2019-11-29 PROCEDURE — 82565 ASSAY OF CREATININE: CPT | Mod: ZL | Performed by: INTERNAL MEDICINE

## 2019-11-29 PROCEDURE — 00000146 ZZHCL STATISTIC GLUCOSE BY METER IP

## 2019-11-29 PROCEDURE — 80048 BASIC METABOLIC PNL TOTAL CA: CPT | Performed by: PHYSICIAN ASSISTANT

## 2019-11-29 PROCEDURE — 99283 EMERGENCY DEPT VISIT LOW MDM: CPT

## 2019-11-29 PROCEDURE — 85025 COMPLETE CBC W/AUTO DIFF WBC: CPT | Mod: ZL,91 | Performed by: INTERNAL MEDICINE

## 2019-11-29 PROCEDURE — 81001 URINALYSIS AUTO W/SCOPE: CPT | Performed by: PHYSICIAN ASSISTANT

## 2019-11-29 PROCEDURE — 82550 ASSAY OF CK (CPK): CPT | Mod: ZL | Performed by: INTERNAL MEDICINE

## 2019-11-29 PROCEDURE — 36415 COLL VENOUS BLD VENIPUNCTURE: CPT | Mod: ZL | Performed by: INTERNAL MEDICINE

## 2019-11-29 RX ORDER — DEXTROSE MONOHYDRATE 25 G/50ML
25-50 INJECTION, SOLUTION INTRAVENOUS
Status: DISCONTINUED | OUTPATIENT
Start: 2019-11-29 | End: 2019-11-29

## 2019-11-29 RX ORDER — NICOTINE POLACRILEX 4 MG
15-30 LOZENGE BUCCAL
Status: DISCONTINUED | OUTPATIENT
Start: 2019-11-29 | End: 2019-11-29 | Stop reason: HOSPADM

## 2019-11-29 RX ORDER — NICOTINE POLACRILEX 4 MG
15-30 LOZENGE BUCCAL
Status: DISCONTINUED | OUTPATIENT
Start: 2019-11-29 | End: 2019-11-29

## 2019-11-29 RX ORDER — DEXTROSE MONOHYDRATE 25 G/50ML
25-50 INJECTION, SOLUTION INTRAVENOUS
Status: DISCONTINUED | OUTPATIENT
Start: 2019-11-29 | End: 2019-11-29 | Stop reason: HOSPADM

## 2019-11-29 ASSESSMENT — ENCOUNTER SYMPTOMS
DIFFICULTY URINATING: 0
SHORTNESS OF BREATH: 0
POLYDIPSIA: 1
DIZZINESS: 0
NAUSEA: 0
VOMITING: 0
HEMATURIA: 0
ACTIVITY CHANGE: 0
CHILLS: 0
DYSURIA: 0
ABDOMINAL PAIN: 0
BACK PAIN: 0
NUMBNESS: 0
WHEEZING: 0

## 2019-11-29 NOTE — ED PROVIDER NOTES
History     Chief Complaint   Patient presents with     Hyperglycemia     take 1500 blood sugar was 365     HPI  Radha Fox is a 69 year old female with borderline diabetes, and has been attempting to control her BGs with diet and exercise.  She recently received a steroid injection for her chronic back pain x2 weeks ago, and she also takes daily 3 mg prednisone for her RA.  Usually her blood glucose ranges from 130-150, but she has noticed her levels increasing over the last couple weeks. She feels like she needs to start medications. Associated sxs of polydipsia and polyuria. She had similar sxs approximately 6 years ago, when she was found to have an asymptomatic UTI. She denies dysuria, frequency, or hematuria.     Denies abdominal pain, nausea, vomiting, or illness.     Allergies:  Allergies   Allergen Reactions     Neomycin-Polymyxin-Hc      Rash behind ear after ear drops     Penicillins Hives and Itching     One time large local reaction after IM PCN in 1970, no airway symptoms     Plaquenil [Hydroxychloroquine Sulfate] Hives       Problem List:    Patient Active Problem List    Diagnosis Date Noted     Obesity (BMI 35.0-39.9) with comorbidity (H) 09/09/2019     Priority: Medium     Type 2 diabetes mellitus without complication (H) 10/30/2017     Priority: Medium     Chronic cough 08/17/2016     Priority: Medium     ACP (advance care planning) 07/21/2016     Priority: Medium     Advance Care Planning 7/21/2016: ACP Review of Chart / Resources Provided:  Reviewed chart for advance care plan.  Radha Fox has no plan or code status on file. Discussed available resources and provided with information. Confirmed code status reflects current choices pending further ACP discussions.  Confirmed/documented legally designated decision makers.  Added by Gregoria Gregorio             Diabetes mellitus type 2, diet-controlled (H)      Priority: Medium     Rheumatoid arthritis of multiple sites without  rheumatoid factor (H) 11/10/2015     Priority: Medium     SNHL (sensory-neural hearing loss), asymmetrical 07/08/2014     Priority: Medium     negative MRI IAC 7/2014       S/P myringotomy with insertion of tube 07/08/2014     Priority: Medium     History of chronic sinusitis 07/08/2014     Priority: Medium     History of sinus surgery 05/28/2014     Priority: Medium     Simple chronic serous otitis media 05/28/2014     Priority: Medium     Problem list name updated by automated process. Provider to review       Dysfunction of eustachian tube 05/28/2014     Priority: Medium     Mixed hearing loss, unilateral 05/28/2014     Priority: Medium     Abnormal glucose 01/22/2014     Priority: Medium     Problem list name updated by automated process. Provider to review       Recurrent UTI 03/20/2013     Priority: Medium     RA (rheumatoid arthritis) (H) 03/12/2013     Priority: Medium     Insomnia 01/24/2013     Priority: Medium     Problem list name updated by automated process. Provider to review       Osteoporosis 01/19/2012     Priority: Medium     Problem list name updated by automated process. Provider to review       Obesity 01/19/2012     Priority: Medium     Problem list name updated by automated process. Provider to review       Restless legs syndrome (RLS) 04/21/2011     Priority: Medium     Esophageal reflux 11/12/2010     Priority: Medium     Contact dermatitis and other eczema, due to unspecified cause 03/05/2009     Priority: Medium     Coronary atherosclerosis 09/27/2005     Priority: Medium     Problem list name updated by automated process. Provider to review       Hyperlipidemia 04/13/2005     Priority: Medium     Problem list name updated by automated process. Provider to review       Depressive disorder, not elsewhere classified 01/11/2002     Priority: Medium     Sinusitis, chronic 09/05/2000     Priority: Medium     Problem list name updated by automated process. Provider to review       Arthropathy  05/01/2000     Priority: Medium     Problem list name updated by automated process. Provider to review       Diffuse connective tissue disease (H) 04/04/2000     Priority: Medium     Problem list name updated by automated process. Provider to review          Past Medical History:    Past Medical History:   Diagnosis Date     Abnormal PFT      Arthropathy, unspecified, site unspecified 05/01/2000     Chest pain, unspecified 2005     Contact dermatitis and other eczema, due to unspecified cause 03/05/2009     Coronary atherosclerosis of unspecified type of vessel, native or graft 09/27/2005     Depressive disorder, not elsewhere classified 01/11/2002     Diabetes mellitus type 2, diet-controlled (H)      Esophageal reflux 11/12/2010     H/O: hysterectomy 1977     Impaired fasting glucose 11/12/2010     Insomnia, unspecified 01/24/2013     Leukocytosis, unspecified 12/22/2007     Obesity, unspecified 01/19/2012     Osteoporosis, unspecified 01/19/2012     Other and unspecified hyperlipidemia 04/13/2005     Other dyspnea and respiratory abnormality 01/19/2012     Restless legs syndrome (RLS) 04/21/2011     Rheumatoid arthritis(714.0) 11/09/1999     Unspecified diffuse connective tissue disease 04/04/2000     Unspecified sinusitis (chronic) 09/05/2000       Past Surgical History:    Past Surgical History:   Procedure Laterality Date     APPENDECTOMY  1961     CHOLECYSTECTOMY  1973     COLONOSCOPY N/A 3/23/2015    Procedure: COLONOSCOPY;  Surgeon: Clarisa Larkin MD;  Location: [facility]     COLONOSCOPY N/A 2/21/2019    Procedure: COLONOSCOPY;  Surgeon: Milton Gracia MD;  Location: [facility]     endoscopic sphenoidotomy  10/2011    Right     ETHMOIDECTOMY Bilateral 9/27/2016    Procedure: ETHMOIDECTOMY;  Surgeon: Aracelis Ayon MD;  Location: [facility]     excision bilateral telma bullosa  10/2011     GENITOURINARY SURGERY       HYSTERECTOMY      benign     IR CONSULTATION FOR IR EXAM  9/26/2019     IR FLUORO 0-1  HOUR  9/26/2019     LAPAROSCOPY DIAGNOSTIC (GENERAL)       ORTHOPEDIC SURGERY      left foot 5th metarsal fracture screws placed     ORTHOPEDIC SURGERY Left 04/22/2019    Dr. King; first MPJ fusion     PFT GENERAL LAB TESTING  10/17/2019    mild obstructive; moderate restriction; moderately severe diffusion deffect.  same as 2016     SEPTOPLASTY N/A 9/27/2016    Procedure: SEPTOPLASTY;  Surgeon: Aracelis Ayon MD;  Location: [facility]     third metatarsal fracture Left 01/2019    stress fracture; Dr. King     TOT Internal urethrotomy  2010     TUBAL LIGATION         Family History:    Family History   Problem Relation Age of Onset     C.A.D. Father 83     Other - See Comments Father         CHF     Cerebrovascular Disease Father         CVA     Diabetes Father      Hypertension Father      Coronary Artery Disease Father      Cerebrovascular Disease Mother 72        CVA     Heart Disease Mother         Heart Issue     Hyperlipidemia Mother      Coronary Artery Disease Mother      Diabetes Brother      Diabetes Sister      Colon Cancer No family hx of      Breast Cancer No family hx of      Asthma No family hx of      Thyroid Disease No family hx of        Social History:  Marital Status:   [2]  Social History     Tobacco Use     Smoking status: Never Smoker     Smokeless tobacco: Never Used   Substance Use Topics     Alcohol use: No     Alcohol/week: 0.0 standard drinks     Drug use: No        Medications:    ASPIRIN PO  atorvastatin (LIPITOR) 20 MG tablet  Blood Glucose Calibration (ACCU-CHEK ANGELICA) SOLN  Blood Glucose Calibration (ACCU-CHEK COMPACT BLUE CONTROL) LIQD  blood glucose monitoring (ONETOUCH ULTRA) test strip  blood glucose monitoring (ONETOUCH ULTRA) test strip  calcium carbonate-vitamin D (CALCIUM + D) 600-200 MG-UNIT TABS  celecoxib (CELEBREX) 200 MG capsule  Cyanocobalamin (VITAMIN B 12 PO)  fluticasone-salmeterol (ADVAIR) 100-50 MCG/DOSE inhaler  folic acid (FOLVITE) 1 MG  tablet  Methotrexate Sodium (METHOTREXATE PO)  multivitamin, therapeutic with minerals (MULTI-VITAMIN) TABS  omeprazole (PRILOSEC) 40 MG DR capsule  potassium chloride ER (K-DUR/KLOR-CON M) 20 MEQ CR tablet  pramipexole (MIRAPEX) 0.25 MG tablet  predniSONE (DELTASONE) 1 MG tablet  Probiotic Product (PROBIOTIC DAILY PO)  propranolol (INDERAL) 20 MG tablet  RiTUXimab (RITUXAN IV)  venlafaxine (EFFEXOR-XR) 75 MG 24 hr capsule          Review of Systems   Constitutional: Negative for activity change and chills.   Respiratory: Negative for shortness of breath and wheezing.    Cardiovascular: Negative for chest pain.   Gastrointestinal: Negative for abdominal pain, nausea and vomiting.   Endocrine: Positive for polydipsia and polyuria.   Genitourinary: Negative for difficulty urinating, dysuria, hematuria, pelvic pain and urgency.   Musculoskeletal: Negative for back pain.   Neurological: Negative for dizziness and numbness.   Psychiatric/Behavioral: Negative for behavioral problems.       Physical Exam   BP: 135/88  Pulse: 50  Heart Rate: 62  Temp: 97.6  F (36.4  C)  Resp: 16  Weight: 95.4 kg (210 lb 4.8 oz)  SpO2: 98 %      Physical Exam  Constitutional:       Appearance: Normal appearance.   HENT:      Head: Normocephalic.   Eyes:      Conjunctiva/sclera: Conjunctivae normal.   Neck:      Musculoskeletal: Neck supple.   Cardiovascular:      Rate and Rhythm: Normal rate and regular rhythm.      Pulses: Normal pulses.      Heart sounds: Normal heart sounds.   Abdominal:      General: Abdomen is flat. There is no distension.   Skin:     General: Skin is warm and dry.   Neurological:      General: No focal deficit present.      Mental Status: She is alert and oriented to person, place, and time.   Psychiatric:         Mood and Affect: Mood normal.         Behavior: Behavior normal.         ED Course              Results for orders placed or performed during the hospital encounter of 11/29/19 (from the past 24 hour(s))    Glucose by meter   Result Value Ref Range    Glucose 253 (H) 70 - 99 mg/dL   UA reflex to Microscopic and Culture   Result Value Ref Range    Color Urine Yellow     Appearance Urine Slightly Cloudy     Glucose Urine >1000 (A) NEG^Negative mg/dL    Bilirubin Urine Negative NEG^Negative    Ketones Urine Negative NEG^Negative mg/dL    Specific Gravity Urine 1.021 1.003 - 1.035    Blood Urine Negative NEG^Negative    pH Urine 7.5 4.7 - 8.0 pH    Protein Albumin Urine Negative NEG^Negative mg/dL    Urobilinogen mg/dL Normal 0.0 - 2.0 mg/dL    Nitrite Urine Negative NEG^Negative    Leukocyte Esterase Urine Negative NEG^Negative    Source Midstream Urine     RBC Urine 0 0 - 2 /HPF    WBC Urine 0 0 - 5 /HPF    Bacteria Urine None (A) NEG^Negative /HPF    Squamous Epithelial /HPF Urine 1 0 - 1 /HPF    Mucous Urine Present (A) NEG^Negative /LPF    Amorphous Crystals Few (A) NEG^Negative /HPF   Basic metabolic panel   Result Value Ref Range    Sodium 140 133 - 144 mmol/L    Potassium 4.2 3.4 - 5.3 mmol/L    Chloride 105 94 - 109 mmol/L    Carbon Dioxide 31 20 - 32 mmol/L    Anion Gap 4 3 - 14 mmol/L    Glucose 220 (H) 70 - 99 mg/dL    Urea Nitrogen 23 7 - 30 mg/dL    Creatinine 0.75 0.52 - 1.04 mg/dL    GFR Estimate 82 >60 mL/min/[1.73_m2]    GFR Estimate If Black >90 >60 mL/min/[1.73_m2]    Calcium 9.0 8.5 - 10.1 mg/dL   CBC with platelets differential   Result Value Ref Range    WBC 13.3 (H) 4.0 - 11.0 10e9/L    RBC Count 3.97 3.8 - 5.2 10e12/L    Hemoglobin 13.3 11.7 - 15.7 g/dL    Hematocrit 39.2 35.0 - 47.0 %    MCV 99 78 - 100 fl    MCH 33.5 (H) 26.5 - 33.0 pg    MCHC 33.9 31.5 - 36.5 g/dL    RDW 14.0 10.0 - 15.0 %    Platelet Count 215 150 - 450 10e9/L    Diff Method Automated Method     % Neutrophils 82.4 %    % Lymphocytes 6.3 %    % Monocytes 9.9 %    % Eosinophils 0.5 %    % Basophils 0.2 %    % Immature Granulocytes 0.7 %    Nucleated RBCs 0 0 /100    Absolute Neutrophil 11.0 (H) 1.6 - 8.3 10e9/L    Absolute  Lymphocytes 0.8 0.8 - 5.3 10e9/L    Absolute Monocytes 1.3 0.0 - 1.3 10e9/L    Absolute Eosinophils 0.1 0.0 - 0.7 10e9/L    Absolute Basophils 0.0 0.0 - 0.2 10e9/L    Abs Immature Granulocytes 0.1 0 - 0.4 10e9/L    Absolute Nucleated RBC 0.0    Glucose by meter   Result Value Ref Range    Glucose 209 (H) 70 - 99 mg/dL   Glucose by meter   Result Value Ref Range    Glucose 177 (H) 70 - 99 mg/dL       Medications - No data to display    Assessments & Plan (with Medical Decision Making)     I have reviewed the nursing notes.    I have reviewed the findings, diagnosis, plan and need for follow up with the patient.  Radha Fox presents to the Emergency Department with c/o hyperglycemia with BG > 350    -appears to be associated with steroid injection for chronic spine pain and daily steroids due to RA  -initially thought patient would require insulin, but upon repeat labs her   -monitored and performed serial BG labs, final 177  -no insulin administered    -follow-up with her PCP to discuss her steroid management and diabetes management as she likely will need to start medications  -She understands to seek medical attention if she has increased symptoms of polyuria, polydipsia, abdominal pain, nausea, vomiting, or elevated blood glucose    Disposition: home      Discharge Medication List as of 11/29/2019  6:35 PM          Final diagnoses:   Hyperglycemia       11/29/2019   [facility]     Kevin Holloway PA-C  11/29/19 2103       Kevin Holloway PA-C  11/29/19 2104

## 2019-11-29 NOTE — ED AVS SNAPSHOT
[facility]  [address] [address]  Phone:  [phone]                                    Radha Fox   MRN: [id number]    Department:  [facility]   Date of Visit:  11/29/2019           After Visit Summary Signature Page    I have received my discharge instructions, and my questions have been answered. I have discussed any challenges I see with this plan with the nurse or doctor.    ..........................................................................................................................................  Patient/Patient Representative Signature      ..........................................................................................................................................  Patient Representative Print Name and Relationship to Patient    ..................................................               ................................................  Date                                   Time    ..........................................................................................................................................  Reviewed by Signature/Title    ...................................................              ..............................................  Date                                               Time          22EPIC Rev 08/18

## 2019-11-29 NOTE — ED TRIAGE NOTES
"70 y/o female presents with reports of high blood sugar - states at 1500 blood sugar was 365 \"and mine is always below 150.\" Reports increased thirst and urination also headache. States \"the last time I had high blood sugar like this it was cause of a UTI.\" States blood sugars have steadily been rising over last week or so. Started a new inhaler Breo one month ago. Got a steroid injection 2 weeks ago  "

## 2019-11-29 NOTE — TELEPHONE ENCOUNTER
"Is taking a new inhaler, fluticasone-salmeterol (ADVAIR) 100-50 MCG/DOSE inhaler    She has noticed that her blood sugars have been in the high 300.  She is symptomatic of hyper glycemia stating she does not feel well, promotes feeling clammy and very thirsty.  Patient wanted permission to go to ER, she does not take insulin and has none at home to adjust.  Patient informed to go to ER if she feels hyperglycemic and informed that I would notify MD .      Additional Information    Negative: Unconscious or difficult to awaken    Negative: Acting confused (e.g., disoriented, slurred speech)    Negative: Very weak (can't stand)    Negative: Sounds like a life-threatening emergency to the triager    Negative: Vomiting and signs of dehydration (e.g., very dry mouth, lightheaded, etc.)    Negative: Blood glucose > 240 mg/dl (13 mmol/l) and rapid breathing    Negative: Blood glucose > 500 mg/dl (27.5 mmol/l)    Negative: Blood glucose > 240 mg/dl (13 mmol/l) AND urine ketones moderate-large (or more than 1+)    Negative: Blood glucose > 240 mg/dl (13 mmol/l) and blood ketones > 1.5 mmol/l    Negative: Blood glucose > 240 mg/dl (13 mmol/l) AND vomiting AND unable to check for ketones (in blood or urine)    Negative: Vomiting lasting > 4 hours    Negative: Patient sounds very sick or weak to the triager    Negative: Caller has URGENT medication or insulin pump question and triager unable to answer question    Blood glucose > 300 mg/dl (16.7 mmol/l) AND two or more times in a row    Answer Assessment - Initial Assessment Questions  1. BLOOD GLUCOSE: \"What is your blood glucose level?\"      345  2. ONSET: \"When did you check the blood glucose?\"      I  Had labs today and this was my blood sample, this was fasting   3. USUAL RANGE: \"What is your glucose level usually?\" (e.g., usual fasting morning value, usual evening value)      Normally in the normal range   4. KETONES: \"Do you check for ketones (urine or blood test strips)?\" " "If yes, ask: \"What does the test show now?\"       NO I dont have those supplies   5. TYPE 1 or 2:  \"Do you know what type of diabetes you have?\"  (e.g., Type 1, Type 2, Gestational; doesn't know)       Type   6. INSULIN: \"Do you take insulin?\" If yes, ask: \"Have you missed any shots recently?\"       NO I do not have any insulin   7. DIABETES PILLS: \"Do you take any pills for your diabetes?\" If yes, ask: \"Have you missed taking any pills recently?\"      NO   8. OTHER SYMPTOMS: \"Do you have any symptoms?\" (e.g., fever, frequent urination, difficulty breathing, dizziness, weakness, vomiting)      I dont feel well, thirsty and clammy   9. PREGNANCY: \"Is there any chance you are pregnant?\" \"When was your last menstrual period?\"      NO    Protocols used: DIABETES - HIGH BLOOD SUGAR-A-OH      "

## 2019-11-30 NOTE — DISCHARGE INSTRUCTIONS
Continue to check your glucose daily  Limit your carbohydrate intake  Discuss steroid use with your PCP as this likely is the reason your BG is sporadic

## 2019-12-02 NOTE — PROGRESS NOTES
Subjective     Radha Fox is a 69 year old female who presents to clinic today for the following health issues:    HPI   Diabetes Follow-up    How often are you checking your blood sugar? Four or more times daily  Blood sugar testing frequency justification:  Uncontrolled diabetes  What time of day are you checking your blood sugars (select all that apply)?  Before meals, Before and after meals and At bedtime  Have you had any blood sugars above 200?  Yes over 300's  Have you had any blood sugars below 70?  No    What symptoms do you notice when your blood sugar is low?  None    What concerns do you have today about your diabetes? Blood sugar is often over 200 and Other: Non healing foot bone, thirsty, sweaty, fatigued; lowest 131 - fasting     Do you have any of these symptoms? (Select all that apply)  Excessive thirst     Have you had a diabetic eye exam in the last 12 months? Yes- Date of last eye exam: -3/2019     Onset a couple weeks ago with excessive thirst - started checking more often; had a 365    On chronic steroids - RA; injections done in neck and low back 10/14/19, 19    Did see pulmonology - testing done, echo, etc - [facility] - given samples - worked well - did try stopping this to see if it was the cause, but wasn't    Foot fractures - not healing; has appointment with endocrinology 20 - Dr. Tanya Monteiro's    Test strips are     BP Readings from Last 2 Encounters:   19 (!) 142/78   19 154/80     Hemoglobin A1C (%)   Date Value   2019 7.5 (H)   2019 6.8 (H)     LDL Cholesterol Calculated (mg/dL)   Date Value   2019 90   2017 61       Diabetes Management Resources      How many servings of fruits and vegetables do you eat daily?  0-1    On average, how many sweetened beverages do you drink each day (Examples: soda, juice, sweet tea, etc.  Do NOT count diet or artificially sweetened beverages)?   0    How many days per week do you miss  taking your medication? 0    Hypothyroidism Follow-up      Since last visit, patient describes the following symptoms: fatigue, excessive thirst, sweating.    Rheumatology advised 2.5 mg prednisone to begin taper down from 3 mg.  Forgot to do script.      Current Outpatient Medications   Medication     ASPIRIN PO     atorvastatin (LIPITOR) 20 MG tablet     blood glucose (ONETOUCH ULTRA) test strip     Blood Glucose Calibration (ACCU-CHEK ANGELICA) SOLN     Blood Glucose Calibration (ACCU-CHEK COMPACT BLUE CONTROL) LIQD     blood glucose monitoring (ONETOUCH ULTRA) test strip     blood glucose monitoring (ONETOUCH ULTRA) test strip     calcium carbonate-vitamin D (CALCIUM + D) 600-200 MG-UNIT TABS     celecoxib (CELEBREX) 200 MG capsule     Cyanocobalamin (VITAMIN B 12 PO)     fluticasone-vilanterol (BREO ELLIPTA) 100-25 MCG/INH inhaler     folic acid (FOLVITE) 1 MG tablet     levothyroxine (SYNTHROID/LEVOTHROID) 25 MCG tablet     lisinopril (PRINIVIL/ZESTRIL) 5 MG tablet     metFORMIN (GLUCOPHAGE-XR) 500 MG 24 hr tablet     Methotrexate Sodium (METHOTREXATE PO)     multivitamin, therapeutic with minerals (MULTI-VITAMIN) TABS     omeprazole (PRILOSEC) 40 MG DR capsule     order for DME     potassium chloride ER (K-DUR/KLOR-CON M) 20 MEQ CR tablet     pramipexole (MIRAPEX) 0.25 MG tablet     predniSONE (DELTASONE) 1 MG tablet     predniSONE (DELTASONE) 2.5 MG tablet     Probiotic Product (PROBIOTIC DAILY PO)     propranolol (INDERAL) 20 MG tablet     RiTUXimab (RITUXAN IV)     venlafaxine (EFFEXOR-XR) 75 MG 24 hr capsule     No current facility-administered medications for this visit.        Patient Active Problem List   Diagnosis     RA (rheumatoid arthritis) (H)     Recurrent UTI     Hyperlipidemia     Depressive disorder, not elsewhere classified     Contact dermatitis and other eczema, due to unspecified cause     Diffuse connective tissue disease (H)     Arthropathy     Coronary atherosclerosis     Sinusitis,  chronic     Esophageal reflux     Restless legs syndrome (RLS)     Osteoporosis     Obesity     Insomnia     Abnormal glucose     History of sinus surgery     Simple chronic serous otitis media     Dysfunction of eustachian tube     Mixed hearing loss, unilateral     SNHL (sensory-neural hearing loss), asymmetrical     S/P myringotomy with insertion of tube     History of chronic sinusitis     Rheumatoid arthritis of multiple sites without rheumatoid factor (H)     ACP (advance care planning)     Diabetes mellitus type 2, diet-controlled (H)     Chronic cough     Type 2 diabetes mellitus without complication (H)     Obesity (BMI 35.0-39.9) with comorbidity (H)     Past Surgical History:   Procedure Laterality Date     APPENDECTOMY  1961     CHOLECYSTECTOMY  1973     COLONOSCOPY N/A 3/23/2015    Procedure: COLONOSCOPY;  Surgeon: Clarisa Larkin MD;  Location: [facility]     COLONOSCOPY N/A 2/21/2019    Procedure: COLONOSCOPY;  Surgeon: Milton Gracia MD;  Location: [facility]     endoscopic sphenoidotomy  10/2011    Right     ETHMOIDECTOMY Bilateral 9/27/2016    Procedure: ETHMOIDECTOMY;  Surgeon: Aracelis Ayon MD;  Location: [facility]     excision bilateral telma bullosa  10/2011     GENITOURINARY SURGERY       HYSTERECTOMY      benign     IR CONSULTATION FOR IR EXAM  9/26/2019     IR FLUORO 0-1 HOUR  9/26/2019     LAPAROSCOPY DIAGNOSTIC (GENERAL)       ORTHOPEDIC SURGERY      left foot 5th metarsal fracture screws placed     ORTHOPEDIC SURGERY Left 04/22/2019    Dr. King; first MPJ fusion     PFT GENERAL LAB TESTING  10/17/2019    mild obstructive; moderate restriction; moderately severe diffusion deffect.  same as 2016     SEPTOPLASTY N/A 9/27/2016    Procedure: SEPTOPLASTY;  Surgeon: Aracelis Ayon MD;  Location: [facility]     third metatarsal fracture Left 01/2019    stress fracture; Dr. King     TOT Internal urethrotomy  2010     TUBAL LIGATION         Social History     Tobacco Use     Smoking  status: Never Smoker     Smokeless tobacco: Never Used   Substance Use Topics     Alcohol use: No     Alcohol/week: 0.0 standard drinks     Family History   Problem Relation Age of Onset     C.A.D. Father 83     Other - See Comments Father         CHF     Cerebrovascular Disease Father         CVA     Diabetes Father      Hypertension Father      Coronary Artery Disease Father      Cerebrovascular Disease Mother 72        CVA     Heart Disease Mother         Heart Issue     Hyperlipidemia Mother      Coronary Artery Disease Mother      Diabetes Brother      Diabetes Sister      Colon Cancer No family hx of      Breast Cancer No family hx of      Asthma No family hx of      Thyroid Disease No family hx of            Reviewed and updated as needed this visit by Provider  Allergies  Meds         Review of Systems   ROS COMP: Constitutional, HEENT, cardiovascular, pulmonary, gi and gu systems are negative, except as otherwise noted.      Objective    BP (!) 142/78 (BP Location: Right arm, Patient Position: Chair, Cuff Size: Adult Large)   Pulse 68   Temp 96.1  F (35.6  C) (Tympanic)   Resp 20   Wt 95.3 kg (210 lb)   SpO2 97%   BMI 33.92 kg/m    Body mass index is 33.92 kg/m .  Physical Exam   GENERAL: alert, no distress and over weight  NECK: no adenopathy, no asymmetry, masses, or scars and thyroid normal to palpation  RESP: lungs clear to auscultation - no rales, rhonchi or wheezes  CV: regular rate and rhythm, normal S1 S2, no S3 or S4, no murmur, click or rub, no peripheral edema and peripheral pulses strong  ABDOMEN: soft, nontender, no hepatosplenomegaly, no masses and bowel sounds normal  MS: normal muscle tone and no edema  PSYCH: mentation appears normal, affect normal/bright    Diagnostic Test Results:  Results for orders placed or performed in visit on 12/06/19 (from the past 24 hour(s))   Hemoglobin A1c   Result Value Ref Range    Hemoglobin A1C 7.5 (H) 0 - 5.6 %   TSH with free T4 reflex   Result  Value Ref Range    TSH 6.80 (H) 0.40 - 4.00 mU/L   Basic metabolic panel   Result Value Ref Range    Sodium 141 133 - 144 mmol/L    Potassium 4.5 3.4 - 5.3 mmol/L    Chloride 107 94 - 109 mmol/L    Carbon Dioxide 30 20 - 32 mmol/L    Anion Gap 4 3 - 14 mmol/L    Glucose 278 (H) 70 - 99 mg/dL    Urea Nitrogen 26 7 - 30 mg/dL    Creatinine 0.87 0.52 - 1.04 mg/dL    GFR Estimate 68 >60 mL/min/[1.73_m2]    GFR Estimate If Black 79 >60 mL/min/[1.73_m2]    Calcium 9.0 8.5 - 10.1 mg/dL   Estimated Average Glucose   Result Value Ref Range    Estimated Average Glucose 169 mg/dL   T4 free   Result Value Ref Range    T4 Free 0.88 0.76 - 1.46 ng/dL   Albumin Random Urine Quantitative with Creat Ratio   Result Value Ref Range    Creatinine Urine 153 mg/dL    Albumin Urine mg/L 73 mg/L    Albumin Urine mg/g Cr 47.65 (H) 0 - 25 mg/g Cr           Assessment & Plan       ICD-10-CM    1. Uncontrolled type 2 diabetes mellitus with hyperglycemia (H) E11.65 Hemoglobin A1c     TSH with free T4 reflex     Basic metabolic panel     Albumin Random Urine Quantitative with Creat Ratio     lisinopril (PRINIVIL/ZESTRIL) 5 MG tablet     metFORMIN (GLUCOPHAGE-XR) 500 MG 24 hr tablet     Estimated Average Glucose     Estimated Average Glucose     T4 free     T4 free     predniSONE (DELTASONE) 2.5 MG tablet     DIABETES EDUCATION REFERRAL ([place])     order for DME     blood glucose (ONETOUCH ULTRA) test strip   2. Abnormal TSH R79.89 TSH with free T4 reflex     levothyroxine (SYNTHROID/LEVOTHROID) 25 MCG tablet     TSH with free T4 reflex   3. Essential hypertension I10    4. Need for prophylactic vaccination and inoculation against influenza Z23 INFLUENZA (HIGH DOSE) 3 VALENT VACCINE [77664]     ADMIN INFLUENZA (For MEDICARE Patients ONLY) []   5. Hypothyroidism, unspecified type E03.9 TSH with free T4 reflex        BMI:   Estimated body mass index is 33.92 kg/m  as calculated from the following:    Height as of 6/27/19: 1.676 m (5'  "5.98\").    Weight as of this encounter: 95.3 kg (210 lb).   Weight management plan: Discussed healthy diet and exercise guidelines        Patient Instructions     Referral to [facility] - they will call.  Script for new meter and test supplies faxed.  Add Metformin  mg with evening meal.  Check blood sugars fasting and 2 hours after a meal (can vary lunch, dinner, etc).  Bring readings/meter with to visits.  Add Lisinopril 5 mg - for BP and diabetes/kidneys.  Add Synthroid 25 mcg daily for thyroid.  Recheck thyroid lab in 4-6 weeks - lab order in place - does not need to be fasting.    Results for orders placed or performed in visit on 12/06/19 (from the past 24 hour(s))   Hemoglobin A1c   Result Value Ref Range    Hemoglobin A1C 7.5 (H) 0 - 5.6 %   TSH with free T4 reflex   Result Value Ref Range    TSH 6.80 (H) 0.40 - 4.00 mU/L   Basic metabolic panel   Result Value Ref Range    Sodium 141 133 - 144 mmol/L    Potassium 4.5 3.4 - 5.3 mmol/L    Chloride 107 94 - 109 mmol/L    Carbon Dioxide 30 20 - 32 mmol/L    Anion Gap 4 3 - 14 mmol/L    Glucose 278 (H) 70 - 99 mg/dL    Urea Nitrogen 26 7 - 30 mg/dL    Creatinine 0.87 0.52 - 1.04 mg/dL    GFR Estimate 68 >60 mL/min/[1.73_m2]    GFR Estimate If Black 79 >60 mL/min/[1.73_m2]    Calcium 9.0 8.5 - 10.1 mg/dL   Estimated Average Glucose   Result Value Ref Range    Estimated Average Glucose 169 mg/dL   T4 free   Result Value Ref Range    T4 Free 0.88 0.76 - 1.46 ng/dL   Albumin Random Urine Quantitative with Creat Ratio   Result Value Ref Range    Creatinine Urine 153 mg/dL    Albumin Urine mg/L 73 mg/L    Albumin Urine mg/g Cr 47.65 (H) 0 - 25 mg/g Cr           Return in about 3 months (around 3/6/2020) for diabetes.    Lanie Duggan MD  [facility] [place]    "

## 2019-12-03 ENCOUNTER — TELEPHONE (OUTPATIENT)
Dept: INTERVENTIONAL RADIOLOGY/VASCULAR | Facility: HOSPITAL | Age: 69
End: 2019-12-03

## 2019-12-03 NOTE — TELEPHONE ENCOUNTER
INJECTION POST CALL    Procedure: Epidural, lumbar bilater facet L4-5, L5-S1  Radiologist(s): Dr. Daniel Danielle  Date of Procedure:  11/19/2019    Pre-procedure pain score was: 4 (See pre-procedure score)  Post-procedure pain score as of today is: 1  Percentage of pain improvement today?  75%  Where is the pain located? Lower back and on both sides  Is this new pain? No  Would you like to be scheduled for an additional injection?  No not at this time.       I responded to the patient's questions/concerns.    Patient will contact the provider if there are any issues.      Miranda Taylor RN

## 2019-12-06 ENCOUNTER — OFFICE VISIT (OUTPATIENT)
Dept: FAMILY MEDICINE | Facility: OTHER | Age: 69
End: 2019-12-06
Attending: FAMILY MEDICINE
Payer: MEDICARE

## 2019-12-06 VITALS
OXYGEN SATURATION: 97 % | RESPIRATION RATE: 20 BRPM | DIASTOLIC BLOOD PRESSURE: 78 MMHG | BODY MASS INDEX: 33.92 KG/M2 | WEIGHT: 210 LBS | SYSTOLIC BLOOD PRESSURE: 142 MMHG | HEART RATE: 68 BPM | TEMPERATURE: 96.1 F

## 2019-12-06 DIAGNOSIS — Z23 NEED FOR PROPHYLACTIC VACCINATION AND INOCULATION AGAINST INFLUENZA: ICD-10-CM

## 2019-12-06 DIAGNOSIS — R79.89 ABNORMAL TSH: ICD-10-CM

## 2019-12-06 DIAGNOSIS — E03.9 HYPOTHYROIDISM, UNSPECIFIED TYPE: ICD-10-CM

## 2019-12-06 DIAGNOSIS — I10 ESSENTIAL HYPERTENSION: ICD-10-CM

## 2019-12-06 DIAGNOSIS — E11.65 UNCONTROLLED TYPE 2 DIABETES MELLITUS WITH HYPERGLYCEMIA (H): Primary | ICD-10-CM

## 2019-12-06 LAB
ANION GAP SERPL CALCULATED.3IONS-SCNC: 4 MMOL/L (ref 3–14)
BUN SERPL-MCNC: 26 MG/DL (ref 7–30)
CALCIUM SERPL-MCNC: 9 MG/DL (ref 8.5–10.1)
CHLORIDE SERPL-SCNC: 107 MMOL/L (ref 94–109)
CO2 SERPL-SCNC: 30 MMOL/L (ref 20–32)
CREAT SERPL-MCNC: 0.87 MG/DL (ref 0.52–1.04)
CREAT UR-MCNC: 153 MG/DL
EST. AVERAGE GLUCOSE BLD GHB EST-MCNC: 169 MG/DL
GFR SERPL CREATININE-BSD FRML MDRD: 68 ML/MIN/{1.73_M2}
GLUCOSE SERPL-MCNC: 278 MG/DL (ref 70–99)
HBA1C MFR BLD: 7.5 % (ref 0–5.6)
MICROALBUMIN UR-MCNC: 73 MG/L
MICROALBUMIN/CREAT UR: 47.65 MG/G CR (ref 0–25)
POTASSIUM SERPL-SCNC: 4.5 MMOL/L (ref 3.4–5.3)
SODIUM SERPL-SCNC: 141 MMOL/L (ref 133–144)
T4 FREE SERPL-MCNC: 0.88 NG/DL (ref 0.76–1.46)
TSH SERPL DL<=0.005 MIU/L-ACNC: 6.8 MU/L (ref 0.4–4)

## 2019-12-06 PROCEDURE — G0463 HOSPITAL OUTPT CLINIC VISIT: HCPCS | Mod: 25

## 2019-12-06 PROCEDURE — 99214 OFFICE O/P EST MOD 30 MIN: CPT | Performed by: FAMILY MEDICINE

## 2019-12-06 PROCEDURE — G0463 HOSPITAL OUTPT CLINIC VISIT: HCPCS

## 2019-12-06 PROCEDURE — G0008 ADMIN INFLUENZA VIRUS VAC: HCPCS | Performed by: FAMILY MEDICINE

## 2019-12-06 PROCEDURE — 82043 UR ALBUMIN QUANTITATIVE: CPT | Mod: ZL | Performed by: FAMILY MEDICINE

## 2019-12-06 PROCEDURE — 84439 ASSAY OF FREE THYROXINE: CPT | Mod: ZL | Performed by: FAMILY MEDICINE

## 2019-12-06 PROCEDURE — 40000788 ZZHCL STATISTIC ESTIMATED AVERAGE GLUCOSE: Mod: ZL | Performed by: FAMILY MEDICINE

## 2019-12-06 PROCEDURE — 80048 BASIC METABOLIC PNL TOTAL CA: CPT | Mod: ZL | Performed by: FAMILY MEDICINE

## 2019-12-06 PROCEDURE — 36415 COLL VENOUS BLD VENIPUNCTURE: CPT | Mod: ZL | Performed by: FAMILY MEDICINE

## 2019-12-06 PROCEDURE — 84443 ASSAY THYROID STIM HORMONE: CPT | Mod: ZL | Performed by: FAMILY MEDICINE

## 2019-12-06 PROCEDURE — 83036 HEMOGLOBIN GLYCOSYLATED A1C: CPT | Mod: ZL | Performed by: FAMILY MEDICINE

## 2019-12-06 PROCEDURE — 90662 IIV NO PRSV INCREASED AG IM: CPT

## 2019-12-06 RX ORDER — LEVOTHYROXINE SODIUM 25 UG/1
25 TABLET ORAL DAILY
Qty: 90 TABLET | Refills: 1 | Status: SHIPPED | OUTPATIENT
Start: 2019-12-06 | End: 2020-06-01

## 2019-12-06 RX ORDER — PREDNISONE 2.5 MG/1
2.5 TABLET ORAL DAILY
Qty: 90 TABLET | Refills: 0 | Status: SHIPPED | OUTPATIENT
Start: 2019-12-06

## 2019-12-06 RX ORDER — LISINOPRIL 5 MG/1
5 TABLET ORAL DAILY
Qty: 90 TABLET | Refills: 1 | Status: SHIPPED | OUTPATIENT
Start: 2019-12-06 | End: 2020-02-12

## 2019-12-06 RX ORDER — METFORMIN HCL 500 MG
500 TABLET, EXTENDED RELEASE 24 HR ORAL
Qty: 90 TABLET | Refills: 1 | Status: SHIPPED | OUTPATIENT
Start: 2019-12-06 | End: 2020-01-07

## 2019-12-06 ASSESSMENT — PATIENT HEALTH QUESTIONNAIRE - PHQ9: SUM OF ALL RESPONSES TO PHQ QUESTIONS 1-9: 1

## 2019-12-06 ASSESSMENT — PAIN SCALES - GENERAL: PAINLEVEL: NO PAIN (0)

## 2019-12-06 NOTE — PATIENT INSTRUCTIONS
Referral to [facility] - they will call.  Script for new meter and test supplies faxed.  Add Metformin  mg with evening meal.  Check blood sugars fasting and 2 hours after a meal (can vary lunch, dinner, etc).  Bring readings/meter with to visits.  Add Lisinopril 5 mg - for BP and diabetes/kidneys.  Add Synthroid 25 mcg daily for thyroid.  Recheck thyroid lab in 4-6 weeks - lab order in place - does not need to be fasting.    Results for orders placed or performed in visit on 12/06/19 (from the past 24 hour(s))   Hemoglobin A1c   Result Value Ref Range    Hemoglobin A1C 7.5 (H) 0 - 5.6 %   TSH with free T4 reflex   Result Value Ref Range    TSH 6.80 (H) 0.40 - 4.00 mU/L   Basic metabolic panel   Result Value Ref Range    Sodium 141 133 - 144 mmol/L    Potassium 4.5 3.4 - 5.3 mmol/L    Chloride 107 94 - 109 mmol/L    Carbon Dioxide 30 20 - 32 mmol/L    Anion Gap 4 3 - 14 mmol/L    Glucose 278 (H) 70 - 99 mg/dL    Urea Nitrogen 26 7 - 30 mg/dL    Creatinine 0.87 0.52 - 1.04 mg/dL    GFR Estimate 68 >60 mL/min/[1.73_m2]    GFR Estimate If Black 79 >60 mL/min/[1.73_m2]    Calcium 9.0 8.5 - 10.1 mg/dL   Estimated Average Glucose   Result Value Ref Range    Estimated Average Glucose 169 mg/dL   T4 free   Result Value Ref Range    T4 Free 0.88 0.76 - 1.46 ng/dL   Albumin Random Urine Quantitative with Creat Ratio   Result Value Ref Range    Creatinine Urine 153 mg/dL    Albumin Urine mg/L 73 mg/L    Albumin Urine mg/g Cr 47.65 (H) 0 - 25 mg/g Cr

## 2019-12-06 NOTE — NURSING NOTE
"Chief Complaint   Patient presents with     Diabetes     Flu Shot     Thyroid Problem       Initial BP (!) 142/78 (BP Location: Right arm, Patient Position: Chair, Cuff Size: Adult Large)   Pulse 68   Temp 96.1  F (35.6  C) (Tympanic)   Resp 20   Wt 95.3 kg (210 lb)   SpO2 97%   BMI 33.92 kg/m   Estimated body mass index is 33.92 kg/m  as calculated from the following:    Height as of 6/27/19: 1.676 m (5' 5.98\").    Weight as of this encounter: 95.3 kg (210 lb).  Medication Reconciliation: complete  Christy Montaño LPN    "

## 2019-12-16 ENCOUNTER — TRANSFERRED RECORDS (OUTPATIENT)
Dept: HEALTH INFORMATION MANAGEMENT | Facility: CLINIC | Age: 69
End: 2019-12-16

## 2019-12-26 ENCOUNTER — TELEPHONE (OUTPATIENT)
Dept: FAMILY MEDICINE | Facility: OTHER | Age: 69
End: 2019-12-26

## 2019-12-26 DIAGNOSIS — M54.40 ACUTE LOW BACK PAIN WITH SCIATICA, SCIATICA LATERALITY UNSPECIFIED, UNSPECIFIED BACK PAIN LATERALITY: Primary | ICD-10-CM

## 2019-12-26 NOTE — TELEPHONE ENCOUNTER
Pt reports chronic back pain. Requesting Over Book request, but pt prefers a referral without being seen for PT for sciatica pain. PCP to advise on plan. Pt was advised PCP out today returns tomorrow.     Pt requesting call back contact number [phone] (H)

## 2019-12-29 DIAGNOSIS — E78.5 HYPERLIPIDEMIA LDL GOAL <100: ICD-10-CM

## 2019-12-31 RX ORDER — ATORVASTATIN CALCIUM 20 MG/1
TABLET, FILM COATED ORAL
Qty: 30 TABLET | Refills: 2 | Status: SHIPPED | OUTPATIENT
Start: 2019-12-31 | End: 2020-03-12

## 2019-12-31 NOTE — TELEPHONE ENCOUNTER
lipitor      Last Written Prescription Date:  7/1/19  Last Fill Quantity: 90,   # refills: 1  Last Office Visit: 12/6/19  Future Office visit:

## 2020-01-01 ENCOUNTER — HEALTH MAINTENANCE LETTER (OUTPATIENT)
Age: 70
End: 2020-01-01

## 2020-01-01 DIAGNOSIS — R05.3 CHRONIC COUGH: Primary | ICD-10-CM

## 2020-01-03 ENCOUNTER — MEDICAL CORRESPONDENCE (OUTPATIENT)
Dept: HEALTH INFORMATION MANAGEMENT | Facility: CLINIC | Age: 70
End: 2020-01-03

## 2020-01-07 ENCOUNTER — HOSPITAL ENCOUNTER (OUTPATIENT)
Dept: EDUCATION SERVICES | Facility: HOSPITAL | Age: 70
Discharge: HOME | End: 2020-01-07
Attending: FAMILY MEDICINE | Admitting: FAMILY MEDICINE
Payer: MEDICARE

## 2020-01-07 VITALS
HEART RATE: 65 BPM | OXYGEN SATURATION: 98 % | RESPIRATION RATE: 16 BRPM | WEIGHT: 211.8 LBS | DIASTOLIC BLOOD PRESSURE: 64 MMHG | SYSTOLIC BLOOD PRESSURE: 88 MMHG | BODY MASS INDEX: 34.21 KG/M2

## 2020-01-07 DIAGNOSIS — E11.65 UNCONTROLLED TYPE 2 DIABETES MELLITUS WITH HYPERGLYCEMIA (H): ICD-10-CM

## 2020-01-07 PROCEDURE — G0108 DIAB MANAGE TRN  PER INDIV: HCPCS

## 2020-01-07 RX ORDER — METFORMIN HCL 500 MG
1000 TABLET, EXTENDED RELEASE 24 HR ORAL
Qty: 180 TABLET | Refills: 1 | Status: SHIPPED | OUTPATIENT
Start: 2020-01-07 | End: 2020-02-12

## 2020-01-07 ASSESSMENT — PAIN SCALES - GENERAL: PAINLEVEL: NO PAIN (0)

## 2020-01-07 NOTE — PATIENT INSTRUCTIONS
Increase Metformin XR:  2 - 500 mg tablets at supper starting tonight.    Call in BG readings on 01/20/2020 - [phone] ([place])

## 2020-01-08 ENCOUNTER — TELEPHONE (OUTPATIENT)
Dept: FAMILY MEDICINE | Facility: OTHER | Age: 70
End: 2020-01-08

## 2020-01-08 DIAGNOSIS — M06.09 RHEUMATOID ARTHRITIS OF MULTIPLE SITES WITHOUT RHEUMATOID FACTOR (H): Primary | ICD-10-CM

## 2020-01-08 DIAGNOSIS — Z79.899 ENCOUNTER FOR LONG-TERM (CURRENT) USE OF OTHER MEDICATIONS: ICD-10-CM

## 2020-01-08 RX ORDER — METHYLPREDNISOLONE SODIUM SUCCINATE 125 MG/2ML
100 INJECTION, POWDER, LYOPHILIZED, FOR SOLUTION INTRAMUSCULAR; INTRAVENOUS ONCE
Status: CANCELLED | OUTPATIENT
Start: 2020-01-08

## 2020-01-08 NOTE — TELEPHONE ENCOUNTER
Rec'd call from patient asking to get scheduled for her Rituxan infusions. Per patient she states that she is supposed to be getting the infusion 2 weeks apart every 6months. Informed patient the order wasn't stated that way, but we could call [facility] and clarify. Pt agreed with plan.    Call placed to [facility]. Spoke with Evy RN with Dr. Sanchez. Per Evy, Dr. Sanchez is ok with doing the infusion 2 weeks apart every 6 months. Will update order.     Call placed back to patient ot inform her. Pt verbalized understanding and agrees with this plan.     [place], [place] [facility] [place] on schedule for Jan 17th and Jan 31st at 0900 for both days.

## 2020-01-08 NOTE — TELEPHONE ENCOUNTER
Rec'd faxed orders from [facility], Dr. Sanchez, for patient to receive Rituxan infusions here. Patient's PCP is Dr. Lanie Costello here at [place]. Will place therapy plan and send to Dr. Costello to co-sign.

## 2020-01-16 DIAGNOSIS — M06.09 RHEUMATOID ARTHRITIS OF MULTIPLE SITES WITHOUT RHEUMATOID FACTOR (H): ICD-10-CM

## 2020-01-16 DIAGNOSIS — Z79.899 ENCOUNTER FOR LONG-TERM (CURRENT) USE OF OTHER MEDICATIONS: ICD-10-CM

## 2020-01-16 LAB
ALBUMIN SERPL-MCNC: 3.6 G/DL (ref 3.4–5)
ALBUMIN SERPL-MCNC: 3.7 G/DL (ref 3.4–5)
ALP SERPL-CCNC: 83 U/L (ref 40–150)
ALT SERPL W P-5'-P-CCNC: 40 U/L (ref 0–50)
ALT SERPL W P-5'-P-CCNC: 42 U/L (ref 0–50)
AST SERPL W P-5'-P-CCNC: 15 U/L (ref 0–45)
BASOPHILS # BLD AUTO: 0.1 10E9/L (ref 0–0.2)
BASOPHILS NFR BLD AUTO: 0.5 %
BILIRUB DIRECT SERPL-MCNC: <0.1 MG/DL (ref 0–0.2)
BILIRUB SERPL-MCNC: 0.2 MG/DL (ref 0.2–1.3)
CK SERPL-CCNC: 50 U/L (ref 30–225)
CREAT SERPL-MCNC: 0.93 MG/DL (ref 0.52–1.04)
CRP SERPL-MCNC: 5.8 MG/L (ref 0–8)
DIFFERENTIAL METHOD BLD: ABNORMAL
EOSINOPHIL # BLD AUTO: 0.1 10E9/L (ref 0–0.7)
EOSINOPHIL NFR BLD AUTO: 1.1 %
ERYTHROCYTE [DISTWIDTH] IN BLOOD BY AUTOMATED COUNT: 15 % (ref 10–15)
ERYTHROCYTE [SEDIMENTATION RATE] IN BLOOD BY WESTERGREN METHOD: 22 MM/H (ref 0–30)
GFR SERPL CREATININE-BSD FRML MDRD: 63 ML/MIN/{1.73_M2}
HCT VFR BLD AUTO: 38.1 % (ref 35–47)
HGB BLD-MCNC: 13 G/DL (ref 11.7–15.7)
IMM GRANULOCYTES # BLD: 0.1 10E9/L (ref 0–0.4)
IMM GRANULOCYTES NFR BLD: 0.7 %
LYMPHOCYTES # BLD AUTO: 1.2 10E9/L (ref 0.8–5.3)
LYMPHOCYTES NFR BLD AUTO: 10.4 %
MCH RBC QN AUTO: 34.2 PG (ref 26.5–33)
MCHC RBC AUTO-ENTMCNC: 34.1 G/DL (ref 31.5–36.5)
MCV RBC AUTO: 100 FL (ref 78–100)
MONOCYTES # BLD AUTO: 1.2 10E9/L (ref 0–1.3)
MONOCYTES NFR BLD AUTO: 10.8 %
NEUTROPHILS # BLD AUTO: 8.5 10E9/L (ref 1.6–8.3)
NEUTROPHILS NFR BLD AUTO: 76.5 %
NRBC # BLD AUTO: 0 10*3/UL
NRBC BLD AUTO-RTO: 0 /100
PLATELET # BLD AUTO: 278 10E9/L (ref 150–450)
PROT SERPL-MCNC: 7.1 G/DL (ref 6.8–8.8)
RBC # BLD AUTO: 3.8 10E12/L (ref 3.8–5.2)
WBC # BLD AUTO: 11.1 10E9/L (ref 4–11)

## 2020-01-16 PROCEDURE — 84460 ALANINE AMINO (ALT) (SGPT): CPT | Mod: ZL,91 | Performed by: FAMILY MEDICINE

## 2020-01-16 PROCEDURE — 82550 ASSAY OF CK (CPK): CPT | Mod: ZL | Performed by: FAMILY MEDICINE

## 2020-01-16 PROCEDURE — 82565 ASSAY OF CREATININE: CPT | Mod: ZL | Performed by: FAMILY MEDICINE

## 2020-01-16 PROCEDURE — 80076 HEPATIC FUNCTION PANEL: CPT | Mod: ZL | Performed by: INTERNAL MEDICINE

## 2020-01-16 PROCEDURE — 82040 ASSAY OF SERUM ALBUMIN: CPT | Mod: ZL | Performed by: FAMILY MEDICINE

## 2020-01-16 PROCEDURE — 36415 COLL VENOUS BLD VENIPUNCTURE: CPT | Mod: ZL | Performed by: FAMILY MEDICINE

## 2020-01-16 PROCEDURE — 85652 RBC SED RATE AUTOMATED: CPT | Mod: ZL | Performed by: FAMILY MEDICINE

## 2020-01-16 PROCEDURE — 86140 C-REACTIVE PROTEIN: CPT | Mod: ZL | Performed by: FAMILY MEDICINE

## 2020-01-16 PROCEDURE — 85025 COMPLETE CBC W/AUTO DIFF WBC: CPT | Mod: ZL | Performed by: FAMILY MEDICINE

## 2020-01-17 ENCOUNTER — INFUSION THERAPY VISIT (OUTPATIENT)
Dept: INFUSION THERAPY | Facility: OTHER | Age: 70
End: 2020-01-17
Attending: FAMILY MEDICINE
Payer: MEDICARE

## 2020-01-17 VITALS
SYSTOLIC BLOOD PRESSURE: 117 MMHG | DIASTOLIC BLOOD PRESSURE: 50 MMHG | HEIGHT: 66 IN | OXYGEN SATURATION: 97 % | WEIGHT: 210.54 LBS | RESPIRATION RATE: 18 BRPM | BODY MASS INDEX: 33.84 KG/M2 | TEMPERATURE: 97.3 F | HEART RATE: 63 BPM

## 2020-01-17 DIAGNOSIS — M06.9 RHEUMATOID ARTHRITIS, INVOLVING UNSPECIFIED SITE, UNSPECIFIED RHEUMATOID FACTOR PRESENCE: ICD-10-CM

## 2020-01-17 DIAGNOSIS — M06.09 RHEUMATOID ARTHRITIS OF MULTIPLE SITES WITHOUT RHEUMATOID FACTOR (H): Primary | ICD-10-CM

## 2020-01-17 PROCEDURE — 96413 CHEMO IV INFUSION 1 HR: CPT

## 2020-01-17 PROCEDURE — 96375 TX/PRO/DX INJ NEW DRUG ADDON: CPT

## 2020-01-17 PROCEDURE — 96415 CHEMO IV INFUSION ADDL HR: CPT

## 2020-01-17 PROCEDURE — 25800030 ZZH RX IP 258 OP 636: Performed by: FAMILY MEDICINE

## 2020-01-17 PROCEDURE — 25000128 H RX IP 250 OP 636: Performed by: FAMILY MEDICINE

## 2020-01-17 PROCEDURE — 96365 THER/PROPH/DIAG IV INF INIT: CPT

## 2020-01-17 PROCEDURE — 96367 TX/PROPH/DG ADDL SEQ IV INF: CPT

## 2020-01-17 PROCEDURE — 96366 THER/PROPH/DIAG IV INF ADDON: CPT

## 2020-01-17 RX ORDER — METHYLPREDNISOLONE SODIUM SUCCINATE 125 MG/2ML
100 INJECTION, POWDER, LYOPHILIZED, FOR SOLUTION INTRAMUSCULAR; INTRAVENOUS ONCE
Status: CANCELLED | OUTPATIENT
Start: 2020-01-17

## 2020-01-17 RX ORDER — METHYLPREDNISOLONE SODIUM SUCCINATE 125 MG/2ML
100 INJECTION, POWDER, LYOPHILIZED, FOR SOLUTION INTRAMUSCULAR; INTRAVENOUS ONCE
Status: COMPLETED | OUTPATIENT
Start: 2020-01-17 | End: 2020-01-17

## 2020-01-17 RX ORDER — METHYLPREDNISOLONE SODIUM SUCCINATE 125 MG/2ML
100 INJECTION, POWDER, LYOPHILIZED, FOR SOLUTION INTRAMUSCULAR; INTRAVENOUS ONCE
Status: CANCELLED
Start: 2020-01-17

## 2020-01-17 RX ADMIN — SODIUM CHLORIDE 250 ML: 9 INJECTION, SOLUTION INTRAVENOUS at 09:44

## 2020-01-17 RX ADMIN — METHYLPREDNISOLONE SODIUM SUCCINATE 100 MG: 125 INJECTION, POWDER, FOR SOLUTION INTRAMUSCULAR; INTRAVENOUS at 09:45

## 2020-01-17 RX ADMIN — RITUXIMAB 1000 MG: 10 INJECTION, SOLUTION INTRAVENOUS at 10:18

## 2020-01-17 RX ADMIN — FAMOTIDINE 20 MG: 20 INJECTION, SOLUTION INTRAVENOUS at 09:50

## 2020-01-17 ASSESSMENT — PAIN SCALES - GENERAL: PAINLEVEL: MILD PAIN (2)

## 2020-01-17 ASSESSMENT — MIFFLIN-ST. JEOR: SCORE: 1496.5

## 2020-01-17 NOTE — TELEPHONE ENCOUNTER
Received co-signed orders from Dr Costello. Therapy plan updated. See visit note from 1-17-20 for more information on changes made. Orders sent to scanning. Patient on schedule.

## 2020-01-17 NOTE — PROGRESS NOTES
Patient accepted and admission order received from:: DION CROOK [[id number]]   Patient Class: Inpatient [1]   Patient on Telemetry: Yes   Has physician to physician communication occurred?: Yes   Transferring Patient to? Only adjust for transfers between [facility]: [facility] [4]   Patient is a 70 year old female here accompanied by self today for infusion of Rituxan per order of Dr Costello under the supervision of Dr Sanchez.  Patient meets parameters for today's infusion. Patient identified with two identifiers, order verified, and verbal consent for today's infusion obtained from patient.      1-16-19 lab values:  WBC: 11.1, HGB: 13.0, PLT: 278, ANC: 8.5, AST: 15, ALT: 42, Alk Phos: 83, Creatinine: 0.93, CRP: 5.8, ESR: 22. Patient meets order parameters for today's treatment.     24 gauge angio cath inserted into right anterior mid forearm.  Immediate blood return noted.  IV secured with sterile, transparent dressing and tape.  Patient tolerated well, denies pain or discomfort at this time.  Flushes easily without resistance, no signs or symptoms of infiltration or infection.  Patient denies questions or concerns regarding infusion and/or medication(s) being administered.        1018: IV pump verified with dose, drug, and rate of administration.  Infusion administered per protocol.  Patient tolerated infusion well, no signs or symptoms of adverse reaction noted.  Patient denies pain nor discomfort.     IV removed, catheter intact.  Site clean, dry and intact.  No signs or symptoms of infiltration or infection.  Covered with a sterile bandage, slight pressure applied for 30 seconds.  Pt instructed to leave bandage intact for a minimum of one hour, and to call with questions or concerns. Patient states understanding, discharged ambulatory.

## 2020-01-17 NOTE — PATIENT INSTRUCTIONS

## 2020-01-17 NOTE — PROGRESS NOTES
Rituxan orders reviewed, as patient has received in past and tolerated well. She is ok to use th 100mg/hr titration, which within our facility means she would run at 100mL/hr and be titrated up by 100mL per hour until a max of 400mL/hr. Will run per orders, and plan updated for future infusions.

## 2020-01-22 ENCOUNTER — TRANSFERRED RECORDS (OUTPATIENT)
Dept: HEALTH INFORMATION MANAGEMENT | Facility: CLINIC | Age: 70
End: 2020-01-22

## 2020-01-22 ENCOUNTER — MEDICAL CORRESPONDENCE (OUTPATIENT)
Dept: HEALTH INFORMATION MANAGEMENT | Facility: CLINIC | Age: 70
End: 2020-01-22

## 2020-01-24 ENCOUNTER — TELEPHONE (OUTPATIENT)
Dept: EDUCATION SERVICES | Facility: OTHER | Age: 70
End: 2020-01-24

## 2020-01-24 DIAGNOSIS — M06.9 RA (RHEUMATOID ARTHRITIS) (H): ICD-10-CM

## 2020-01-24 DIAGNOSIS — F32.9 MDD (MAJOR DEPRESSIVE DISORDER): ICD-10-CM

## 2020-01-24 NOTE — TELEPHONE ENCOUNTER
The pt calls in BG reading's:  on 01/07/20 BG was  163 in the am, 88 at noon, 210 after dinner time  on 01/08/20 BG was  116 in the am, 225 at noon, 194 after dinner time  on 01/09/20 BG was  125 in the am, 140 at noon, 178 after dinner time  on 01/10/20 BG was  148 in the am, 336 at noon, 201 after dinner time  on 01/11/20 BG was  133 in the am, 258 at noon, 157 after dinner time  on 01/12/20 BG was  134 in the am, 155 at noon, 138 after dinner time  on 01/13/20 BG was  149 in the am, 138 at noon, 147 after dinner time  on 01/14/20 BG was 140 in the am, 141 at noon, 119 after dinner time  on 01/15/20 BG was  114 in the am, 100 at noon, 153 after dinner time  on 01/16/20 BG was  126 in the am, 127 at noon, 183 after dinner time  on 01/17/20 BG was  114 in the am, 282 at noon, 310 after dinner time  on 01/18/20 BG was  127 in the am, 197 at noon, 176 after dinner time  on 01/19/20 BG was  125 in the am, 151 at noon, 110 after dinner time  on 01/20/20 BG was  223 in the am, 222 at noon, 120 after dinner time

## 2020-01-29 RX ORDER — VENLAFAXINE HYDROCHLORIDE 75 MG/1
CAPSULE, EXTENDED RELEASE ORAL
Qty: 90 CAPSULE | Refills: 0 | Status: SHIPPED | OUTPATIENT
Start: 2020-01-29 | End: 2020-04-22

## 2020-01-29 RX ORDER — CELECOXIB 200 MG/1
CAPSULE ORAL
Qty: 90 CAPSULE | Refills: 0 | Status: SHIPPED | OUTPATIENT
Start: 2020-01-29 | End: 2020-04-22

## 2020-01-30 ENCOUNTER — TELEPHONE (OUTPATIENT)
Dept: INFUSION THERAPY | Facility: OTHER | Age: 70
End: 2020-01-30

## 2020-01-30 DIAGNOSIS — Z79.899 ENCOUNTER FOR LONG-TERM (CURRENT) USE OF OTHER MEDICATIONS: ICD-10-CM

## 2020-01-30 DIAGNOSIS — M06.09 RHEUMATOID ARTHRITIS OF MULTIPLE SITES WITHOUT RHEUMATOID FACTOR (H): ICD-10-CM

## 2020-01-30 LAB
ALBUMIN SERPL-MCNC: 3.7 G/DL (ref 3.4–5)
ALP SERPL-CCNC: 82 U/L (ref 40–150)
ALT SERPL W P-5'-P-CCNC: 45 U/L (ref 0–50)
AST SERPL W P-5'-P-CCNC: 16 U/L (ref 0–45)
BASOPHILS # BLD AUTO: 0.1 10E9/L (ref 0–0.2)
BASOPHILS NFR BLD AUTO: 0.4 %
BILIRUB DIRECT SERPL-MCNC: <0.1 MG/DL (ref 0–0.2)
BILIRUB SERPL-MCNC: 0.3 MG/DL (ref 0.2–1.3)
CK SERPL-CCNC: 34 U/L (ref 30–225)
CREAT SERPL-MCNC: 1.04 MG/DL (ref 0.52–1.04)
CRP SERPL-MCNC: 8.8 MG/L (ref 0–8)
DIFFERENTIAL METHOD BLD: ABNORMAL
EOSINOPHIL # BLD AUTO: 0.1 10E9/L (ref 0–0.7)
EOSINOPHIL NFR BLD AUTO: 0.9 %
ERYTHROCYTE [DISTWIDTH] IN BLOOD BY AUTOMATED COUNT: 14.8 % (ref 10–15)
ERYTHROCYTE [SEDIMENTATION RATE] IN BLOOD BY WESTERGREN METHOD: 18 MM/H (ref 0–30)
GFR SERPL CREATININE-BSD FRML MDRD: 55 ML/MIN/{1.73_M2}
HCT VFR BLD AUTO: 39.4 % (ref 35–47)
HGB BLD-MCNC: 13.2 G/DL (ref 11.7–15.7)
IMM GRANULOCYTES # BLD: 0.1 10E9/L (ref 0–0.4)
IMM GRANULOCYTES NFR BLD: 0.6 %
LYMPHOCYTES # BLD AUTO: 1 10E9/L (ref 0.8–5.3)
LYMPHOCYTES NFR BLD AUTO: 7.8 %
MCH RBC QN AUTO: 33.9 PG (ref 26.5–33)
MCHC RBC AUTO-ENTMCNC: 33.5 G/DL (ref 31.5–36.5)
MCV RBC AUTO: 101 FL (ref 78–100)
MONOCYTES # BLD AUTO: 1.1 10E9/L (ref 0–1.3)
MONOCYTES NFR BLD AUTO: 9.3 %
NEUTROPHILS # BLD AUTO: 9.8 10E9/L (ref 1.6–8.3)
NEUTROPHILS NFR BLD AUTO: 81 %
NRBC # BLD AUTO: 0 10*3/UL
NRBC BLD AUTO-RTO: 0 /100
PLATELET # BLD AUTO: 276 10E9/L (ref 150–450)
PROT SERPL-MCNC: 7.1 G/DL (ref 6.8–8.8)
RBC # BLD AUTO: 3.89 10E12/L (ref 3.8–5.2)
WBC # BLD AUTO: 12.1 10E9/L (ref 4–11)

## 2020-01-30 PROCEDURE — 85025 COMPLETE CBC W/AUTO DIFF WBC: CPT | Mod: ZL | Performed by: INTERNAL MEDICINE

## 2020-01-30 PROCEDURE — 82550 ASSAY OF CK (CPK): CPT | Mod: ZL | Performed by: INTERNAL MEDICINE

## 2020-01-30 PROCEDURE — 82565 ASSAY OF CREATININE: CPT | Mod: ZL | Performed by: INTERNAL MEDICINE

## 2020-01-30 PROCEDURE — 36415 COLL VENOUS BLD VENIPUNCTURE: CPT | Mod: ZL | Performed by: INTERNAL MEDICINE

## 2020-01-30 PROCEDURE — 86140 C-REACTIVE PROTEIN: CPT | Mod: ZL | Performed by: INTERNAL MEDICINE

## 2020-01-30 PROCEDURE — 85652 RBC SED RATE AUTOMATED: CPT | Mod: ZL | Performed by: INTERNAL MEDICINE

## 2020-01-30 PROCEDURE — 80076 HEPATIC FUNCTION PANEL: CPT | Mod: ZL | Performed by: INTERNAL MEDICINE

## 2020-01-30 NOTE — TELEPHONE ENCOUNTER
Patient here tomorrow for day 15 of Rituxan infusion. Orders state labs with every infusion, but many times we will just do them with day 1. Call to Dr Sanchez's office, left message for nurse asking for return call to clarify if day 15 labs are necessary.

## 2020-01-30 NOTE — TELEPHONE ENCOUNTER
Received return call from Dr Sanchez's office with ok to only do labs on day 1, not on day 15. Patient stopped in at lab prior to this and already had drawn. Will update patient at appointment tomorrow that she only needs labs for day 1 next time.     WBC count elevated outside parameters. Call to [facility], Dr Sanchez's office, spoke with nurse. Updated that patient's WBC count and ANC can be high, takes prednisone regularly. TORB with ok to treat as long as she does not report any s/sx infection/illness on admit tomorrow. Plan updated.

## 2020-01-31 ENCOUNTER — INFUSION THERAPY VISIT (OUTPATIENT)
Dept: INFUSION THERAPY | Facility: OTHER | Age: 70
End: 2020-01-31
Attending: FAMILY MEDICINE
Payer: MEDICARE

## 2020-01-31 VITALS
HEIGHT: 66 IN | RESPIRATION RATE: 18 BRPM | DIASTOLIC BLOOD PRESSURE: 51 MMHG | HEART RATE: 68 BPM | TEMPERATURE: 95.5 F | BODY MASS INDEX: 33.55 KG/M2 | WEIGHT: 208.78 LBS | SYSTOLIC BLOOD PRESSURE: 124 MMHG | OXYGEN SATURATION: 100 %

## 2020-01-31 DIAGNOSIS — M06.09 RHEUMATOID ARTHRITIS OF MULTIPLE SITES WITHOUT RHEUMATOID FACTOR (H): Primary | ICD-10-CM

## 2020-01-31 DIAGNOSIS — M06.9 RHEUMATOID ARTHRITIS, INVOLVING UNSPECIFIED SITE, UNSPECIFIED RHEUMATOID FACTOR PRESENCE: ICD-10-CM

## 2020-01-31 PROCEDURE — 25000128 H RX IP 250 OP 636: Performed by: FAMILY MEDICINE

## 2020-01-31 PROCEDURE — 96375 TX/PRO/DX INJ NEW DRUG ADDON: CPT

## 2020-01-31 PROCEDURE — 25800030 ZZH RX IP 258 OP 636: Performed by: FAMILY MEDICINE

## 2020-01-31 PROCEDURE — 96367 TX/PROPH/DG ADDL SEQ IV INF: CPT

## 2020-01-31 PROCEDURE — 96415 CHEMO IV INFUSION ADDL HR: CPT

## 2020-01-31 PROCEDURE — 96365 THER/PROPH/DIAG IV INF INIT: CPT

## 2020-01-31 PROCEDURE — 96366 THER/PROPH/DIAG IV INF ADDON: CPT

## 2020-01-31 PROCEDURE — 96413 CHEMO IV INFUSION 1 HR: CPT

## 2020-01-31 RX ORDER — METHYLPREDNISOLONE SODIUM SUCCINATE 125 MG/2ML
100 INJECTION, POWDER, LYOPHILIZED, FOR SOLUTION INTRAMUSCULAR; INTRAVENOUS ONCE
Status: COMPLETED | OUTPATIENT
Start: 2020-01-31 | End: 2020-01-31

## 2020-01-31 RX ORDER — METHYLPREDNISOLONE SODIUM SUCCINATE 125 MG/2ML
100 INJECTION, POWDER, LYOPHILIZED, FOR SOLUTION INTRAMUSCULAR; INTRAVENOUS ONCE
Status: CANCELLED
Start: 2020-01-31

## 2020-01-31 RX ADMIN — SODIUM CHLORIDE 250 ML: 9 INJECTION, SOLUTION INTRAVENOUS at 09:45

## 2020-01-31 RX ADMIN — RITUXIMAB 1000 MG: 10 INJECTION, SOLUTION INTRAVENOUS at 10:16

## 2020-01-31 RX ADMIN — METHYLPREDNISOLONE SODIUM SUCCINATE 100 MG: 125 INJECTION, POWDER, FOR SOLUTION INTRAMUSCULAR; INTRAVENOUS at 09:45

## 2020-01-31 RX ADMIN — FAMOTIDINE 20 MG: 20 INJECTION, SOLUTION INTRAVENOUS at 09:51

## 2020-01-31 ASSESSMENT — MIFFLIN-ST. JEOR: SCORE: 1488.5

## 2020-01-31 NOTE — PATIENT INSTRUCTIONS
Thank you for scheduling your appointment with us today. If you have any questions or concerns related to procedure/medication at today's visit, please contact your primary care provider, or the provider who ordered today's procedure/medication. If you have any questions related to scheduling with our unit, or if you are having trouble getting in contact with your ordering provider, we can be reached at [phone].   Patient Education     Rituximab Solution for injection  What is this medicine?  RITUXIMAB (ri TUX i mab) is a monoclonal antibody. This medicine changes the way the body's immune system works. It is used commonly to treat non-Hodgkin lymphoma and other conditions. In cancer cells, this drug targets a specific protein within cancer cells and stops the cancer cells from growing. It is also used to treat rheumatoid arthritis (RA). In RA, this medicine slows the inflammatory process and help reduce joint pain and swelling. This medicine is often used with other cancer or arthritis medications.  This medicine may be used for other purposes; ask your health care provider or pharmacist if you have questions.  What should I tell my health care provider before I take this medicine?  They need to know if you have any of these conditions:    blood disorders    heart disease    history of hepatitis B    infection (especially a virus infection such as chickenpox, cold sores, or herpes)    irregular heartbeat    kidney disease    lung or breathing disease, like asthma    lupus    an unusual or allergic reaction to rituximab, mouse proteins, other medicines, foods, dyes, or preservatives    pregnant or trying to get pregnant    breast-feeding  How should I use this medicine?  This medicine is for infusion into a vein. It is administered in a hospital or clinic by a specially trained health care professional.  A special MedGuide will be given to you by the pharmacist with each prescription and refill. Be sure to read  this information carefully each time.  Talk to your pediatrician regarding the use of this medicine in children. This medicine is not approved for use in children.  Overdosage: If you think you have taken too much of this medicine contact a poison control center or emergency room at once.  NOTE: This medicine is only for you. Do not share this medicine with others.  What if I miss a dose?  It is important not to miss a dose. Call your doctor or health care professional if you are unable to keep an appointment.  What may interact with this medicine?    cisplatin    medicines for blood pressure    some other medicines for arthritis    vaccines  This list may not describe all possible interactions. Give your health care provider a list of all the medicines, herbs, non-prescription drugs, or dietary supplements you use. Also tell them if you smoke, drink alcohol, or use illegal drugs. Some items may interact with your medicine.  What should I watch for while using this medicine?  Report any side effects that you notice during your treatment right away, such as changes in your breathing, fever, chills, dizziness or lightheadedness. These effects are more common with the first dose.  Visit your prescriber or health care professional for checks on your progress. You will need to have regular blood work. Report any other side effects. The side effects of this medicine can continue after you finish your treatment. Continue your course of treatment even though you feel ill unless your doctor tells you to stop.  Call your doctor or health care professional for advice if you get a fever, chills or sore throat, or other symptoms of a cold or flu. Do not treat yourself. This drug decreases your body's ability to fight infections. Try to avoid being around people who are sick.  This medicine may increase your risk to bruise or bleed. Call your doctor or health care professional if you notice any unusual bleeding.  Be careful  brushing and flossing your teeth or using a toothpick because you may get an infection or bleed more easily. If you have any dental work done, tell your dentist you are receiving this medicine.  Avoid taking products that contain aspirin, acetaminophen, ibuprofen, naproxen, or ketoprofen unless instructed by your doctor. These medicines may hide a fever.  Do not become pregnant while taking this medicine. Women should inform their doctor if they wish to become pregnant or think they might be pregnant. There is a potential for serious side effects to an unborn child. Talk to your health care professional or pharmacist for more information. Do not breast-feed an infant while taking this medicine.  What side effects may I notice from receiving this medicine?  Side effects that you should report to your doctor or health care professional as soon as possible:    allergic reactions like skin rash, itching or hives, swelling of the face, lips, or tongue    low blood counts - this medicine may decrease the number of white blood cells, red blood cells and platelets. You may be at increased risk for infections and bleeding.    signs of infection - fever or chills, cough, sore throat, pain or difficulty passing urine    signs of decreased platelets or bleeding - bruising, pinpoint red spots on the skin, black, tarry stools, blood in the urine    signs of decreased red blood cells - unusually weak or tired, fainting spells, lightheadedness    breathing problems    confused, not responsive    chest pain    fast, irregular heartbeat    feeling faint or lightheaded, falls    mouth sores    redness, blistering, peeling or loosening of the skin, including inside the mouth    stomach pain    swelling of the ankles, feet, or hands    trouble passing urine or change in the amount of urine  Side effects that usually do not require medical attention (report to your doctor or other health care professional if they continue or are  bothersome):    anxiety    headache    loss of appetite    muscle aches    nausea    night sweats  This list may not describe all possible side effects. Call your doctor for medical advice about side effects. You may report side effects to FDA at 4-916-RML-4909.  Where should I keep my medicine?  This drug is given in a hospital or clinic and will not be stored at home.  NOTE:This sheet is a summary. It may not cover all possible information. If you have questions about this medicine, talk to your doctor, pharmacist, or health care provider. Copyright  2016 Gold Standard

## 2020-01-31 NOTE — PROGRESS NOTES
Patient is a 70 year old female here accompanied by self today for infusion of Rituxan per order of Dr Costello under the supervision of Dr DUARTE Sanchez, [facility].   Patient identified with two identifiers, order verified, and verbal consent for today's infusion obtained from patient.      1-30-20 lab values:  WBC: 12.1, HGB: 13.2, PLT: 276, ANC: 9.8, AST: 16, ALT: 45, Alkaline Phosphatase: 82, Creatinine: 1.04, ESR: 18, CRP: 8.8.       WBC just outside of parameters. Ok to infuse received from Dr Sanchez's office, as long as patient not exhibiting s/sx infection on admit, which she is not. Proceed with treatment.    24 gauge angio cath inserted into right posterior hand by Gregoria Neely LPN.  Immediate blood return noted.  IV secured with sterile, transparent dressing and tape.  Patient tolerated well, denies pain or discomfort at this time.  Flushes easily without resistance, no signs or symptoms of infiltration or infection.   Patient denies questions or concerns regarding infusion and/or medication(s) being administered.    Alerted patient to orders received with ok to check labs only on day 1 of each rituxan dosing every 6months. Verbalizes understanding.     1016: IV pump verified with Rituxan dose, drug, and rate of administration.  Infusion administered per protocol.      1110: Patient noted she gets a pretty significant headache after infusions, wanting to know if titrating slower would help that. Explained it is possible. She asked to go back to 50mg/mL titration every 30min. Call to [facility], spoke with nurse Hill and updated. TORB with ok to proceed at slower titration. If helps, will continue at slower titration going forward, but this will mean a significantly longer day for patient, so we will plan for full 8 hour day for next infusion. All nurses on floor updated for future titrations today. Therapy plan updated to reflect.     Patient tolerated infusion well, no signs or symptoms of adverse reaction  noted.  Patient denies pain nor discomfort.     IV removed, catheter intact.  Site clean, dry and intact.  No signs or symptoms of infiltration or infection.  Covered with a sterile bandage, slight pressure applied for 30 seconds.  Pt instructed to leave bandage intact for a minimum of one hour, and to call with questions or concerns.  Copy of appointments, discharge instructions, and after visit summary (AVS) provided to patient.  Patient states understanding, discharged ambulatory.      [place] and labs faxed to [facility].

## 2020-02-11 NOTE — PROGRESS NOTES
"Subjective     Radha Fox is a 70 year old female who presents to clinic today for the following health issues:    Medications \"not agreeing with me\".  Started on Metformin, Lisinopril, and Synthroids.  DRC doubled Metformin - worse - nausea, headache, fatigue, stomach ache.  Cut back to 500 mg last week.  Also stopped her thyroid supplement last week.  Hadn't been taking it separate from other medications.  Light headed.  Passed out in the bathroom last week - had been light headed.    HPI   Diabetes Follow-up  How often are you checking your blood sugar? Three times daily; fasting 110-120; post prandial 160s  Blood sugar testing frequency justification:    What time of day are you checking your blood sugars (select all that apply)?  morning afternoon after dinner  Have you had any blood sugars above 200?  No  Have you had any blood sugars below 70?  No    What symptoms do you notice when your blood sugar is low?  Shaky, Dizzy and Weak    What concerns do you have today about your diabetes? None and Other: medication concerns     Do you have any of these symptoms? (Select all that apply)  Excessive thirst and Blurry vision     PT would like to see DRC again r/t med question    Due for foot exam    Eye exam due 5/2020    CMP today      BP Readings from Last 2 Encounters:   02/12/20 116/72   01/31/20 124/51     Hemoglobin A1C (%)   Date Value   12/06/2019 7.5 (H)   09/09/2019 6.8 (H)     LDL Cholesterol Calculated (mg/dL)   Date Value   06/18/2019 90   09/27/2017 61         Hyperlipidemia Follow-Up    Are you regularly taking any medication or supplement to lower your cholesterol?   Yes- lipitor    Are you having muscle aches or other side effects that you think could be caused by your cholesterol lowering medication?  No     Hypothyroidism Follow-up    Since last visit, patient describes the following symptoms: Weight stable, no hair loss, no skin changes, no constipation, no loose stools    Started on " synthroid 25 mcg    Due for repeat TSH    Sweating/fatigue    Depression Followup    How are you doing with your depression since your last visit? Worsened     Are you having other symptoms that might be associated with depression? Yes:  fatigue    Have you had a significant life event?  No     Are you feeling anxious or having panic attacks?   No    Do you have any concerns with your use of alcohol or other drugs? No    Social History     Tobacco Use     Smoking status: Never Smoker     Smokeless tobacco: Never Used   Substance Use Topics     Alcohol use: No     Alcohol/week: 0.0 standard drinks     Drug use: No     PHQ 9/9/2019 12/6/2019 2/12/2020   PHQ-9 Total Score 0 1 4   Q9: Thoughts of better off dead/self-harm past 2 weeks Not at all Not at all Not at all     DONNY-7 SCORE 4/4/2019 9/9/2019 2/12/2020   Total Score 0 0 0     Last PHQ-9 2/12/2020   1.  Little interest or pleasure in doing things 1   2.  Feeling down, depressed, or hopeless 1   3.  Trouble falling or staying asleep, or sleeping too much 1   4.  Feeling tired or having little energy 1   5.  Poor appetite or overeating 0   6.  Feeling bad about yourself 0   7.  Trouble concentrating 0   8.  Moving slowly or restless 0   Q9: Thoughts of better off dead/self-harm past 2 weeks 0   PHQ-9 Total Score 4   Difficulty at work, home, or with people Somewhat difficult     DONNY-7  2/12/2020   1. Feeling nervous, anxious, or on edge 0   2. Not being able to stop or control worrying 0   3. Worrying too much about different things 0   4. Trouble relaxing 0   5. Being so restless that it is hard to sit still 0   6. Becoming easily annoyed or irritable 0   7. Feeling afraid, as if something awful might happen 0   DONNY-7 Total Score 0   If you checked any problems, how difficult have they made it for you to do your work, take care of things at home, or get along with other people? -         Suicide Assessment Five-step Evaluation and Treatment (SAFE-T)      How many  servings of fruits and vegetables do you eat daily?  0-1    On average, how many sweetened beverages do you drink each day (Examples: soda, juice, sweet tea, etc.  Do NOT count diet or artificially sweetened beverages)?   1-org juice    How many days per week do you exercise enough to make your heart beat faster? 3 or less    How many minutes a day do you exercise enough to make your heart beat faster? 9 or less    How many days per week do you miss taking your medication? 0      Current Outpatient Medications   Medication     ASPIRIN PO     atorvastatin (LIPITOR) 20 MG tablet     blood glucose (ONETOUCH ULTRA) test strip     Blood Glucose Calibration (ACCU-CHEK ANGELICA) SOLN     Blood Glucose Calibration (ACCU-CHEK COMPACT BLUE CONTROL) LIQD     blood glucose monitoring (ONETOUCH ULTRA) test strip     calcium carbonate-vitamin D (CALCIUM + D) 600-200 MG-UNIT TABS     celecoxib (CELEBREX) 200 MG capsule     Cyanocobalamin (VITAMIN B 12 PO)     fluticasone-vilanterol (BREO ELLIPTA) 100-25 MCG/INH inhaler     folic acid (FOLVITE) 1 MG tablet     levothyroxine (SYNTHROID/LEVOTHROID) 25 MCG tablet     metFORMIN (GLUCOPHAGE-XR) 500 MG 24 hr tablet     Methotrexate Sodium (METHOTREXATE PO)     multivitamin, therapeutic with minerals (MULTI-VITAMIN) TABS     omeprazole (PRILOSEC) 40 MG DR capsule     order for DME     potassium chloride ER (K-DUR/KLOR-CON M) 20 MEQ CR tablet     pramipexole (MIRAPEX) 0.25 MG tablet     predniSONE (DELTASONE) 2.5 MG tablet     Probiotic Product (PROBIOTIC DAILY PO)     propranolol (INDERAL) 20 MG tablet     RiTUXimab (RITUXAN IV)     venlafaxine (EFFEXOR-XR) 75 MG 24 hr capsule     No current facility-administered medications for this visit.        Patient Active Problem List   Diagnosis     RA (rheumatoid arthritis) (H)     Recurrent UTI     Hyperlipidemia     Depressive disorder, not elsewhere classified     Contact dermatitis and other eczema, due to unspecified cause     Diffuse  connective tissue disease (H)     Arthropathy     Coronary atherosclerosis     Sinusitis, chronic     Esophageal reflux     Restless legs syndrome (RLS)     Osteoporosis     Obesity     Insomnia     History of sinus surgery     Simple chronic serous otitis media     Dysfunction of eustachian tube     Mixed hearing loss, unilateral     SNHL (sensory-neural hearing loss), asymmetrical     S/P myringotomy with insertion of tube     History of chronic sinusitis     Rheumatoid arthritis of multiple sites without rheumatoid factor (H)     ACP (advance care planning)     Diabetes mellitus type 2, diet-controlled (H)     Chronic cough     Type 2 diabetes mellitus without complication (H)     Obesity (BMI 35.0-39.9) with comorbidity (H)     Hypothyroidism, unspecified type     Past Surgical History:   Procedure Laterality Date     APPENDECTOMY  1961     CHOLECYSTECTOMY  1973     COLONOSCOPY N/A 3/23/2015    Procedure: COLONOSCOPY;  Surgeon: Clarisa Larkin MD;  Location: [facility]     COLONOSCOPY N/A 2/21/2019    Procedure: COLONOSCOPY;  Surgeon: Milton Gracia MD;  Location: [facility]     endoscopic sphenoidotomy  10/2011    Right     ETHMOIDECTOMY Bilateral 9/27/2016    Procedure: ETHMOIDECTOMY;  Surgeon: Aracelis Ayon MD;  Location: [facility]     excision bilateral telma bullosa  10/2011     GENITOURINARY SURGERY       HYSTERECTOMY      benign     IR CONSULTATION FOR IR EXAM  9/26/2019     IR FLUORO 0-1 HOUR  9/26/2019     LAPAROSCOPY DIAGNOSTIC (GENERAL)       ORTHOPEDIC SURGERY      left foot 5th metarsal fracture screws placed     ORTHOPEDIC SURGERY Left 04/22/2019    Dr. King; first MPJ fusion     PFT GENERAL LAB TESTING  10/17/2019    mild obstructive; moderate restriction; moderately severe diffusion deffect.  same as 2016     SEPTOPLASTY N/A 9/27/2016    Procedure: SEPTOPLASTY;  Surgeon: Aracelis Ayon MD;  Location: [facility]     third metatarsal fracture Left 01/2019    stress fracture; Dr. King  "    TOT Internal urethrotomy  2010     TUBAL LIGATION         Social History     Tobacco Use     Smoking status: Never Smoker     Smokeless tobacco: Never Used   Substance Use Topics     Alcohol use: No     Alcohol/week: 0.0 standard drinks     Family History   Problem Relation Age of Onset     C.A.D. Father 83     Other - See Comments Father         CHF     Cerebrovascular Disease Father         CVA     Diabetes Father      Hypertension Father      Coronary Artery Disease Father      Cerebrovascular Disease Mother 72        CVA     Heart Disease Mother         Heart Issue     Hyperlipidemia Mother      Coronary Artery Disease Mother      Diabetes Brother      Diabetes Sister      Colon Cancer No family hx of      Breast Cancer No family hx of      Asthma No family hx of      Thyroid Disease No family hx of              Reviewed and updated as needed this visit by Provider  Allergies  Meds         Review of Systems   ROS COMP: Constitutional, HEENT, cardiovascular, pulmonary, gi and gu systems are negative, except as otherwise noted.      Objective    /72 (BP Location: Left arm, Patient Position: Sitting, Cuff Size: Adult Regular)   Pulse 64   Temp 96.5  F (35.8  C) (Tympanic)   Ht 1.651 m (5' 5\")   Wt 96.2 kg (212 lb)   SpO2 98%   BMI 35.28 kg/m    Body mass index is 35.28 kg/m .  Physical Exam   GENERAL: alert, no distress and over weight  EYES: Eyes grossly normal to inspection, PERRL and conjunctivae and sclerae normal  HENT: ear canals and TM's normal, nose and mouth without ulcers or lesions  NECK: no adenopathy, no asymmetry, masses, or scars and thyroid normal to palpation  RESP: lungs clear to auscultation - no rales, rhonchi or wheezes  CV: regular rate and rhythm, normal S1 S2, no S3 or S4, no murmur, click or rub, no peripheral edema and peripheral pulses strong  ABDOMEN: no organomegaly or masses, bowel sounds normal and soft; mild tenderness lower abdomen  MS: no gross musculoskeletal " defects noted, no edema  SKIN: no suspicious lesions or rashes  PSYCH: mentation appears normal, affect normal/bright  Diabetic foot exam: normal DP and PT pulses, no trophic changes or ulcerative lesions, normal sensory exam and normal monofilament exam; is s/p left first MPJ fusion and left 5gh metatarsal fracture s/p ORIF (performed by Dr. Ramos)    Diagnostic Test Results:  Labs reviewed in Epic  Future labs ordered        Assessment & Plan       ICD-10-CM    1. Diabetes mellitus type 2, diet-controlled (H) E11.9 Comprehensive metabolic panel     TSH with free T4 reflex     Hemoglobin A1c   2. Hyperlipidemia, unspecified hyperlipidemia type E78.5    3. Hypothyroidism, unspecified type E03.9 TSH with free T4 reflex   4. Encounter for screening mammogram for breast cancer Z12.31 MA Screening Digital Bilateral   5. Uncontrolled type 2 diabetes mellitus with hyperglycemia (H) E11.65 metFORMIN (GLUCOPHAGE-XR) 500 MG 24 hr tablet          Patient Instructions   Restart Synthroid.  Take separately.  Recheck in 1 month.    Continue Metformin 500 mg only.    Recheck a1c in 1 month (3 month repeat).  Update ABBY Sheppard.    Hold off on Lisinopril for now.  Consider reintroducing it at 2.5 mg in the future.          No follow-ups on file.    Lanie Duggan MD  [facility] [place]

## 2020-02-12 ENCOUNTER — OFFICE VISIT (OUTPATIENT)
Dept: FAMILY MEDICINE | Facility: OTHER | Age: 70
End: 2020-02-12
Attending: FAMILY MEDICINE
Payer: MEDICARE

## 2020-02-12 VITALS
SYSTOLIC BLOOD PRESSURE: 116 MMHG | HEART RATE: 64 BPM | BODY MASS INDEX: 35.32 KG/M2 | WEIGHT: 212 LBS | DIASTOLIC BLOOD PRESSURE: 72 MMHG | HEIGHT: 65 IN | OXYGEN SATURATION: 98 % | TEMPERATURE: 96.5 F

## 2020-02-12 DIAGNOSIS — Z12.31 ENCOUNTER FOR SCREENING MAMMOGRAM FOR BREAST CANCER: ICD-10-CM

## 2020-02-12 DIAGNOSIS — E03.9 HYPOTHYROIDISM, UNSPECIFIED TYPE: ICD-10-CM

## 2020-02-12 DIAGNOSIS — E78.5 HYPERLIPIDEMIA, UNSPECIFIED HYPERLIPIDEMIA TYPE: ICD-10-CM

## 2020-02-12 DIAGNOSIS — E11.9 DIABETES MELLITUS TYPE 2, DIET-CONTROLLED (H): Primary | ICD-10-CM

## 2020-02-12 DIAGNOSIS — E11.65 UNCONTROLLED TYPE 2 DIABETES MELLITUS WITH HYPERGLYCEMIA (H): ICD-10-CM

## 2020-02-12 PROCEDURE — G0463 HOSPITAL OUTPT CLINIC VISIT: HCPCS

## 2020-02-12 PROCEDURE — 99214 OFFICE O/P EST MOD 30 MIN: CPT | Performed by: FAMILY MEDICINE

## 2020-02-12 RX ORDER — METFORMIN HCL 500 MG
500 TABLET, EXTENDED RELEASE 24 HR ORAL
Qty: 180 TABLET | Refills: 1 | COMMUNITY
Start: 2020-02-12 | End: 2020-09-08

## 2020-02-12 ASSESSMENT — PATIENT HEALTH QUESTIONNAIRE - PHQ9: SUM OF ALL RESPONSES TO PHQ QUESTIONS 1-9: 4

## 2020-02-12 ASSESSMENT — ANXIETY QUESTIONNAIRES
GAD7 TOTAL SCORE: 0
6. BECOMING EASILY ANNOYED OR IRRITABLE: NOT AT ALL
1. FEELING NERVOUS, ANXIOUS, OR ON EDGE: NOT AT ALL
5. BEING SO RESTLESS THAT IT IS HARD TO SIT STILL: NOT AT ALL
2. NOT BEING ABLE TO STOP OR CONTROL WORRYING: NOT AT ALL
7. FEELING AFRAID AS IF SOMETHING AWFUL MIGHT HAPPEN: NOT AT ALL
4. TROUBLE RELAXING: NOT AT ALL
3. WORRYING TOO MUCH ABOUT DIFFERENT THINGS: NOT AT ALL

## 2020-02-12 ASSESSMENT — PAIN SCALES - GENERAL: PAINLEVEL: NO PAIN (0)

## 2020-02-12 ASSESSMENT — MIFFLIN-ST. JEOR: SCORE: 1487.51

## 2020-02-12 NOTE — Clinical Note
See note - some gi side effects with upping metformin- she cut back to 500 mg and is giving it some time.  May need to consider other options.a1c pended for next month (3 mo recheck)

## 2020-02-12 NOTE — PATIENT INSTRUCTIONS
Restart Synthroid.  Take separately.  Recheck in 1 month.    Continue Metformin 500 mg only.    Recheck a1c in 1 month (3 month repeat).  Update ABBY Sheppard.    Hold off on Lisinopril for now.  Consider reintroducing it at 2.5 mg in the future.

## 2020-02-13 ASSESSMENT — ANXIETY QUESTIONNAIRES: GAD7 TOTAL SCORE: 0

## 2020-02-17 ENCOUNTER — TRANSFERRED RECORDS (OUTPATIENT)
Dept: HEALTH INFORMATION MANAGEMENT | Facility: CLINIC | Age: 70
End: 2020-02-17

## 2020-02-18 ENCOUNTER — TELEPHONE (OUTPATIENT)
Dept: FAMILY MEDICINE | Facility: OTHER | Age: 70
End: 2020-02-18

## 2020-02-18 DIAGNOSIS — S92.302G: ICD-10-CM

## 2020-02-18 DIAGNOSIS — E11.9 DIABETES MELLITUS TYPE 2, DIET-CONTROLLED (H): ICD-10-CM

## 2020-02-18 DIAGNOSIS — M79.673 PAIN OF FOOT, UNSPECIFIED LATERALITY: Primary | ICD-10-CM

## 2020-02-18 NOTE — TELEPHONE ENCOUNTER
Patient calling and states she is still having issues with the metformin and levothyroxine. Patient states she is still light headed and dizzy and has stomach issues. Patient is wanting to stop both metformin and levothyroxine. For now patient is only going to stop the metformin and see if her concerns resolve.Patient notified PCP is not in today, but returns tomorrow.     Patient's phone number is [phone]

## 2020-02-19 NOTE — TELEPHONE ENCOUNTER
Pt notified. Pt also seen the pediatrist and was advised to get orthopedic shoes and asked if you could place this order for her. Upon searching orders to pend, i seen an order for ankle/foot bracing or inserts. Please advise which order would be proper and I can pend it for you to sign. Please advise. Aissatou Velez LPN

## 2020-02-19 NOTE — TELEPHONE ENCOUNTER
Yes - continue synthroid.  Stop the metformin.  Please have her schedule follow up at [facility] to discuss other medication options.

## 2020-02-20 NOTE — TELEPHONE ENCOUNTER
This referral will meet pts request. Left msg for pt to call back to notify of this referral being place. Aissatou Velez LPN

## 2020-02-20 NOTE — TELEPHONE ENCOUNTER
Unclear need for referral?  [facility]  Does he want her to see Savana Freeman here for orthotics?  I signed a referral for her.  If needing something else, please let me know.

## 2020-02-25 ENCOUNTER — TELEPHONE (OUTPATIENT)
Dept: FAMILY MEDICINE | Facility: OTHER | Age: 70
End: 2020-02-25

## 2020-02-25 DIAGNOSIS — S92.302G: ICD-10-CM

## 2020-02-25 DIAGNOSIS — M79.673 PAIN OF FOOT, UNSPECIFIED LATERALITY: Primary | ICD-10-CM

## 2020-02-25 NOTE — TELEPHONE ENCOUNTER
Pt called asking for referral for ortho for  not here because she has been dealing with  for sometime now. New referral pended please advise. Aissatou Velez LPN

## 2020-03-02 ENCOUNTER — HEALTH MAINTENANCE LETTER (OUTPATIENT)
Age: 70
End: 2020-03-02

## 2020-03-02 NOTE — PROGRESS NOTES
"Diabetes Self-Management Education & Support    Presents for: Individual review    SUBJECTIVE/OBJECTIVE:  Presents for: Individual review  Accompanied by: Self  Diabetes education in the past 24mo: No  Focus of Visit: Monitoring, Taking Medication  Diabetes type: Type 2  Disease course: Worsening  Diabetes management related comments/concerns: Increasing A1C  Transportation concerns: No  Difficulty affording diabetes medication?: No  Difficulty affording diabetes testing supplies?: No  Other concerns:: Glasses, Cognitive impairment  Cultural Influences/Ethnic Background:  American      Diabetes Symptoms & Complications:  Fatigue: No  Neuropathy: No  Polydipsia: No  Polyphagia: No  Polyuria: No  Visual change: No  Slow healing wounds: No  Symptom course: Stable  Weight trend: Stable  Complications assessed today?: Yes  Autonomic neuropathy: No  CVA: No  Heart disease: Yes  Nephropathy: No  Peripheral neuropathy: No  Peripheral Vascular Disease: No  Retinopathy: No    Patient Problem List and Family Medical History reviewed for relevant medical history, current medical status, and diabetes risk factors.    Vitals:  BP (!) 88/64   Pulse 65   Resp 16   Wt 96.1 kg (211 lb 12.8 oz)   SpO2 98%   BMI 34.21 kg/m    Estimated body mass index is 34.21 kg/m  as calculated from the following:    Height as of 6/27/19: 1.676 m (5' 5.98\").    Weight as of this encounter: 96.1 kg (211 lb 12.8 oz).   Last 3 BP:   BP Readings from Last 3 Encounters:   02/12/20 116/72   01/31/20 124/51   01/17/20 117/50       History   Smoking Status     Never Smoker   Smokeless Tobacco     Never Used       Labs:  Lab Results   Component Value Date    A1C 7.5 12/06/2019     Lab Results   Component Value Date     12/06/2019     Lab Results   Component Value Date    LDL 90 06/18/2019     HDL Cholesterol   Date Value Ref Range Status   06/18/2019 78 >49 mg/dL Final   ]  GFR Estimate   Date Value Ref Range Status   01/30/2020 55 (L) >60 " mL/min/[1.73_m2] Final     Comment:     Non  GFR Calc  Starting 12/18/2018, serum creatinine based estimated GFR (eGFR) will be   calculated using the Chronic Kidney Disease Epidemiology Collaboration   (CKD-EPI) equation.       GFR Estimate If Black   Date Value Ref Range Status   01/30/2020 63 >60 mL/min/[1.73_m2] Final     Comment:      GFR Calc  Starting 12/18/2018, serum creatinine based estimated GFR (eGFR) will be   calculated using the Chronic Kidney Disease Epidemiology Collaboration   (CKD-EPI) equation.       Lab Results   Component Value Date    CR 1.04 01/30/2020     No results found for: MICROALBUMIN    Healthy Eating:  Eats 3 meals a day.  Breakfast: ceral or toast or bagel with coffee  Lunch:Yogurt, meaghan, deana cracker, apple or soup. Drinks water  Supper: meat with potato or rice or pasta; or chili or pizza or soup. Drinks wate    Being Active:  Not currently exercising    Monitoring:  Tests BG 3 times daily     Taking Medications:  Diabetes Medication(s)     Biguanides       metFORMIN (GLUCOPHAGE-XR) 500 MG 24 hr tablet    Take 1 tablet (500 mg) by mouth daily (with dinner)        Problem Solving:  Denies lows    Healthy Coping:  Ready to learn     Patient Activation Measure Survey Score:  MICHAEL Score (Last Two) 8/10/2018   MICHAEL Raw Score 30   Activation Score 56   MICHAEL Level 3       Diabetes knowledge and skills assessment:   Patient is knowledgeable in diabetes management concepts related to: Healthy Eating, Monitoring and Taking Medication    Patient needs further education on the following diabetes management concepts: Healthy Eating, Monitoring and Taking Medication    Based on learning assessment above, most appropriate setting for further diabetes education would be: Individual setting.      INTERVENTIONS:    Education provided today on:  AADE Self-Care Behaviors:  Diabetes Pathophysiology  Healthy Eating: consistency in amount, composition, and timing of  food intake, portion control and label reading  Monitoring: purpose, log and interpret results, individual blood glucose targets and frequency of monitoring  Taking Medication: action of prescribed medication, side effects of prescribed medications and when to take medications    Opportunities for ongoing education and support in diabetes-self management were discussed.    Pt verbalized understanding of concepts discussed and recommendations provided today.       Education Materials Provided:  No new materials provided today      ASSESSMENT:  Readings range as follows: fastin-166, pre-supper: 140-260 and post-supper:148-296    Radha has been dealing with some personal and family stressors. Open to trying to increase Metformin.    Patient's most recent   Lab Results   Component Value Date    A1C 7.5 2019    is meeting goal of <8.0    PLAN  See Patient Instructions for co-developed, patient-stated behavior change goals.  Increase Metformin XR:  2 - 500 mg tablets at supper starting tonight.    Call in BG readings on 2020 - [phone] (Carole)   AVS printed and provided to patient today. See Follow-Up section for recommended follow-up.      Time Spent: 30 minutes  Encounter Type: Individual    Any diabetes medication dose changes were made via the CDE Protocol and Collaborative Practice Agreement with the patient's primary care provider. A copy of this encounter was shared with the provider.

## 2020-03-03 ENCOUNTER — ANCILLARY PROCEDURE (OUTPATIENT)
Dept: MAMMOGRAPHY | Facility: OTHER | Age: 70
End: 2020-03-03
Attending: FAMILY MEDICINE
Payer: MEDICARE

## 2020-03-03 DIAGNOSIS — Z12.31 ENCOUNTER FOR SCREENING MAMMOGRAM FOR BREAST CANCER: ICD-10-CM

## 2020-03-03 PROCEDURE — 77067 SCR MAMMO BI INCL CAD: CPT | Mod: TC

## 2020-03-04 ENCOUNTER — MEDICAL CORRESPONDENCE (OUTPATIENT)
Dept: HEALTH INFORMATION MANAGEMENT | Facility: CLINIC | Age: 70
End: 2020-03-04

## 2020-03-09 ENCOUNTER — TELEPHONE (OUTPATIENT)
Dept: EDUCATION SERVICES | Facility: HOSPITAL | Age: 70
End: 2020-03-09

## 2020-03-09 NOTE — TELEPHONE ENCOUNTER
Called patient to schedule her with Valarie Boyd for a follow up. Patient stated she is following up with a endocrinologist in [place].     Please declined appt.

## 2020-03-09 NOTE — TELEPHONE ENCOUNTER
----- Message from Valarie Boyd RN sent at 3/6/2020  4:35 PM CST -----  Regarding: Patient appointment  Please call Radha for a follow-up appointment with me. BG review/med discussion.    Thanks!

## 2020-03-10 DIAGNOSIS — E03.9 HYPOTHYROIDISM, UNSPECIFIED TYPE: ICD-10-CM

## 2020-03-10 DIAGNOSIS — E11.9 DIABETES MELLITUS TYPE 2, DIET-CONTROLLED (H): ICD-10-CM

## 2020-03-10 LAB
ALBUMIN SERPL-MCNC: 3.6 G/DL (ref 3.4–5)
ALP SERPL-CCNC: 77 U/L (ref 40–150)
ALT SERPL W P-5'-P-CCNC: 35 U/L (ref 0–50)
ANION GAP SERPL CALCULATED.3IONS-SCNC: 3 MMOL/L (ref 3–14)
AST SERPL W P-5'-P-CCNC: 20 U/L (ref 0–45)
BILIRUB SERPL-MCNC: 0.4 MG/DL (ref 0.2–1.3)
BUN SERPL-MCNC: 30 MG/DL (ref 7–30)
CALCIUM SERPL-MCNC: 8.6 MG/DL (ref 8.5–10.1)
CHLORIDE SERPL-SCNC: 109 MMOL/L (ref 94–109)
CO2 SERPL-SCNC: 28 MMOL/L (ref 20–32)
CREAT SERPL-MCNC: 0.88 MG/DL (ref 0.52–1.04)
EST. AVERAGE GLUCOSE BLD GHB EST-MCNC: 131 MG/DL
GFR SERPL CREATININE-BSD FRML MDRD: 67 ML/MIN/{1.73_M2}
GLUCOSE SERPL-MCNC: 115 MG/DL (ref 70–99)
HBA1C MFR BLD: 6.2 % (ref 0–5.6)
POTASSIUM SERPL-SCNC: 4.2 MMOL/L (ref 3.4–5.3)
PROT SERPL-MCNC: 6.9 G/DL (ref 6.8–8.8)
SODIUM SERPL-SCNC: 140 MMOL/L (ref 133–144)
TSH SERPL DL<=0.005 MIU/L-ACNC: 2.7 MU/L (ref 0.4–4)

## 2020-03-10 PROCEDURE — 80053 COMPREHEN METABOLIC PANEL: CPT | Mod: ZL | Performed by: FAMILY MEDICINE

## 2020-03-10 PROCEDURE — 84443 ASSAY THYROID STIM HORMONE: CPT | Mod: ZL | Performed by: FAMILY MEDICINE

## 2020-03-10 PROCEDURE — 36415 COLL VENOUS BLD VENIPUNCTURE: CPT | Mod: ZL | Performed by: FAMILY MEDICINE

## 2020-03-10 PROCEDURE — 83036 HEMOGLOBIN GLYCOSYLATED A1C: CPT | Mod: ZL | Performed by: FAMILY MEDICINE

## 2020-03-10 PROCEDURE — 40000788 ZZHCL STATISTIC ESTIMATED AVERAGE GLUCOSE: Mod: ZL | Performed by: FAMILY MEDICINE

## 2020-03-12 ENCOUNTER — TELEPHONE (OUTPATIENT)
Dept: FAMILY MEDICINE | Facility: OTHER | Age: 70
End: 2020-03-12

## 2020-03-12 DIAGNOSIS — E78.5 HYPERLIPIDEMIA LDL GOAL <100: ICD-10-CM

## 2020-03-12 RX ORDER — ATORVASTATIN CALCIUM 20 MG/1
TABLET, FILM COATED ORAL
Qty: 30 TABLET | Refills: 2 | Status: SHIPPED | OUTPATIENT
Start: 2020-03-12 | End: 2020-07-29

## 2020-03-12 NOTE — TELEPHONE ENCOUNTER
[facility] clinic calling r/t pts referral for inserts/shoe for deformity on dm statement #2 letter c. But in Dr note from 2/12/20 foot exam is noted no deformity. She requested an addendum to that note stating the deformity noted and be faxed back so the referral for the shoes/insert can be approved, please advise. Aissatou Velez LPN

## 2020-03-12 NOTE — TELEPHONE ENCOUNTER
Lipitor    Last Office Visit: 2/12  Last Refill Date:12/31/2019  # 30          Refills  2    Pt is requesting a 90 day supply.       Thank you!

## 2020-03-12 NOTE — TELEPHONE ENCOUNTER
Pt called regarding her Metformin. Pt stopped taking it on 2/7/2020 and does not want to start taking it again. Pt stated taking Metformin made her not feel well. When she saw Dr. Costello on 2/12/2020, they discussed cutting back on the Metformin but Pt did not want to take it at all and stated her A1C is fine. Please advise.

## 2020-03-19 ENCOUNTER — TELEPHONE (OUTPATIENT)
Dept: FAMILY MEDICINE | Facility: OTHER | Age: 70
End: 2020-03-19

## 2020-03-19 NOTE — TELEPHONE ENCOUNTER
Note from 3/12:  [facility] clinic calling r/t pts referral for inserts/shoe for deformity on dm statement #2 letter c. But in Dr note from 2/12/20 foot exam is noted no deformity. She requested an addendum to that note stating the deformity noted and be faxed back so the referral for the shoes/insert can be approved   please advise.   Aissatou Velez LPN    Thank you.

## 2020-03-19 NOTE — TELEPHONE ENCOUNTER
Sumeet from [facility] called regarding a message she left on 3/12 and needs an addendum stating deformity noted faxed over so the referral can be approved.

## 2020-03-19 NOTE — TELEPHONE ENCOUNTER
Patient has had left first MPJ fusion by Dr. King and left 5gh metatarsal fracture s/p ORIF.    This was added to 2/12/20 note - can reprint and fax

## 2020-03-20 NOTE — TELEPHONE ENCOUNTER
Now that note from 2/12 was attended/corrected please reprint and fax to [facility] so referral could be approved thank you!

## 2020-04-21 DIAGNOSIS — F33.9 EPISODE OF RECURRENT MAJOR DEPRESSIVE DISORDER, UNSPECIFIED DEPRESSION EPISODE SEVERITY (H): ICD-10-CM

## 2020-04-21 DIAGNOSIS — M06.9 RHEUMATOID ARTHRITIS, INVOLVING UNSPECIFIED SITE, UNSPECIFIED RHEUMATOID FACTOR PRESENCE: ICD-10-CM

## 2020-04-21 DIAGNOSIS — I10 HYPERTENSION, UNSPECIFIED TYPE: ICD-10-CM

## 2020-04-22 RX ORDER — VENLAFAXINE HYDROCHLORIDE 75 MG/1
CAPSULE, EXTENDED RELEASE ORAL
Qty: 90 CAPSULE | Refills: 0 | Status: SHIPPED | OUTPATIENT
Start: 2020-04-22 | End: 2020-07-29

## 2020-04-22 RX ORDER — PROPRANOLOL HYDROCHLORIDE 20 MG/1
TABLET ORAL
Qty: 90 TABLET | Refills: 0 | Status: SHIPPED | OUTPATIENT
Start: 2020-04-22 | End: 2020-07-29

## 2020-04-22 RX ORDER — CELECOXIB 200 MG/1
CAPSULE ORAL
Qty: 90 CAPSULE | Refills: 0 | Status: SHIPPED | OUTPATIENT
Start: 2020-04-22 | End: 2020-07-29

## 2020-04-22 NOTE — TELEPHONE ENCOUNTER
celecoxib      Last Written Prescription Date:  1/29/20  Last Fill Quantity: 90,   # refills: 0  Last Office Visit: 2/12/20  Future Office visit:       Routing refill request to provider for review/approval because:  Patient age over 65   Lab Results   Component Value Date    WBC 12.1 01/30/2020     Lab Results   Component Value Date    RBC 3.89 01/30/2020     Lab Results   Component Value Date    HGB 13.2 01/30/2020     Lab Results   Component Value Date    HCT 39.4 01/30/2020     No components found for: MCT  Lab Results   Component Value Date     01/30/2020     Lab Results   Component Value Date    MCH 33.9 01/30/2020     Lab Results   Component Value Date    MCHC 33.5 01/30/2020     Lab Results   Component Value Date    RDW 14.8 01/30/2020     Lab Results   Component Value Date     01/30/2020

## 2020-05-09 DIAGNOSIS — G25.81 RESTLESS LEGS SYNDROME (RLS): ICD-10-CM

## 2020-05-09 DIAGNOSIS — K21.9 GASTROESOPHAGEAL REFLUX DISEASE, ESOPHAGITIS PRESENCE NOT SPECIFIED: ICD-10-CM

## 2020-05-11 RX ORDER — OMEPRAZOLE 40 MG/1
CAPSULE, DELAYED RELEASE ORAL
Qty: 90 CAPSULE | Refills: 0 | Status: SHIPPED | OUTPATIENT
Start: 2020-05-11 | End: 2020-08-11

## 2020-05-11 RX ORDER — PRAMIPEXOLE DIHYDROCHLORIDE 0.25 MG/1
TABLET ORAL
Qty: 270 TABLET | Refills: 0 | Status: SHIPPED | OUTPATIENT
Start: 2020-05-11 | End: 2020-08-11

## 2020-05-11 NOTE — TELEPHONE ENCOUNTER
omeprazole      Last Written Prescription Date:  8/23/2019  Last Fill Quantity: 90,   # refills: 2  Last Office Visit: 2/12/20  Future Office visit:       Routing refill request to provider for review/approval because:  No diagnosis of osteoporosis on record

## 2020-05-30 DIAGNOSIS — R79.89 ABNORMAL TSH: ICD-10-CM

## 2020-06-01 RX ORDER — LEVOTHYROXINE SODIUM 25 UG/1
TABLET ORAL
Qty: 90 TABLET | Refills: 2 | Status: SHIPPED | OUTPATIENT
Start: 2020-06-01 | End: 2021-01-01

## 2020-07-01 ENCOUNTER — TRANSFERRED RECORDS (OUTPATIENT)
Dept: HEALTH INFORMATION MANAGEMENT | Facility: CLINIC | Age: 70
End: 2020-07-01

## 2020-07-01 LAB — RETINOPATHY: NORMAL

## 2020-07-15 DIAGNOSIS — E88.9 METABOLIC DISORDER: Primary | ICD-10-CM

## 2020-07-15 DIAGNOSIS — M89.8X9 METABOLIC BONE DISEASE: ICD-10-CM

## 2020-07-15 DIAGNOSIS — E55.9 VITAMIN D DEFICIENCY, UNSPECIFIED: ICD-10-CM

## 2020-07-15 DIAGNOSIS — R79.9 ABNORMAL FINDING OF BLOOD CHEMISTRY, UNSPECIFIED: ICD-10-CM

## 2020-07-16 DIAGNOSIS — M89.8X9 METABOLIC BONE DISEASE: ICD-10-CM

## 2020-07-16 DIAGNOSIS — R79.9 ABNORMAL FINDING OF BLOOD CHEMISTRY, UNSPECIFIED: ICD-10-CM

## 2020-07-16 DIAGNOSIS — Z79.899 ENCOUNTER FOR LONG-TERM (CURRENT) USE OF OTHER MEDICATIONS: ICD-10-CM

## 2020-07-16 DIAGNOSIS — E88.9 METABOLIC DISORDER: ICD-10-CM

## 2020-07-16 DIAGNOSIS — M06.09 RHEUMATOID ARTHRITIS OF MULTIPLE SITES WITHOUT RHEUMATOID FACTOR (H): ICD-10-CM

## 2020-07-16 LAB
ALBUMIN SERPL-MCNC: 3.6 G/DL (ref 3.4–5)
ALP SERPL-CCNC: 96 U/L (ref 40–150)
ALT SERPL W P-5'-P-CCNC: 38 U/L (ref 0–50)
ANION GAP SERPL CALCULATED.3IONS-SCNC: <1 MMOL/L (ref 3–14)
AST SERPL W P-5'-P-CCNC: 17 U/L (ref 0–45)
BASOPHILS # BLD AUTO: 0 10E9/L (ref 0–0.2)
BASOPHILS NFR BLD AUTO: 0.4 %
BILIRUB DIRECT SERPL-MCNC: 0.1 MG/DL (ref 0–0.2)
BILIRUB SERPL-MCNC: 0.3 MG/DL (ref 0.2–1.3)
BUN SERPL-MCNC: 31 MG/DL (ref 7–30)
CALCIUM SERPL-MCNC: 9.4 MG/DL (ref 8.5–10.1)
CHLORIDE SERPL-SCNC: 109 MMOL/L (ref 94–109)
CK SERPL-CCNC: 43 U/L (ref 30–225)
CO2 SERPL-SCNC: 32 MMOL/L (ref 20–32)
CREAT SERPL-MCNC: 1.11 MG/DL (ref 0.52–1.04)
CRP SERPL-MCNC: 3.7 MG/L (ref 0–8)
DIFFERENTIAL METHOD BLD: ABNORMAL
EOSINOPHIL # BLD AUTO: 0.2 10E9/L (ref 0–0.7)
EOSINOPHIL NFR BLD AUTO: 1.9 %
ERYTHROCYTE [DISTWIDTH] IN BLOOD BY AUTOMATED COUNT: 15.1 % (ref 10–15)
ERYTHROCYTE [SEDIMENTATION RATE] IN BLOOD BY WESTERGREN METHOD: 18 MM/H (ref 0–30)
EST. AVERAGE GLUCOSE BLD GHB EST-MCNC: 143 MG/DL
GFR SERPL CREATININE-BSD FRML MDRD: 50 ML/MIN/{1.73_M2}
GLUCOSE SERPL-MCNC: 125 MG/DL (ref 70–99)
HBA1C MFR BLD: 6.6 % (ref 0–5.6)
HCT VFR BLD AUTO: 39 % (ref 35–47)
HGB BLD-MCNC: 13 G/DL (ref 11.7–15.7)
IMM GRANULOCYTES # BLD: 0 10E9/L (ref 0–0.4)
IMM GRANULOCYTES NFR BLD: 0.4 %
LYMPHOCYTES # BLD AUTO: 1.1 10E9/L (ref 0.8–5.3)
LYMPHOCYTES NFR BLD AUTO: 11.3 %
MCH RBC QN AUTO: 33.3 PG (ref 26.5–33)
MCHC RBC AUTO-ENTMCNC: 33.3 G/DL (ref 31.5–36.5)
MCV RBC AUTO: 100 FL (ref 78–100)
MONOCYTES # BLD AUTO: 1.1 10E9/L (ref 0–1.3)
MONOCYTES NFR BLD AUTO: 11 %
NEUTROPHILS # BLD AUTO: 7.1 10E9/L (ref 1.6–8.3)
NEUTROPHILS NFR BLD AUTO: 75 %
NRBC # BLD AUTO: 0 10*3/UL
NRBC BLD AUTO-RTO: 0 /100
PLATELET # BLD AUTO: 238 10E9/L (ref 150–450)
POTASSIUM SERPL-SCNC: 4.9 MMOL/L (ref 3.4–5.3)
PROT SERPL-MCNC: 7.1 G/DL (ref 6.8–8.8)
RBC # BLD AUTO: 3.9 10E12/L (ref 3.8–5.2)
SODIUM SERPL-SCNC: 140 MMOL/L (ref 133–144)
WBC # BLD AUTO: 9.5 10E9/L (ref 4–11)

## 2020-07-16 PROCEDURE — 80048 BASIC METABOLIC PNL TOTAL CA: CPT | Mod: ZL | Performed by: INTERNAL MEDICINE

## 2020-07-16 PROCEDURE — 84450 TRANSFERASE (AST) (SGOT): CPT | Mod: ZL | Performed by: INTERNAL MEDICINE

## 2020-07-16 PROCEDURE — 85025 COMPLETE CBC W/AUTO DIFF WBC: CPT | Mod: ZL | Performed by: INTERNAL MEDICINE

## 2020-07-16 PROCEDURE — 83036 HEMOGLOBIN GLYCOSYLATED A1C: CPT | Mod: ZL | Performed by: INTERNAL MEDICINE

## 2020-07-16 PROCEDURE — 82040 ASSAY OF SERUM ALBUMIN: CPT | Mod: ZL | Performed by: INTERNAL MEDICINE

## 2020-07-16 PROCEDURE — 85652 RBC SED RATE AUTOMATED: CPT | Mod: ZL | Performed by: INTERNAL MEDICINE

## 2020-07-16 PROCEDURE — 82550 ASSAY OF CK (CPK): CPT | Mod: ZL | Performed by: INTERNAL MEDICINE

## 2020-07-16 PROCEDURE — 80076 HEPATIC FUNCTION PANEL: CPT | Mod: ZL | Performed by: INTERNAL MEDICINE

## 2020-07-16 PROCEDURE — 86140 C-REACTIVE PROTEIN: CPT | Mod: ZL | Performed by: INTERNAL MEDICINE

## 2020-07-16 PROCEDURE — 40000788 ZZHCL STATISTIC ESTIMATED AVERAGE GLUCOSE: Mod: ZL | Performed by: INTERNAL MEDICINE

## 2020-07-16 PROCEDURE — 36415 COLL VENOUS BLD VENIPUNCTURE: CPT | Mod: ZL | Performed by: INTERNAL MEDICINE

## 2020-07-17 ENCOUNTER — INFUSION THERAPY VISIT (OUTPATIENT)
Dept: INFUSION THERAPY | Facility: OTHER | Age: 70
End: 2020-07-17
Attending: FAMILY MEDICINE
Payer: MEDICARE

## 2020-07-17 VITALS
HEART RATE: 82 BPM | SYSTOLIC BLOOD PRESSURE: 142 MMHG | WEIGHT: 209.22 LBS | BODY MASS INDEX: 34.86 KG/M2 | DIASTOLIC BLOOD PRESSURE: 72 MMHG | HEIGHT: 65 IN | TEMPERATURE: 97.2 F | RESPIRATION RATE: 18 BRPM | OXYGEN SATURATION: 98 %

## 2020-07-17 DIAGNOSIS — M06.09 RHEUMATOID ARTHRITIS OF MULTIPLE SITES WITHOUT RHEUMATOID FACTOR (H): Primary | ICD-10-CM

## 2020-07-17 DIAGNOSIS — M06.9 RHEUMATOID ARTHRITIS, INVOLVING UNSPECIFIED SITE, UNSPECIFIED RHEUMATOID FACTOR PRESENCE: ICD-10-CM

## 2020-07-17 PROCEDURE — 96365 THER/PROPH/DIAG IV INF INIT: CPT

## 2020-07-17 PROCEDURE — 25800030 ZZH RX IP 258 OP 636: Performed by: FAMILY MEDICINE

## 2020-07-17 PROCEDURE — 25000128 H RX IP 250 OP 636: Performed by: FAMILY MEDICINE

## 2020-07-17 PROCEDURE — 96366 THER/PROPH/DIAG IV INF ADDON: CPT

## 2020-07-17 PROCEDURE — 96375 TX/PRO/DX INJ NEW DRUG ADDON: CPT

## 2020-07-17 PROCEDURE — 96415 CHEMO IV INFUSION ADDL HR: CPT

## 2020-07-17 PROCEDURE — 96413 CHEMO IV INFUSION 1 HR: CPT

## 2020-07-17 PROCEDURE — 96367 TX/PROPH/DG ADDL SEQ IV INF: CPT

## 2020-07-17 RX ORDER — METHYLPREDNISOLONE SODIUM SUCCINATE 125 MG/2ML
100 INJECTION, POWDER, LYOPHILIZED, FOR SOLUTION INTRAMUSCULAR; INTRAVENOUS ONCE
Status: CANCELLED
Start: 2020-07-17

## 2020-07-17 RX ORDER — METHYLPREDNISOLONE SODIUM SUCCINATE 125 MG/2ML
100 INJECTION, POWDER, LYOPHILIZED, FOR SOLUTION INTRAMUSCULAR; INTRAVENOUS ONCE
Status: COMPLETED | OUTPATIENT
Start: 2020-07-17 | End: 2020-07-17

## 2020-07-17 RX ADMIN — RITUXIMAB 1000 MG: 10 INJECTION, SOLUTION INTRAVENOUS at 09:38

## 2020-07-17 RX ADMIN — SODIUM CHLORIDE 250 ML: 9 INJECTION, SOLUTION INTRAVENOUS at 08:28

## 2020-07-17 RX ADMIN — FAMOTIDINE 20 MG: 20 INJECTION, SOLUTION INTRAVENOUS at 08:33

## 2020-07-17 RX ADMIN — METHYLPREDNISOLONE SODIUM SUCCINATE 100 MG: 125 INJECTION, POWDER, FOR SOLUTION INTRAMUSCULAR; INTRAVENOUS at 08:28

## 2020-07-17 ASSESSMENT — MIFFLIN-ST. JEOR: SCORE: 1474.88

## 2020-07-17 ASSESSMENT — PAIN SCALES - GENERAL: PAINLEVEL: MODERATE PAIN (5)

## 2020-07-17 NOTE — PATIENT INSTRUCTIONS

## 2020-07-17 NOTE — PROGRESS NOTES
IV pump verified with dose, drug, and rate of administration.  Infusion administered per protocol.  Patient tolerated well.  Patient denies pain nor discomfort.     IV removed, catheter intact.  Site clean, dry and intact.  No signs or symptoms of infiltration or infection.  Covered with a sterile bandage, slight pressure applied for 30 seconds.  Pt instructed to leave bandage intact for a minimum of one hour, and to call with questions or concerns.  Copy of appointments, discharge instructions, and after visit summary (AVS) provided to patient.  Patient states understanding, discharged.

## 2020-07-17 NOTE — PROGRESS NOTES
Patient is a 70 year old female here accompanied by self today for infusion of Rituxan per order of Dr Costello under the supervision of Dr Sanchez.  Patient identified with two identifiers, order verified, and verbal consent for today's infusion obtained from patient.      7-16-20 lab values:  WBC: 9.5, HGB: 13, PLT: 238, ANC: 7.1, AST: 17, ALT: 38, Alkaline Phosphatase: 96, Creatinine: 1.11.      Patient meets order parameters for today's treatment.     0821 Call to Pharmacy, spoke with pharmacist Scott. Alerted that patient is here for Rituxan and we need pharmacy to sign and release from plan, as it is an outside order/provider. Alerted that the signed hard copy of orders are in the media tab dated 1-30-20.    24 gauge angio cath inserted into left posterior hand by this nurse after 2 failed attempts to right arm by Gregoria HAYWARD LPN.  Immediate blood return noted.  IV secured with sterile, transparent dressing and tape.  Patient tolerated well, denies pain or discomfort at this time.  Flushes easily without resistance, no signs or symptoms of infiltration or infection.   Patient denies questions or concerns regarding infusion and/or medication(s) being administered.    0938 IV pump verified with dose, drug, and rate of administration.  Infusion administered per protocol.  Patient tolerated infusion well, no signs or symptoms of adverse reaction noted.  Patient denies pain nor discomfort.     IV removed by Gregoria HAYWARD LPN, at this nurses request. Per Gregoria, catheter intact.  Site clean, dry and intact.  No signs or symptoms of infiltration or infection.  Covered with a sterile bandage, slight pressure applied for 30 seconds.  Pt instructed to leave bandage intact for a minimum of one hour, and to call with questions or concerns. Patient states understanding, discharged.     172.72

## 2020-07-24 DIAGNOSIS — I10 HYPERTENSION, UNSPECIFIED TYPE: ICD-10-CM

## 2020-07-24 DIAGNOSIS — F33.9 EPISODE OF RECURRENT MAJOR DEPRESSIVE DISORDER, UNSPECIFIED DEPRESSION EPISODE SEVERITY (H): ICD-10-CM

## 2020-07-24 DIAGNOSIS — M06.9 RHEUMATOID ARTHRITIS, INVOLVING UNSPECIFIED SITE, UNSPECIFIED RHEUMATOID FACTOR PRESENCE: ICD-10-CM

## 2020-07-24 DIAGNOSIS — E78.5 HYPERLIPIDEMIA LDL GOAL <100: ICD-10-CM

## 2020-07-29 RX ORDER — CELECOXIB 200 MG/1
CAPSULE ORAL
Qty: 90 CAPSULE | Refills: 0 | Status: SHIPPED | OUTPATIENT
Start: 2020-07-29 | End: 2020-11-02

## 2020-07-29 RX ORDER — VENLAFAXINE HYDROCHLORIDE 75 MG/1
CAPSULE, EXTENDED RELEASE ORAL
Qty: 90 CAPSULE | Refills: 0 | Status: SHIPPED | OUTPATIENT
Start: 2020-07-29 | End: 2020-11-02

## 2020-07-29 RX ORDER — PROPRANOLOL HYDROCHLORIDE 20 MG/1
TABLET ORAL
Qty: 90 TABLET | Refills: 0 | Status: SHIPPED | OUTPATIENT
Start: 2020-07-29 | End: 2020-11-02

## 2020-07-29 RX ORDER — ATORVASTATIN CALCIUM 20 MG/1
TABLET, FILM COATED ORAL
Qty: 90 TABLET | Refills: 0 | Status: SHIPPED | OUTPATIENT
Start: 2020-07-29 | End: 2020-11-02

## 2020-07-29 NOTE — TELEPHONE ENCOUNTER
Venlafaxine      Last Written Prescription Date:  04/22/20   Last Fill Quantity: 90,   # refills: 0  Last Office Visit: 02/12/20  Future Office visit:       Routing refill request to provider for review/approval because:  Blood pressure under 140/90 in past 12 months         BP Readings from Last 3 Encounters:   07/17/20 (!) 142/72   02/12/20 116/72   01/31/20 124/51                Normal serum creatinine on file in past 12 months         Recent Labs   Lab Test 07/16/20  1348   CR 1.11*               Celebrex      Last Written Prescription Date:  04/22/20  Last Fill Quantity: 90,   # refills: 0  Last Office Visit: 02/12/20  Future Office visit:       Routing refill request to provider for review/approval because:  Blood pressure under 140/90 in past 12 months         BP Readings from Last 3 Encounters:   07/17/20 (!) 142/72   02/12/20 116/72   01/31/20 124/51                Patient is age 6-64 years         Normal serum creatinine on file in past 12 months         Recent Labs   Lab Test 07/16/20  1348   CR 1.11*               Atrovastain 20 MG      Last Written Prescription Date:  02/12/20  Last Fill Quantity: 30,   # refills: 2  Last Office Visit: 02/12/20  Future Office visit:       Routing refill request to provider for review/approval because:  Patient over due for Lipid profile      Propranolol 20 MG      Last Written Prescription Date:  04/22/20  Last Fill Quantity: 90,   # refills: 0  Last Office Visit: 02/12/20  Future Office visit:       Routing refill request to provider for review/approval because:  Blood pressure out of range

## 2020-07-31 ENCOUNTER — INFUSION THERAPY VISIT (OUTPATIENT)
Dept: INFUSION THERAPY | Facility: OTHER | Age: 70
End: 2020-07-31
Attending: FAMILY MEDICINE
Payer: MEDICARE

## 2020-07-31 VITALS
TEMPERATURE: 97.8 F | OXYGEN SATURATION: 97 % | WEIGHT: 210.32 LBS | HEART RATE: 69 BPM | HEIGHT: 66 IN | BODY MASS INDEX: 33.8 KG/M2 | RESPIRATION RATE: 16 BRPM | DIASTOLIC BLOOD PRESSURE: 68 MMHG | SYSTOLIC BLOOD PRESSURE: 115 MMHG

## 2020-07-31 DIAGNOSIS — M06.9 RHEUMATOID ARTHRITIS, INVOLVING UNSPECIFIED SITE, UNSPECIFIED RHEUMATOID FACTOR PRESENCE: ICD-10-CM

## 2020-07-31 DIAGNOSIS — M06.09 RHEUMATOID ARTHRITIS OF MULTIPLE SITES WITHOUT RHEUMATOID FACTOR (H): Primary | ICD-10-CM

## 2020-07-31 PROCEDURE — 96365 THER/PROPH/DIAG IV INF INIT: CPT

## 2020-07-31 PROCEDURE — 96366 THER/PROPH/DIAG IV INF ADDON: CPT

## 2020-07-31 PROCEDURE — 96367 TX/PROPH/DG ADDL SEQ IV INF: CPT

## 2020-07-31 PROCEDURE — 25800030 ZZH RX IP 258 OP 636: Performed by: FAMILY MEDICINE

## 2020-07-31 PROCEDURE — 25000128 H RX IP 250 OP 636: Performed by: FAMILY MEDICINE

## 2020-07-31 PROCEDURE — 96415 CHEMO IV INFUSION ADDL HR: CPT

## 2020-07-31 PROCEDURE — 96413 CHEMO IV INFUSION 1 HR: CPT

## 2020-07-31 PROCEDURE — 96375 TX/PRO/DX INJ NEW DRUG ADDON: CPT

## 2020-07-31 RX ORDER — METHYLPREDNISOLONE SODIUM SUCCINATE 125 MG/2ML
100 INJECTION, POWDER, LYOPHILIZED, FOR SOLUTION INTRAMUSCULAR; INTRAVENOUS ONCE
Status: COMPLETED | OUTPATIENT
Start: 2020-07-31 | End: 2020-07-31

## 2020-07-31 RX ORDER — METHYLPREDNISOLONE SODIUM SUCCINATE 125 MG/2ML
100 INJECTION, POWDER, LYOPHILIZED, FOR SOLUTION INTRAMUSCULAR; INTRAVENOUS ONCE
Status: CANCELLED
Start: 2020-07-31

## 2020-07-31 RX ADMIN — FAMOTIDINE 20 MG: 20 INJECTION, SOLUTION INTRAVENOUS at 08:45

## 2020-07-31 RX ADMIN — SODIUM CHLORIDE 500 ML: 9 INJECTION, SOLUTION INTRAVENOUS at 08:40

## 2020-07-31 RX ADMIN — METHYLPREDNISOLONE SODIUM SUCCINATE 100 MG: 125 INJECTION, POWDER, FOR SOLUTION INTRAMUSCULAR; INTRAVENOUS at 08:41

## 2020-07-31 RX ADMIN — RITUXIMAB 1000 MG: 10 INJECTION, SOLUTION INTRAVENOUS at 09:19

## 2020-07-31 ASSESSMENT — PAIN SCALES - GENERAL: PAINLEVEL: MILD PAIN (3)

## 2020-07-31 ASSESSMENT — MIFFLIN-ST. JEOR: SCORE: 1495.75

## 2020-07-31 NOTE — PROGRESS NOTES
24 gauge angio cath inserted into right anterior upper medial forearm.  Immediate blood return noted.  IV secured with sterile, transparent dressing and tape.  Patient tolerated well, denies pain or discomfort at this time.  Flushes easily without resistance, no signs or symptoms of infiltration or infection. Patient denies questions or concerns regarding infusion and/or medication(s) being administered.

## 2020-07-31 NOTE — PROGRESS NOTES
05/07/20 1012   Rehab Time/Intention   Evaluation Not Performed other (see comments)  (Following for need for SLE - MRI negative, etiology of dizziness and right facial droop not established. Patient was seen for swallow eval and is on diet. Speech-Language eval not warranted.)   Rehab Treatment   Discipline speech language pathologist      Patient tolerated infusion well.      IV removed, catheter intact.  Site clean, dry and intact.  No signs or symptoms of infiltration or infection.  Covered with a sterile bandage, slight pressure applied for 30 seconds.  Pt instructed to leave bandage intact for a minimum of one hour, and to call with questions or concerns.  Copy of appointments, discharge instructions, and after visit summary (AVS) provided to patient.  Patient states understanding, discharged.

## 2020-07-31 NOTE — PROGRESS NOTES
Patient is a 70 year old female here accompanied by self today for infusion of Rituximab per order of Dr. Sanchez under the supervision of Dr. Costello.  Patient identified with two identifiers, order verified, and verbal consent for today's infusion obtained from patient.       24 gauge angio cath inserted into left lower forearm.  Immediate blood return noted.  IV secured with sterile, transparent dressing and tape.  Patient tolerated well, denies pain or discomfort at this time.  Flushes easily without resistance, no signs or symptoms of infiltration or infection.   Patient denies questions or concerns regarding infusion and/or medication(s) being administered.    0919: IV pump verified with dose, drug, and rate of administration.  Infusion administered per protocol.  Patient tolerated infusion well, no signs or symptoms of adverse reaction noted.  Patient denies pain nor discomfort.     At approximately 1045am, patient went to the bathroom and when she returned, she stated that she was having pain at her IV site. Upon assessment, it appeared that IV site had infiltrated. Infusion stopped and cold compress applied. Grade 1 infiltration. New IV restarted by Carmen Chi RN. Old IV removed by this RN at approximately 1110am.     IV removed, catheter intact.  Site clean, dry and intact.  No signs or symptoms of infiltration or infection.  Covered with a sterile bandage, slight pressure applied for 30 seconds.  Pt instructed to leave bandage intact for a minimum of one hour, and to call with questions or concerns.  Copy of appointments, discharge instructions, and after visit summary (AVS) provided to patient.  Patient states understanding, discharged.

## 2020-08-07 DIAGNOSIS — G25.81 RESTLESS LEGS SYNDROME (RLS): ICD-10-CM

## 2020-08-07 DIAGNOSIS — K21.9 GASTROESOPHAGEAL REFLUX DISEASE, ESOPHAGITIS PRESENCE NOT SPECIFIED: ICD-10-CM

## 2020-08-10 NOTE — TELEPHONE ENCOUNTER
omeprazole (PRILOSEC) 40 MG DR capsule       Last Written Prescription Date:  5/11/2020  Last Fill Quantity: 90,   # refills: 0  Last Office Visit: 2/12/2020  Future Office visit:     pramipexole (MIRAPEX) 0.25 MG tablet       Last Written Prescription Date:  5/11/2020  Last Fill Quantity: 270,   # refills: 0  Last Office Visit: 2/12/2020  Future Office visit:

## 2020-08-11 RX ORDER — OMEPRAZOLE 40 MG/1
CAPSULE, DELAYED RELEASE ORAL
Qty: 90 CAPSULE | Refills: 3 | Status: SHIPPED | OUTPATIENT
Start: 2020-08-11 | End: 2021-01-01

## 2020-08-11 RX ORDER — PRAMIPEXOLE DIHYDROCHLORIDE 0.25 MG/1
TABLET ORAL
Qty: 270 TABLET | Refills: 3 | Status: SHIPPED | OUTPATIENT
Start: 2020-08-11 | End: 2021-01-01

## 2020-08-11 NOTE — TELEPHONE ENCOUNTER
Prilosec- Protocol failed due to No diagnosis of osteoporosis on record    Mirapex- no office visit within the past 6 months

## 2020-09-08 ENCOUNTER — NURSE TRIAGE (OUTPATIENT)
Dept: FAMILY MEDICINE | Facility: OTHER | Age: 70
End: 2020-09-08

## 2020-09-08 ENCOUNTER — OFFICE VISIT (OUTPATIENT)
Dept: FAMILY MEDICINE | Facility: OTHER | Age: 70
End: 2020-09-08
Attending: FAMILY MEDICINE
Payer: MEDICARE

## 2020-09-08 DIAGNOSIS — R07.0 THROAT PAIN: Primary | ICD-10-CM

## 2020-09-08 PROCEDURE — U0003 INFECTIOUS AGENT DETECTION BY NUCLEIC ACID (DNA OR RNA); SEVERE ACUTE RESPIRATORY SYNDROME CORONAVIRUS 2 (SARS-COV-2) (CORONAVIRUS DISEASE [COVID-19]), AMPLIFIED PROBE TECHNIQUE, MAKING USE OF HIGH THROUGHPUT TECHNOLOGIES AS DESCRIBED BY CMS-2020-01-R: HCPCS | Mod: ZL | Performed by: FAMILY MEDICINE

## 2020-09-08 NOTE — TELEPHONE ENCOUNTER
"Patient calling and reports having a sore throat since last Friday. Patient is scheduled today for covid test.    Reason for Disposition    HIGH RISK patient (e.g., age > 64 years, diabetes, heart or lung disease, weak immune system) (Exception: Has already been evaluated by healthcare provider and has no new or worsening symptoms)    Additional Information    Negative: SEVERE difficulty breathing (e.g., struggling for each breath, speaks in single words)    Negative: Difficult to awaken or acting confused (e.g., disoriented, slurred speech)    Negative: Bluish (or gray) lips or face now    Negative: Shock suspected (e.g., cold/pale/clammy skin, too weak to stand, low BP, rapid pulse)    Negative: Sounds like a life-threatening emergency to the triager    Negative: [1] COVID-19 exposure AND [2] no symptoms    Negative: COVID-19 and Breastfeeding, questions about    Negative: [1] Adult with possible COVID-19 symptoms AND [2] triager concerned about severity of symptoms or other causes    Negative: Patient sounds very sick or weak to the triager    Negative: MILD difficulty breathing (e.g., minimal/no SOB at rest, SOB with walking, pulse <100)    Negative: Chest pain or pressure    Negative: Fever > 103 F (39.4 C)    Negative: [1] Fever > 101 F (38.3 C) AND [2] age > 60    Negative: [1] Fever > 100.0 F (37.8 C) AND [2] bedridden (e.g., nursing home patient, CVA, chronic illness, recovering from surgery)    Answer Assessment - Initial Assessment Questions  1. COVID-19 DIAGNOSIS: \"Who made your Coronavirus (COVID-19) diagnosis?\" \"Was it confirmed by a positive lab test?\" If not diagnosed by a HCP, ask \"Are there lots of cases (community spread) where you live?\" (See public health department website, if unsure)      Not diagnosed  2. ONSET: \"When did the COVID-19 symptoms start?\"       Last Friday  3. WORST SYMPTOM: \"What is your worst symptom?\" (e.g., cough, fever, shortness of breath, muscle aches)      Sore throat  4. " "COUGH: \"Do you have a cough?\" If so, ask: \"How bad is the cough?\"        No  5. FEVER: \"Do you have a fever?\" If so, ask: \"What is your temperature, how was it measured, and when did it start?\"      No  6. RESPIRATORY STATUS: \"Describe your breathing?\" (e.g., shortness of breath, wheezing, unable to speak)       ok  7. BETTER-SAME-WORSE: \"Are you getting better, staying the same or getting worse compared to yesterday?\"  If getting worse, ask, \"In what way?\"      same  8. HIGH RISK DISEASE: \"Do you have any chronic medical problems?\" (e.g., asthma, heart or lung disease, weak immune system, etc.)      Yes. RA and asthma  9. PREGNANCY: \"Is there any chance you are pregnant?\" \"When was your last menstrual period?\"      No  10. OTHER SYMPTOMS: \"Do you have any other symptoms?\"  (e.g., chills, fatigue, headache, loss of smell or taste, muscle pain, sore throat)        Neck pain    Protocols used: CORONAVIRUS (COVID-19) DIAGNOSED OR [id number] 8.4.20      "

## 2020-09-11 LAB
SARS-COV-2 RNA SPEC QL NAA+PROBE: NOT DETECTED
SPECIMEN SOURCE: NORMAL

## 2020-09-28 ENCOUNTER — TELEPHONE (OUTPATIENT)
Dept: FAMILY MEDICINE | Facility: OTHER | Age: 70
End: 2020-09-28

## 2020-09-28 DIAGNOSIS — E11.9 DIABETES MELLITUS TYPE 2, DIET-CONTROLLED (H): Primary | ICD-10-CM

## 2020-09-28 DIAGNOSIS — R79.89 ABNORMAL TSH: ICD-10-CM

## 2020-09-28 NOTE — TELEPHONE ENCOUNTER
12:49 PM    Reason for Call: Phone Call    Description: Radha needs her labs done for her medications, can those orders be put in so she can get her medications . Please call Radha to advise    Was an appointment offered for this call?  No    Preferred method for responding to this message: [phone]    If we cannot reach you directly, may we leave a detailed response at the number you provided?  Yes      Evelin Kim

## 2020-10-01 DIAGNOSIS — E55.9 VITAMIN D DEFICIENCY, UNSPECIFIED: ICD-10-CM

## 2020-10-01 DIAGNOSIS — E88.9 METABOLIC DISORDER: ICD-10-CM

## 2020-10-01 DIAGNOSIS — M06.09 RHEUMATOID ARTHRITIS OF MULTIPLE SITES WITHOUT RHEUMATOID FACTOR (H): ICD-10-CM

## 2020-10-01 DIAGNOSIS — E03.9 HYPOTHYROIDISM, UNSPECIFIED TYPE: ICD-10-CM

## 2020-10-01 DIAGNOSIS — Z79.899 ENCOUNTER FOR LONG-TERM (CURRENT) USE OF OTHER MEDICATIONS: ICD-10-CM

## 2020-10-01 DIAGNOSIS — R79.89 ABNORMAL TSH: ICD-10-CM

## 2020-10-01 DIAGNOSIS — E11.9 DIABETES MELLITUS TYPE 2, DIET-CONTROLLED (H): ICD-10-CM

## 2020-10-01 DIAGNOSIS — M89.8X9 METABOLIC BONE DISEASE: ICD-10-CM

## 2020-10-01 LAB
ALBUMIN SERPL-MCNC: 3.3 G/DL (ref 3.4–5)
ALP SERPL-CCNC: 98 U/L (ref 40–150)
ALT SERPL W P-5'-P-CCNC: 40 U/L (ref 0–50)
AST SERPL W P-5'-P-CCNC: 25 U/L (ref 0–45)
BASOPHILS # BLD AUTO: 0 10E9/L (ref 0–0.2)
BASOPHILS NFR BLD AUTO: 0.5 %
BILIRUB DIRECT SERPL-MCNC: <0.1 MG/DL (ref 0–0.2)
BILIRUB SERPL-MCNC: 0.2 MG/DL (ref 0.2–1.3)
CHOLEST SERPL-MCNC: 161 MG/DL
CK SERPL-CCNC: 48 U/L (ref 30–225)
CREAT SERPL-MCNC: 0.77 MG/DL (ref 0.52–1.04)
CREAT UR-MCNC: 181 MG/DL
CRP SERPL-MCNC: 9.9 MG/L (ref 0–8)
DIFFERENTIAL METHOD BLD: ABNORMAL
EOSINOPHIL # BLD AUTO: 0.3 10E9/L (ref 0–0.7)
EOSINOPHIL NFR BLD AUTO: 3.3 %
ERYTHROCYTE [DISTWIDTH] IN BLOOD BY AUTOMATED COUNT: 13.6 % (ref 10–15)
ERYTHROCYTE [SEDIMENTATION RATE] IN BLOOD BY WESTERGREN METHOD: 30 MM/H (ref 0–30)
EST. AVERAGE GLUCOSE BLD GHB EST-MCNC: 137 MG/DL
GFR SERPL CREATININE-BSD FRML MDRD: 78 ML/MIN/{1.73_M2}
HBA1C MFR BLD: 6.4 % (ref 0–5.6)
HCT VFR BLD AUTO: 39.8 % (ref 35–47)
HDLC SERPL-MCNC: 66 MG/DL
HGB BLD-MCNC: 13.3 G/DL (ref 11.7–15.7)
IMM GRANULOCYTES # BLD: 0 10E9/L (ref 0–0.4)
IMM GRANULOCYTES NFR BLD: 0.5 %
LDLC SERPL CALC-MCNC: 73 MG/DL
LYMPHOCYTES # BLD AUTO: 1.5 10E9/L (ref 0.8–5.3)
LYMPHOCYTES NFR BLD AUTO: 17.1 %
MCH RBC QN AUTO: 33.8 PG (ref 26.5–33)
MCHC RBC AUTO-ENTMCNC: 33.4 G/DL (ref 31.5–36.5)
MCV RBC AUTO: 101 FL (ref 78–100)
MICROALBUMIN UR-MCNC: 42 MG/L
MICROALBUMIN/CREAT UR: 23.31 MG/G CR (ref 0–25)
MONOCYTES # BLD AUTO: 1.1 10E9/L (ref 0–1.3)
MONOCYTES NFR BLD AUTO: 12.3 %
NEUTROPHILS # BLD AUTO: 5.9 10E9/L (ref 1.6–8.3)
NEUTROPHILS NFR BLD AUTO: 66.3 %
NONHDLC SERPL-MCNC: 95 MG/DL
NRBC # BLD AUTO: 0 10*3/UL
NRBC BLD AUTO-RTO: 0 /100
PLATELET # BLD AUTO: 262 10E9/L (ref 150–450)
PROT SERPL-MCNC: 6.9 G/DL (ref 6.8–8.8)
RBC # BLD AUTO: 3.94 10E12/L (ref 3.8–5.2)
T4 FREE SERPL-MCNC: 1.06 NG/DL (ref 0.76–1.46)
TRIGL SERPL-MCNC: 108 MG/DL
TSH SERPL DL<=0.005 MIU/L-ACNC: 4.55 MU/L (ref 0.4–4)
WBC # BLD AUTO: 8.9 10E9/L (ref 4–11)

## 2020-10-01 PROCEDURE — 80076 HEPATIC FUNCTION PANEL: CPT | Mod: ZL | Performed by: INTERNAL MEDICINE

## 2020-10-01 PROCEDURE — 36415 COLL VENOUS BLD VENIPUNCTURE: CPT | Mod: ZL | Performed by: INTERNAL MEDICINE

## 2020-10-01 PROCEDURE — 86140 C-REACTIVE PROTEIN: CPT | Mod: ZL | Performed by: INTERNAL MEDICINE

## 2020-10-01 PROCEDURE — 85652 RBC SED RATE AUTOMATED: CPT | Mod: ZL | Performed by: INTERNAL MEDICINE

## 2020-10-01 PROCEDURE — 86200 CCP ANTIBODY: CPT | Mod: ZL | Performed by: INTERNAL MEDICINE

## 2020-10-01 PROCEDURE — 84439 ASSAY OF FREE THYROXINE: CPT | Mod: ZL | Performed by: INTERNAL MEDICINE

## 2020-10-01 PROCEDURE — 80061 LIPID PANEL: CPT | Mod: ZL | Performed by: INTERNAL MEDICINE

## 2020-10-01 PROCEDURE — 86431 RHEUMATOID FACTOR QUANT: CPT | Mod: ZL | Performed by: INTERNAL MEDICINE

## 2020-10-01 PROCEDURE — 82043 UR ALBUMIN QUANTITATIVE: CPT | Mod: ZL | Performed by: FAMILY MEDICINE

## 2020-10-01 PROCEDURE — 83036 HEMOGLOBIN GLYCOSYLATED A1C: CPT | Mod: ZL | Performed by: INTERNAL MEDICINE

## 2020-10-01 PROCEDURE — 84443 ASSAY THYROID STIM HORMONE: CPT | Mod: ZL | Performed by: INTERNAL MEDICINE

## 2020-10-01 PROCEDURE — 82550 ASSAY OF CK (CPK): CPT | Mod: ZL | Performed by: INTERNAL MEDICINE

## 2020-10-01 PROCEDURE — 82565 ASSAY OF CREATININE: CPT | Mod: ZL | Performed by: INTERNAL MEDICINE

## 2020-10-01 PROCEDURE — 999N001182 HC STATISTIC ESTIMATED AVERAGE GLUCOSE: Mod: ZL | Performed by: INTERNAL MEDICINE

## 2020-10-01 PROCEDURE — 82306 VITAMIN D 25 HYDROXY: CPT | Mod: ZL | Performed by: INTERNAL MEDICINE

## 2020-10-01 PROCEDURE — 85025 COMPLETE CBC W/AUTO DIFF WBC: CPT | Mod: ZL | Performed by: INTERNAL MEDICINE

## 2020-10-02 LAB
CCP AB SER IA-ACNC: 1 U/ML
DEPRECATED CALCIDIOL+CALCIFEROL SERPL-MC: 60 UG/L (ref 20–75)
RHEUMATOID FACT SER NEPH-ACNC: <7 IU/ML (ref 0–20)

## 2020-10-29 DIAGNOSIS — M06.9 RHEUMATOID ARTHRITIS, INVOLVING UNSPECIFIED SITE, UNSPECIFIED WHETHER RHEUMATOID FACTOR PRESENT (H): ICD-10-CM

## 2020-10-29 DIAGNOSIS — F33.9 EPISODE OF RECURRENT MAJOR DEPRESSIVE DISORDER, UNSPECIFIED DEPRESSION EPISODE SEVERITY (H): ICD-10-CM

## 2020-10-29 DIAGNOSIS — E78.5 HYPERLIPIDEMIA LDL GOAL <100: ICD-10-CM

## 2020-10-29 DIAGNOSIS — I10 HYPERTENSION, UNSPECIFIED TYPE: ICD-10-CM

## 2020-11-02 RX ORDER — ATORVASTATIN CALCIUM 20 MG/1
TABLET, FILM COATED ORAL
Qty: 90 TABLET | Refills: 0 | Status: SHIPPED | OUTPATIENT
Start: 2020-11-02 | End: 2021-01-01

## 2020-11-02 RX ORDER — PROPRANOLOL HYDROCHLORIDE 20 MG/1
TABLET ORAL
Qty: 90 TABLET | Refills: 0 | Status: SHIPPED | OUTPATIENT
Start: 2020-11-02 | End: 2021-01-01

## 2020-11-02 RX ORDER — CELECOXIB 200 MG/1
CAPSULE ORAL
Qty: 90 CAPSULE | Refills: 0 | Status: SHIPPED | OUTPATIENT
Start: 2020-11-02 | End: 2021-01-01

## 2020-11-02 RX ORDER — VENLAFAXINE HYDROCHLORIDE 75 MG/1
CAPSULE, EXTENDED RELEASE ORAL
Qty: 90 CAPSULE | Refills: 0 | Status: SHIPPED | OUTPATIENT
Start: 2020-11-02 | End: 2021-01-01

## 2020-11-02 NOTE — TELEPHONE ENCOUNTER
Lipitor       Last Written Prescription Date:  7/29/2020  Last Fill Quantity: 90,   # refills: 0    Celebrex       Last Written Prescription Date:  7/29/2020  Last Fill Quantity: 90,   # refills: 0    Inderal       Last Written Prescription Date:  7/29/2020  Last Fill Quantity: 90,   # refills: 0    Effexor       Last Written Prescription Date:  7/29/2020  Last Fill Quantity: 90,   # refills: 0  Last Office Visit: 2/12/2020  Future Office visit:

## 2020-12-29 NOTE — TELEPHONE ENCOUNTER
Pranav Rosenbaum       Last Written Prescription Date:  11/25/2019  Last Fill Quantity: 1,   # refills: 0  Last Office Visit: 2/12/2020  Future Office visit:

## 2021-01-01 ENCOUNTER — ANCILLARY PROCEDURE (OUTPATIENT)
Dept: MAMMOGRAPHY | Facility: OTHER | Age: 71
End: 2021-01-01
Attending: FAMILY MEDICINE
Payer: MEDICARE

## 2021-01-01 ENCOUNTER — TRANSFERRED RECORDS (OUTPATIENT)
Dept: HEALTH INFORMATION MANAGEMENT | Facility: CLINIC | Age: 71
End: 2021-01-01

## 2021-01-01 ENCOUNTER — OFFICE VISIT (OUTPATIENT)
Dept: FAMILY MEDICINE | Facility: OTHER | Age: 71
End: 2021-01-01
Attending: FAMILY MEDICINE
Payer: MEDICARE

## 2021-01-01 ENCOUNTER — HOSPITAL ENCOUNTER (OUTPATIENT)
Dept: PHYSICAL THERAPY | Facility: HOSPITAL | Age: 71
Setting detail: THERAPIES SERIES
End: 2021-08-09
Attending: NURSE PRACTITIONER
Payer: MEDICARE

## 2021-01-01 ENCOUNTER — TELEPHONE (OUTPATIENT)
Dept: FAMILY MEDICINE | Facility: OTHER | Age: 71
End: 2021-01-01

## 2021-01-01 ENCOUNTER — TELEPHONE (OUTPATIENT)
Dept: INFUSION THERAPY | Facility: OTHER | Age: 71
End: 2021-01-01

## 2021-01-01 ENCOUNTER — HOSPITAL ENCOUNTER (OUTPATIENT)
Dept: PHYSICAL THERAPY | Facility: HOSPITAL | Age: 71
Setting detail: THERAPIES SERIES
End: 2021-08-20
Attending: NURSE PRACTITIONER
Payer: MEDICARE

## 2021-01-01 ENCOUNTER — RESULTS ONLY (OUTPATIENT)
Dept: ADMINISTRATIVE | Facility: CLINIC | Age: 71
End: 2021-01-01

## 2021-01-01 ENCOUNTER — APPOINTMENT (OUTPATIENT)
Dept: GENERAL RADIOLOGY | Facility: HOSPITAL | Age: 71
End: 2021-01-01
Attending: NURSE PRACTITIONER
Payer: MEDICARE

## 2021-01-01 ENCOUNTER — HOSPITAL ENCOUNTER (OUTPATIENT)
Dept: PHYSICAL THERAPY | Facility: HOSPITAL | Age: 71
Setting detail: THERAPIES SERIES
End: 2021-08-25
Attending: NURSE PRACTITIONER
Payer: MEDICARE

## 2021-01-01 ENCOUNTER — LAB (OUTPATIENT)
Dept: LAB | Facility: OTHER | Age: 71
End: 2021-01-01
Attending: INTERNAL MEDICINE
Payer: MEDICARE

## 2021-01-01 ENCOUNTER — LAB (OUTPATIENT)
Dept: LAB | Facility: OTHER | Age: 71
End: 2021-01-01
Payer: MEDICARE

## 2021-01-01 ENCOUNTER — OFFICE VISIT (OUTPATIENT)
Dept: FAMILY MEDICINE | Facility: OTHER | Age: 71
End: 2021-01-01
Attending: NURSE PRACTITIONER
Payer: MEDICARE

## 2021-01-01 ENCOUNTER — HEALTH MAINTENANCE LETTER (OUTPATIENT)
Age: 71
End: 2021-01-01

## 2021-01-01 ENCOUNTER — ANCILLARY PROCEDURE (OUTPATIENT)
Dept: GENERAL RADIOLOGY | Facility: OTHER | Age: 71
End: 2021-01-01
Attending: NURSE PRACTITIONER
Payer: MEDICARE

## 2021-01-01 ENCOUNTER — INFUSION THERAPY VISIT (OUTPATIENT)
Dept: INFUSION THERAPY | Facility: OTHER | Age: 71
End: 2021-01-01
Attending: FAMILY MEDICINE
Payer: MEDICARE

## 2021-01-01 ENCOUNTER — HOSPITAL ENCOUNTER (EMERGENCY)
Facility: HOSPITAL | Age: 71
Discharge: HOME | End: 2021-11-01
Attending: NURSE PRACTITIONER | Admitting: NURSE PRACTITIONER
Payer: MEDICARE

## 2021-01-01 ENCOUNTER — HOSPITAL ENCOUNTER (OUTPATIENT)
Dept: PHYSICAL THERAPY | Facility: HOSPITAL | Age: 71
Setting detail: THERAPIES SERIES
End: 2021-09-08
Attending: NURSE PRACTITIONER
Payer: MEDICARE

## 2021-01-01 ENCOUNTER — LAB (OUTPATIENT)
Dept: LAB | Facility: HOSPITAL | Age: 71
End: 2021-01-01
Attending: OPHTHALMOLOGY
Payer: MEDICARE

## 2021-01-01 ENCOUNTER — HOSPITAL ENCOUNTER (EMERGENCY)
Facility: HOSPITAL | Age: 71
Discharge: HOME | End: 2021-08-03
Attending: NURSE PRACTITIONER | Admitting: NURSE PRACTITIONER
Payer: MEDICARE

## 2021-01-01 ENCOUNTER — RESULTS ONLY (OUTPATIENT)
Dept: LAB | Age: 71
End: 2021-01-01

## 2021-01-01 ENCOUNTER — HOSPITAL ENCOUNTER (OUTPATIENT)
Dept: PHYSICAL THERAPY | Facility: HOSPITAL | Age: 71
Setting detail: THERAPIES SERIES
End: 2021-09-01
Attending: NURSE PRACTITIONER
Payer: MEDICARE

## 2021-01-01 ENCOUNTER — HOSPITAL ENCOUNTER (OUTPATIENT)
Dept: PHYSICAL THERAPY | Facility: HOSPITAL | Age: 71
Setting detail: THERAPIES SERIES
End: 2021-08-23
Attending: NURSE PRACTITIONER
Payer: MEDICARE

## 2021-01-01 ENCOUNTER — TELEPHONE (OUTPATIENT)
Dept: ONCOLOGY | Facility: OTHER | Age: 71
End: 2021-01-01

## 2021-01-01 VITALS
HEART RATE: 66 BPM | DIASTOLIC BLOOD PRESSURE: 68 MMHG | HEIGHT: 66 IN | WEIGHT: 214.73 LBS | RESPIRATION RATE: 16 BRPM | BODY MASS INDEX: 34.51 KG/M2 | TEMPERATURE: 98 F | OXYGEN SATURATION: 97 % | SYSTOLIC BLOOD PRESSURE: 130 MMHG

## 2021-01-01 VITALS
TEMPERATURE: 97.2 F | DIASTOLIC BLOOD PRESSURE: 75 MMHG | HEART RATE: 58 BPM | SYSTOLIC BLOOD PRESSURE: 133 MMHG | RESPIRATION RATE: 16 BRPM | OXYGEN SATURATION: 99 %

## 2021-01-01 VITALS
WEIGHT: 210 LBS | HEART RATE: 95 BPM | BODY MASS INDEX: 33.89 KG/M2 | OXYGEN SATURATION: 97 % | SYSTOLIC BLOOD PRESSURE: 124 MMHG | RESPIRATION RATE: 24 BRPM | TEMPERATURE: 96.9 F | DIASTOLIC BLOOD PRESSURE: 72 MMHG

## 2021-01-01 VITALS
HEART RATE: 72 BPM | RESPIRATION RATE: 18 BRPM | DIASTOLIC BLOOD PRESSURE: 62 MMHG | TEMPERATURE: 97.8 F | SYSTOLIC BLOOD PRESSURE: 134 MMHG | OXYGEN SATURATION: 95 %

## 2021-01-01 VITALS
TEMPERATURE: 97.1 F | BODY MASS INDEX: 34.62 KG/M2 | RESPIRATION RATE: 18 BRPM | OXYGEN SATURATION: 98 % | WEIGHT: 214.51 LBS | HEART RATE: 69 BPM | DIASTOLIC BLOOD PRESSURE: 75 MMHG | SYSTOLIC BLOOD PRESSURE: 120 MMHG

## 2021-01-01 VITALS
OXYGEN SATURATION: 96 % | WEIGHT: 214 LBS | HEART RATE: 94 BPM | RESPIRATION RATE: 20 BRPM | DIASTOLIC BLOOD PRESSURE: 72 MMHG | SYSTOLIC BLOOD PRESSURE: 124 MMHG | TEMPERATURE: 97.8 F | BODY MASS INDEX: 34.56 KG/M2

## 2021-01-01 VITALS
BODY MASS INDEX: 34.07 KG/M2 | TEMPERATURE: 96.7 F | DIASTOLIC BLOOD PRESSURE: 68 MMHG | HEART RATE: 63 BPM | SYSTOLIC BLOOD PRESSURE: 120 MMHG | WEIGHT: 212 LBS | HEIGHT: 66 IN | OXYGEN SATURATION: 98 %

## 2021-01-01 VITALS
RESPIRATION RATE: 21 BRPM | OXYGEN SATURATION: 90 % | DIASTOLIC BLOOD PRESSURE: 62 MMHG | HEART RATE: 69 BPM | SYSTOLIC BLOOD PRESSURE: 120 MMHG | TEMPERATURE: 97.7 F

## 2021-01-01 DIAGNOSIS — M06.9 RHEUMATOID ARTHRITIS, INVOLVING UNSPECIFIED SITE, UNSPECIFIED WHETHER RHEUMATOID FACTOR PRESENT (H): ICD-10-CM

## 2021-01-01 DIAGNOSIS — M06.09 RHEUMATOID ARTHRITIS OF MULTIPLE SITES WITHOUT RHEUMATOID FACTOR (H): ICD-10-CM

## 2021-01-01 DIAGNOSIS — B37.2 YEAST INFECTION OF THE SKIN: ICD-10-CM

## 2021-01-01 DIAGNOSIS — Z00.00 ROUTINE GENERAL MEDICAL EXAMINATION AT A HEALTH CARE FACILITY: Primary | ICD-10-CM

## 2021-01-01 DIAGNOSIS — Z91.81 PERSONAL HISTORY OF FALL: ICD-10-CM

## 2021-01-01 DIAGNOSIS — Z79.899 ENCOUNTER FOR LONG-TERM (CURRENT) USE OF MEDICATIONS: ICD-10-CM

## 2021-01-01 DIAGNOSIS — R79.89 ABNORMAL TSH: ICD-10-CM

## 2021-01-01 DIAGNOSIS — I10 HYPERTENSION, UNSPECIFIED TYPE: ICD-10-CM

## 2021-01-01 DIAGNOSIS — E11.65 TYPE 2 DIABETES MELLITUS WITH HYPERGLYCEMIA, WITHOUT LONG-TERM CURRENT USE OF INSULIN (H): ICD-10-CM

## 2021-01-01 DIAGNOSIS — Z12.31 ENCOUNTER FOR SCREENING MAMMOGRAM FOR BREAST CANCER: ICD-10-CM

## 2021-01-01 DIAGNOSIS — M06.09 RHEUMATOID ARTHRITIS OF MULTIPLE SITES WITHOUT RHEUMATOID FACTOR (H): Primary | ICD-10-CM

## 2021-01-01 DIAGNOSIS — M25.551 PAIN IN JOINT INVOLVING PELVIC REGION AND THIGH, RIGHT: ICD-10-CM

## 2021-01-01 DIAGNOSIS — Z79.899 ENCOUNTER FOR LONG-TERM (CURRENT) USE OF MEDICATIONS: Primary | ICD-10-CM

## 2021-01-01 DIAGNOSIS — N30.00 ACUTE CYSTITIS WITHOUT HEMATURIA: ICD-10-CM

## 2021-01-01 DIAGNOSIS — F33.9 EPISODE OF RECURRENT MAJOR DEPRESSIVE DISORDER, UNSPECIFIED DEPRESSION EPISODE SEVERITY (H): ICD-10-CM

## 2021-01-01 DIAGNOSIS — S76.011A STRAIN OF RIGHT HIP AND THIGH, INITIAL ENCOUNTER: ICD-10-CM

## 2021-01-01 DIAGNOSIS — N39.0 UTI (URINARY TRACT INFECTION), UNCOMPLICATED: Primary | ICD-10-CM

## 2021-01-01 DIAGNOSIS — E03.9 HYPOTHYROIDISM, UNSPECIFIED TYPE: ICD-10-CM

## 2021-01-01 DIAGNOSIS — M79.672 LEFT FOOT PAIN: Primary | ICD-10-CM

## 2021-01-01 DIAGNOSIS — Z20.822 ENCOUNTER FOR LABORATORY TESTING FOR COVID-19 VIRUS: ICD-10-CM

## 2021-01-01 DIAGNOSIS — R35.0 URINARY FREQUENCY: ICD-10-CM

## 2021-01-01 DIAGNOSIS — E11.9 DIABETES MELLITUS TYPE 2, DIET-CONTROLLED (H): Primary | ICD-10-CM

## 2021-01-01 DIAGNOSIS — E11.9 TYPE 2 DIABETES MELLITUS WITHOUT COMPLICATION, WITHOUT LONG-TERM CURRENT USE OF INSULIN (H): ICD-10-CM

## 2021-01-01 DIAGNOSIS — E11.9 DIABETES MELLITUS TYPE 2, DIET-CONTROLLED (H): ICD-10-CM

## 2021-01-01 DIAGNOSIS — J18.9 PNEUMONIA: ICD-10-CM

## 2021-01-01 DIAGNOSIS — R73.9 HYPERGLYCEMIA: ICD-10-CM

## 2021-01-01 DIAGNOSIS — E78.5 HYPERLIPIDEMIA LDL GOAL <100: ICD-10-CM

## 2021-01-01 DIAGNOSIS — R19.7 DIARRHEA, UNSPECIFIED TYPE: ICD-10-CM

## 2021-01-01 DIAGNOSIS — R05.3 CHRONIC COUGH: ICD-10-CM

## 2021-01-01 DIAGNOSIS — R39.9 LOWER URINARY TRACT SYMPTOMS (LUTS): ICD-10-CM

## 2021-01-01 DIAGNOSIS — S76.911A STRAIN OF RIGHT HIP AND THIGH, INITIAL ENCOUNTER: ICD-10-CM

## 2021-01-01 DIAGNOSIS — E11.9 TYPE 2 DIABETES MELLITUS WITHOUT COMPLICATION, WITHOUT LONG-TERM CURRENT USE OF INSULIN (H): Primary | ICD-10-CM

## 2021-01-01 DIAGNOSIS — M81.0 OSTEOPOROSIS: ICD-10-CM

## 2021-01-01 DIAGNOSIS — H02.61: ICD-10-CM

## 2021-01-01 DIAGNOSIS — J18.9 PNEUMONIA OF LEFT LOWER LOBE DUE TO INFECTIOUS ORGANISM: Primary | ICD-10-CM

## 2021-01-01 DIAGNOSIS — Z98.890 H/O FOOT SURGERY: ICD-10-CM

## 2021-01-01 DIAGNOSIS — Z00.00 ROUTINE GENERAL MEDICAL EXAMINATION AT A HEALTH CARE FACILITY: ICD-10-CM

## 2021-01-01 DIAGNOSIS — S92.512A CLOSED DISPLACED FRACTURE OF PROXIMAL PHALANX OF LESSER TOE OF LEFT FOOT, INITIAL ENCOUNTER: ICD-10-CM

## 2021-01-01 LAB
ALBUMIN SERPL-MCNC: 2.8 G/DL (ref 3.4–5)
ALBUMIN SERPL-MCNC: 3.3 G/DL (ref 3.4–5)
ALBUMIN SERPL-MCNC: 3.4 G/DL (ref 3.4–5)
ALBUMIN SERPL-MCNC: 3.5 G/DL (ref 3.4–5)
ALBUMIN SERPL-MCNC: 3.5 G/DL (ref 3.4–5)
ALBUMIN SERPL-MCNC: 3.6 G/DL (ref 3.4–5)
ALBUMIN UR-MCNC: 30 MG/DL
ALBUMIN UR-MCNC: 50 MG/DL
ALP SERPL-CCNC: 88 U/L (ref 40–150)
ALT SERPL W P-5'-P-CCNC: 33 U/L (ref 0–50)
ALT SERPL W P-5'-P-CCNC: 37 U/L (ref 0–50)
ALT SERPL W P-5'-P-CCNC: 38 U/L (ref 0–50)
ALT SERPL W P-5'-P-CCNC: 39 U/L (ref 0–50)
ALT SERPL W P-5'-P-CCNC: 46 U/L (ref 0–50)
ALT SERPL W P-5'-P-CCNC: 54 U/L (ref 0–50)
ALT SERPL-CCNC: 44 IU/L (ref 6–31)
ANION GAP SERPL CALCULATED.3IONS-SCNC: 5 MMOL/L (ref 3–14)
APPEARANCE UR: CLEAR
APPEARANCE UR: CLEAR
AST SERPL W P-5'-P-CCNC: 14 U/L (ref 0–45)
AST SERPL W P-5'-P-CCNC: 15 U/L (ref 0–45)
AST SERPL W P-5'-P-CCNC: 16 U/L (ref 0–45)
AST SERPL W P-5'-P-CCNC: 22 U/L (ref 0–45)
AST SERPL W P-5'-P-CCNC: 22 U/L (ref 0–45)
AST SERPL W P-5'-P-CCNC: 41 U/L (ref 0–45)
AST SERPL-CCNC: 53 IU/L (ref 10–40)
BACTERIA #/AREA URNS HPF: ABNORMAL /HPF
BACTERIA #/AREA URNS HPF: ABNORMAL /HPF
BACTERIA UR CULT: ABNORMAL
BACTERIA UR CULT: ABNORMAL
BASOPHILS # BLD AUTO: 0 10E3/UL (ref 0–0.2)
BASOPHILS # BLD AUTO: 0 10E9/L (ref 0–0.2)
BASOPHILS # BLD AUTO: 0.1 10E9/L (ref 0–0.2)
BASOPHILS # BLD MANUAL: 0 10E3/UL (ref 0–0.2)
BASOPHILS NFR BLD AUTO: 0 %
BASOPHILS NFR BLD AUTO: 0.4 %
BASOPHILS NFR BLD AUTO: 0.4 %
BASOPHILS NFR BLD MANUAL: 0 %
BILIRUB SERPL-MCNC: 0.4 MG/DL (ref 0.2–1.3)
BILIRUB UR QL STRIP: NEGATIVE
BILIRUB UR QL STRIP: NEGATIVE
BUN SERPL-MCNC: 19 MG/DL (ref 7–30)
CALCIUM SERPL-MCNC: 8.2 MG/DL (ref 8.5–10.1)
CALCIUM SERPL-MCNC: 9.1 MG/DL (ref 8.5–10.1)
CALCIUM SERPL-MCNC: 9.1 MG/DL (ref 8.5–10.1)
CHLORIDE BLD-SCNC: 101 MMOL/L (ref 94–109)
CO2 SERPL-SCNC: 27 MMOL/L (ref 20–32)
COLOR UR AUTO: YELLOW
COLOR UR AUTO: YELLOW
COPATH REPORT: NORMAL
CREAT SERPL-MCNC: 0.79 MG/DL (ref 0.52–1.04)
CREAT SERPL-MCNC: 0.81 MG/DL (ref 0.52–1.04)
CREAT SERPL-MCNC: 0.82 MG/DL (ref 0.52–1.04)
CREAT SERPL-MCNC: 0.9 MG/DL (ref 0.52–1.04)
CREAT SERPL-MCNC: 0.91 MG/DL (ref 0.52–1.04)
CREAT SERPL-MCNC: 0.91 MG/DL (ref 0.52–1.04)
CREATININE (EXTERNAL): 0.98 MG/DL (ref 0.4–1)
CRP SERPL-MCNC: 10.4 MG/L (ref 0–8)
CRP SERPL-MCNC: 15.3 MG/L (ref 0–8)
CRP SERPL-MCNC: 24.9 MG/L (ref 0–8)
CRP SERPL-MCNC: 4.8 MG/L (ref 0–8)
CRP SERPL-MCNC: 58.8 MG/L (ref 0–8)
CRP SERPL-MCNC: 6.5 MG/L (ref 0–8)
CRP SERPL-MCNC: 9.4 MG/L (ref 0–8)
DEPRECATED CALCIDIOL+CALCIFEROL SERPL-MC: 56 UG/L (ref 20–75)
DEPRECATED CALCIDIOL+CALCIFEROL SERPL-MC: 67 UG/L (ref 20–75)
DIFFERENTIAL METHOD BLD: ABNORMAL
DIFFERENTIAL METHOD BLD: ABNORMAL
EOSINOPHIL # BLD AUTO: 0.1 10E3/UL (ref 0–0.7)
EOSINOPHIL # BLD AUTO: 0.1 10E9/L (ref 0–0.7)
EOSINOPHIL # BLD AUTO: 0.2 10E3/UL (ref 0–0.7)
EOSINOPHIL # BLD AUTO: 0.3 10E9/L (ref 0–0.7)
EOSINOPHIL # BLD MANUAL: 0 10E3/UL (ref 0–0.7)
EOSINOPHIL NFR BLD AUTO: 1 %
EOSINOPHIL NFR BLD AUTO: 1 %
EOSINOPHIL NFR BLD AUTO: 2 %
EOSINOPHIL NFR BLD AUTO: 2.4 %
EOSINOPHIL NFR BLD MANUAL: 0 %
ERYTHROCYTE [DISTWIDTH] IN BLOOD BY AUTOMATED COUNT: 13.7 % (ref 10–15)
ERYTHROCYTE [DISTWIDTH] IN BLOOD BY AUTOMATED COUNT: 13.9 % (ref 10–15)
ERYTHROCYTE [DISTWIDTH] IN BLOOD BY AUTOMATED COUNT: 14 % (ref 10–15)
ERYTHROCYTE [DISTWIDTH] IN BLOOD BY AUTOMATED COUNT: 14.1 % (ref 10–15)
ERYTHROCYTE [DISTWIDTH] IN BLOOD BY AUTOMATED COUNT: 14.2 % (ref 10–15)
ERYTHROCYTE [SEDIMENTATION RATE] IN BLOOD BY WESTERGREN METHOD: 14 MM/HR (ref 0–30)
ERYTHROCYTE [SEDIMENTATION RATE] IN BLOOD BY WESTERGREN METHOD: 14 MM/HR (ref 0–30)
ERYTHROCYTE [SEDIMENTATION RATE] IN BLOOD BY WESTERGREN METHOD: 18 MM/HR (ref 0–30)
ERYTHROCYTE [SEDIMENTATION RATE] IN BLOOD BY WESTERGREN METHOD: 19 MM/H (ref 0–30)
ERYTHROCYTE [SEDIMENTATION RATE] IN BLOOD BY WESTERGREN METHOD: 21 MM/H (ref 0–30)
ERYTHROCYTE [SEDIMENTATION RATE] IN BLOOD BY WESTERGREN METHOD: 22 MM/HR (ref 0–30)
ERYTHROCYTE [SEDIMENTATION RATE] IN BLOOD BY WESTERGREN METHOD: 30 MM/HR (ref 0–30)
EST. AVERAGE GLUCOSE BLD GHB EST-MCNC: 174 MG/DL
EST. AVERAGE GLUCOSE BLD GHB EST-MCNC: 229 MG/DL
GFR ESTIMATED (EXTERNAL): 56 ML/MIN/1.73M2
GFR SERPL CREATININE-BSD FRML MDRD: 64 ML/MIN/1.73M2
GFR SERPL CREATININE-BSD FRML MDRD: 64 ML/MIN/1.73M2
GFR SERPL CREATININE-BSD FRML MDRD: 65 ML/MIN/1.73M2
GFR SERPL CREATININE-BSD FRML MDRD: 73 ML/MIN/{1.73_M2}
GFR SERPL CREATININE-BSD FRML MDRD: 74 ML/MIN/{1.73_M2}
GFR SERPL CREATININE-BSD FRML MDRD: 76 ML/MIN/1.73M2
GLUCOSE (EXTERNAL): 326 MG/DL (ref 70–99)
GLUCOSE BLD-MCNC: 379 MG/DL (ref 60–99)
GLUCOSE BLD-MCNC: 555 MG/DL (ref 70–99)
GLUCOSE BLDC GLUCOMTR-MCNC: 402 MG/DL (ref 70–99)
GLUCOSE BLDC GLUCOMTR-MCNC: 540 MG/DL (ref 70–99)
GLUCOSE UR STRIP-MCNC: >1000 MG/DL
GLUCOSE UR STRIP-MCNC: NEGATIVE MG/DL
GRANULAR CAST: 3 /LPF
HBA1C MFR BLD: 7.7 % (ref 0–5.6)
HBA1C MFR BLD: 9.6 % (ref 0–5.6)
HCT VFR BLD AUTO: 37.8 % (ref 35–47)
HCT VFR BLD AUTO: 40.3 % (ref 35–47)
HCT VFR BLD AUTO: 40.4 % (ref 35–47)
HCT VFR BLD AUTO: 41.2 % (ref 35–47)
HCT VFR BLD AUTO: 41.2 % (ref 35–47)
HCT VFR BLD AUTO: 42 % (ref 35–47)
HCT VFR BLD AUTO: 42.3 % (ref 35–47)
HGB BLD-MCNC: 13.1 G/DL (ref 11.7–15.7)
HGB BLD-MCNC: 13.4 G/DL (ref 11.7–15.7)
HGB BLD-MCNC: 13.5 G/DL (ref 11.7–15.7)
HGB BLD-MCNC: 13.8 G/DL (ref 11.7–15.7)
HGB BLD-MCNC: 13.9 G/DL (ref 11.7–15.7)
HGB BLD-MCNC: 14 G/DL (ref 11.7–15.7)
HGB BLD-MCNC: 14.2 G/DL (ref 11.7–15.7)
HGB UR QL STRIP: NEGATIVE
HGB UR QL STRIP: NEGATIVE
HOLD SPECIMEN: NORMAL
HYALINE CASTS: 2 /LPF
IMM GRANULOCYTES # BLD: 0 10E3/UL
IMM GRANULOCYTES # BLD: 0 10E3/UL
IMM GRANULOCYTES # BLD: 0 10E9/L (ref 0–0.4)
IMM GRANULOCYTES # BLD: 0 10E9/L (ref 0–0.4)
IMM GRANULOCYTES # BLD: 0.1 10E3/UL
IMM GRANULOCYTES NFR BLD: 0 %
IMM GRANULOCYTES NFR BLD: 0 %
IMM GRANULOCYTES NFR BLD: 0.3 %
IMM GRANULOCYTES NFR BLD: 0.4 %
IMM GRANULOCYTES NFR BLD: 1 %
INR (EXTERNAL): 0.9 (ref 0.9–1.1)
KETONES UR STRIP-MCNC: ABNORMAL MG/DL
KETONES UR STRIP-MCNC: NEGATIVE MG/DL
LACTATE SERPL-SCNC: 1.5 MMOL/L (ref 0.7–2)
LEUKOCYTE ESTERASE UR QL STRIP: ABNORMAL
LEUKOCYTE ESTERASE UR QL STRIP: ABNORMAL
LYMPHOCYTES # BLD AUTO: 0.9 10E3/UL (ref 0.8–5.3)
LYMPHOCYTES # BLD AUTO: 0.9 10E9/L (ref 0.8–5.3)
LYMPHOCYTES # BLD AUTO: 1 10E3/UL (ref 0.8–5.3)
LYMPHOCYTES # BLD AUTO: 1.2 10E9/L (ref 0.8–5.3)
LYMPHOCYTES # BLD MANUAL: 0.4 10E3/UL (ref 0.8–5.3)
LYMPHOCYTES NFR BLD AUTO: 10 %
LYMPHOCYTES NFR BLD AUTO: 11 %
LYMPHOCYTES NFR BLD AUTO: 13 %
LYMPHOCYTES NFR BLD AUTO: 7 %
LYMPHOCYTES NFR BLD AUTO: 7.9 %
LYMPHOCYTES NFR BLD AUTO: 9.6 %
LYMPHOCYTES NFR BLD MANUAL: 15 %
MCH RBC QN AUTO: 33.4 PG (ref 26.5–33)
MCH RBC QN AUTO: 33.5 PG (ref 26.5–33)
MCH RBC QN AUTO: 33.8 PG (ref 26.5–33)
MCH RBC QN AUTO: 33.9 PG (ref 26.5–33)
MCH RBC QN AUTO: 34.1 PG (ref 26.5–33)
MCH RBC QN AUTO: 34.2 PG (ref 26.5–33)
MCH RBC QN AUTO: 34.3 PG (ref 26.5–33)
MCHC RBC AUTO-ENTMCNC: 33.1 G/DL (ref 31.5–36.5)
MCHC RBC AUTO-ENTMCNC: 33.3 G/DL (ref 31.5–36.5)
MCHC RBC AUTO-ENTMCNC: 33.4 G/DL (ref 31.5–36.5)
MCHC RBC AUTO-ENTMCNC: 33.5 G/DL (ref 31.5–36.5)
MCHC RBC AUTO-ENTMCNC: 33.6 G/DL (ref 31.5–36.5)
MCHC RBC AUTO-ENTMCNC: 34 G/DL (ref 31.5–36.5)
MCHC RBC AUTO-ENTMCNC: 34.7 G/DL (ref 31.5–36.5)
MCV RBC AUTO: 101 FL (ref 78–100)
MCV RBC AUTO: 101 FL (ref 78–100)
MCV RBC AUTO: 102 FL (ref 78–100)
MCV RBC AUTO: 96 FL (ref 78–100)
MONOCYTES # BLD AUTO: 0.6 10E3/UL (ref 0–1.3)
MONOCYTES # BLD AUTO: 0.8 10E3/UL (ref 0–1.3)
MONOCYTES # BLD AUTO: 0.9 10E9/L (ref 0–1.3)
MONOCYTES # BLD AUTO: 1 10E3/UL (ref 0–1.3)
MONOCYTES # BLD AUTO: 1.2 10E3/UL (ref 0–1.3)
MONOCYTES # BLD AUTO: 1.2 10E9/L (ref 0–1.3)
MONOCYTES # BLD MANUAL: 0.5 10E3/UL (ref 0–1.3)
MONOCYTES NFR BLD AUTO: 10 %
MONOCYTES NFR BLD AUTO: 10.1 %
MONOCYTES NFR BLD AUTO: 13 %
MONOCYTES NFR BLD AUTO: 7 %
MONOCYTES NFR BLD AUTO: 7.8 %
MONOCYTES NFR BLD AUTO: 8 %
MONOCYTES NFR BLD MANUAL: 18 %
MUCOUS THREADS #/AREA URNS LPF: PRESENT /LPF
NEUTROPHILS # BLD AUTO: 5.6 10E3/UL (ref 1.6–8.3)
NEUTROPHILS # BLD AUTO: 6.5 10E3/UL (ref 1.6–8.3)
NEUTROPHILS # BLD AUTO: 6.9 10E3/UL (ref 1.6–8.3)
NEUTROPHILS # BLD AUTO: 9.2 10E9/L (ref 1.6–8.3)
NEUTROPHILS # BLD AUTO: 9.2 10E9/L (ref 1.6–8.3)
NEUTROPHILS # BLD AUTO: 9.8 10E3/UL (ref 1.6–8.3)
NEUTROPHILS # BLD MANUAL: 1.9 10E3/UL (ref 1.6–8.3)
NEUTROPHILS NFR BLD AUTO: 75 %
NEUTROPHILS NFR BLD AUTO: 75 %
NEUTROPHILS NFR BLD AUTO: 77.2 %
NEUTROPHILS NFR BLD AUTO: 80 %
NEUTROPHILS NFR BLD AUTO: 82.5 %
NEUTROPHILS NFR BLD AUTO: 83 %
NEUTROPHILS NFR BLD MANUAL: 67 %
NITRATE UR QL: POSITIVE
NITRATE UR QL: POSITIVE
NRBC # BLD AUTO: 0 10*3/UL
NRBC # BLD AUTO: 0 10*3/UL
NRBC # BLD AUTO: 0 10E3/UL
NRBC BLD AUTO-RTO: 0 /100
PATH REV: ABNORMAL
PH UR STRIP: 5.5 [PH] (ref 4.7–8)
PH UR STRIP: 5.5 [PH] (ref 5–7)
PLAT MORPH BLD: ABNORMAL
PLATELET # BLD AUTO: 106 10E3/UL (ref 150–450)
PLATELET # BLD AUTO: 227 10E3/UL (ref 150–450)
PLATELET # BLD AUTO: 254 10E3/UL (ref 150–450)
PLATELET # BLD AUTO: 255 10E3/UL (ref 150–450)
PLATELET # BLD AUTO: 264 10E9/L (ref 150–450)
PLATELET # BLD AUTO: 285 10E3/UL (ref 150–450)
PLATELET # BLD AUTO: 289 10E9/L (ref 150–450)
POTASSIUM (EXTERNAL): 4.1 MEQ/L (ref 3.4–5.1)
POTASSIUM BLD-SCNC: 4.1 MMOL/L (ref 3.4–5.3)
PROT SERPL-MCNC: 6.4 G/DL (ref 6.8–8.8)
RBC # BLD AUTO: 3.92 10E6/UL (ref 3.8–5.2)
RBC # BLD AUTO: 3.98 10E12/L (ref 3.8–5.2)
RBC # BLD AUTO: 4 10E6/UL (ref 3.8–5.2)
RBC # BLD AUTO: 4.05 10E12/L (ref 3.8–5.2)
RBC # BLD AUTO: 4.09 10E6/UL (ref 3.8–5.2)
RBC # BLD AUTO: 4.11 10E6/UL (ref 3.8–5.2)
RBC # BLD AUTO: 4.14 10E6/UL (ref 3.8–5.2)
RBC #/AREA URNS AUTO: ABNORMAL /HPF
RBC MORPH BLD: ABNORMAL
RBC URINE: 2 /HPF
RETINOPATHY: NEGATIVE
SODIUM SERPL-SCNC: 133 MMOL/L (ref 133–144)
SP GR UR STRIP: 1.03 (ref 1–1.03)
SP GR UR STRIP: >=1.03 (ref 1–1.03)
SQUAMOUS #/AREA URNS AUTO: ABNORMAL /LPF
SQUAMOUS EPITHELIAL: 2 /HPF
TSH SERPL DL<=0.005 MIU/L-ACNC: 3.97 MU/L (ref 0.4–4)
UROBILINOGEN UR STRIP-ACNC: 0.2 E.U./DL
UROBILINOGEN UR STRIP-MCNC: NORMAL MG/DL
WBC # BLD AUTO: 11.1 10E9/L (ref 4–11)
WBC # BLD AUTO: 11.9 10E3/UL (ref 4–11)
WBC # BLD AUTO: 12 10E9/L (ref 4–11)
WBC # BLD AUTO: 2.9 10E3/UL (ref 4–11)
WBC # BLD AUTO: 7.6 10E3/UL (ref 4–11)
WBC # BLD AUTO: 8.2 10E3/UL (ref 4–11)
WBC # BLD AUTO: 9.2 10E3/UL (ref 4–11)
WBC #/AREA URNS AUTO: ABNORMAL /HPF
WBC URINE: 44 /HPF

## 2021-01-01 PROCEDURE — 86140 C-REACTIVE PROTEIN: CPT | Mod: ZL | Performed by: INTERNAL MEDICINE

## 2021-01-01 PROCEDURE — 86140 C-REACTIVE PROTEIN: CPT | Mod: ZL | Performed by: NURSE PRACTITIONER

## 2021-01-01 PROCEDURE — G0438 PPPS, INITIAL VISIT: HCPCS | Performed by: FAMILY MEDICINE

## 2021-01-01 PROCEDURE — 86140 C-REACTIVE PROTEIN: CPT | Mod: ZL

## 2021-01-01 PROCEDURE — 82310 ASSAY OF CALCIUM: CPT | Mod: ZL | Performed by: INTERNAL MEDICINE

## 2021-01-01 PROCEDURE — 82565 ASSAY OF CREATININE: CPT | Mod: ZL

## 2021-01-01 PROCEDURE — 99215 OFFICE O/P EST HI 40 MIN: CPT | Performed by: NURSE PRACTITIONER

## 2021-01-01 PROCEDURE — 97110 THERAPEUTIC EXERCISES: CPT | Mod: GP

## 2021-01-01 PROCEDURE — 84460 ALANINE AMINO (ALT) (SGPT): CPT | Mod: ZL | Performed by: INTERNAL MEDICINE

## 2021-01-01 PROCEDURE — 36415 COLL VENOUS BLD VENIPUNCTURE: CPT | Mod: ZL

## 2021-01-01 PROCEDURE — 85025 COMPLETE CBC W/AUTO DIFF WBC: CPT | Mod: ZL | Performed by: INTERNAL MEDICINE

## 2021-01-01 PROCEDURE — 82306 VITAMIN D 25 HYDROXY: CPT | Mod: ZL | Performed by: INTERNAL MEDICINE

## 2021-01-01 PROCEDURE — 84450 TRANSFERASE (AST) (SGOT): CPT | Mod: ZL

## 2021-01-01 PROCEDURE — 85652 RBC SED RATE AUTOMATED: CPT | Mod: ZL | Performed by: INTERNAL MEDICINE

## 2021-01-01 PROCEDURE — 83036 HEMOGLOBIN GLYCOSYLATED A1C: CPT | Performed by: NURSE PRACTITIONER

## 2021-01-01 PROCEDURE — 85004 AUTOMATED DIFF WBC COUNT: CPT | Mod: ZL | Performed by: NURSE PRACTITIONER

## 2021-01-01 PROCEDURE — 84460 ALANINE AMINO (ALT) (SGPT): CPT | Mod: ZL

## 2021-01-01 PROCEDURE — 36415 COLL VENOUS BLD VENIPUNCTURE: CPT | Mod: ZL | Performed by: INTERNAL MEDICINE

## 2021-01-01 PROCEDURE — 99213 OFFICE O/P EST LOW 20 MIN: CPT | Performed by: NURSE PRACTITIONER

## 2021-01-01 PROCEDURE — 96367 TX/PROPH/DG ADDL SEQ IV INF: CPT

## 2021-01-01 PROCEDURE — 85652 RBC SED RATE AUTOMATED: CPT | Mod: ZL

## 2021-01-01 PROCEDURE — 82040 ASSAY OF SERUM ALBUMIN: CPT | Mod: ZL

## 2021-01-01 PROCEDURE — 99214 OFFICE O/P EST MOD 30 MIN: CPT | Performed by: NURSE PRACTITIONER

## 2021-01-01 PROCEDURE — 87086 URINE CULTURE/COLONY COUNT: CPT | Mod: ZL | Performed by: NURSE PRACTITIONER

## 2021-01-01 PROCEDURE — 96415 CHEMO IV INFUSION ADDL HR: CPT

## 2021-01-01 PROCEDURE — 96375 TX/PRO/DX INJ NEW DRUG ADDON: CPT

## 2021-01-01 PROCEDURE — 250N000011 HC RX IP 250 OP 636: Performed by: FAMILY MEDICINE

## 2021-01-01 PROCEDURE — 97140 MANUAL THERAPY 1/> REGIONS: CPT | Mod: GP

## 2021-01-01 PROCEDURE — G0463 HOSPITAL OUTPT CLINIC VISIT: HCPCS

## 2021-01-01 PROCEDURE — 96372 THER/PROPH/DIAG INJ SC/IM: CPT | Performed by: NURSE PRACTITIONER

## 2021-01-01 PROCEDURE — 82306 VITAMIN D 25 HYDROXY: CPT | Mod: ZL

## 2021-01-01 PROCEDURE — 96365 THER/PROPH/DIAG IV INF INIT: CPT

## 2021-01-01 PROCEDURE — 82040 ASSAY OF SERUM ALBUMIN: CPT | Performed by: NURSE PRACTITIONER

## 2021-01-01 PROCEDURE — 83605 ASSAY OF LACTIC ACID: CPT | Performed by: NURSE PRACTITIONER

## 2021-01-01 PROCEDURE — 258N000003 HC RX IP 258 OP 636: Performed by: NURSE PRACTITIONER

## 2021-01-01 PROCEDURE — 82040 ASSAY OF SERUM ALBUMIN: CPT | Mod: ZL | Performed by: INTERNAL MEDICINE

## 2021-01-01 PROCEDURE — 85652 RBC SED RATE AUTOMATED: CPT | Mod: ZL | Performed by: NURSE PRACTITIONER

## 2021-01-01 PROCEDURE — 73523 X-RAY EXAM HIPS BI 5/> VIEWS: CPT | Mod: TC,FY

## 2021-01-01 PROCEDURE — 82310 ASSAY OF CALCIUM: CPT | Mod: ZL

## 2021-01-01 PROCEDURE — 81015 MICROSCOPIC EXAM OF URINE: CPT | Mod: ZL | Performed by: NURSE PRACTITIONER

## 2021-01-01 PROCEDURE — 87086 URINE CULTURE/COLONY COUNT: CPT | Mod: ZL

## 2021-01-01 PROCEDURE — 250N000012 HC RX MED GY IP 250 OP 636 PS 637: Performed by: NURSE PRACTITIONER

## 2021-01-01 PROCEDURE — G0463 HOSPITAL OUTPT CLINIC VISIT: HCPCS | Mod: 25

## 2021-01-01 PROCEDURE — 36415 COLL VENOUS BLD VENIPUNCTURE: CPT | Mod: ZL | Performed by: NURSE PRACTITIONER

## 2021-01-01 PROCEDURE — 73630 X-RAY EXAM OF FOOT: CPT | Mod: LT

## 2021-01-01 PROCEDURE — 250N000013 HC RX MED GY IP 250 OP 250 PS 637: Performed by: FAMILY MEDICINE

## 2021-01-01 PROCEDURE — 85025 COMPLETE CBC W/AUTO DIFF WBC: CPT | Mod: ZL

## 2021-01-01 PROCEDURE — 96413 CHEMO IV INFUSION 1 HR: CPT

## 2021-01-01 PROCEDURE — 77063 BREAST TOMOSYNTHESIS BI: CPT | Mod: TC

## 2021-01-01 PROCEDURE — 82947 ASSAY GLUCOSE BLOOD QUANT: CPT | Mod: ZL | Performed by: NURSE PRACTITIONER

## 2021-01-01 PROCEDURE — 71045 X-RAY EXAM CHEST 1 VIEW: CPT

## 2021-01-01 PROCEDURE — 82565 ASSAY OF CREATININE: CPT | Mod: ZL | Performed by: INTERNAL MEDICINE

## 2021-01-01 PROCEDURE — 96366 THER/PROPH/DIAG IV INF ADDON: CPT

## 2021-01-01 PROCEDURE — 84450 TRANSFERASE (AST) (SGOT): CPT | Mod: ZL | Performed by: INTERNAL MEDICINE

## 2021-01-01 PROCEDURE — 258N000003 HC RX IP 258 OP 636: Performed by: FAMILY MEDICINE

## 2021-01-01 PROCEDURE — 81001 URINALYSIS AUTO W/SCOPE: CPT | Mod: ZL

## 2021-01-01 PROCEDURE — 85025 COMPLETE CBC W/AUTO DIFF WBC: CPT | Mod: ZL | Performed by: NURSE PRACTITIONER

## 2021-01-01 PROCEDURE — 99213 OFFICE O/P EST LOW 20 MIN: CPT | Performed by: FAMILY MEDICINE

## 2021-01-01 PROCEDURE — 85004 AUTOMATED DIFF WBC COUNT: CPT | Mod: ZL

## 2021-01-01 PROCEDURE — 83036 HEMOGLOBIN GLYCOSYLATED A1C: CPT | Mod: ZL | Performed by: NURSE PRACTITIONER

## 2021-01-01 PROCEDURE — 84443 ASSAY THYROID STIM HORMONE: CPT | Mod: ZL | Performed by: FAMILY MEDICINE

## 2021-01-01 PROCEDURE — 97161 PT EVAL LOW COMPLEX 20 MIN: CPT | Mod: GP

## 2021-01-01 PROCEDURE — 36415 COLL VENOUS BLD VENIPUNCTURE: CPT | Performed by: NURSE PRACTITIONER

## 2021-01-01 PROCEDURE — 36415 COLL VENOUS BLD VENIPUNCTURE: CPT | Mod: ZL | Performed by: FAMILY MEDICINE

## 2021-01-01 RX ORDER — NYSTATIN 100000 U/G
CREAM TOPICAL 2 TIMES DAILY
Qty: 30 G | Refills: 1 | Status: SHIPPED | OUTPATIENT
Start: 2021-01-01

## 2021-01-01 RX ORDER — METHYLPREDNISOLONE SODIUM SUCCINATE 125 MG/2ML
100 INJECTION, POWDER, LYOPHILIZED, FOR SOLUTION INTRAMUSCULAR; INTRAVENOUS ONCE
Status: CANCELLED
Start: 2021-01-01

## 2021-01-01 RX ORDER — AZITHROMYCIN 250 MG/1
TABLET, FILM COATED ORAL
Qty: 6 TABLET | Refills: 0 | Status: SHIPPED | OUTPATIENT
Start: 2021-01-01 | End: 2021-01-01

## 2021-01-01 RX ORDER — NITROFURANTOIN 25; 75 MG/1; MG/1
100 CAPSULE ORAL 2 TIMES DAILY
Qty: 14 CAPSULE | Refills: 0 | Status: SHIPPED | OUTPATIENT
Start: 2021-01-01 | End: 2021-01-01

## 2021-01-01 RX ORDER — DIPHENHYDRAMINE HCL 50 MG
50 CAPSULE ORAL ONCE
Status: COMPLETED | OUTPATIENT
Start: 2021-01-01 | End: 2021-01-01

## 2021-01-01 RX ORDER — VENLAFAXINE HYDROCHLORIDE 75 MG/1
CAPSULE, EXTENDED RELEASE ORAL
Qty: 90 CAPSULE | Refills: 0 | Status: SHIPPED | OUTPATIENT
Start: 2021-01-01 | End: 2021-01-01

## 2021-01-01 RX ORDER — HEPARIN SODIUM (PORCINE) LOCK FLUSH IV SOLN 100 UNIT/ML 100 UNIT/ML
5 SOLUTION INTRAVENOUS
Status: CANCELLED | OUTPATIENT
Start: 2021-01-01

## 2021-01-01 RX ORDER — BLOOD SUGAR DIAGNOSTIC
STRIP MISCELLANEOUS
Qty: 100 STRIP | Refills: 2 | Status: SHIPPED | OUTPATIENT
Start: 2021-01-01

## 2021-01-01 RX ORDER — LEVOFLOXACIN 500 MG/1
500 TABLET, FILM COATED ORAL DAILY
Refills: 0 | Status: CANCELLED | OUTPATIENT
Start: 2021-01-01 | End: 2021-01-01

## 2021-01-01 RX ORDER — ALBUTEROL SULFATE 90 UG/1
AEROSOL, METERED RESPIRATORY (INHALATION)
COMMUNITY
Start: 2021-01-01

## 2021-01-01 RX ORDER — DIPHENHYDRAMINE HYDROCHLORIDE 50 MG/ML
50 INJECTION INTRAMUSCULAR; INTRAVENOUS
Status: CANCELLED
Start: 2021-01-01

## 2021-01-01 RX ORDER — ACETAMINOPHEN 325 MG/1
975 TABLET ORAL ONCE
Status: COMPLETED | OUTPATIENT
Start: 2021-01-01 | End: 2021-01-01

## 2021-01-01 RX ORDER — HEPARIN SODIUM (PORCINE) LOCK FLUSH IV SOLN 100 UNIT/ML 100 UNIT/ML
5 SOLUTION INTRAVENOUS
Status: DISCONTINUED | OUTPATIENT
Start: 2021-01-01 | End: 2021-01-01 | Stop reason: HOSPADM

## 2021-01-01 RX ORDER — PROPRANOLOL HYDROCHLORIDE 20 MG/1
TABLET ORAL
Qty: 90 TABLET | Refills: 0 | Status: SHIPPED | OUTPATIENT
Start: 2021-01-01 | End: 2021-01-01

## 2021-01-01 RX ORDER — METHYLPREDNISOLONE SODIUM SUCCINATE 125 MG/2ML
100 INJECTION, POWDER, LYOPHILIZED, FOR SOLUTION INTRAMUSCULAR; INTRAVENOUS ONCE
Status: COMPLETED | OUTPATIENT
Start: 2021-01-01 | End: 2021-01-01

## 2021-01-01 RX ORDER — HEPARIN SODIUM,PORCINE 10 UNIT/ML
5 VIAL (ML) INTRAVENOUS
Status: CANCELLED | OUTPATIENT
Start: 2021-01-01

## 2021-01-01 RX ORDER — LEUCOVORIN CALCIUM 5 MG/1
TABLET ORAL
COMMUNITY
Start: 2021-01-01

## 2021-01-01 RX ORDER — HEPARIN SODIUM,PORCINE 10 UNIT/ML
5 VIAL (ML) INTRAVENOUS
Status: DISCONTINUED | OUTPATIENT
Start: 2021-01-01 | End: 2021-01-01 | Stop reason: HOSPADM

## 2021-01-01 RX ORDER — CELECOXIB 200 MG/1
CAPSULE ORAL
Qty: 90 CAPSULE | Refills: 0 | Status: SHIPPED | OUTPATIENT
Start: 2021-01-01 | End: 2021-01-01

## 2021-01-01 RX ORDER — ATORVASTATIN CALCIUM 20 MG/1
TABLET, FILM COATED ORAL
Qty: 90 TABLET | Refills: 0 | Status: SHIPPED | OUTPATIENT
Start: 2021-01-01 | End: 2021-01-01

## 2021-01-01 RX ORDER — CELECOXIB 200 MG/1
CAPSULE ORAL
Qty: 90 CAPSULE | Refills: 0 | Status: SHIPPED | OUTPATIENT
Start: 2021-01-01

## 2021-01-01 RX ORDER — MONTELUKAST SODIUM 10 MG/1
10 TABLET ORAL
COMMUNITY
Start: 2021-01-01 | End: 2021-01-01

## 2021-01-01 RX ORDER — LEVOTHYROXINE SODIUM 25 UG/1
TABLET ORAL
Qty: 90 TABLET | Refills: 0 | Status: SHIPPED | OUTPATIENT
Start: 2021-01-01 | End: 2021-01-01

## 2021-01-01 RX ADMIN — METHYLPREDNISOLONE SODIUM SUCCINATE 100 MG: 125 INJECTION, POWDER, FOR SOLUTION INTRAMUSCULAR; INTRAVENOUS at 09:38

## 2021-01-01 RX ADMIN — FAMOTIDINE 20 MG: 20 INJECTION, SOLUTION INTRAVENOUS at 09:47

## 2021-01-01 RX ADMIN — SODIUM CHLORIDE 250 ML: 9 INJECTION, SOLUTION INTRAVENOUS at 09:29

## 2021-01-01 RX ADMIN — METHYLPREDNISOLONE SODIUM SUCCINATE 100 MG: 125 INJECTION, POWDER, FOR SOLUTION INTRAMUSCULAR; INTRAVENOUS at 09:34

## 2021-01-01 RX ADMIN — SODIUM CHLORIDE 1000 ML: 9 INJECTION, SOLUTION INTRAVENOUS at 15:26

## 2021-01-01 RX ADMIN — ACETAMINOPHEN 975 MG: 325 TABLET, FILM COATED ORAL at 09:53

## 2021-01-01 RX ADMIN — SODIUM CHLORIDE 250 ML: 9 INJECTION, SOLUTION INTRAVENOUS at 09:33

## 2021-01-01 RX ADMIN — INSULIN GLARGINE 10 UNITS: 100 INJECTION, SOLUTION SUBCUTANEOUS at 16:15

## 2021-01-01 RX ADMIN — RITUXIMAB-ABBS 1000 MG: 10 INJECTION, SOLUTION INTRAVENOUS at 10:40

## 2021-01-01 RX ADMIN — FAMOTIDINE 20 MG: 20 INJECTION, SOLUTION INTRAVENOUS at 09:38

## 2021-01-01 RX ADMIN — DIPHENHYDRAMINE HYDROCHLORIDE 50 MG: 50 CAPSULE ORAL at 09:54

## 2021-01-01 RX ADMIN — RITUXIMAB-ABBS 1000 MG: 10 INJECTION, SOLUTION INTRAVENOUS at 10:19

## 2021-01-01 ASSESSMENT — ENCOUNTER SYMPTOMS
ARTHRALGIAS: 1
MYALGIAS: 1
NUMBNESS: 0
RHINORRHEA: 0
ACTIVITY CHANGE: 1
WOUND: 0
SINUS PRESSURE: 0
CHILLS: 1
VOMITING: 0
CHEST TIGHTNESS: 0
NAUSEA: 0
SINUS PAIN: 0
SORE THROAT: 0
FEVER: 1
COLOR CHANGE: 0
SHORTNESS OF BREATH: 1
LIGHT-HEADEDNESS: 1
CHILLS: 0
DIARRHEA: 1
COUGH: 1
FATIGUE: 1
HEADACHES: 1
DIZZINESS: 0
JOINT SWELLING: 0
EYES NEGATIVE: 1
BACK PAIN: 1
FEVER: 0

## 2021-01-01 ASSESSMENT — MIFFLIN-ST. JEOR
SCORE: 1498.38
SCORE: 1510.5

## 2021-01-01 ASSESSMENT — PATIENT HEALTH QUESTIONNAIRE - PHQ9
SUM OF ALL RESPONSES TO PHQ QUESTIONS 1-9: 3
SUM OF ALL RESPONSES TO PHQ QUESTIONS 1-9: 4

## 2021-01-01 ASSESSMENT — ANXIETY QUESTIONNAIRES
7. FEELING AFRAID AS IF SOMETHING AWFUL MIGHT HAPPEN: NOT AT ALL
1. FEELING NERVOUS, ANXIOUS, OR ON EDGE: NOT AT ALL
4. TROUBLE RELAXING: NOT AT ALL
GAD7 TOTAL SCORE: 0
2. NOT BEING ABLE TO STOP OR CONTROL WORRYING: NOT AT ALL
6. BECOMING EASILY ANNOYED OR IRRITABLE: NOT AT ALL
3. WORRYING TOO MUCH ABOUT DIFFERENT THINGS: NOT AT ALL
5. BEING SO RESTLESS THAT IT IS HARD TO SIT STILL: NOT AT ALL
GAD7 TOTAL SCORE: 0

## 2021-01-01 ASSESSMENT — PAIN SCALES - GENERAL
PAINLEVEL: NO PAIN (0)
PAINLEVEL: MODERATE PAIN (4)
PAINLEVEL: NO PAIN (0)
PAINLEVEL: MODERATE PAIN (5)

## 2021-02-02 NOTE — LETTER
February 3, 2021      Radha Fox     [facility] [address]        Dear Radha,     APPOINTMENT REMINDER:   Our records indicates that it is time for you to be seen for a follow up visit and medication review.     Your current medication request will be approved for one refill but you will need to be seen before any additional refills can be approved.  Taking care of your health is important to us, and ongoing visits with your provider are vital to your care.    We look forward to seeing you in the near future.  You may call our office at [phone] to schedule a visit.     Please disregard this notice if you have already made an appointment.        Sincerely,        Lanie Duggan MD

## 2021-02-03 NOTE — TELEPHONE ENCOUNTER
Letter sent to schedule an appointment. Please advise, thank you.    venlafaxine (EFFEXOR-XR) 75 MG 24 hr capsule      Last Written Prescription Date:  11/02/20  Last Fill Quantity: 90,   # refills: 0  Failed protocol due to    PHQ-9 score of less than 5 in past 6 months Protocol Details    Recent (6 mo) or future (30 days) visit within the authorizing provider's specialty          celecoxib (CELEBREX) 200 MG capsule      Last Written Prescription Date:  11/02/20  Last Fill Quantity: 90,   # refills: 0  Last Office Visit: 02/12/20  Future Office visit:     Failed protocol due to   Patient is age 6-64 years       Normal CBC on file in past 12 months        Recent Labs   Lab Test 01/26/21  1253   WBC 11.1*   RBC 4.05   HGB 13.8   HCT 41.2

## 2021-02-05 NOTE — PROGRESS NOTES
Received signed faxed orders from [facility], Dr Sanchez, for Rituxan every 6 months. Call to their office, alerting that no specific labs ordered. His nurse Lizzy notes she sent them seperately. Explained we did not receive, need at least a verbal so we know what to watch for/draw. There are labs in open orders, likely entered by our lab under their orders, but they are for every 3 months, so they do not coincide with rituxan exactly. She will fax to us shortly. Also, asked after dosing, as typically patient had been day 1 and day 15, but current orders are only for once. She notes she did as MD about this and he wants only the one dose every 6 months. Therapy plan entered. Will send to PCP for sign, once lab orders have been received.

## 2021-02-05 NOTE — PROGRESS NOTES
Received updated signed orders from [facility], noting labs prior to each infusion. Therapy plan updated and sent within epic to PCP for sign.

## 2021-02-08 NOTE — TELEPHONE ENCOUNTER
----- Message from Liz Clark RN sent at 2/8/2021  8:31 AM CST -----  Please place patient on the schedule for a level 6 Rituxan, Plan is in place and Patient approved for Day one every 6 months.  Thank you, Liz KAUFMAN

## 2021-02-08 NOTE — PROGRESS NOTES
Secondary review of order complete.  Plan is accurate.  Staff message sent to PAC to place patient on schedule.

## 2021-02-19 NOTE — PATIENT INSTRUCTIONS
Mammogram ordered.  Eye referral done.  Antifungal/yeast for skin beneath breasts.  Fasting labs due 10/2021.    Patient Education   Personalized Prevention Plan  You are due for the preventive services outlined below.  Your care team is available to assist you in scheduling these services.  If you have already completed any of these items, please share that information with your care team to update in your medical record.  Health Maintenance Due   Topic Date Due     Zoster (Shingles) Vaccine (1 of 2) 01/12/2001     Annual Wellness Visit  02/02/2016     Diptheria Tetanus Pertussis (DTAP/TDAP/TD) Vaccine (2 - Td) 03/08/2020     Anxiety Assessment  03/12/2020     Depression Assessment  03/12/2020     Eye Exam  05/30/2020     Diabetic Foot Exam  02/12/2021     Comprehensive Metabolic Panel  03/10/2021        Preventive Health Recommendations    See your health care provider every year to    Review health changes.     Discuss preventive care.      Review your medicines if your doctor has prescribed any.      You no longer need a yearly Pap test unless you've had an abnormal Pap test in the past 10 years. If you have vaginal symptoms, such as bleeding or discharge, be sure to talk with your provider about a Pap test.      Every 1 to 2 years, have a mammogram.  If you are over 69, talk with your health care provider about whether or not you want to continue having screening mammograms.      Every 10 years, have a colonoscopy. Or, have a yearly FIT test (stool test). These exams will check for colon cancer.       Have a cholesterol test every 5 years, or more often if your doctor advises it.       Have a diabetes test (fasting glucose) every three years. If you are at risk for diabetes, you should have this test more often.       At age 65, have a bone density scan (DEXA) to check for osteoporosis (brittle bone disease).    Shots:    Get a flu shot each year.    Get a tetanus shot every 10 years.    Talk to your doctor  Speech therapy contact note:     Orders received for cognitive-linguistic evaluation. Pt intubated and not medically appropriate for cognitive linguistic evaluation at this time. From chart review, anticipate once medically appropriate, patient will need a clinical swallow evaluation. SLP will await new orders as medically appropriate.    about your pneumonia vaccines. There are now two you should receive - Pneumovax (PPSV 23) and Prevnar (PCV 13).    Talk to your pharmacist about the shingles vaccine.    Talk to your doctor about the hepatitis B vaccine.    Nutrition:     Eat at least 5 servings of fruits and vegetables each day.      Eat whole-grain bread, whole-wheat pasta and brown rice instead of white grains and rice.      Get adequate about Calcium and Vitamin D.     Lifestyle    Exercise at least 150 minutes a week (30 minutes a day, 5 days a week). This will help you control your weight and prevent disease.      Limit alcohol to one drink per day.      No smoking.       Wear sunscreen to prevent skin cancer.       See your dentist twice a year for an exam and cleaning.      See your eye doctor every 1 to 2 years to screen for conditions such as glaucoma, macular degeneration, cataracts, etc.    Personalized Prevention Plan  You are due for the preventive services outlined below.  Your care team is available to assist you in scheduling these services.  If you have already completed any of these items, please share that information with your care team to update in your medical record.    Health Maintenance Due   Topic Date Due     Zoster (Shingles) Vaccine (1 of 2) 01/12/2001     Annual Wellness Visit  02/02/2016     Diptheria Tetanus Pertussis (DTAP/TDAP/TD) Vaccine (2 - Td) 03/08/2020     Anxiety Assessment  03/12/2020     Depression Assessment  03/12/2020     Eye Exam  05/30/2020     Diabetic Foot Exam  02/12/2021     Comprehensive Metabolic Panel  03/10/2021

## 2021-02-26 NOTE — NURSING NOTE
"Chief Complaint   Patient presents with     Physical       Initial /72 (BP Location: Right arm, Patient Position: Sitting, Cuff Size: Adult Regular)   Pulse 95   Temp 96.9  F (36.1  C) (Tympanic)   Resp 24   Wt 95.3 kg (210 lb)   SpO2 97%   BMI 33.89 kg/m   Estimated body mass index is 33.89 kg/m  as calculated from the following:    Height as of 7/31/20: 1.676 m (5' 6\").    Weight as of this encounter: 95.3 kg (210 lb).  Medication Reconciliation: complete  Robert Locke LPN  "

## 2021-02-26 NOTE — PROGRESS NOTES
"  SUBJECTIVE:   Radha Fox is a 71 year old female who presents for Preventive Visit.      Patient has been advised of split billing requirements and indicates understanding: Yes  Are you in the first 12 months of your Medicare Part B coverage?  No    Physical Health:    In general, how would you rate your overall physical health? fair    Outside of work, how many days during the week do you exercise? 1 day/week    Outside of work, approximately how many minutes a day do you exercise?less than 15 minutes    If you drink alcohol do you typically have >3 drinks per day or >7 drinks per week? No    Do you usually eat at least 4 servings of fruit and vegetables a day, include whole grains & fiber and avoid regularly eating high fat or \"junk\" foods? Yes    Do you have any problems taking medications regularly?  No    Do you have any side effects from medications? none    Needs assistance for the following daily activities: no assistance needed    Which of the following safety concerns are present in your home?  none identified     Hearing impairment: Yes, Difficulty following a conversation in a noisy restaurant or crowded room.    In the past 6 months, have you been bothered by leaking of urine? Yes    Tired a lot    Due for rituxan infusion - on hold  Until after covid vaccines - will get 2nd 3/8/2021    Due for mammogram    Colonoscopy 2019    Spot on right inner eye lid - symptomatic, enlarging - would like it removed    Rash under left breast more than right, past week, itchy - putting on antibiotic ointment; new bra    Mental Health:    In general, how would you rate your overall mental or emotional health? Fair  PHQ-2 Score:        Diabetes Follow-up  How often are you checking your blood sugar? A few times a week  What time of day are you checking your blood sugars (select all that apply)?  In the morning  Have you had any blood sugars above 200?  No  Have you had any blood sugars below 70?  No    What " "symptoms do you notice when your blood sugar is low?  None    What concerns do you have today about your diabetes? None     Do you have any of these symptoms? (Select all that apply)  No numbness or tingling in feet.  No redness, sores or blisters on feet.  No complaints of excessive thirst.  No reports of blurry vision.  No significant changes to weight.    Have you had a diabetic eye exam in the last 12 months? Yes- Date of [facility] [address],  Location: 07/2020     Metformin didn't \"agree with me\" a1c was high 7.5 2019 but has come back down    Defers Lisinopril    Lab Results   Component Value Date    A1C 6.4 10/01/2020    A1C 6.6 07/16/2020    A1C 6.2 03/10/2020    A1C 7.5 12/06/2019    A1C 6.8 09/09/2019         BP Readings from Last 2 Encounters:   02/26/21 124/72   07/31/20 (P) 128/72     Hemoglobin A1C (%)   Date Value   10/01/2020 6.4 (H)   07/16/2020 6.6 (H)     LDL Cholesterol Calculated (mg/dL)   Date Value   10/01/2020 73   06/18/2019 90         Hyperlipidemia Follow-Up    Are you regularly taking any medication or supplement to lower your cholesterol?   Yes- atorvastatin    Are you having muscle aches or other side effects that you think could be caused by your cholesterol lowering medication?  No    Hypertension Follow-up    Do you check your blood pressure regularly outside of the clinic? Yes     Are you following a low salt diet? No    Are your blood pressures ever more than 140 on the top number (systolic) OR more   than 90 on the bottom number (diastolic), for example 140/90? No    Depression Followup    How are you doing with your depression since your last visit? Worsened     Are you having other symptoms that might be associated with depression? No    Have you had a significant life event?  Grief or Loss, Brother passed away     Are you feeling anxious or having panic attacks?   No    Do you have any concerns with your use of alcohol or other drugs? No    Social " History     Tobacco Use     Smoking status: Never Smoker     Smokeless tobacco: Never Used   Substance Use Topics     Alcohol use: No     Alcohol/week: 0.0 standard drinks     Drug use: No     PHQ 9/9/2019 12/6/2019 2/12/2020   PHQ-9 Total Score 0 1 4   Q9: Thoughts of better off dead/self-harm past 2 weeks Not at all Not at all Not at all     DONNY-7 SCORE 4/4/2019 9/9/2019 2/12/2020   Total Score 0 0 0     Last PHQ-9 2/26/2021   1.  Little interest or pleasure in doing things 1   2.  Feeling down, depressed, or hopeless 1   3.  Trouble falling or staying asleep, or sleeping too much 0   4.  Feeling tired or having little energy 1   5.  Poor appetite or overeating 0   6.  Feeling bad about yourself 0   7.  Trouble concentrating 0   8.  Moving slowly or restless 0   Q9: Thoughts of better off dead/self-harm past 2 weeks 0   PHQ-9 Total Score 3   Difficulty at work, home, or with people Not difficult at all     DONNY-7  2/26/2021   1. Feeling nervous, anxious, or on edge 0   2. Not being able to stop or control worrying 0   3. Worrying too much about different things 0   4. Trouble relaxing 0   5. Being so restless that it is hard to sit still 0   6. Becoming easily annoyed or irritable 0   7. Feeling afraid, as if something awful might happen 0   DONNY-7 Total Score 0   If you checked any problems, how difficult have they made it for you to do your work, take care of things at home, or get along with other people? -       Suicide Assessment Five-step Evaluation and Treatment (SAFE-T)    Hypothyroidism Follow-up      Since last visit, patient describes the following symptoms: Weight stable, no hair loss, no skin changes, no constipation, no loose stools, depression and fatigue     Do you feel safe in your environment? Yes    Have you ever done Advance Care Planning? (For example, a Health Directive, POLST, or a discussion with a medical provider or your loved ones about your wishes):     Additional concerns to address?   No    Fall risk:  Fallen 2 or more times in the past year?: No  Any fall with injury in the past year?: No  Cognitive Screenin) Repeat 3 items (Leader, Season, Table)    2) Clock draw: NORMAL  3) 3 item recall: Recalls 3 objects  Results: 3 items recalled: COGNITIVE IMPAIRMENT LESS LIKELY    Mini-CogTM Copyright ANTHONY Cornell. Licensed by the author for use in [facility]; reprinted with permission (mitul@[facility]). All rights reserved.      Do you have sleep apnea, excessive snoring or daytime drowsiness?: no            Reviewed and updated as needed this visit by clinical staff  Tobacco  Allergies  Meds   Med Hx  Surg Hx  Fam Hx  Soc Hx        Reviewed and updated as needed this visit by Provider  Tobacco  Allergies  Meds   Med Hx  Surg Hx  Fam Hx  Soc Hx       Social History     Tobacco Use     Smoking status: Never Smoker     Smokeless tobacco: Never Used   Substance Use Topics     Alcohol use: No     Alcohol/week: 0.0 standard drinks                           Current providers sharing in care for this patient include:   Patient Care Team:  Lanie Costello MD as PCP - General  Lanie Costello MD as Assigned PCP  Constance Méndez LPN as Valarie Abreu RN as Diabetes Educator (Diabetes Education)    The following health maintenance items are reviewed in Epic and correct as of today:  Health Maintenance   Topic Date Due     ZOSTER IMMUNIZATION (1 of 2) 2001     DTAP/TDAP/TD IMMUNIZATION (2 - Td) 2020     DONNY ASSESSMENT  2020     PHQ-9  2020     EYE EXAM  2020     DIABETIC FOOT EXAM  2021     CMP  03/10/2021     A1C  2021     BMP  2021     ADVANCE CARE PLANNING  2021     FALL RISK ASSESSMENT  2021     LIPID  10/01/2021     MICROALBUMIN  10/01/2021     MEDICARE ANNUAL WELLNESS VISIT  2022     MAMMO SCREENING  2022     COLORECTAL CANCER SCREENING  2029     DEXA  2034     HEPATITIS C SCREENING  Completed      DEPRESSION ACTION PLAN  Completed     INFLUENZA VACCINE  Completed     Pneumococcal Vaccine: Pediatrics (0 to 5 Years) and At-Risk Patients (6 to 64 Years)  Completed     Pneumococcal Vaccine: 65+ Years  Completed     IPV IMMUNIZATION  Aged Out     MENINGITIS IMMUNIZATION  Aged Out     Current Outpatient Medications   Medication     ASPIRIN PO     atorvastatin (LIPITOR) 20 MG tablet     Blood Glucose Calibration (ACCU-CHEK COMPACT BLUE CONTROL) LIQD     blood glucose monitoring (ONETOUCH ULTRA) test strip     calcium carbonate-vitamin D (CALCIUM + D) 600-200 MG-UNIT TABS     celecoxib (CELEBREX) 200 MG capsule     fluticasone-salmeterol (ADVAIR) 100-50 MCG/DOSE inhaler     folic acid (FOLVITE) 1 MG tablet     levothyroxine (SYNTHROID/LEVOTHROID) 25 MCG tablet     Methotrexate Sodium (METHOTREXATE PO)     montelukast (SINGULAIR) 10 MG tablet     multivitamin, therapeutic with minerals (MULTI-VITAMIN) TABS     nystatin (MYCOSTATIN) 279137 UNIT/GM external cream     omeprazole (PRILOSEC) 40 MG DR capsule     order for DME     potassium chloride ER (K-DUR/KLOR-CON M) 20 MEQ CR tablet     pramipexole (MIRAPEX) 0.25 MG tablet     predniSONE (DELTASONE) 2.5 MG tablet     Probiotic Product (PROBIOTIC DAILY PO)     propranolol (INDERAL) 20 MG tablet     RiTUXimab (RITUXAN IV)     venlafaxine (EFFEXOR-XR) 75 MG 24 hr capsule     No current facility-administered medications for this visit.        Labs reviewed in [facility]  Mammogram Screening: Mammogram Screening: Recommended mammography every 1-2 years with patient discussion and risk factor consideration    ROS:  Constitutional, HEENT, cardiovascular, pulmonary, gi and gu systems are negative, except as otherwise noted.    OBJECTIVE:   /72 (BP Location: Right arm, Patient Position: Sitting, Cuff Size: Adult Regular)   Pulse 95   Temp 96.9  F (36.1  C) (Tympanic)   Resp 24   Wt 95.3 kg (210 lb)   SpO2 97%   BMI 33.89 kg/m   Estimated body mass index is 33.89 kg/m   "as calculated from the following:    Height as of 7/31/20: 1.676 m (5' 6\").    Weight as of this encounter: 95.3 kg (210 lb).  EXAM:   GENERAL APPEARANCE: alert, no distress and over weight  EYES: Eyes grossly normal to inspection, PERRL and conjunctivae and sclerae normal  EYES: right upper inner lid with bilobed yellow plaquing  HENT: ear canals and TM's normal, nose and mouth without ulcers or lesions, oropharynx clear and oral mucous membranes moist  NECK: no adenopathy, no asymmetry, masses, or scars and thyroid normal to palpation  RESP: lungs clear to auscultation - no rales, rhonchi or wheezes  BREAST: normal without masses, tenderness or nipple discharge and no palpable axillary masses or adenopathy  CV: regular rate and rhythm, normal S1 S2, no S3 or S4, no murmur, click or rub, no peripheral edema and peripheral pulses strong  ABDOMEN: soft, nontender, no hepatosplenomegaly, no masses and bowel sounds normal  MS: no musculoskeletal defects are noted and gait is age appropriate without ataxia  SKIN: under breasts, left more than right erythematous maculopapular rash consistent with yeast  NEURO: Normal strength and tone, sensory exam grossly normal, mentation intact and speech normal  PSYCH: mentation appears normal and affect normal/bright  Diabetic foot exam: normal DP and PT pulses, no trophic changes or ulcerative lesions, normal sensory exam and normal monofilament exam      Diagnostic Test Results:  Labs reviewed in Epic    ASSESSMENT / PLAN:       ICD-10-CM    1. Routine general medical examination at a health care facility  Z00.00 MA Screening Digital Bilateral   2. 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 Encounter for screening mammogram for breast cancer  Z12.31 MA Screening Digital Bilateral   4. Yeast infection of the skin  B37.2 nystatin (MYCOSTATIN) 910034 UNIT/GM external cream   5. Hypothyroidism, unspecified type  E03.9    6. Diabetes mellitus type 2, diet-controlled " "(H)  E11.9    7. Hyperlipidemia LDL goal <100  E78.5      Referral for xanthoma excision.    Nystatin for yeast skin infection.    Patient has been advised of split billing requirements and indicates understanding: Yes    COUNSELING:  Reviewed preventive health counseling, as reflected in patient instructions       Regular exercise       Healthy diet/nutrition       Vision screening       Hearing screening       Dental care       Bladder control       Fall risk prevention       Immunizations    Between covid vaccines at this time - other vaccines deferred         Alcohol Use        Osteoporosis prevention/bone health       Colon cancer screening       Hepatitis C screening       HIV screening for high risk patient       Advanced Planning     Estimated body mass index is 33.89 kg/m  as calculated from the following:    Height as of 7/31/20: 1.676 m (5' 6\").    Weight as of this encounter: 95.3 kg (210 lb).    Weight management plan: Discussed healthy diet and exercise guidelines    She reports that she has never smoked. She has never used smokeless tobacco.    Appropriate preventive services were discussed with this patient, including applicable screening as appropriate for cardiovascular disease, diabetes, osteopenia/osteoporosis, and glaucoma.  As appropriate for age/gender, discussed screening for colorectal cancer, prostate cancer, breast cancer, and cervical cancer. Checklist reviewing preventive services available has been given to the patient.    Reviewed patients plan of care and provided an AVS. The Basic Care Plan (routine screening as documented in Health Maintenance) for Radha meets the Care Plan requirement. This Care Plan has been established and reviewed with the Patient.    Counseling Resources:  ATP IV Guidelines  Pooled Cohorts Equation Calculator  Breast Cancer Risk Calculator  BRCA-Related Cancer Risk Assessment: FHS-7 Tool  FRAX Risk Assessment  ICSI Preventive Guidelines  Dietary Guidelines for " Americans, 2010  USDA's MyPlate  ASA Prophylaxis  Lung CA Screening    Lanie Duggan MD  [facility]

## 2021-03-19 NOTE — PROGRESS NOTES
Francois, labs and nurses notes faxed to [facility].    Francois form sent to scanning.

## 2021-03-19 NOTE — PROGRESS NOTES
24 gauge angio cath inserted into Rt arm.  Immediate blood return noted.  IV secured with sterile, transparent dressing and tape.  Patient tolerated well, denies pain or discomfort at this time.  Flushes easily without resistance, no signs or symptoms of infiltration or infection.  Flushed with 3mL normal saline to clear line. Patient denies questions or concerns regarding infusion and/or medication(s) being administered.

## 2021-03-19 NOTE — PATIENT INSTRUCTIONS

## 2021-03-19 NOTE — PROGRESS NOTES
Signed order received from DUARTE Dobson NP for patient to receive tylenol 1000mg po and benadryl 50 mg po today with patients infusion. Orders faxed to Dr Costello for co-sign.

## 2021-03-19 NOTE — PROGRESS NOTES
Patient is a 71 year old here accompanied by self today for infusion of rituximab-abbs per order of Dr mayorga under the supervision of Dr Costello.  Patient identified with two identifiers, order verified, and verbal consent for today's infusion obtained from patient.      3/18/2021 lab values:  WBC: 12.0, HGB: 13.5, PLT: 289, ANC: 9.2, AST:14, ALT: 33, Creatinine: 0.81, CRP: 9.4, ESR: 21,   Albumin: 3.5      Patient meets order parameters for today's treatment.     Patient denies questions or concerns regarding infusion and/or medication(s) being administered.    Patient verbalizes that she takes tylenol 1000 mg po and benadryl 50 mg po prior to each infusion at home, did not take these medications today, pt requesting tylenol/benadryl prior to infusion. YANY Dobson NP to give patient Tylenol 1000mg po and Benadryl 50 mg po x1 today. [facility] will be faxing written orders.      1019 IV pump verified with Rituximab-abbs dose, drug, and rate of administration.  Infusion administered per protocol.  Patient tolerated infusion well, no signs or symptoms of adverse reaction noted.  Patient denies pain nor discomfort.     IV removed, catheter intact.  Site clean, dry and intact.  No signs or symptoms of infiltration or infection.  Covered with a sterile bandage, slight pressure applied for 30 seconds.  Pt instructed to leave bandage intact for a minimum of one hour, and to call with questions or concerns. Patient states understanding, discharged.

## 2021-05-03 NOTE — TELEPHONE ENCOUNTER
Celebrex      Last Written Prescription Date:  02/03/21  Last Fill Quantity: 90,   # refills: 0  Last Office Visit: 02/26/21  Future Office visit:         Effexor      Last Written Prescription Date:  02/03/21  Last Fill Quantity: 90,   # refills: 0  Last Office Visit: 02/26/21  Future Office visit:

## 2021-05-19 NOTE — TELEPHONE ENCOUNTER
On 3-19-21, patients infusion RN had received signed orders to give Tylenol and Benadryl prior to Rituxan infusion, as a one time order. Those orders were faxed to Dr Costello, per that nurses charting. Orders have not been received co-signed. Refaxed this date for co-sign.

## 2021-05-24 NOTE — TELEPHONE ENCOUNTER
euthyrox 25 mcg      Last Written Prescription Date:  2-  Last Fill Quantity: 90,   # refills: 0  Last Office Visit: 2-

## 2021-06-14 NOTE — TELEPHONE ENCOUNTER
Orders for Tylenol and Benadryl prior to Rituxan infusion, one time order resent to Dr Costello for co-sign.

## 2021-07-27 NOTE — TELEPHONE ENCOUNTER
Pt scheduled in [facility] for 7/27 to be seen for issue do to PCP on vacation til next week. Aissatou Velez LPN

## 2021-07-27 NOTE — TELEPHONE ENCOUNTER
10:16 AM    Reason for Call: OVERBOOK    Patient is having the following symptoms: Muscle pain in RT thigh from a fall  for 23 days.    The patient is requesting an appointment for ASAP with Lanie Costello.    Was an appointment offered for this call? No  If yes : Appointment type              Date    Preferred method for responding to this message: Telephone Call  What is your phone number ? [phone]    If we cannot reach you directly, may we leave a detailed response at the number you provided? Yes    Can this message wait until your PCP/provider returns, if unavailable today? YES, advised provider is out this week    Kristin Melo

## 2021-07-29 NOTE — PROGRESS NOTES
Assessment & Plan   1. Strain of right hip and thigh, initial encounter  - I see no evidence of a fracture. Your Xrays are normal.   Physical Therapy Referral - Office will call to schedule    2. Pain in joint involving pelvic region and thigh, right  - XR Pelvis and Hip Bilateral 2 Views (Clinic Performed); Future  Discuss this with your rheumatologist to see about temporarily increasing your Celebrex. It is reasonable to take 200 mg twice a day until you hear back but please stop taking the naproxen due to risk of GI bleed.     3. Personal history of fall  -Fall safety reviewed. Recommend removing rugs.   -Physical Therapy Referral  - XR Pelvis and Hip Bilateral 2 Views (Clinic Performed); Future    4. Diabetes mellitus type 2, diet-controlled (H)  - Hemoglobin A1c; Future  - Hemoglobin A1c    5. Type 2 diabetes mellitus without complication, without long-term current use of insulin (H)  - If glucose remains high or A1C returns above 7, recommend follow up with primary provider or diabetes clinic in 1 month.   - Hemoglobin A1c; Future  - Hemoglobin A1c    6. Hyperglycemia- recent and atypical for patient   CBC with platelets and differential; Future  - ESR: Erythrocyte sedimentation rate; Future  - CRP, inflammation; Future  - Hemoglobin A1c; Future  - Hemoglobin A1c    7. Acute cystitis without hematuria  - nitroFURantoin macrocrystal-monohydrate (MACROBID) 100 MG capsule; Take 1 capsule (100 mg) by mouth 2 times daily for 7 days  Dispense: 14 capsule; Refill: 0    8. Urinary frequency  - *UA reflex to Microscopic and Culture - [facility]; Future  - CBC with platelets and differential; Future  - ESR: Erythrocyte sedimentation rate; Future  - CRP, inflammation; Future  - Hemoglobin A1c; Future  - Hemoglobin A1c  - ESR: Erythrocyte sedimentation rate  - CRP, inflammation  - CBC with platelets and differential  - *UA reflex to Microscopic and Culture - [facility]  - Urine Microscopic Exam  - Urine  "Culture  - nitroFURantoin macrocrystal-monohydrate (MACROBID) 100 MG capsule; Take 1 capsule (100 mg) by mouth 2 times daily for 7 days  Dispense: 14 capsule; Refill: 0      Ordering of each unique test  Prescription drug management  45+ minutes spent on the date of the encounter doing chart review, patient visit and documentation          No follow-ups on file.    Roberta Horne, CNP  [facility] - [place] KARISSA Garcia is a 71 year old who presents for the following health issues     HPI     Musculoskeletal problem/pain  Onset/Duration: 7/4/21- Tripped over a outdoor rug and landed on right side on grass. She thought it would improve but seemed to improve initially but now walking is very painful.   Description  Location: leg - right-- possible referring to back pain   Joint Swelling: no  Redness: no  Pain: YES  Warmth: no  Intensity:  moderate  Progression of Symptoms:  constant  Accompanying signs and symptoms:   Fevers: no  Numbness/tingling/weakness: no  History  Trauma to the area: YES- fell  Recent illness:  no  Previous similar problem: no  Previous evaluation:  no  Precipitating or alleviating factors:  Aggravating factors include: walking and climbing stairs  Therapies tried and outcome: rest/inactivity and NSAID - aleve      Diabetes Follow-up  Was on a prednisone burst for wheezing from her pulmonologist. Finished this 1 week ago. However, her glucose was checked once while on this and it \"was fine\". She finished her prednisone Thursday and Monday she had a glucose of 400. She's been averaging 200-300.  168 fasting today but this is the lowest she's been in a week. Hx of asymptomatic UTI. She also reports this past 5 days or so she started having nasal congestion and sinus pressure R>L.    She was seeing endocrinology at [facility] but reports that she was released to her primary for DM care. Does not take any medication for DM. She admits that she has a hx of admission to the " hospital for a glucose of 600 (asymptomatic). Has tried metformin but reports she did not tolerate this.     How often are you checking your blood sugar? Four or more times daily recently  Blood sugar testing frequency justification:  Frequent hypoglycemia  What time of day are you checking your blood sugars (select all that apply)?  Before meals and After meals  Have you had any blood sugars above 200?  Yes 400  Have you had any blood sugars below 70?  No    What symptoms do you notice when your blood sugar is low?  None    What concerns do you have today about your diabetes? Getting infections often and Other: just finished prednisone burst about 1 week ago      Do you have any of these symptoms? (Select all that apply)  No numbness or tingling in feet.  No redness, sores or blisters on feet.  No complaints of excessive thirst.  No reports of blurry vision.  No significant changes to weight.    Have you had a diabetic eye exam in the last 12 months? No        BP Readings from Last 2 Encounters:   07/29/21 120/68   03/19/21 120/75     Hemoglobin A1C (%)   Date Value   10/01/2020 6.4 (H)   07/16/2020 6.6 (H)     LDL Cholesterol Calculated (mg/dL)   Date Value   10/01/2020 73   06/18/2019 90             Review of Systems   Constitutional, HEENT, cardiovascular, pulmonary, gi and gu systems are negative, except as otherwise noted.      Objective    There were no vitals taken for this visit.  There is no height or weight on file to calculate BMI.  Physical Exam   GENERAL: healthy, alert and no distress  EYES: Eyes grossly normal to inspection, PERRL and conjunctivae and sclerae normal  HENT: normal cephalic/atraumatic, ear canals and TM's normal, nasal mucosa edematous , rhinorrhea clear, sinuses: maxillary tenderness on right and postnasal drainage.   NECK: no adenopathy, no asymmetry, masses, or scars and thyroid normal to palpation  RESP: lungs clear to auscultation - no rales, rhonchi or wheezes  CV: regular rate  and rhythm, normal S1 S2, no S3 or S4, no murmur, click or rub, no peripheral edema and peripheral pulses strong  MS: Ambulates independently. Able to get onto table without assistance. No visible abnormality to hip. No bruising, erythema, ecchymosis. Positive hip squeeze. Negative straight leg raise and internal rotation.   SKIN: no suspicious lesions or rashes    Results for orders placed or performed in visit on 07/29/21   XR Pelvis and Hip Bilateral 2 Views (Clinic Performed)     Status: None    Narrative    PROCEDURE: XR PELVIS AND HIP BILATERAL 2 VIEWS 7/29/2021 11:03 AM    HISTORY: Personal history of fall; Pain in joint involving pelvic  region and thigh, right    COMPARISONS: None.    TECHNIQUE: Pelvis one view, right hip 2 views, left hip 2 views,    FINDINGS: Pelvis one view: The pelvis is intact. The sacrum and  sacroiliac joints appear normal. Degenerative changes are present in  the lower lumbar spine.    Right hip 2 views: The articular spaces are normal in height at the  right hip. The acetabulum and right proximal femur appears intact.    Left hip 2 views: The acetabulum and proximal femur appear normal. The  articular spaces are normal in height.         Impression    IMPRESSION: Normal hips bilaterally    ROLANDO NG MD         SYSTEM ID:  [id number]   Results for orders placed or performed in visit on 07/29/21   ESR: Erythrocyte sedimentation rate     Status: Normal   Result Value Ref Range    Erythrocyte Sedimentation Rate 14 0 - 30 mm/hr   *UA reflex to Microscopic and Culture - [facility]     Status: Abnormal    Specimen: Urine, Midstream   Result Value Ref Range    Color Urine Yellow Colorless, Straw, Light Yellow, Yellow    Appearance Urine Clear Clear    Glucose Urine Negative Negative mg/dL    Bilirubin Urine Negative Negative    Ketones Urine Trace (A) Negative mg/dL    Specific Gravity Urine >=1.030 1.003 - 1.035    Blood Urine Negative Negative    pH Urine 5.5 5.0 - 7.0     Protein Albumin Urine 30  (A) Negative mg/dL    Urobilinogen Urine 0.2 0.2, 1.0 E.U./dL    Nitrite Urine Positive (A) Negative    Leukocyte Esterase Urine Small (A) Negative   Urine Microscopic Exam     Status: Abnormal   Result Value Ref Range    Bacteria Urine Many (A) None Seen /HPF    RBC Urine 0-2 0-2 /HPF /HPF    WBC Urine  (A) 0-5 /HPF /HPF    Squamous Epithelials Urine Few (A) None Seen /LPF   CBC with platelets and differential     Status: Abnormal   Result Value Ref Range    WBC Count 9.2 4.0 - 11.0 10e3/uL    RBC Count 4.14 3.80 - 5.20 10e6/uL    Hemoglobin 14.2 11.7 - 15.7 g/dL    Hematocrit 42.3 35.0 - 47.0 %     (H) 78 - 100 fL    MCH 34.3 (H) 26.5 - 33.0 pg    MCHC 33.6 31.5 - 36.5 g/dL    RDW 13.9 10.0 - 15.0 %    Platelet Count 227 150 - 450 10e3/uL    % Neutrophils 75 %    % Lymphocytes 10 %    % Monocytes 13 %    % Eosinophils 2 %    % Basophils 0 %    Absolute Neutrophils 6.9 1.6 - 8.3 10e3/uL    Absolute Lymphocytes 0.9 0.8 - 5.3 10e3/uL    Absolute Monocytes 1.2 0.0 - 1.3 10e3/uL    Absolute Eosinophils 0.1 0.0 - 0.7 10e3/uL    Absolute Basophils 0.0 0.0 - 0.2 10e3/uL   CBC with platelets and differential     Status: Abnormal    Narrative    The following orders were created for panel order CBC with platelets and differential.  Procedure                               Abnormality         Status                     ---------                               -----------         ------                     CBC with platelets and d...[[id number]]  Abnormal            Final result                 Please view results for these tests on the individual orders.

## 2021-07-29 NOTE — PATIENT INSTRUCTIONS
Patient Education   Diabetes Activity Program  [facility] - Exercise Guidelines for Adults with Diabetes  Physical activity may be the best thing you can do to manage your diabetes and improve your health. This is true no matter what your age or fitness level. Through activity, you will lower your blood glucose and help your body use insulin better.  If you've never been active before, it's important to set realistic goals for yourself. Work with your diabetes care team to find an activity plan that's safe for you. Your care team may need to adjust your medicines as you become more active over time.  Getting started  Remember:  1. Talk to a doctor before you increase your activity.  2. Make sure that what you do is right for your level of fitness.  3. Start slowly. Exercise just 5 to 10 minutes a day, if you have been inactive. Do more only as you are able.  Choose activities that you enjoy. If you have arthritis or other joint problems, try swimming, water aerobics, chair exercises or other exercise that increases your heart rate without stressing your joints.  Walking is often a good way to get started. It is easy and cheap--all you need are good socks and a pair of supportive shoes that fit well. If you use the shoes often, plan to buy a new pair at least twice a year for good support.  Tips    Try to do some type of aerobic activity every other day for 30 minutes or more. Aerobic refers to any activity that makes you breathe harder and keeps your heart rate up for several minutes at a time.    To prevent injury, it helps to warm up before activity and cool down afterward.    Drink plenty of water before, during and after activity.    Carry a carbohydrate snack with you in case of low blood sugar. (For example, a small box of raisins, a 4-ounce juice box or a small piece of fruit.)    Always wear or carry ID that says you have diabetes.    Look for ways to stay motivated. Exercise with a partner or  reward your success.    Try to increase your general activity level throughout the day. (For example, take the stairs instead of the elevator or park your car at the far end of the parking lot.) This will help you burn calories and get (or stay) fit.  If you have questions about your diabetes care, call your diabetes educator.  Control your blood glucose during activity  Your glucose level can drop during activity or many hours later. This is especially true if you take certain diabetes medicines. You may need to take extra steps to prevent low blood glucose.    Test your glucose before and after activity. This will tell you how your body responds to exercise.    If you take insulin, sulfonureas or meglitinides: Your glucose should be above 100 before you begin. (If you aren't sure what kind of medicine you take, call your diabetes educator.)    If you have type 1 diabetes: Your glucose should be below 240 before you begin. If you have ketones, wait for them to clear before you do any activity. (If your doctor or nurse gives you different instructions, follow his or her advice.)    At the first sign of low blood glucose, stop your activity and treat the low glucose.    Avoid activity when you are ill.    Avoid activity just before bedtime.    If you have foot sores or severe numbness or tingling, do non-weight bearing activities (such as biking or swimming). Tell your care team about any blisters or foot problems.    If you take insulin, avoid activity while the insulin is peaking (working at its strongest level). Do not inject into the area you plan to exercise the most.  Goals for activity    30 minutes every other day    Get to a level of 4 to 6 on the effort scale    Do strength-training 2 to 3 days per week  Notes: ____________________________________  __________________________________________  __________________________________________  Talk to your doctor before starting an activity program  This is very  important if:    You are over age 35.    You have had type 1 diabetes for over 15 years.    You have had type 2 diabetes for over 10 years.    You have any risk factors for heart or artery disease (such as high blood pressure, high cholesterol or being overweight).    You have a history of heart or artery disease.    You have any kind of nerve damage (neuropathy).    You have eye disease (retinopathy).         Patient Education     Bladder Infection, Female (Adult)     Urine normally doesn't have any germs (bacteria) in it. But bacteria can get into the urinary tract from the skin around the rectum. Or they can travel in the blood from other parts of the body. Once they are in your urinary tract, they can cause infection in these areas:    The urethra (urethritis)    The bladder (cystitis)    The kidneys (pyelonephritis)  The most common place for an infection is in the bladder. This is called a bladder infection. This is one of the most common infections in women. Most bladder infections are easily treated. They are not serious unless the infection spreads to the kidney.  The terms bladder infection, UTI, and cystitis are often used to describe the same thing. But they are not always the same. Cystitis is an inflammation of the bladder. The most common cause of cystitis is an infection.  Symptoms  The infection causes inflammation in the urethra and bladder. This causes many of the symptoms. The most common symptoms of a bladder infection are:    Pain or burning when urinating    Having to urinate more often than normal    Urgent need to urinate    Only a small amount of urine comes out    Blood in urine    Belly (abdominal) discomfort. This is often in the lower belly above the pubic bone.    Cloudy urine    Strong- or bad-smelling urine    Unable to urinate (urinary retention)    Unable to hold urine in (urinary incontinence)    Fever    Loss of appetite    Confusion (in older adults)  Causes  Bladder infections  are not contagious. You can't get one from someone else, from a toilet seat, or from sharing a bath.  The most common cause of bladder infections is bacteria from the bowels. The bacteria get onto the skin around the opening of the urethra. From there, they can get into the urine. Then they travel up to the bladder, causing inflammation and infection. This often happens because of:    Wiping incorrectly after urinating. Always wipe from front to back.    Bowel incontinence    Pregnancy    Procedures such as having a catheter put in    Older age    Not emptying your bladder. This can give bacteria a chance to grow in your urine.    Fluid loss (dehydration)    Constipation    Having sex    Using a diaphragm for birth control   Treatment  Bladder infections are diagnosed by a urine test and urine culture. They are treated with antibiotics. They often clear up quickly without problems. Treatment helps prevent a more serious kidney infection.  Medicines  Medicines can help in the treatment of a bladder infection:    Take antibiotics until they are used up, even if you feel better. It's important to finish them to make sure the infection has cleared.    You can use acetaminophen or ibuprofen for pain, fever, or discomfort, unless another medicine was prescribed. If you have long-term (chronic) liver or kidney disease, talk with your healthcare provider before using these medicines. Also talk with your provider if you've ever had a stomach ulcer or GI (gastrointestinal) bleeding, or are taking blood-thinner medicines.    If you are given phenazopydridine to reduce burning with urination, it will make your urine a bright orange color. This can stain clothing.  Care and prevention  These self-care steps can help prevent future infections:    Drink plenty of fluids. This helps to prevent dehydration and flush out your bladder. Do this unless you must restrict fluids for other health reasons, or your healthcare provider told  you not to.    Clean yourself correctly after going to the bathroom. Wipe from front to back after using the toilet. This helps prevent the spread of bacteria.    Urinate more often. Don't try to hold urine in for a long time.    Wear loose-fitting clothes and cotton underwear. Don't wear tight-fitting pants.    Improve your diet and prevent constipation. Eat more fresh fruits and vegetables, and fiber. Eat less junk foods and fatty foods.    Don't have sex until your symptoms are gone.    Don't have caffeine, alcohol, and spicy foods. These can irritate your bladder.    Urinate right after you have sex to flush out your bladder.    If you use birth control pills and have frequent bladder infections, discuss it with your healthcare provider.  Follow-up care  Call your healthcare provider if all symptoms are not gone after 3 days of treatment. This is especially important if you have repeat infections.  If a culture was done, you will be told if your treatment needs to be changed. If directed, you can call to find out the results.  If X-rays were done, you will be told if the results will affect your treatment.  Call 911  Call 911 if any of the following occur:    Trouble breathing    Hard to wake up or confusion    Fainting (loss of consciousness)    Fast heart rate  When to get medical advice  Call your healthcare provider right away if any of these occur:    Fever of 100.4 F (38.0 C) or higher, or as directed by your healthcare provider    Symptoms are not better after 3 days of treatment    Back or belly pain that gets worse    Repeated vomiting, or unable to keep medicine down    Weakness or dizziness    Vaginal discharge    Pain, redness, or swelling in the outer vaginal area (labia)  [employer] last reviewed this educational content on 11/1/2019 2000-2021 [employer]. All rights reserved. This information is not intended as a substitute for professional medical care. Always follow your  healthcare professional's instructions.           Patient Education     Hip Strain    You have a strain of the muscles around the hip joint. A muscle strain is a stretching or tearing of muscle fibers. This causes pain, especially when you move that muscle. There may also be some swelling and bruising.  Home care    Stay off the injured leg as much as possible until you can walk on it without pain. If you have a lot of pain with walking, crutches or a walker may be prescribed. These can be rented or purchased at many pharmacies and surgical or orthopedic supply stores. Follow your healthcare provider's advice about when to start putting weight on that leg.    Apply an ice pack over the injured area for 15 to 20 minutes every 3 to 6 hours. Do this for the first 24 to 48 hours. You can make an ice pack by filling a plastic bag that seals at the top with ice cubes and then wrapping it with a thin towel. Be careful not to injure your skin with the ice treatments. Ice should never be applied directly to skin. Continue the use of ice packs for relief of pain and swelling as needed. After 48 hours, apply heat (warm shower or warm bath) for 15 to 20 minutes several times a day, or alternate ice and heat.    You may use over-the-counter pain medicine to control pain, unless another pain medicine was prescribed. If you have chronic liver or kidney disease or ever had a stomach ulcer or gastrointestinal bleeding, talk with your healthcare provider before using these medicines.    If you play sports, you may resume these activities when you are able to hop and run on the injured leg without pain.  Follow-up care  Follow up with your healthcare provider, or as advised. If your symptoms don't start to get better after a week, more tests may be needed.  If X-rays were taken, you will be told of any new findings that may affect your care.  When to seek medical advice  Call your healthcare provider right away if any of these  occur:    Increased swelling or bruising    Increased pain    Losing the ability to put weight on the injured side  [employer] last reviewed this educational content on 5/1/2018 2000-2021 [employer]. All rights reserved. This information is not intended as a substitute for professional medical care. Always follow your healthcare professional's instructions.           Patient Education     Preventing Falls: Making Changes in Your Living Space  Is your living space filled with hazards that could cause you to fall? Changes can make you safer. They could even save your life. Take a careful look around your home. Change what you can on your own. Hire someone or ask friends or family to help with harder tasks.      Be sure to add a nonslip mat to the inside of your shower or bathtub. Always keep a nightlight on. Keep a clear path from your bed to the bathroom. Move items from higher shelves to lower ones.   Remove hazards    Remove things that can trip you, like throw rugs, boxes, piles of paper, or cords.    Nail down rugs or carpeting if you don't want to remove them.    Don't store items on stairs.    Keep walkways clear.    Clean up spills right away.    Replace glass tables with wooden ones. They're safer if you fall.    Add safety devices    Add handrails to both sides of stairs.    Buy a raised toilet seat.    Add grab bars near the toilet and in the shower.    Get grabbers to help you reach things and avoid climbing.    Improve lighting    Add nightlights to halls, bedrooms, and bathrooms.     Put light switches at the top and bottom of stairs.    Be sure each room and flight of stairs has proper lighting.    Use shades or curtains to cut glare from windows.    Put flashlights in each room. Replace burned-out bulbs.    Get glowing light switches for room entrances.    Take other precautions    Use nonskid floor wax.    Buy a nonslip mat and a liquid soap dispenser for the shower.    Tack down  carpets or use slip-resistant backing.    Put most-used items within easy reach.    Add bright paint or tape on the top front edge of steps.    Save big jobs, such as moving furniture or other heavy objects, for family or friends.    Get professional help installing grab bars. They can be unsafe if not installed the right way.    Fix riskier rooms first  Don't tackle everything at once. Focus on one room at a time. The bathroom is a common spot for falls, so you may want to start there. Or start with a room you spend lots of time in, such as your bedroom. Make only a few changes at once. This will give you time to adjust to them.  Outside safety  You might arrange for these changes yourself, or you might need to talk to your  or homeowners' association about them.    Have loose boards on porches or damaged stairs repaired.    Have rough edges, holes, or large cracks in sidewalks or driveways repaired.    Remove hazards that could trip you, such as hoses or kylah.    Use high-wattage light bulbs (100 reese or greater) near outside doors and stairs.    Add handrails to outside stairs. Have them extend beyond the bottom step.    Get help in winter weather with ice or snow removal.  [employer] last reviewed this educational content on 5/1/2020 2000-2021 [employer]. All rights reserved. This information is not intended as a substitute for professional medical care. Always follow your healthcare professional's instructions.

## 2021-07-29 NOTE — NURSING NOTE
"Chief Complaint   Patient presents with     Musculoskeletal Problem       Initial /68 (BP Location: Right arm, Patient Position: Chair, Cuff Size: Adult Large)   Pulse 63   Temp (!) 96.7  F (35.9  C) (Tympanic)   Ht 1.676 m (5' 6\")   Wt 96.2 kg (212 lb)   SpO2 98%   BMI 34.22 kg/m   Estimated body mass index is 34.22 kg/m  as calculated from the following:    Height as of this encounter: 1.676 m (5' 6\").    Weight as of this encounter: 96.2 kg (212 lb).  Medication Reconciliation: complete  Gunjan Castro LPN    "

## 2021-08-03 NOTE — DISCHARGE INSTRUCTIONS
(M79.672) Left foot pain  (primary encounter diagnosis)  (Z98.890) H/O foot surgery  (S92.512A) Closed displaced fracture of proximal phalanx of lesser toe of left foot, initial encounter  Comment: acute, symptomatic  Plan: she has boot at home, can use for comfort. She also would like images revived by Dr. King her foot surgeon so I will forward my note to him.   Symptomatic treatments recommended:  - Elevation  - Ice  - minimize aggravating activities        RETURN TO THE ED WITH NEW OR WORSENING SYMPTOMS.    FOLLOW-UP WITH YOUR PRIMARY CARE PROVIDER IN 7-10 DAYS.      Nadira Merchant CNP    Results for orders placed or performed during the hospital encounter of 08/03/21   Foot XR, G/E 3 views, left     Status: None    Narrative    Exam: XR FOOT LEFT G/E 3 VIEWS    Technique: Left foot, 3 Views    Comparison: MRI of the left foot on 7/12/2016; left foot radiographs  on 2/4/2013    Exam reason: left foot pain. Pt requested xr.    Findings:    There is a new mildly displaced fracture fragment along the lateral  aspect of the base of the second proximal phalanx, which may represent  an acute fracture.    There has been plate and screw arthrodesis of the first MTP joint.  Surgical hardware appears intact, without evidence of loosening.    A surgical screw is present in the proximal aspect of the fifth  metatarsal, traversing a previously demonstrated proximal metatarsal  fracture. There is residual sclerosis and surface irregularity at the  site of the fracture, which is similar to 2013.    There is stable cortical irregularity in the mid third metatarsal  shaft, compatible with remote healed fracture, not significantly  changed since 2013.    Otherwise mild scattered degenerative changes.      Impression    Impression:  1. New mildly displaced fracture fragment along the lateral aspect of  the base of the second proximal phalanx which may represent an acute  fracture.  2. Postsurgical and other chronic findings  as above.    PASCUAL SCHWARTZ MD         SYSTEM ID:  [id number]

## 2021-08-03 NOTE — ED TRIAGE NOTES
Pt states she has a history of surgeries and pins placed in left foot. C/O left lateral foot pain and would like an xray to see if anything is broke.

## 2021-08-03 NOTE — ED PROVIDER NOTES
History     Chief Complaint   Patient presents with     Foot Pain     HPI  Radha Fox is a 71 year old female who presents ambulatory with left foot pain.    Joint Pain    Onset: Friday    Description:   Location: left foot  Character: dull    Intensity: Currently 2/10 while sitting. Worsens up to 5-6/10 with standing/walking     Progression of Symptoms: same    Accompanying Signs & Symptoms:  Other symptoms: denies numbness, tingling    History:   Previous similar pain: YES- when she broke her foot in the past      Precipitating factors:   Trauma or overuse: No - fell around the 4th of July and pulled a muscle in the right thigh and was probably bearing more weight to left foot.     Alleviating factors:  Improved by: limiting weight bearing    Therapies Tried and outcome: nothing           Allergies:  Allergies   Allergen Reactions     Neomycin-Polymyxin-Hc      Rash behind ear after ear drops     Penicillins Hives and Itching     One time large local reaction after IM PCN in 1970, no airway symptoms     Plaquenil [Hydroxychloroquine Sulfate] Hives       Problem List:    Patient Active Problem List    Diagnosis Date Noted     Hypothyroidism, unspecified type 02/12/2020     Priority: Medium     Obesity (BMI 35.0-39.9) with comorbidity (H) 09/09/2019     Priority: Medium     Type 2 diabetes mellitus without complication (H) 10/30/2017     Priority: Medium     Chronic cough 08/17/2016     Priority: Medium     ACP (advance care planning) 07/21/2016     Priority: Medium     Advance Care Planning 7/21/2016: ACP Review of Chart / Resources Provided:  Reviewed chart for advance care plan.  Radha Fox has no plan or code status on file. Discussed available resources and provided with information. Confirmed code status reflects current choices pending further ACP discussions.  Confirmed/documented legally designated decision makers.  Added by Gregoria Gregorio             Diabetes mellitus type 2,  diet-controlled (H)      Priority: Medium     Rheumatoid arthritis of multiple sites without rheumatoid factor (H) 11/10/2015     Priority: Medium     SNHL (sensory-neural hearing loss), asymmetrical 07/08/2014     Priority: Medium     negative MRI IAC 7/2014       S/P myringotomy with insertion of tube 07/08/2014     Priority: Medium     History of chronic sinusitis 07/08/2014     Priority: Medium     History of sinus surgery 05/28/2014     Priority: Medium     Simple chronic serous otitis media 05/28/2014     Priority: Medium     Problem list name updated by automated process. Provider to review       Dysfunction of eustachian tube 05/28/2014     Priority: Medium     Mixed hearing loss, unilateral 05/28/2014     Priority: Medium     Recurrent UTI 03/20/2013     Priority: Medium     RA (rheumatoid arthritis) (H) 03/12/2013     Priority: Medium     Insomnia 01/24/2013     Priority: Medium     Problem list name updated by automated process. Provider to review       Osteoporosis 01/19/2012     Priority: Medium     Problem list name updated by automated process. Provider to review       Obesity 01/19/2012     Priority: Medium     Problem list name updated by automated process. Provider to review       Restless legs syndrome (RLS) 04/21/2011     Priority: Medium     Esophageal reflux 11/12/2010     Priority: Medium     Contact dermatitis and other eczema, due to unspecified cause 03/05/2009     Priority: Medium     Coronary atherosclerosis 09/27/2005     Priority: Medium     Problem list name updated by automated process. Provider to review       Essential hypertension 09/15/2005     Priority: Medium     IMO Update       Other extrapyramidal disease and abnormal movement disorder 09/15/2005     Priority: Medium     Other type of intractable migraine 09/15/2005     Priority: Medium     IMO Update       Asymptomatic postmenopausal status 09/06/2005     Priority: Medium     IMO Update 10/11       Hyperlipidemia 04/13/2005      Priority: Medium     Problem list name updated by automated process. Provider to review       Depressive disorder, not elsewhere classified 01/11/2002     Priority: Medium     Sinusitis, chronic 09/05/2000     Priority: Medium     Problem list name updated by automated process. Provider to review       Arthropathy 05/01/2000     Priority: Medium     Problem list name updated by automated process. Provider to review       Diffuse connective tissue disease (H) 04/04/2000     Priority: Medium     Problem list name updated by automated process. Provider to review          Past Medical History:    Past Medical History:   Diagnosis Date     Abnormal PFT      Arthropathy, unspecified, site unspecified 05/01/2000     Chest pain, unspecified 2005     Contact dermatitis and other eczema, due to unspecified cause 03/05/2009     Coronary atherosclerosis of unspecified type of vessel, native or graft 09/27/2005     Depressive disorder, not elsewhere classified 01/11/2002     Diabetes mellitus type 2, diet-controlled (H)      Esophageal reflux 11/12/2010     H/O: hysterectomy 1977     Impaired fasting glucose 11/12/2010     Insomnia, unspecified 01/24/2013     Leukocytosis, unspecified 12/22/2007     Obesity, unspecified 01/19/2012     Osteoporosis, unspecified 01/19/2012     Other and unspecified hyperlipidemia 04/13/2005     Other dyspnea and respiratory abnormality 01/19/2012     Restless legs syndrome (RLS) 04/21/2011     Rheumatoid arthritis(714.0) 11/09/1999     Unspecified diffuse connective tissue disease 04/04/2000     Unspecified sinusitis (chronic) 09/05/2000       Past Surgical History:    Past Surgical History:   Procedure Laterality Date     APPENDECTOMY  1961     CHOLECYSTECTOMY  1973     COLONOSCOPY N/A 3/23/2015    Procedure: COLONOSCOPY;  Surgeon: Clarisa Larkin MD;  Location: [facility]     COLONOSCOPY N/A 2/21/2019    Procedure: COLONOSCOPY;  Surgeon: Milton Gracia MD;  Location: [facility]      endoscopic sphenoidotomy  10/2011    Right     ETHMOIDECTOMY Bilateral 9/27/2016    Procedure: ETHMOIDECTOMY;  Surgeon: Aracelis Ayon MD;  Location: [facility]     excision bilateral telma bullosa  10/2011     GENITOURINARY SURGERY       HYSTERECTOMY      benign     IR CONSULTATION FOR IR EXAM  9/26/2019     IR FLUORO 0-1 HOUR  9/26/2019     LAPAROSCOPY DIAGNOSTIC (GENERAL)       ORTHOPEDIC SURGERY      left foot 5th metarsal fracture screws placed     ORTHOPEDIC SURGERY Left 04/22/2019    Dr. King; first MPJ fusion     PFT GENERAL LAB TESTING  10/17/2019    mild obstructive; moderate restriction; moderately severe diffusion deffect.  same as 2016     SEPTOPLASTY N/A 9/27/2016    Procedure: SEPTOPLASTY;  Surgeon: Aracelis Ayon MD;  Location: [facility]     third metatarsal fracture Left 01/2019    stress fracture; Dr. King     TOT Internal urethrotomy  2010     TUBAL LIGATION         Family History:    Family History   Problem Relation Age of Onset     C.A.D. Father 83     Other - See Comments Father         CHF     Cerebrovascular Disease Father         CVA     Diabetes Father      Hypertension Father      Coronary Artery Disease Father      Cerebrovascular Disease Mother 72        CVA     Heart Disease Mother         Heart Issue     Hyperlipidemia Mother      Coronary Artery Disease Mother      Diabetes Brother      Diabetes Sister      Colon Cancer No family hx of      Breast Cancer No family hx of      Asthma No family hx of      Thyroid Disease No family hx of        Social History:  Marital Status:   [2]  Social History     Tobacco Use     Smoking status: Never Smoker     Smokeless tobacco: Never Used   Substance Use Topics     Alcohol use: No     Alcohol/week: 0.0 standard drinks     Drug use: No        Medications:    albuterol (PROAIR HFA/PROVENTIL HFA/VENTOLIN HFA) 108 (90 Base) MCG/ACT inhaler  ASPIRIN PO  atorvastatin (LIPITOR) 20 MG tablet  blood glucose (ONETOUCH ULTRA) test  strip  Blood Glucose Calibration (ACCU-CHEK COMPACT BLUE CONTROL) LIQD  calcium carbonate-vitamin D (CALCIUM + D) 600-200 MG-UNIT TABS  celecoxib (CELEBREX) 200 MG capsule  EUTHYROX 25 MCG tablet  fluticasone-salmeterol (ADVAIR) 100-50 MCG/DOSE inhaler  folic acid (FOLVITE) 1 MG tablet  leucovorin (WELLCOVORIN) 5 MG tablet  Methotrexate Sodium (METHOTREXATE PO)  montelukast (SINGULAIR) 10 MG tablet  multivitamin, therapeutic with minerals (MULTI-VITAMIN) TABS  nitroFURantoin macrocrystal-monohydrate (MACROBID) 100 MG capsule  nystatin (MYCOSTATIN) 692788 UNIT/GM external cream  omeprazole (PRILOSEC) 40 MG DR capsule  order for DME  potassium chloride ER (K-DUR/KLOR-CON M) 20 MEQ CR tablet  pramipexole (MIRAPEX) 0.25 MG tablet  predniSONE (DELTASONE) 2.5 MG tablet  Probiotic Product (PROBIOTIC DAILY PO)  propranolol (INDERAL) 20 MG tablet  RiTUXimab (RITUXAN IV)  venlafaxine (EFFEXOR-XR) 75 MG 24 hr capsule          Review of Systems   Constitutional: Negative for chills and fever.   Musculoskeletal: Positive for arthralgias (left foot). Negative for joint swelling.   Skin: Negative for color change, rash and wound.   Neurological: Negative for numbness.       Physical Exam   BP: 133/75  Pulse: 58  Temp: 97.2  F (36.2  C)  Resp: 16  SpO2: 99 %      Physical Exam  Constitutional:       General: She is not in acute distress.     Appearance: Normal appearance.   Cardiovascular:      Pulses:           Dorsalis pedis pulses are 2+ on the left side.   Musculoskeletal:      Right lower le+ Pitting Edema present.      Left lower le+ Pitting Edema present.        Feet:    Skin:     General: Skin is warm and dry.      Capillary Refill: Capillary refill takes less than 2 seconds.      Coloration: Skin is not pale.   Neurological:      Mental Status: She is alert and oriented to person, place, and time.      Gait: Gait is intact.   Psychiatric:         Mood and Affect: Mood normal.         Behavior: Behavior is  cooperative.         ED Course        Procedures           Results for orders placed or performed during the hospital encounter of 08/03/21 (from the past 24 hour(s))   Foot XR, G/E 3 views, left    Narrative    Exam: XR FOOT LEFT G/E 3 VIEWS    Technique: Left foot, 3 Views    Comparison: MRI of the left foot on 7/12/2016; left foot radiographs  on 2/4/2013    Exam reason: left foot pain. Pt requested xr.    Findings:    There is a new mildly displaced fracture fragment along the lateral  aspect of the base of the second proximal phalanx, which may represent  an acute fracture.    There has been plate and screw arthrodesis of the first MTP joint.  Surgical hardware appears intact, without evidence of loosening.    A surgical screw is present in the proximal aspect of the fifth  metatarsal, traversing a previously demonstrated proximal metatarsal  fracture. There is residual sclerosis and surface irregularity at the  site of the fracture, which is similar to 2013.    There is stable cortical irregularity in the mid third metatarsal  shaft, compatible with remote healed fracture, not significantly  changed since 2013.    Otherwise mild scattered degenerative changes.      Impression    Impression:  1. New mildly displaced fracture fragment along the lateral aspect of  the base of the second proximal phalanx which may represent an acute  fracture.  2. Postsurgical and other chronic findings as above.    PASCUAL SCHWARTZ MD         SYSTEM ID:  [id number]       Medications - No data to display    Assessments & Plan (with Medical Decision Making)     I have reviewed the nursing notes.    I have reviewed the findings, diagnosis, plan and need for follow up with the patient.  (M79.672) Left foot pain  (primary encounter diagnosis)  (Z98.890) H/O foot surgery  Comment: acute, symptomatic  Plan: No acute findings by my reading. Will call if radiologist reads differently - she has boot at home. She also would like images revived  by Dr. King her foot surgeon so I will forward my note to him.   Symptomatic treatments recommended:  - Elevation  - Ice  - minimize aggravating activities        RETURN TO THE ED WITH NEW OR WORSENING SYMPTOMS.    FOLLOW-UP WITH YOUR PRIMARY CARE PROVIDER IN 7-10 DAYS.      Nadira Merchant CNP    Discharge Medication List as of 8/3/2021  2:34 PM          Final diagnoses:   Left foot pain   H/O foot surgery   Closed displaced fracture of proximal phalanx of lesser toe of left foot, initial encounter       8/3/2021   [facility]     Nadira Merchant CNP  08/03/21 [id number]

## 2021-08-03 NOTE — ED TRIAGE NOTES
Pt presents with left foot pain. Unsure of what she did to it but has had a hx of previous surgeries on that foot. Reports she does have a pin in that foot and has broken foot by simply walking across the floor before. States she would like an xray to make sure it is okay. Pt has only taking her Celebrex that she takes for her RA. Foot pain started on Friday.

## 2021-08-10 NOTE — PROGRESS NOTES
08/09/21 1300   General Information   Type of Visit Initial OP Ortho PT Evaluation   Start of Care Date 08/09/21   Referring Physician Roberta Horne CNP   Patient/Family Goals Statement Less pain   Orders Evaluate and Treat   Medical Diagnosis Pain of R hip and thigh   Surgical/Medical history reviewed Yes   Precautions/Limitations fall precautions   Weight-Bearing Status - LLE weight-bearing as tolerated   Weight-Bearing Status - RLE weight-bearing as tolerated   Body Part(s)   Body Part(s) Hip   Presentation and Etiology   Pertinent history of current problem (include personal factors and/or comorbidities that impact the POC) Pt reports she fell and injured her right posterior thigh on the 4th of July. Reports walking is very painful and so are stairs. Sitting is also painful if she sits for too long. Reports h/o back pain that has worsened since her fall. Pt also reports a foot fracture for which she is in a walking boot.    Impairments A. Pain;F. Decreased strength and endurance;H. Impaired gait   Functional Limitations perform activities of daily living   Symptom Location R posterior thigh , HS region   How/Where did it occur With a fall   Onset date of current episode/exacerbation 07/04/21   Chronicity New   Pain rating (0-10 point scale) Best (/10);Worst (/10)   Best (/10) 1   Worst (/10) 6   Pain quality B. Dull;C. Aching;F. Stabbing;G. Cramping   Frequency of pain/symptoms C. With activity   Pain/symptoms are: Worse during the day   Pain/symptoms exacerbated by A. Sitting;B. Walking;I. Bending;J. ADL;K. Home tasks   Pain/symptoms eased by C. Rest;F. Certain positions   Progression of symptoms since onset: Unchanged   Current / Previous Interventions   Diagnostic Tests: X-ray   X-ray Results unremarkable   Current Level of Function   Patient role/employment history F. Retired   Living environment House/townhome   Fall Risk Screen   Have you fallen 2 or more times in the past year? No   Have you fallen  and had an injury in the past year? Yes   Is patient a fall risk? No   Hip Objective Findings   Side (if bilateral, select both right and left) Right   Posture Normal   Gait/Locomotion Pt ambulates with walking boot on L , mildly decreased WB on right   Balance/Proprioception (Single Leg Stance) Unable to tolerate   Lumbar ROM WFL   Pelvic Screen L elevated ASIS, Leg length inch shorter on left   Scour Test Neg   ELIZ Test Neg   Palpation Painful to palpation R HS tendons and ITB   Right Hip Flexion PROM WFL   Right Hip Abduction PROM WFL   Right Hip Adduction PROM WFL   Right Hip ER PROM WFL   Right Hip IR PROM WFL   Right Hip Flexion Strength 5/5   Right Hip Abduction Strength 4/5   Right Hip Extension Strength 4/5   Right Hip IR Strength 5/5   Right Hip ER Strength 5/5   Right Knee Flexion Strength 3-/5 and painful   Right Knee Extension Strength 5/5   Right Hamstring Flexibility Pain with flexibility assessment   Planned Therapy Interventions   Planned Therapy Interventions gait training;joint mobilization;manual therapy;neuromuscular re-education;ROM;strengthening;stretching;transfer training   Planned Modality Interventions   Planned Modality Interventions Cryotherapy;Electrical stimulation;Hot packs;Ultrasound;Hydrotherapy   Planned Modality Interventions Comments As needed for pain   Clinical Impression   Criteria for Skilled Therapeutic Interventions Met yes, treatment indicated   PT Diagnosis R HS pain   Influenced by the following impairments Impaired ROM and strength , pain, impaired gait   Functional limitations due to impairments Decreased tolerance for daily activities   Clinical Presentation Stable/Uncomplicated   Clinical Presentation Rationale Clinical judgement   Clinical Decision Making (Complexity) Low complexity   Therapy Frequency 2 times/Week   Predicted Duration of Therapy Intervention (days/wks) up to 90 days   Risk & Benefits of therapy have been explained Yes   Patient, Family & other  staff in agreement with plan of care Yes   Clinical Impression Comments Pt demonstrates with symptoms consistent with hamstring strain. Pt is expected to improve with skilled PT services   ORTHO GOALS   PT Ortho Eval Goals 1;2;3;4   Ortho Goal 1   Goal Identifier STG 1   Goal Description Pt will demonstrate knowledge/understanding of HEP and report daily compliance   Target Date 08/23/21   Ortho Goal 2   Goal Identifier LTG 1   Goal Description Pt will complete all daily mobility tasks without being limited by RLE pain   Target Date 11/07/21   Ortho Goal 3   Goal Identifier LTG 2   Goal Progress Pt will demonstrate normal 5/5 strength BLE for improved gait and decreased risk of re-injury   Target Date 11/07/21   Total Evaluation Time   PT Eval, Low Complexity Minutes (36691) 20     I certify the need for these services furnished under this plan of treatment and while under my care. (Physician co-signature of this document indicates review and certification of the therapy plan).

## 2021-09-21 NOTE — Clinical Note
Please call Radha and let her know we DO have current orders for her to have her Rituxan infusion, they are good through this next one that is due, so she can be scheduled at any time! Thanks!

## 2021-09-21 NOTE — PROGRESS NOTES
Message from patient asking if orders received from Dr Sanchez for Rituxan. We have current orders on file. Note to PAC to call patient and schedule for next infusion as it appears she is due now per current orders.

## 2021-09-27 NOTE — PROGRESS NOTES
Patient is 71 years old, here accompanied by self today for infusion of Rituxan per order of Dr Costello under the supervision of Dr Sanchez.  Patient identified with two identifiers, order verified, and verbal consent for today's infusion obtained from patient.      Component      Latest Ref Rng & Units 9/23/2021   WBC      4.0 - 11.0 10e3/uL 11.9 (H)   RBC Count      3.80 - 5.20 10e6/uL 4.00   Hemoglobin      11.7 - 15.7 g/dL 13.4   Hematocrit      35.0 - 47.0 % 40.3   MCV      78 - 100 fL 101 (H)   MCH      26.5 - 33.0 pg 33.5 (H)   MCHC      31.5 - 36.5 g/dL 33.3   RDW      10.0 - 15.0 % 13.7   Platelet Count      150 - 450 10e3/uL 255   % Neutrophils      % 83   % Lymphocytes      % 7   % Monocytes      % 8   % Eosinophils      % 1   % Basophils      % 0   % Immature Granulocytes      % 1   NRBCs per 100 WBC      <1 /100 0   Absolute Neutrophils      1.6 - 8.3 10e3/uL 9.8 (H)   Absolute Lymphocytes      0.8 - 5.3 10e3/uL 0.9   Absolute Monocytes      0.0 - 1.3 10e3/uL 1.0   Absolute Eosinophils      0.0 - 0.7 10e3/uL 0.1   Absolute Basophils      0.0 - 0.2 10e3/uL 0.0   Absolute Immature Granulocytes      <=0.0 10e3/uL 0.1 (H)   Absolute NRBCs      10e3/uL 0.0   Creatinine      0.52 - 1.04 mg/dL 0.91   GFR Estimate      >60 mL/min/1.73m2 64   Sed Rate      0 - 30 mm/hr 14   CRP Inflammation      0.0 - 8.0 mg/L 6.5   AST      0 - 45 U/L 15   ALT      0 - 50 U/L 39   Albumin      3.4 - 5.0 g/dL 3.4       Patient meets order parameters for today's treatment.       IV pump verified with dose, drug, and rate of administration.  Infusion administered per protocol.  Patient tolerated infusion well, no signs or symptoms of adverse reaction noted.  Patient denies pain nor discomfort.     IV removed, catheter intact.  Site clean, dry and intact.  No signs or symptoms of infiltration or infection.  Covered with a sterile bandage, slight pressure applied for 30 seconds.  Pt instructed to leave bandage intact for a minimum of  one hour, and to call with questions or concerns.  Copy of appointments, discharge instructions, and after visit summary (AVS) provided to patient.  Patient states understanding, discharged.

## 2021-09-27 NOTE — PROGRESS NOTES
22 gauge angio cath inserted into RT ARM.  Immediate blood return noted.  IV secured with sterile, transparent dressing and tape.  Patient tolerated well, denies pain or discomfort at this time.  Flushes easily without resistance, no signs or symptoms of infiltration or infection.  Flushed with 3mL normal saline to clear line. Patient denies questions or concerns regarding infusion and/or medication(s) being administered.

## 2021-10-29 NOTE — NURSING NOTE
"Chief Complaint   Patient presents with     UTI       Initial /72 (BP Location: Right arm, Patient Position: Sitting, Cuff Size: Adult Large)   Pulse 94   Temp 97.8  F (36.6  C) (Tympanic)   Resp 20   Wt 97.1 kg (214 lb)   SpO2 96%   BMI 34.56 kg/m   Estimated body mass index is 34.56 kg/m  as calculated from the following:    Height as of 9/27/21: 1.676 m (5' 5.98\").    Weight as of this encounter: 97.1 kg (214 lb).  Medication Reconciliation: complete  Robert Locke LPN  "

## 2021-10-29 NOTE — PROGRESS NOTES
"  Assessment & Plan     UTI (urinary tract infection), uncomplicated  Culture and sensitivity pending.  Responded well to Macrobid last time - several months ago.  Push fluids as well.  - nitroFURantoin macrocrystal-monohydrate (MACROBID) 100 MG capsule; Take 1 capsule (100 mg) by mouth 2 times daily for 7 days    Lower urinary tract symptoms (LUTS)  - UA reflex to Microscopic and Culture; Future       BMI:   Estimated body mass index is 34.56 kg/m  as calculated from the following:    Height as of 9/27/21: 1.676 m (5' 5.98\").    Weight as of this encounter: 97.1 kg (214 lb).       See Patient Instructions        Lanie Duggan MD  [facility] - ROSALINDA Garcia is a 71 year old who presents for the following health issues     HPI     Genitourinary - Female  Onset/Duration: 4 days  Description:   Painful urination (Dysuria): no           Frequency: YES - every2 hours  Blood in urine (Hematuria): no  Delay in urine (Hesitency): no  Intensity: mild  Progression of Symptoms:  worsening  Accompanying Signs & Symptoms:  Fever/chills: no  Flank pain: YES - low across abdomen and back; bilateral  Nausea and vomiting: no  Vaginal symptoms: none  Abdominal/Pelvic Pain: YES  History:   History of frequent UTI s: no  History of kidney stones: no  Sexually Active: no  Possibility of pregnancy: No  Precipitating or alleviating factors: None  Therapies tried and outcome:  none     7/29/21 - Kluyvera.  Was resistant to Bactrim.  Treated with Macrobid.  Remote recurrent UTI.    Otherwise none in 2020, 2021.    Abscess tooth a couple months ago.  2 rounds of antibiotics.  Finished 2 weeks ago.  Through the dentist.  Clindamycin?     Joint pains.    Review of Systems   Constitutional, HEENT, cardiovascular, pulmonary, gi and gu systems are negative, except as otherwise noted.      Objective    /72 (BP Location: Right arm, Patient Position: Sitting, Cuff Size: Adult Large)   Pulse 94   Temp 97.8 "  F (36.6  C) (Tympanic)   Resp 20   Wt 97.1 kg (214 lb)   SpO2 96%   BMI 34.56 kg/m    Body mass index is 34.56 kg/m .  Physical Exam   GENERAL: healthy, alert, no distress and over weight  NECK: no adenopathy, no asymmetry, masses, or scars and thyroid normal to palpation  RESP: lungs clear to auscultation - no rales, rhonchi or wheezes  CV: regular rate and rhythm, normal S1 S2, no S3 or S4, no murmur, click or rub, no peripheral edema and peripheral pulses strong  ABDOMEN: no organomegaly or masses, bowel sounds normal and mild tenderness across lower abdomen  MS: no gross musculoskeletal defects noted, no edema  PSYCH: mentation appears normal, affect normal/bright    No results found for this or any previous visit (from the past 24 hour(s)).

## 2021-11-01 NOTE — ED TRIAGE NOTES
Pt Is here with c/o not feeling well over the weekend got a covid test from [facility] notes its pending  Also notes she is prediabetic and has been checking her sugars states she has been going up since this weekend and last check she was in the 400's  Check pt BG today at it was 540   Pt notes she has been excessively thirsty and feeling tired  Also notes being treated for a UTI so is on abx

## 2021-11-01 NOTE — ED TRIAGE NOTES
Patient presents with complaints of not feeling well, states she wants to be tested for COVID as she states she's been achy as well as fatigued. Does endorse a COVID test at [facility] yesterday and awaiting result. Patient also endorses a high blood sugar but states she is not diabetic and only checks it if ill as she states her sugars get high when ill.

## 2021-11-01 NOTE — ED PROVIDER NOTES
History     Chief Complaint   Patient presents with     Covid 19 Testing     Hyperglycemia     HPI  Radha Fox is a 71 year old female who developed symptoms of UTI this past Tuesday.  Came to the clinic and was treated for the UTI on Friday (3 days ago.)  Her UTI symptoms have mostly resolved at this time except for mild low back pain.  Friday evening she started developing symptoms of chills, fatigue, fever, cough, shortness of breath, diarrhea 10 times in the past 24 hours, body aches, headaches, and lightheadedness.  She took ibuprofen last evening that did not seem to help decrease her symptoms.  History of rheumatoid arthritis and is taking 3 mg of prednisone daily.  Is diet-controlled type 2 diabetes.  History of asthma.  Non-smoker.  No known sick contacts.  Second  dose of Covid vaccine 3/2021.  Glucose at home was running in the 400s.  Denies nausea, vomiting, chest pain, chest tightness, and wheezing.    Allergies:  Allergies   Allergen Reactions     Neomycin-Polymyxin-Hc      Rash behind ear after ear drops     Penicillins Hives and Itching     One time large local reaction after IM PCN in 1970, no airway symptoms     Plaquenil [Hydroxychloroquine Sulfate] Hives       Problem List:    Patient Active Problem List    Diagnosis Date Noted     Hypothyroidism, unspecified type 02/12/2020     Priority: Medium     Obesity (BMI 35.0-39.9) with comorbidity (H) 09/09/2019     Priority: Medium     Type 2 diabetes mellitus without complication (H) 10/30/2017     Priority: Medium     Chronic cough 08/17/2016     Priority: Medium     ACP (advance care planning) 07/21/2016     Priority: Medium     Advance Care Planning 7/21/2016: ACP Review of Chart / Resources Provided:  Reviewed chart for advance care plan.  Radha Fox has no plan or code status on file. Discussed available resources and provided with information. Confirmed code status reflects current choices pending further ACP discussions.   Confirmed/documented legally designated decision makers.  Added by Gregoria Gregorio             Diabetes mellitus type 2, diet-controlled (H)      Priority: Medium     Rheumatoid arthritis of multiple sites without rheumatoid factor (H) 11/10/2015     Priority: Medium     SNHL (sensory-neural hearing loss), asymmetrical 07/08/2014     Priority: Medium     negative MRI IAC 7/2014       S/P myringotomy with insertion of tube 07/08/2014     Priority: Medium     History of chronic sinusitis 07/08/2014     Priority: Medium     History of sinus surgery 05/28/2014     Priority: Medium     Simple chronic serous otitis media 05/28/2014     Priority: Medium     Problem list name updated by automated process. Provider to review       Dysfunction of eustachian tube 05/28/2014     Priority: Medium     Mixed hearing loss, unilateral 05/28/2014     Priority: Medium     Recurrent UTI 03/20/2013     Priority: Medium     RA (rheumatoid arthritis) (H) 03/12/2013     Priority: Medium     Insomnia 01/24/2013     Priority: Medium     Problem list name updated by automated process. Provider to review       Osteoporosis 01/19/2012     Priority: Medium     Problem list name updated by automated process. Provider to review       Obesity 01/19/2012     Priority: Medium     Problem list name updated by automated process. Provider to review       Restless legs syndrome (RLS) 04/21/2011     Priority: Medium     Esophageal reflux 11/12/2010     Priority: Medium     Contact dermatitis and other eczema, due to unspecified cause 03/05/2009     Priority: Medium     Coronary atherosclerosis 09/27/2005     Priority: Medium     Problem list name updated by automated process. 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 Provider to review       Depressive disorder, not elsewhere classified 01/11/2002     Priority: Medium     Sinusitis, chronic 09/05/2000     Priority: Medium     Problem list name updated by automated process. Provider to review       Arthropathy 05/01/2000     Priority: Medium     Problem list name updated by automated process. Provider to review       Diffuse connective tissue disease (H) 04/04/2000     Priority: Medium     Problem list name updated by automated process. Provider to review          Past Medical History:    Past Medical History:   Diagnosis Date     Abnormal PFT      Arthropathy, unspecified, site unspecified 05/01/2000     Chest pain, unspecified 2005     Contact dermatitis and other eczema, due to unspecified cause 03/05/2009     Coronary atherosclerosis of unspecified type of vessel, native or graft 09/27/2005     Depressive disorder, not elsewhere classified 01/11/2002     Diabetes mellitus type 2, diet-controlled (H)      Esophageal reflux 11/12/2010     H/O: hysterectomy 1977     Impaired fasting glucose 11/12/2010     Insomnia, unspecified 01/24/2013     Leukocytosis, unspecified 12/22/2007     Obesity, unspecified 01/19/2012     Osteoporosis, unspecified 01/19/2012     Other and unspecified hyperlipidemia 04/13/2005     Other dyspnea and respiratory abnormality 01/19/2012     Restless legs syndrome (RLS) 04/21/2011     Rheumatoid arthritis(714.0) 11/09/1999     Unspecified diffuse connective tissue disease 04/04/2000     Unspecified sinusitis (chronic) 09/05/2000       Past Surgical History:    Past Surgical History:   Procedure Laterality Date     APPENDECTOMY  1961     CHOLECYSTECTOMY  1973     COLONOSCOPY N/A 3/23/2015    Procedure: COLONOSCOPY;  Surgeon: Clarisa Larkin MD;  Location: [facility]      COLONOSCOPY N/A 2/21/2019    Procedure: COLONOSCOPY;  Surgeon: Milton Gracia MD;  Location: [facility]     endoscopic sphenoidotomy  10/2011    Right     ETHMOIDECTOMY Bilateral 9/27/2016    Procedure: ETHMOIDECTOMY;  Surgeon: Aracelis Ayon MD;  Location: [facility]     excision bilateral telma bullosa  10/2011     GENITOURINARY SURGERY       HYSTERECTOMY      benign     IR CONSULTATION FOR IR EXAM  9/26/2019     IR FLUORO 0-1 HOUR  9/26/2019     LAPAROSCOPY DIAGNOSTIC (GENERAL)       ORTHOPEDIC SURGERY      left foot 5th metarsal fracture screws placed     ORTHOPEDIC SURGERY Left 04/22/2019    Dr. King; first MPJ fusion     PFT GENERAL LAB TESTING  10/17/2019    mild obstructive; moderate restriction; moderately severe diffusion deffect.  same as 2016     SEPTOPLASTY N/A 9/27/2016    Procedure: SEPTOPLASTY;  Surgeon: Aracelis Ayon MD;  Location: [facility]     third metatarsal fracture Left 01/2019    stress fracture; Dr. King     TOT Internal urethrotomy  2010     TUBAL LIGATION         Family History:    Family History   Problem Relation Age of Onset     C.A.D. Father 83     Other - See Comments Father         CHF     Cerebrovascular Disease Father         CVA     Diabetes Father      Hypertension Father      Coronary Artery Disease Father      Cerebrovascular Disease Mother 72        CVA     Heart Disease Mother         Heart Issue     Hyperlipidemia Mother      Coronary Artery Disease Mother      Diabetes Brother      Diabetes Sister      Colon Cancer No family hx of      Breast Cancer No family hx of      Asthma No family hx of      Thyroid Disease No family hx of        Social History:  Marital Status:   [2]  Social History     Tobacco Use     Smoking status: Never Smoker     Smokeless tobacco: Never Used   Substance Use Topics     Alcohol use: No     Alcohol/week: 0.0 standard drinks     Drug use: No        Medications:    azithromycin (ZITHROMAX) 250 MG tablet  albuterol  (PROAIR HFA/PROVENTIL HFA/VENTOLIN HFA) 108 (90 Base) MCG/ACT inhaler  ASPIRIN PO  atorvastatin (LIPITOR) 20 MG tablet  blood glucose (ONETOUCH ULTRA) test strip  Blood Glucose Calibration (ACCU-CHEK COMPACT BLUE CONTROL) LIQD  calcium carbonate-vitamin D (CALCIUM + D) 600-200 MG-UNIT TABS  celecoxib (CELEBREX) 200 MG capsule  EUTHYROX 25 MCG tablet  fluticasone-salmeterol (ADVAIR) 100-50 MCG/DOSE inhaler  folic acid (FOLVITE) 1 MG tablet  leucovorin (WELLCOVORIN) 5 MG tablet  Methotrexate Sodium (METHOTREXATE PO)  multivitamin, therapeutic with minerals (MULTI-VITAMIN) TABS  nitroFURantoin macrocrystal-monohydrate (MACROBID) 100 MG capsule  nystatin (MYCOSTATIN) 170223 UNIT/GM external cream  omeprazole (PRILOSEC) 40 MG DR capsule  order for DME  potassium chloride ER (K-DUR/KLOR-CON M) 20 MEQ CR tablet  pramipexole (MIRAPEX) 0.25 MG tablet  predniSONE (DELTASONE) 2.5 MG tablet  Probiotic Product (PROBIOTIC DAILY PO)  propranolol (INDERAL) 20 MG tablet  RiTUXimab (RITUXAN IV)  venlafaxine (EFFEXOR-XR) 75 MG 24 hr capsule          Review of Systems   Constitutional: Positive for activity change, chills, fatigue and fever.   HENT: Negative for ear pain, rhinorrhea, sinus pressure, sinus pain and sore throat.    Eyes: Negative.    Respiratory: Positive for cough and shortness of breath. Negative for chest tightness.    Cardiovascular: Negative for chest pain.   Gastrointestinal: Positive for diarrhea (ten times past 24 hours). Negative for nausea and vomiting.   Genitourinary:        Symptoms have resolved   Musculoskeletal: Positive for back pain (low back pain has improved. rt UTI) and myalgias.   Neurological: Positive for light-headedness and headaches. Negative for dizziness.       Physical Exam   BP: 134/62  Pulse: 72  Temp: 97.8  F (36.6  C)  Resp: 18  SpO2: 95 %      Physical Exam  Vitals and nursing note reviewed.   Constitutional:       General: She is in acute distress (Mild to moderate).      Appearance:  She is overweight.   HENT:      Head: Normocephalic.      Right Ear: Tympanic membrane and ear canal normal.      Left Ear: Tympanic membrane and ear canal normal.      Nose: Nose normal.      Right Sinus: No maxillary sinus tenderness or frontal sinus tenderness.      Left Sinus: No maxillary sinus tenderness or frontal sinus tenderness.      Mouth/Throat:      Lips: Pink.      Mouth: Mucous membranes are moist.      Pharynx: Uvula midline. No posterior oropharyngeal erythema.   Eyes:      Conjunctiva/sclera: Conjunctivae normal.   Cardiovascular:      Rate and Rhythm: Normal rate and regular rhythm.      Heart sounds: Normal heart sounds. No murmur heard.     Pulmonary:      Effort: Pulmonary effort is normal. No respiratory distress.      Breath sounds: Normal air entry. Examination of the left-lower field reveals rales. Rales present. No wheezing.   Lymphadenopathy:      Cervical: No cervical adenopathy.   Skin:     General: Skin is warm and dry.      Capillary Refill: Capillary refill takes less than 2 seconds.      Coloration: Skin is pale.   Neurological:      Mental Status: She is alert and oriented to person, place, and time.   Psychiatric:         Behavior: Behavior normal.         ED Course        Procedures             Results for orders placed or performed during the hospital encounter of 11/01/21 (from the past 24 hour(s))   Glucose by meter   Result Value Ref Range    GLUCOSE BY METER POCT 540 (HH) 70 - 99 mg/dL   Glucose by meter   Result Value Ref Range    GLUCOSE BY METER POCT 540 (HH) 70 - 99 mg/dL   Glucose by meter   Result Value Ref Range    GLUCOSE BY METER POCT 540 (HH) 70 - 99 mg/dL   Comprehensive metabolic panel   Result Value Ref Range    Sodium 133 133 - 144 mmol/L    Potassium 4.1 3.4 - 5.3 mmol/L    Chloride 101 94 - 109 mmol/L    Carbon Dioxide (CO2) 27 20 - 32 mmol/L    Anion Gap 5 3 - 14 mmol/L    Urea Nitrogen 19 7 - 30 mg/dL    Creatinine 0.79 0.52 - 1.04 mg/dL    Calcium 8.2 (L)  8.5 - 10.1 mg/dL    Glucose 555 (HH) 70 - 99 mg/dL    Alkaline Phosphatase 88 40 - 150 U/L    AST 41 0 - 45 U/L    ALT 54 (H) 0 - 50 U/L    Protein Total 6.4 (L) 6.8 - 8.8 g/dL    Albumin 2.8 (L) 3.4 - 5.0 g/dL    Bilirubin Total 0.4 0.2 - 1.3 mg/dL    GFR Estimate 76 >60 mL/min/1.73m2   Lactic acid whole blood   Result Value Ref Range    Lactic Acid 1.5 0.7 - 2.0 mmol/L   Hemoglobin A1c   Result Value Ref Range    Estimated Average Glucose 229 mg/dL    Hemoglobin A1C 9.6 (H) 0.0 - 5.6 %   XR Chest Port 1 View    Narrative    Exam:  XR CHEST PORT 1 VIEW    HISTORY: short of breath. crackles LLL. hx asthma.    COMPARISON:  2/11/2019, CT of the chest on 10/14/2019    FINDINGS:     The cardiomediastinal contours are normal.      There is streaky opacity in the mid to lower left lung, which  partially obscures the diaphragm. Right lung is clear.      No pleural effusion or pneumothorax.    No acute osseous abnormality.       Impression    IMPRESSION:      Opacity in the mid to lower left lung which is compatible with  pneumonia in the appropriate clinical setting.      PASCUAL SCHWARTZ MD         SYSTEM ID:  [id number]   Glucose by meter   Result Value Ref Range    GLUCOSE BY METER POCT 402 (H) 70 - 99 mg/dL       Medications   0.9% sodium chloride BOLUS (0 mLs Intravenous Stopped 11/1/21 1659)   insulin glargine (LANTUS PEN) injection 10 Units (10 Units Subcutaneous Given 11/1/21 1615)       Assessments & Plan (with Medical Decision Making)     I have reviewed the nursing notes.    I have reviewed the findings, diagnosis, plan and need for follow up with the patient.  (Z20.822) Encounter for laboratory testing for COVID-19 virus  (J18.9) Pneumonia  (R73.9) Hyperglycemia  Comment: 71 year old female who developed symptoms of UTI this past Tuesday.  Came to the clinic and was treated for the UTI on Friday (3 days ago.)  Her UTI symptoms have mostly resolved at this time except for mild low back pain.  Friday evening she  started developing symptoms of chills, fatigue, fever, cough, shortness of breath, diarrhea 10 times in the past 24 hours, body aches, headaches, and lightheadedness.  She took ibuprofen last evening that did not seem to help decrease her symptoms.  History of rheumatoid arthritis and is taking 3 mg of prednisone daily.  Is diet-controlled type 2 diabetes.  History of asthma.  Non-smoker.  No known sick contacts.  Second  dose of Covid vaccine 3/2021.  Glucose at home was running in the 400s.  Denies nausea, vomiting, chest pain, chest tightness, and wheezing.    MDM: NHT. Lungs CTA except for coarse crackles LLL    Covid test pending  Hemoglobin A1c 9.6.  Lactic acid 1.5  CMP has normal electrolytes and renal function.  Anion gap and carbon dioxide normal    1 L of normal saline given IV    consulted with Dr. Kemp in ER.  Will start her on Lantus 10 units at bedtime as per discussion.  This was reviewed per pharmacy and okay. recheck blood glucose 402    Chest x-ray reviewed per radiology; opacity in the mid to lower left lung which is compatible with pneumonia in the appropriate clinical setting.    Plan: Follow-up with primary care provider this week.  Return to ER for worsening of symptoms  Will call you if your covid results are positive  Recheck your blood sugar at 6 pm  Lantus 10 units every night. DO NOT TAKE TONIGHT. YOU HAD INSULIN AT URGENT CARE  Z-pack. Complete medication for UTI.  Education provided and/or discussed for this/these medications, diabetes, diabetic insulin reaction, and after Covid testing    These discharge instructions and medications were reviewed with her and understanding verbalized.    This document was prepared using a combination of typing and voice generated software.  While every attempt was made for accuracy, spelling and grammatical errors may exist.    New Prescriptions    AZITHROMYCIN (ZITHROMAX) 250 MG TABLET    Take 2 tablets (500 mg) by mouth daily for 1 day, THEN 1  tablet (250 mg) daily for 4 days.       Final diagnoses:   Hyperglycemia   Encounter for laboratory testing for COVID-19 virus   Pneumonia       11/1/2021   [facility]       Jessica Monahan, CNP  11/01/21 [id number]

## 2021-11-01 NOTE — DISCHARGE INSTRUCTIONS
Follow-up with primary care provider this week.  Return to ER for worsening of symptoms  Will call you if your covid results are positive  Recheck your blood sugar at 6 pm  Lantus 10 units every night. DO NOT TAKE TONIGHT. YOU HAD INSULIN AT URGENT CARE

## 2021-11-02 NOTE — TELEPHONE ENCOUNTER
Patient is pending a covid test through [facility]. Advised spouse to call back if test is positive. Spouse verbalized understanding.    Emergency Department and Urgent Care Follow-up      Reason for ER/UC visit: pneumonia and hyperglycemia  o Date seen: 11/1/21      New or Worsening symptoms:  no       Prescription Received/Picked up from Pharmacy?: yes. azithromycin   o Medications started? yes  o Any questions or issues regarding your prescription?: no      Follow-up Results or Labs that are pending: covid test pending from [facility]      Questions or concerns?: no      ER Recommends Follow-up by: follow up with PCP this week      RN Recommendations: Follow up with PCP or covering provider or return to ER/UC for new or worsening symptoms  o Appointment scheduled: yes with Roberta Horne on 11/04 per spouse request.    If you start feeling worse, or have any further questions, please feel free to contact Nurse Triage at [phone].  If needing immediate medical attention at any time please call 911/Go to the ER.

## 2021-11-03 NOTE — PROGRESS NOTES
"  Assessment & Plan   1. Pneumonia of left lower lobe due to infectious organism  I went in to recheck patient's SpO2 and have her ambulate with the plan of switching her to Levaquin with close interval follow up. She was laying down after blood draw due to fatigue and being uncomfortable in the chair.  Hands were very cold (normal for her per pt) and so I had her run hands under warm water. After this, she de-sated to 79 % and then stayed at 84-88% for a few minutes and breathing rapidly and deeply at 24/min, which was different than prior to the blood draw. Decision made to send her via private vehicle to nearest ED ([facility]). Hand off called to Oziel Wells PA-C.   - CBC with platelets and differential; Future  - ESR: Erythrocyte sedimentation rate; Future  - CRP, inflammation; Future  - CBC with platelets and differential  - ESR: Erythrocyte sedimentation rate  - CRP, inflammation  - Symptomatic COVID-19 Virus (Coronavirus) by PCR Nose- Cancelled. Sent to ED.     2. Type 2 diabetes mellitus with hyperglycemia, without long-term current use of insulin (H)  - Glucose whole blood; Future  - Glucose whole blood    3. Diarrhea, unspecified type  - Clostridium difficile toxin B PCR; Future  - Clostridium difficile toxin B PCR    Discussion of management or test interpretation with external physician/other qualified healthcare professional/appropriate source - ER provider  Ordering of each unique test       No follow-ups on file.    Roberta Horne, CNP  [facility] KARISSA Garcia is a 71 year old who presents for the following health issues     HPI   ED/UC Followup:    Facility:  [facility]  Date of visit: 11/01/21  Reason for visit: hyperglycemia/pneumonia  Current Status: same    Diarrhea in past 24 x 8-9 times.   Vomited clear fluids last night.   \"No appetite\". Has had small amt of yogurt of lunch, 1/2 slice of toast and juice for breakfast.  Also some " "cheese today.  Tolerating fluids well. \"I am drinking all the time through the day\". Est about 8, 16oz glasses a day.   Tested at [facility] Sunday and negative result came Tues 11/2/21.   Had influenza vaccine on 10/26/21  Hx of clindamycin for tooth abscess.   Glucose at home: Today 368 random; Yesterday 290-350 range. Was started on basal insulin (lantus) in ED at 10 units  Has been receiving ritruxan IV infusions for years with last dose on 9/27/21  Chronic low dose prednisone.  Hx: Asthma on advair and albuterol  COVIX vaccinated x 2 with Moderna.        Current antibiotics:   -macrobid for UTI prescribed by Dr. Costello- has 24 hours left  - Azithromycin for pneumonia     Review of Systems   Constitutional, HEENT, cardiovascular, pulmonary, gi and gu systems are negative, except as otherwise noted.      Objective    /62 (BP Location: Right arm, Patient Position: Sitting, Cuff Size: Adult Regular)   Pulse 69   Temp 97.7  F (36.5  C) (Tympanic)   Resp 21   SpO2 90%   There is no height or weight on file to calculate BMI.     Physical Exam   GENERAL: healthy, alert and no distress  EYES: Eyes grossly normal to inspection, PERRL and conjunctivae and sclerae normal  HENT: ear canals and TM's normal, nose and mouth without ulcers or lesions  NECK: no adenopathy, no asymmetry, masses, or scars and thyroid normal to palpation  RESP: rales L lower posterior. Slightly dim bases. Clear upper and middle right lobes  CV: regular rate and rhythm, normal S1 S2, no S3 or S4, no murmur, click or rub, no peripheral edema and peripheral pulses strong  ABDOMEN: soft, nontender, no hepatosplenomegaly, no masses and bowel sounds normal  MS: no gross musculoskeletal defects noted, no edema  SKIN: no suspicious lesions or rashes  NEURO: Normal strength and tone, mentation intact and speech normal  PSYCH: mentation appears normal, affect normal/bright  BACK: No CVA tenderness  Results for orders placed or performed in visit on " 11/04/21   ESR: Erythrocyte sedimentation rate     Status: Normal   Result Value Ref Range    Erythrocyte Sedimentation Rate 30 0 - 30 mm/hr   CBC with platelets and differential     Status: Abnormal   Result Value Ref Range    WBC Count 2.9 (L) 4.0 - 11.0 10e3/uL    RBC Count 3.92 3.80 - 5.20 10e6/uL    Hemoglobin 13.1 11.7 - 15.7 g/dL    Hematocrit 37.8 35.0 - 47.0 %    MCV 96 78 - 100 fL    MCH 33.4 (H) 26.5 - 33.0 pg    MCHC 34.7 31.5 - 36.5 g/dL    RDW 14.1 10.0 - 15.0 %    Platelet Count 106 (L) 150 - 450 10e3/uL   Manual Differential     Status: Abnormal   Result Value Ref Range    % Neutrophils 67 %    % Lymphocytes 15 %    % Monocytes 18 %    % Eosinophils 0 %    % Basophils 0 %    Absolute Neutrophils 1.9 1.6 - 8.3 10e3/uL    Absolute Lymphocytes 0.4 (L) 0.8 - 5.3 10e3/uL    Absolute Monocytes 0.5 0.0 - 1.3 10e3/uL    Absolute Eosinophils 0.0 0.0 - 0.7 10e3/uL    Absolute Basophils 0.0 0.0 - 0.2 10e3/uL    RBC Morphology Confirmed RBC Indices     Platelet Assessment  Automated Count Confirmed. Platelet morphology is normal.     Automated Count Confirmed. Platelet morphology is normal.    Pathologist Review Comments     Glucose whole blood     Status: Abnormal   Result Value Ref Range    Glucose Whole Blood 379 (H) 60 - 99 mg/dL   CBC with platelets and differential     Status: Abnormal    Narrative    The following orders were created for panel order CBC with platelets and differential.  Procedure                               Abnormality         Status                     ---------                               -----------         ------                     CBC with platelets and d...[[id number]]  Abnormal            Final result               Manual Differential[[id number]]          Abnormal            Final result                 Please view results for these tests on the individual orders.   Extra Tube     Status: None (In process)    Narrative    The following orders were created for panel order Extra  Tube.  Procedure                               Abnormality         Status                     ---------                               -----------         ------                     Extra Serum Separator Tu...[[id number]]                      In process                   Please view results for these tests on the individual orders.     COVID cancelled as I am sending patient to the ED.

## 2021-11-04 NOTE — NURSING NOTE
"Chief Complaint   Patient presents with     UC Follow-Up       Initial /62 (BP Location: Right arm, Patient Position: Sitting, Cuff Size: Adult Regular)   Pulse 69   Temp 97.7  F (36.5  C) (Tympanic)   Resp 21   SpO2 90%  Estimated body mass index is 34.56 kg/m  as calculated from the following:    Height as of 9/27/21: 1.676 m (5' 5.98\").    Weight as of 10/29/21: 97.1 kg (214 lb).  Medication Reconciliation: complete  Estela Antony LPN  "

## 2022-05-04 NOTE — PHARMACY
For Rituxan infusion given on 2/13/17, the patient supplied her own drug so there should be no charge.  There should be a charge associated with the NaCl which has now been fixed.     Violette Velasco, PharmTANNER  3/14/2017  1:31 PM    
02-May-2022 04:48

## 2024-01-23 NOTE — TELEPHONE ENCOUNTER
Options D&C vs EB  Lining on imaging 14mm, h/o cervical polyp removal in the office.  At this time I recommend D&C Hysteroscopy with possible polypectomy    We reviewed the risks of the surgery including but not limited to infection, bleeding, injury to nearby organs (bowel. bladder, ureter, blood vessel, nerve) and possible long term consequences of such injury, as well as possibility of  need for blood transfusion and other resuscitative measures.      Please call patient to try to set up follow up with Jo or Dr. Ayon, per Dr. Ayon's recommendation.  Let me know if new referral is needed.

## 2024-02-05 NOTE — PATIENT INSTRUCTIONS
Thank you for allowing Dr. Ayon and our ENT team to participate in your care.  If your medications are too expensive, please give the nurse a call.  We can possibly change this medication.  If you have a scheduling or an appointment question please contact our Health Unit Coordinator at their direct line [phone].   ALL nursing questions or concerns can be directed to your ENT nurse at: [phone] - Candy    Continue Flonase  Use Katie Med Nasal Saline Once Daily  Sinuses look excellent  Ask your dentist about an oral appliance for possible teeth grinding  Follow up with ENT as needed    Use high volume katie med saline irrigation.  Use warm distilled water and 2 packets of the salt solution that comes with the bottle, dissolve in bottle up to 240 ml mary.  Irrigate each side of your nose leaning over the sink, using 1/3 to 1/2 the volume of the bottle in each nostril every irrigation.  Irrigate 1-2 times daily and as needed.        
12

## (undated) DEVICE — CONNECTOR-ERBEFLO 2 PORT

## (undated) DEVICE — SENSOR-OXISENSOR II ADULT

## (undated) DEVICE — IRRIGATION-H2O 1000ML

## (undated) DEVICE — TUBING-SUCTION 20FT

## (undated) DEVICE — CANISTER-SUCTION 2000CC

## (undated) RX ORDER — PROPOFOL 10 MG/ML
INJECTION, EMULSION INTRAVENOUS
Status: DISPENSED
Start: 2019-02-21